# Patient Record
Sex: FEMALE | Race: WHITE | Employment: OTHER | URBAN - METROPOLITAN AREA
[De-identification: names, ages, dates, MRNs, and addresses within clinical notes are randomized per-mention and may not be internally consistent; named-entity substitution may affect disease eponyms.]

---

## 2018-08-24 ENCOUNTER — TRANSFERRED RECORDS (OUTPATIENT)
Dept: MULTI SPECIALTY CLINIC | Facility: CLINIC | Age: 83
End: 2018-08-24

## 2018-08-24 LAB
ALBUMIN SERPL-MCNC: 4.7 G/DL (ref 3.2–4.6)
ALP SERPL-CCNC: 55 IU/L (ref 50–136)
ALT SERPL-CCNC: 16 IU/L (ref 8–45)
ANION GAP SERPL CALCULATED.3IONS-SCNC: 9 MMOL/L (ref 5–18)
AST SERPL-CCNC: 20 IU/L (ref 2–40)
BILIRUB SERPL-MCNC: 0.6 MG/DL (ref 0.2–1.2)
BILIRUBIN DIRECT: 0.3 MG/DL (ref 0.1–0.5)
BUN SERPL-MCNC: 15 MG/DL (ref 8–25)
CALCIUM SERPL-MCNC: 9.9 MG/DL (ref 8.5–10.5)
CHLORIDE SERPLBLD-SCNC: 97 MMOL/L (ref 98–110)
CHOLEST SERPL-MCNC: 179 MG/DL (ref 100–199)
CO2 SERPL-SCNC: 29 MMOL/L (ref 21–31)
CREAT SERPL-MCNC: 0.78 MG/DL (ref 0.57–1.11)
DIFFERENTIAL: NORMAL
ERYTHROCYTE [DISTWIDTH] IN BLOOD BY AUTOMATED COUNT: 13.2 % (ref 11.5–15.5)
GFR SERPL CREATININE-BSD FRML MDRD: >60 ML/MIN/1.73M2
GLUCOSE SERPL-MCNC: 93 MG/DL (ref 65–100)
HCT VFR BLD AUTO: 44.5 % (ref 33–51)
HDLC SERPL-MCNC: 58 MG/DL
HEMOGLOBIN: 14.9 G/DL (ref 12–16)
LDLC SERPL CALC-MCNC: 96 MG/DL
MCH RBC QN AUTO: 31.8 PG (ref 26–34)
MCHC RBC AUTO-ENTMCNC: 33.5 G/DL (ref 32–36)
MCV RBC AUTO: 95 FL (ref 80–100)
NONHDLC SERPL-MCNC: 121 MG/DL
PLATELET # BLD AUTO: 225 THOU/CU MM (ref 140–440)
POTASSIUM SERPL-SCNC: 5.3 MMOL/L (ref 3.5–5)
PROT SERPL-MCNC: 7.4 G/DL (ref 6–8)
RBC # BLD AUTO: 4.69 MIL/CU MM (ref 4–5.2)
SODIUM SERPL-SCNC: 135 MMOL/L (ref 135–145)
TRIGL SERPL-MCNC: 126 MG/DL
TSH SERPL-ACNC: 1.02 UIU/ML (ref 0.35–4.94)
WBC # BLD AUTO: 7 THOU/CU MM (ref 4.5–11)

## 2019-01-01 ENCOUNTER — APPOINTMENT (OUTPATIENT)
Dept: OCCUPATIONAL THERAPY | Facility: CLINIC | Age: 84
DRG: 065 | End: 2019-01-01
Attending: PSYCHIATRY & NEUROLOGY
Payer: COMMERCIAL

## 2019-01-01 ENCOUNTER — APPOINTMENT (OUTPATIENT)
Dept: CT IMAGING | Facility: CLINIC | Age: 84
DRG: 065 | End: 2019-01-01
Attending: PSYCHIATRY & NEUROLOGY
Payer: COMMERCIAL

## 2019-01-01 ENCOUNTER — HOSPITAL ENCOUNTER (EMERGENCY)
Facility: CLINIC | Age: 84
Discharge: HOME | End: 2019-05-26
Attending: EMERGENCY MEDICINE | Admitting: EMERGENCY MEDICINE
Payer: COMMERCIAL

## 2019-01-01 ENCOUNTER — HOSPITAL LABORATORY (OUTPATIENT)
Dept: OTHER | Facility: CLINIC | Age: 84
End: 2019-01-01

## 2019-01-01 ENCOUNTER — NURSING HOME VISIT (OUTPATIENT)
Dept: GERIATRICS | Facility: CLINIC | Age: 84
End: 2019-01-01
Payer: COMMERCIAL

## 2019-01-01 ENCOUNTER — APPOINTMENT (OUTPATIENT)
Dept: PHYSICAL THERAPY | Facility: CLINIC | Age: 84
DRG: 643 | End: 2019-01-01
Payer: COMMERCIAL

## 2019-01-01 ENCOUNTER — APPOINTMENT (OUTPATIENT)
Dept: CT IMAGING | Facility: CLINIC | Age: 84
End: 2019-01-01
Attending: EMERGENCY MEDICINE
Payer: COMMERCIAL

## 2019-01-01 ENCOUNTER — APPOINTMENT (OUTPATIENT)
Dept: CT IMAGING | Facility: CLINIC | Age: 84
DRG: 065 | End: 2019-01-01
Attending: STUDENT IN AN ORGANIZED HEALTH CARE EDUCATION/TRAINING PROGRAM
Payer: COMMERCIAL

## 2019-01-01 ENCOUNTER — DOCUMENTATION ONLY (OUTPATIENT)
Dept: ONCOLOGY | Facility: CLINIC | Age: 84
End: 2019-01-01

## 2019-01-01 ENCOUNTER — APPOINTMENT (OUTPATIENT)
Dept: CT IMAGING | Facility: CLINIC | Age: 84
End: 2019-01-01
Attending: INTERNAL MEDICINE
Payer: COMMERCIAL

## 2019-01-01 ENCOUNTER — APPOINTMENT (OUTPATIENT)
Dept: SPEECH THERAPY | Facility: CLINIC | Age: 84
DRG: 643 | End: 2019-01-01
Payer: COMMERCIAL

## 2019-01-01 ENCOUNTER — APPOINTMENT (OUTPATIENT)
Dept: PHYSICAL THERAPY | Facility: CLINIC | Age: 84
DRG: 065 | End: 2019-01-01
Attending: PSYCHIATRY & NEUROLOGY
Payer: COMMERCIAL

## 2019-01-01 ENCOUNTER — TELEPHONE (OUTPATIENT)
Dept: GERIATRICS | Facility: CLINIC | Age: 84
End: 2019-01-01

## 2019-01-01 ENCOUNTER — DOCUMENTATION ONLY (OUTPATIENT)
Dept: CARE COORDINATION | Facility: CLINIC | Age: 84
End: 2019-01-01

## 2019-01-01 ENCOUNTER — APPOINTMENT (OUTPATIENT)
Dept: PHYSICAL THERAPY | Facility: CLINIC | Age: 84
DRG: 643 | End: 2019-01-01
Attending: PHYSICIAN ASSISTANT
Payer: COMMERCIAL

## 2019-01-01 ENCOUNTER — APPOINTMENT (OUTPATIENT)
Dept: OCCUPATIONAL THERAPY | Facility: CLINIC | Age: 84
DRG: 643 | End: 2019-01-01
Payer: COMMERCIAL

## 2019-01-01 ENCOUNTER — APPOINTMENT (OUTPATIENT)
Dept: SPEECH THERAPY | Facility: CLINIC | Age: 84
DRG: 065 | End: 2019-01-01
Attending: PSYCHIATRY & NEUROLOGY
Payer: COMMERCIAL

## 2019-01-01 ENCOUNTER — HOSPITAL ENCOUNTER (INPATIENT)
Facility: CLINIC | Age: 84
LOS: 8 days | Discharge: NURSING, REHAB OR LONG-TERM CARE | DRG: 065 | End: 2019-05-25
Attending: PSYCHIATRY & NEUROLOGY | Admitting: PSYCHIATRY & NEUROLOGY
Payer: COMMERCIAL

## 2019-01-01 ENCOUNTER — TRANSFERRED RECORDS (OUTPATIENT)
Dept: MULTI SPECIALTY CLINIC | Facility: CLINIC | Age: 84
End: 2019-01-01

## 2019-01-01 ENCOUNTER — INFUSION THERAPY VISIT (OUTPATIENT)
Dept: ONCOLOGY | Facility: CLINIC | Age: 84
DRG: 643 | End: 2019-01-01
Attending: INTERNAL MEDICINE
Payer: COMMERCIAL

## 2019-01-01 ENCOUNTER — HOSPITAL ENCOUNTER (INPATIENT)
Facility: CLINIC | Age: 84
LOS: 7 days | Discharge: NURSING, REHAB OR LONG-TERM CARE | DRG: 643 | End: 2019-06-17
Attending: EMERGENCY MEDICINE | Admitting: INTERNAL MEDICINE
Payer: COMMERCIAL

## 2019-01-01 ENCOUNTER — APPOINTMENT (OUTPATIENT)
Dept: CT IMAGING | Facility: CLINIC | Age: 84
DRG: 643 | End: 2019-01-01
Attending: EMERGENCY MEDICINE
Payer: COMMERCIAL

## 2019-01-01 ENCOUNTER — APPOINTMENT (OUTPATIENT)
Dept: LAB | Facility: CLINIC | Age: 84
DRG: 643 | End: 2019-01-01
Attending: INTERNAL MEDICINE
Payer: COMMERCIAL

## 2019-01-01 ENCOUNTER — TELEPHONE (OUTPATIENT)
Dept: HEMATOLOGY | Facility: CLINIC | Age: 84
End: 2019-01-01

## 2019-01-01 ENCOUNTER — APPOINTMENT (OUTPATIENT)
Dept: SPEECH THERAPY | Facility: CLINIC | Age: 84
DRG: 643 | End: 2019-01-01
Attending: PHYSICIAN ASSISTANT
Payer: COMMERCIAL

## 2019-01-01 ENCOUNTER — RECORDS - HEALTHEAST (OUTPATIENT)
Dept: LAB | Facility: CLINIC | Age: 84
End: 2019-01-01

## 2019-01-01 ENCOUNTER — AMBULATORY - HEALTHEAST (OUTPATIENT)
Dept: OTHER | Facility: CLINIC | Age: 84
End: 2019-01-01

## 2019-01-01 ENCOUNTER — DOCUMENTATION ONLY (OUTPATIENT)
Dept: OTHER | Facility: CLINIC | Age: 84
End: 2019-01-01

## 2019-01-01 ENCOUNTER — ALLIED HEALTH/NURSE VISIT (OUTPATIENT)
Dept: HEMATOLOGY | Facility: CLINIC | Age: 84
End: 2019-01-01
Payer: COMMERCIAL

## 2019-01-01 ENCOUNTER — HOSPITAL ENCOUNTER (EMERGENCY)
Facility: CLINIC | Age: 84
Discharge: OTHER INSTITUTION | End: 2019-05-17
Attending: EMERGENCY MEDICINE | Admitting: EMERGENCY MEDICINE
Payer: COMMERCIAL

## 2019-01-01 ENCOUNTER — HOSPITAL ENCOUNTER (EMERGENCY)
Facility: CLINIC | Age: 84
Discharge: HOME | End: 2019-08-31
Attending: INTERNAL MEDICINE | Admitting: INTERNAL MEDICINE
Payer: COMMERCIAL

## 2019-01-01 ENCOUNTER — OFFICE VISIT (OUTPATIENT)
Dept: HEMATOLOGY | Facility: CLINIC | Age: 84
End: 2019-01-01
Attending: PHYSICIAN ASSISTANT
Payer: COMMERCIAL

## 2019-01-01 ENCOUNTER — INFUSION THERAPY VISIT (OUTPATIENT)
Dept: ONCOLOGY | Facility: CLINIC | Age: 84
End: 2019-01-01
Attending: INTERNAL MEDICINE
Payer: COMMERCIAL

## 2019-01-01 ENCOUNTER — APPOINTMENT (OUTPATIENT)
Dept: OCCUPATIONAL THERAPY | Facility: CLINIC | Age: 84
DRG: 643 | End: 2019-01-01
Attending: PHYSICIAN ASSISTANT
Payer: COMMERCIAL

## 2019-01-01 ENCOUNTER — APPOINTMENT (OUTPATIENT)
Dept: GENERAL RADIOLOGY | Facility: CLINIC | Age: 84
End: 2019-01-01
Attending: INTERNAL MEDICINE
Payer: COMMERCIAL

## 2019-01-01 VITALS
SYSTOLIC BLOOD PRESSURE: 149 MMHG | DIASTOLIC BLOOD PRESSURE: 80 MMHG | HEIGHT: 65 IN | WEIGHT: 92.8 LBS | RESPIRATION RATE: 16 BRPM | HEART RATE: 67 BPM | OXYGEN SATURATION: 93 % | TEMPERATURE: 98.2 F | BODY MASS INDEX: 15.46 KG/M2

## 2019-01-01 VITALS
WEIGHT: 107 LBS | HEART RATE: 48 BPM | TEMPERATURE: 98.2 F | SYSTOLIC BLOOD PRESSURE: 147 MMHG | DIASTOLIC BLOOD PRESSURE: 65 MMHG | BODY MASS INDEX: 20.9 KG/M2

## 2019-01-01 VITALS
BODY MASS INDEX: 15.83 KG/M2 | OXYGEN SATURATION: 97 % | DIASTOLIC BLOOD PRESSURE: 85 MMHG | SYSTOLIC BLOOD PRESSURE: 140 MMHG | TEMPERATURE: 97.7 F | WEIGHT: 95 LBS | RESPIRATION RATE: 12 BRPM | HEART RATE: 79 BPM | HEIGHT: 65 IN

## 2019-01-01 VITALS
SYSTOLIC BLOOD PRESSURE: 156 MMHG | OXYGEN SATURATION: 93 % | TEMPERATURE: 97.9 F | RESPIRATION RATE: 18 BRPM | DIASTOLIC BLOOD PRESSURE: 82 MMHG | HEART RATE: 54 BPM

## 2019-01-01 VITALS
HEART RATE: 64 BPM | BODY MASS INDEX: 15.78 KG/M2 | TEMPERATURE: 97.5 F | HEIGHT: 65 IN | WEIGHT: 94.7 LBS | RESPIRATION RATE: 18 BRPM | SYSTOLIC BLOOD PRESSURE: 146 MMHG | OXYGEN SATURATION: 93 % | DIASTOLIC BLOOD PRESSURE: 76 MMHG

## 2019-01-01 VITALS
HEART RATE: 65 BPM | DIASTOLIC BLOOD PRESSURE: 62 MMHG | WEIGHT: 106.5 LBS | BODY MASS INDEX: 20.8 KG/M2 | RESPIRATION RATE: 14 BRPM | SYSTOLIC BLOOD PRESSURE: 127 MMHG | OXYGEN SATURATION: 91 % | TEMPERATURE: 97.7 F

## 2019-01-01 VITALS
HEART RATE: 69 BPM | WEIGHT: 107.58 LBS | TEMPERATURE: 97.3 F | BODY MASS INDEX: 21.12 KG/M2 | OXYGEN SATURATION: 96 % | RESPIRATION RATE: 16 BRPM | HEIGHT: 60 IN | SYSTOLIC BLOOD PRESSURE: 111 MMHG | DIASTOLIC BLOOD PRESSURE: 67 MMHG

## 2019-01-01 VITALS
SYSTOLIC BLOOD PRESSURE: 140 MMHG | HEART RATE: 83 BPM | DIASTOLIC BLOOD PRESSURE: 80 MMHG | TEMPERATURE: 94.5 F | RESPIRATION RATE: 18 BRPM | OXYGEN SATURATION: 100 %

## 2019-01-01 VITALS
HEART RATE: 76 BPM | TEMPERATURE: 97.8 F | WEIGHT: 91.8 LBS | RESPIRATION RATE: 18 BRPM | SYSTOLIC BLOOD PRESSURE: 149 MMHG | OXYGEN SATURATION: 94 % | DIASTOLIC BLOOD PRESSURE: 79 MMHG | HEIGHT: 65 IN | BODY MASS INDEX: 15.29 KG/M2

## 2019-01-01 VITALS
TEMPERATURE: 97.9 F | HEART RATE: 85 BPM | BODY MASS INDEX: 15.23 KG/M2 | DIASTOLIC BLOOD PRESSURE: 72 MMHG | SYSTOLIC BLOOD PRESSURE: 142 MMHG | WEIGHT: 91.4 LBS | HEIGHT: 65 IN | OXYGEN SATURATION: 93 % | RESPIRATION RATE: 16 BRPM

## 2019-01-01 VITALS
TEMPERATURE: 96.7 F | DIASTOLIC BLOOD PRESSURE: 66 MMHG | WEIGHT: 97.6 LBS | BODY MASS INDEX: 16.66 KG/M2 | OXYGEN SATURATION: 90 % | HEART RATE: 55 BPM | RESPIRATION RATE: 16 BRPM | HEIGHT: 64 IN | SYSTOLIC BLOOD PRESSURE: 152 MMHG

## 2019-01-01 VITALS
SYSTOLIC BLOOD PRESSURE: 146 MMHG | DIASTOLIC BLOOD PRESSURE: 86 MMHG | HEIGHT: 64 IN | RESPIRATION RATE: 18 BRPM | OXYGEN SATURATION: 93 % | TEMPERATURE: 97.6 F | BODY MASS INDEX: 16.53 KG/M2 | HEART RATE: 67 BPM | WEIGHT: 96.8 LBS

## 2019-01-01 VITALS
WEIGHT: 97.1 LBS | BODY MASS INDEX: 19.06 KG/M2 | TEMPERATURE: 97 F | HEART RATE: 72 BPM | DIASTOLIC BLOOD PRESSURE: 88 MMHG | HEIGHT: 60 IN | SYSTOLIC BLOOD PRESSURE: 143 MMHG | RESPIRATION RATE: 20 BRPM | OXYGEN SATURATION: 98 %

## 2019-01-01 DIAGNOSIS — M54.9 BACK PAIN, UNSPECIFIED BACK LOCATION, UNSPECIFIED BACK PAIN LATERALITY, UNSPECIFIED CHRONICITY: ICD-10-CM

## 2019-01-01 DIAGNOSIS — D68.4 ACQUIRED COAGULATION FACTOR DEFICIENCY (H): ICD-10-CM

## 2019-01-01 DIAGNOSIS — R41.89 COGNITIVE IMPAIRMENT: ICD-10-CM

## 2019-01-01 DIAGNOSIS — F41.9 ANXIETY: ICD-10-CM

## 2019-01-01 DIAGNOSIS — I10 HYPERTENSION: ICD-10-CM

## 2019-01-01 DIAGNOSIS — M19.90 OSTEOARTHRITIS: ICD-10-CM

## 2019-01-01 DIAGNOSIS — R42 VERTIGO: ICD-10-CM

## 2019-01-01 DIAGNOSIS — Z86.73 HISTORY OF STROKE: ICD-10-CM

## 2019-01-01 DIAGNOSIS — D66 HEMOPHILIA A (H): Primary | ICD-10-CM

## 2019-01-01 DIAGNOSIS — D68.311 ACQUIRED HEMOPHILIA A (H): ICD-10-CM

## 2019-01-01 DIAGNOSIS — E22.2 SIADH (SYNDROME OF INAPPROPRIATE ADH PRODUCTION) (H): ICD-10-CM

## 2019-01-01 DIAGNOSIS — D68.311 ACQUIRED HEMOPHILIA (H): ICD-10-CM

## 2019-01-01 DIAGNOSIS — E87.1 HYPONATREMIA: ICD-10-CM

## 2019-01-01 DIAGNOSIS — G47.00 INSOMNIA, UNSPECIFIED TYPE: ICD-10-CM

## 2019-01-01 DIAGNOSIS — S00.03XA HEMATOMA OF SCALP, INITIAL ENCOUNTER: ICD-10-CM

## 2019-01-01 DIAGNOSIS — K21.9 GASTROESOPHAGEAL REFLUX DISEASE, ESOPHAGITIS PRESENCE NOT SPECIFIED: ICD-10-CM

## 2019-01-01 DIAGNOSIS — D68.311 ACQUIRED HEMOPHILIA (H): Primary | ICD-10-CM

## 2019-01-01 DIAGNOSIS — I62.9 INTRACRANIAL HEMORRHAGE (H): ICD-10-CM

## 2019-01-01 DIAGNOSIS — R13.10 DYSPHAGIA, UNSPECIFIED TYPE: ICD-10-CM

## 2019-01-01 DIAGNOSIS — R30.0 DYSURIA: ICD-10-CM

## 2019-01-01 DIAGNOSIS — I10 ESSENTIAL HYPERTENSION, BENIGN: ICD-10-CM

## 2019-01-01 DIAGNOSIS — I16.1 HYPERTENSIVE EMERGENCY: ICD-10-CM

## 2019-01-01 DIAGNOSIS — E78.5 HYPERLIPIDEMIA LDL GOAL <100: ICD-10-CM

## 2019-01-01 DIAGNOSIS — E56.9 VITAMIN DEFICIENCY: ICD-10-CM

## 2019-01-01 DIAGNOSIS — R05.3 CHRONIC COUGH: Primary | ICD-10-CM

## 2019-01-01 DIAGNOSIS — Z71.89 ADVANCE CARE PLANNING: ICD-10-CM

## 2019-01-01 DIAGNOSIS — R00.1 BRADYCARDIA: ICD-10-CM

## 2019-01-01 DIAGNOSIS — D68.4 ACQUIRED COAGULATION FACTOR DEFICIENCY (H): Primary | ICD-10-CM

## 2019-01-01 DIAGNOSIS — M19.90 OSTEOARTHRITIS, UNSPECIFIED OSTEOARTHRITIS TYPE, UNSPECIFIED SITE: ICD-10-CM
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
## 2019-01-01 DIAGNOSIS — F32.A DEPRESSION, UNSPECIFIED DEPRESSION TYPE: Primary | ICD-10-CM

## 2019-01-01 DIAGNOSIS — E78.5 HYPERLIPIDEMIA, UNSPECIFIED HYPERLIPIDEMIA TYPE: ICD-10-CM

## 2019-01-01 DIAGNOSIS — J98.8 CONGESTION OF UPPER AIRWAY: ICD-10-CM

## 2019-01-01 DIAGNOSIS — I10 ESSENTIAL HYPERTENSION: ICD-10-CM

## 2019-01-01 DIAGNOSIS — I10 ESSENTIAL HYPERTENSION, BENIGN: Primary | ICD-10-CM

## 2019-01-01 DIAGNOSIS — E22.2 SIADH (SYNDROME OF INAPPROPRIATE ADH PRODUCTION) (H): Primary | ICD-10-CM

## 2019-01-01 DIAGNOSIS — I10 BENIGN ESSENTIAL HYPERTENSION: Primary | ICD-10-CM

## 2019-01-01 DIAGNOSIS — E46 PROTEIN-CALORIE MALNUTRITION, UNSPECIFIED SEVERITY (H): ICD-10-CM

## 2019-01-01 DIAGNOSIS — E78.5 HYPERLIPIDEMIA: ICD-10-CM

## 2019-01-01 DIAGNOSIS — D68.311 ACQUIRED HEMOPHILIA A (H): Primary | ICD-10-CM

## 2019-01-01 DIAGNOSIS — D66 HEMOPHILIA A (H): ICD-10-CM

## 2019-01-01 DIAGNOSIS — M81.0 SENILE OSTEOPOROSIS: ICD-10-CM

## 2019-01-01 LAB
25(OH)D3 SERPL-MCNC: 29.2 NG/ML (ref 30–80)
ABO + RH BLD: NORMAL
ALBUMIN SERPL-MCNC: 2.8 G/DL (ref 3.4–5)
ALBUMIN SERPL-MCNC: 3 G/DL (ref 3.4–5)
ALBUMIN SERPL-MCNC: 3.3 G/DL (ref 3.4–5)
ALBUMIN SERPL-MCNC: 3.3 G/DL (ref 3.4–5)
ALBUMIN SERPL-MCNC: 3.4 G/DL (ref 3.4–5)
ALBUMIN SERPL-MCNC: 3.4 G/DL (ref 3.4–5)
ALBUMIN SERPL-MCNC: 4.1 G/DL (ref 3.4–5)
ALBUMIN UR-MCNC: 10 MG/DL
ALBUMIN UR-MCNC: NEGATIVE MG/DL
ALP SERPL-CCNC: 41 U/L (ref 40–150)
ALP SERPL-CCNC: 57 U/L (ref 40–150)
ALP SERPL-CCNC: 65 U/L (ref 40–150)
ALP SERPL-CCNC: 66 U/L (ref 40–150)
ALP SERPL-CCNC: 78 U/L (ref 40–150)
ALP SERPL-CCNC: 81 U/L (ref 40–150)
ALP SERPL-CCNC: 86 U/L (ref 40–150)
ALT SERPL W P-5'-P-CCNC: 22 U/L (ref 0–50)
ALT SERPL W P-5'-P-CCNC: 22 U/L (ref 0–50)
ALT SERPL W P-5'-P-CCNC: 24 U/L (ref 0–50)
ALT SERPL W P-5'-P-CCNC: 25 U/L (ref 0–50)
ALT SERPL W P-5'-P-CCNC: 37 U/L (ref 0–50)
ALT SERPL W P-5'-P-CCNC: 41 U/L (ref 0–50)
ALT SERPL W P-5'-P-CCNC: 46 U/L (ref 0–50)
AMORPH CRY #/AREA URNS HPF: ABNORMAL /HPF
ANION GAP SERPL CALCULATED.3IONS-SCNC: 11 MMOL/L (ref 3–14)
ANION GAP SERPL CALCULATED.3IONS-SCNC: 4 MMOL/L (ref 3–14)
ANION GAP SERPL CALCULATED.3IONS-SCNC: 5 MMOL/L (ref 3–14)
ANION GAP SERPL CALCULATED.3IONS-SCNC: 6 MMOL/L (ref 3–14)
ANION GAP SERPL CALCULATED.3IONS-SCNC: 7 MMOL/L (ref 3–14)
ANION GAP SERPL CALCULATED.3IONS-SCNC: 8 MMOL/L (ref 3–14)
ANION GAP SERPL CALCULATED.3IONS-SCNC: 8 MMOL/L (ref 5–18)
ANION GAP SERPL CALCULATED.3IONS-SCNC: 8 MMOL/L (ref 5–18)
ANION GAP SERPL CALCULATED.3IONS-SCNC: 9 MMOL/L (ref 3–14)
APPEARANCE UR: ABNORMAL
APPEARANCE UR: CLEAR
APTT PPP: 26 SEC (ref 22–37)
APTT PPP: 28 SEC (ref 22–37)
APTT PPP: 30 SEC (ref 22–37)
APTT PPP: 31 SEC (ref 22–37)
APTT PPP: 32 SEC (ref 22–37)
APTT PPP: 43 SEC (ref 22–37)
APTT PPP: 54 SEC (ref 22–37)
AST SERPL W P-5'-P-CCNC: 12 U/L (ref 0–45)
AST SERPL W P-5'-P-CCNC: 17 U/L (ref 0–45)
AST SERPL W P-5'-P-CCNC: 18 U/L (ref 0–45)
AST SERPL W P-5'-P-CCNC: 18 U/L (ref 0–45)
AST SERPL W P-5'-P-CCNC: 24 U/L (ref 0–45)
AST SERPL W P-5'-P-CCNC: 33 U/L (ref 0–45)
AST SERPL W P-5'-P-CCNC: 38 U/L (ref 0–45)
BACTERIA #/AREA URNS HPF: ABNORMAL /HPF
BACTERIA SPEC CULT: NORMAL
BACTERIA SPEC CULT: NORMAL
BASOPHILS # BLD AUTO: 0 10E9/L (ref 0–0.2)
BASOPHILS # BLD AUTO: 0 THOU/UL (ref 0–0.2)
BASOPHILS NFR BLD AUTO: 0 % (ref 0–2)
BASOPHILS NFR BLD AUTO: 0.1 %
BASOPHILS NFR BLD AUTO: 0.2 %
BASOPHILS NFR BLD AUTO: 0.4 %
BILIRUB DIRECT SERPL-MCNC: 0.1 MG/DL (ref 0–0.2)
BILIRUB DIRECT SERPL-MCNC: <0.1 MG/DL (ref 0–0.2)
BILIRUB SERPL-MCNC: 0.4 MG/DL (ref 0.2–1.3)
BILIRUB SERPL-MCNC: 0.4 MG/DL (ref 0.2–1.3)
BILIRUB SERPL-MCNC: 0.5 MG/DL (ref 0.2–1.3)
BILIRUB SERPL-MCNC: 0.5 MG/DL (ref 0.2–1.3)
BILIRUB SERPL-MCNC: 0.6 MG/DL (ref 0.2–1.3)
BILIRUB SERPL-MCNC: 0.8 MG/DL (ref 0.2–1.3)
BILIRUB SERPL-MCNC: 0.9 MG/DL (ref 0.2–1.3)
BILIRUB UR QL STRIP: NEGATIVE
BLD GP AB SCN SERPL QL: NORMAL
BLD GP AB SCN SERPL QL: NORMAL
BLOOD BANK CMNT PATIENT-IMP: NORMAL
BLOOD BANK CMNT PATIENT-IMP: NORMAL
BUN SERPL-MCNC: 11 MG/DL (ref 7–30)
BUN SERPL-MCNC: 13 MG/DL (ref 7–30)
BUN SERPL-MCNC: 13 MG/DL (ref 7–30)
BUN SERPL-MCNC: 14 MG/DL (ref 7–30)
BUN SERPL-MCNC: 15 MG/DL (ref 7–30)
BUN SERPL-MCNC: 16 MG/DL (ref 7–30)
BUN SERPL-MCNC: 17 MG/DL (ref 7–30)
BUN SERPL-MCNC: 18 MG/DL (ref 7–30)
BUN SERPL-MCNC: 19 MG/DL (ref 7–30)
BUN SERPL-MCNC: 19 MG/DL (ref 7–30)
BUN SERPL-MCNC: 20 MG/DL (ref 8–28)
BUN SERPL-MCNC: 20 MG/DL (ref 8–28)
BUN SERPL-MCNC: 22 MG/DL (ref 7–30)
BUN SERPL-MCNC: 23 MG/DL (ref 7–30)
BUN SERPL-MCNC: 28 MG/DL (ref 7–30)
BUN SERPL-MCNC: 6 MG/DL (ref 7–30)
BUN SERPL-MCNC: 7 MG/DL (ref 7–30)
BUN SERPL-MCNC: 8 MG/DL (ref 7–30)
BUN SERPL-MCNC: 8 MG/DL (ref 7–30)
CALCIUM SERPL-MCNC: 8.3 MG/DL (ref 8.5–10.1)
CALCIUM SERPL-MCNC: 8.4 MG/DL (ref 8.5–10.1)
CALCIUM SERPL-MCNC: 8.5 MG/DL (ref 8.5–10.1)
CALCIUM SERPL-MCNC: 8.5 MG/DL (ref 8.5–10.1)
CALCIUM SERPL-MCNC: 8.6 MG/DL (ref 8.5–10.1)
CALCIUM SERPL-MCNC: 8.6 MG/DL (ref 8.5–10.1)
CALCIUM SERPL-MCNC: 8.7 MG/DL (ref 8.5–10.1)
CALCIUM SERPL-MCNC: 8.8 MG/DL (ref 8.5–10.1)
CALCIUM SERPL-MCNC: 8.9 MG/DL (ref 8.5–10.1)
CALCIUM SERPL-MCNC: 9 MG/DL (ref 8.5–10.1)
CALCIUM SERPL-MCNC: 9.1 MG/DL (ref 8.5–10.1)
CALCIUM SERPL-MCNC: 9.1 MG/DL (ref 8.5–10.1)
CALCIUM SERPL-MCNC: 9.2 MG/DL (ref 8.5–10.1)
CALCIUM SERPL-MCNC: 9.2 MG/DL (ref 8.5–10.5)
CALCIUM SERPL-MCNC: 9.2 MG/DL (ref 8.5–10.5)
CALCIUM SERPL-MCNC: 9.3 MG/DL (ref 8.5–10.1)
CALCIUM SERPL-MCNC: 9.4 MG/DL (ref 8.5–10.1)
CHLORIDE BLD-SCNC: 98 MMOL/L (ref 98–107)
CHLORIDE SERPL-SCNC: 101 MMOL/L (ref 94–109)
CHLORIDE SERPL-SCNC: 101 MMOL/L (ref 94–109)
CHLORIDE SERPL-SCNC: 103 MMOL/L (ref 94–109)
CHLORIDE SERPL-SCNC: 105 MMOL/L (ref 94–109)
CHLORIDE SERPL-SCNC: 105 MMOL/L (ref 94–109)
CHLORIDE SERPL-SCNC: 106 MMOL/L (ref 94–109)
CHLORIDE SERPL-SCNC: 110 MMOL/L (ref 94–109)
CHLORIDE SERPL-SCNC: 90 MMOL/L (ref 94–109)
CHLORIDE SERPL-SCNC: 90 MMOL/L (ref 94–109)
CHLORIDE SERPL-SCNC: 91 MMOL/L (ref 94–109)
CHLORIDE SERPL-SCNC: 92 MMOL/L (ref 94–109)
CHLORIDE SERPL-SCNC: 93 MMOL/L (ref 94–109)
CHLORIDE SERPL-SCNC: 93 MMOL/L (ref 94–109)
CHLORIDE SERPL-SCNC: 94 MMOL/L (ref 94–109)
CHLORIDE SERPL-SCNC: 95 MMOL/L (ref 94–109)
CHLORIDE SERPL-SCNC: 95 MMOL/L (ref 94–109)
CHLORIDE SERPL-SCNC: 96 MMOL/L (ref 94–109)
CHLORIDE SERPL-SCNC: 97 MMOL/L (ref 94–109)
CHLORIDE SERPL-SCNC: 98 MMOL/L (ref 94–109)
CHLORIDE SERPL-SCNC: 98 MMOL/L (ref 94–109)
CHLORIDE SERPLBLD-SCNC: 98 MMOL/L (ref 98–107)
CHOLEST SERPL-MCNC: 127 MG/DL
CO2 SERPL-SCNC: 22 MMOL/L (ref 20–32)
CO2 SERPL-SCNC: 23 MMOL/L (ref 20–32)
CO2 SERPL-SCNC: 24 MMOL/L (ref 20–32)
CO2 SERPL-SCNC: 24 MMOL/L (ref 20–32)
CO2 SERPL-SCNC: 26 MMOL/L (ref 20–32)
CO2 SERPL-SCNC: 27 MMOL/L (ref 20–32)
CO2 SERPL-SCNC: 28 MMOL/L (ref 20–32)
CO2 SERPL-SCNC: 29 MMOL/L (ref 20–32)
CO2 SERPL-SCNC: 29 MMOL/L (ref 22–31)
CO2 SERPL-SCNC: 29 MMOL/L (ref 22–31)
CO2 SERPL-SCNC: 30 MMOL/L (ref 20–32)
CO2 SERPL-SCNC: 31 MMOL/L (ref 20–32)
CO2 SERPL-SCNC: 31 MMOL/L (ref 20–32)
CO2 SERPL-SCNC: 32 MMOL/L (ref 20–32)
COLOR UR AUTO: ABNORMAL
COLOR UR AUTO: ABNORMAL
COLOR UR AUTO: NORMAL
COLOR UR AUTO: YELLOW
CREAT SERPL-MCNC: 0.41 MG/DL (ref 0.52–1.04)
CREAT SERPL-MCNC: 0.42 MG/DL (ref 0.52–1.04)
CREAT SERPL-MCNC: 0.45 MG/DL (ref 0.52–1.04)
CREAT SERPL-MCNC: 0.47 MG/DL (ref 0.52–1.04)
CREAT SERPL-MCNC: 0.48 MG/DL (ref 0.52–1.04)
CREAT SERPL-MCNC: 0.48 MG/DL (ref 0.52–1.04)
CREAT SERPL-MCNC: 0.49 MG/DL (ref 0.52–1.04)
CREAT SERPL-MCNC: 0.5 MG/DL (ref 0.52–1.04)
CREAT SERPL-MCNC: 0.5 MG/DL (ref 0.52–1.04)
CREAT SERPL-MCNC: 0.51 MG/DL (ref 0.52–1.04)
CREAT SERPL-MCNC: 0.51 MG/DL (ref 0.52–1.04)
CREAT SERPL-MCNC: 0.52 MG/DL (ref 0.52–1.04)
CREAT SERPL-MCNC: 0.53 MG/DL (ref 0.52–1.04)
CREAT SERPL-MCNC: 0.54 MG/DL (ref 0.52–1.04)
CREAT SERPL-MCNC: 0.55 MG/DL (ref 0.52–1.04)
CREAT SERPL-MCNC: 0.57 MG/DL (ref 0.52–1.04)
CREAT SERPL-MCNC: 0.58 MG/DL (ref 0.52–1.04)
CREAT SERPL-MCNC: 0.59 MG/DL (ref 0.52–1.04)
CREAT SERPL-MCNC: 0.59 MG/DL (ref 0.52–1.04)
CREAT SERPL-MCNC: 0.61 MG/DL (ref 0.52–1.04)
CREAT SERPL-MCNC: 0.62 MG/DL (ref 0.52–1.04)
CREAT SERPL-MCNC: 0.66 MG/DL (ref 0.52–1.04)
CREAT SERPL-MCNC: 0.69 MG/DL (ref 0.52–1.04)
CREAT SERPL-MCNC: 0.69 MG/DL (ref 0.6–1.1)
CREAT SERPL-MCNC: 0.69 MG/DL (ref 0.6–1.1)
CREAT SERPL-MCNC: 0.71 MG/DL (ref 0.52–1.04)
CREAT SERPL-MCNC: 0.72 MG/DL (ref 0.52–1.04)
CREAT SERPL-MCNC: 0.82 MG/DL (ref 0.52–1.04)
CREAT UR-MCNC: 21 MG/DL
CREAT UR-MCNC: 42 MG/DL
DEPRECATED CALCIDIOL+CALCIFEROL SERPL-MC: 37 UG/L (ref 20–75)
DIFFERENTIAL METHOD BLD: ABNORMAL
DIFFERENTIAL METHOD BLD: NORMAL
DIFFERENTIAL: NORMAL
EOSINOPHIL # BLD AUTO: 0 10E9/L (ref 0–0.7)
EOSINOPHIL # BLD AUTO: 0.1 10E9/L (ref 0–0.7)
EOSINOPHIL # BLD AUTO: 0.1 10E9/L (ref 0–0.7)
EOSINOPHIL # BLD AUTO: 0.1 THOU/UL (ref 0–0.4)
EOSINOPHIL # BLD AUTO: 0.2 10E9/L (ref 0–0.7)
EOSINOPHIL NFR BLD AUTO: 0 %
EOSINOPHIL NFR BLD AUTO: 0 %
EOSINOPHIL NFR BLD AUTO: 0.1 %
EOSINOPHIL NFR BLD AUTO: 0.4 %
EOSINOPHIL NFR BLD AUTO: 0.6 %
EOSINOPHIL NFR BLD AUTO: 1 % (ref 0–6)
EOSINOPHIL NFR BLD AUTO: 2.5 %
ERYTHROCYTE [DISTWIDTH] IN BLOOD BY AUTOMATED COUNT: 13 % (ref 10–15)
ERYTHROCYTE [DISTWIDTH] IN BLOOD BY AUTOMATED COUNT: 13 % (ref 10–15)
ERYTHROCYTE [DISTWIDTH] IN BLOOD BY AUTOMATED COUNT: 13.1 % (ref 10–15)
ERYTHROCYTE [DISTWIDTH] IN BLOOD BY AUTOMATED COUNT: 13.2 % (ref 10–15)
ERYTHROCYTE [DISTWIDTH] IN BLOOD BY AUTOMATED COUNT: 13.2 % (ref 10–15)
ERYTHROCYTE [DISTWIDTH] IN BLOOD BY AUTOMATED COUNT: 13.2 % (ref 11–14.5)
ERYTHROCYTE [DISTWIDTH] IN BLOOD BY AUTOMATED COUNT: 13.2 % (ref 11–14.5)
ERYTHROCYTE [DISTWIDTH] IN BLOOD BY AUTOMATED COUNT: 13.3 % (ref 10–15)
ERYTHROCYTE [DISTWIDTH] IN BLOOD BY AUTOMATED COUNT: 13.3 % (ref 10–15)
ERYTHROCYTE [DISTWIDTH] IN BLOOD BY AUTOMATED COUNT: 13.4 % (ref 10–15)
ERYTHROCYTE [DISTWIDTH] IN BLOOD BY AUTOMATED COUNT: 13.5 % (ref 10–15)
ERYTHROCYTE [DISTWIDTH] IN BLOOD BY AUTOMATED COUNT: 13.6 % (ref 10–15)
ERYTHROCYTE [DISTWIDTH] IN BLOOD BY AUTOMATED COUNT: 13.8 % (ref 10–15)
ERYTHROCYTE [DISTWIDTH] IN BLOOD BY AUTOMATED COUNT: 13.9 % (ref 10–15)
ERYTHROCYTE [DISTWIDTH] IN BLOOD BY AUTOMATED COUNT: 13.9 % (ref 10–15)
ERYTHROCYTE [DISTWIDTH] IN BLOOD BY AUTOMATED COUNT: 14 % (ref 10–15)
ERYTHROCYTE [DISTWIDTH] IN BLOOD BY AUTOMATED COUNT: 14.1 % (ref 10–15)
ERYTHROCYTE [DISTWIDTH] IN BLOOD BY AUTOMATED COUNT: 14.2 % (ref 10–15)
ERYTHROCYTE [DISTWIDTH] IN BLOOD BY AUTOMATED COUNT: 14.7 % (ref 10–15)
FACT VIII ACT/NOR PPP: 11 % (ref 55–200)
FACT VIII ACT/NOR PPP: 232 % (ref 55–200)
FACT VIII ACT/NOR PPP: 76 % (ref 55–200)
FACT VIII ACT/NOR PPP: 76 % (ref 55–200)
FACT VIII ACT/NOR PPP: 94 % (ref 55–200)
FACT VIII INHIB PPP-ACNC: 88.2 BU/ML
FACT VIII INHIB PPP-ACNC: NORMAL BU/ML
GFR SERPL CREATININE-BSD FRML MDRD: 65 ML/MIN/{1.73_M2}
GFR SERPL CREATININE-BSD FRML MDRD: 76 ML/MIN/{1.73_M2}
GFR SERPL CREATININE-BSD FRML MDRD: 76 ML/MIN/{1.73_M2}
GFR SERPL CREATININE-BSD FRML MDRD: 79 ML/MIN/{1.73_M2}
GFR SERPL CREATININE-BSD FRML MDRD: 80 ML/MIN/{1.73_M2}
GFR SERPL CREATININE-BSD FRML MDRD: 81 ML/MIN/{1.73_M2}
GFR SERPL CREATININE-BSD FRML MDRD: 82 ML/MIN/{1.73_M2}
GFR SERPL CREATININE-BSD FRML MDRD: 83 ML/MIN/{1.73_M2}
GFR SERPL CREATININE-BSD FRML MDRD: 84 ML/MIN/{1.73_M2}
GFR SERPL CREATININE-BSD FRML MDRD: 85 ML/MIN/{1.73_M2}
GFR SERPL CREATININE-BSD FRML MDRD: 86 ML/MIN/{1.73_M2}
GFR SERPL CREATININE-BSD FRML MDRD: 86 ML/MIN/{1.73_M2}
GFR SERPL CREATININE-BSD FRML MDRD: 87 ML/MIN/{1.73_M2}
GFR SERPL CREATININE-BSD FRML MDRD: 88 ML/MIN/{1.73_M2}
GFR SERPL CREATININE-BSD FRML MDRD: 89 ML/MIN/{1.73_M2}
GFR SERPL CREATININE-BSD FRML MDRD: >60 ML/MIN/1.73M2
GFR SERPL CREATININE-BSD FRML MDRD: >60 ML/MIN/1.73M2
GFR SERPL CREATININE-BSD FRML MDRD: >90 ML/MIN/{1.73_M2}
GLUCOSE BLD-MCNC: 89 MG/DL (ref 70–125)
GLUCOSE BLDC GLUCOMTR-MCNC: 106 MG/DL (ref 70–99)
GLUCOSE BLDC GLUCOMTR-MCNC: 120 MG/DL (ref 70–99)
GLUCOSE BLDC GLUCOMTR-MCNC: 121 MG/DL (ref 70–99)
GLUCOSE BLDC GLUCOMTR-MCNC: 125 MG/DL (ref 70–99)
GLUCOSE BLDC GLUCOMTR-MCNC: 137 MG/DL (ref 70–99)
GLUCOSE BLDC GLUCOMTR-MCNC: 142 MG/DL (ref 70–99)
GLUCOSE BLDC GLUCOMTR-MCNC: 147 MG/DL (ref 70–99)
GLUCOSE BLDC GLUCOMTR-MCNC: 150 MG/DL (ref 70–99)
GLUCOSE BLDC GLUCOMTR-MCNC: 152 MG/DL (ref 70–99)
GLUCOSE BLDC GLUCOMTR-MCNC: 158 MG/DL (ref 70–99)
GLUCOSE BLDC GLUCOMTR-MCNC: 159 MG/DL (ref 70–99)
GLUCOSE BLDC GLUCOMTR-MCNC: 160 MG/DL (ref 70–99)
GLUCOSE BLDC GLUCOMTR-MCNC: 160 MG/DL (ref 70–99)
GLUCOSE BLDC GLUCOMTR-MCNC: 161 MG/DL (ref 70–99)
GLUCOSE BLDC GLUCOMTR-MCNC: 162 MG/DL (ref 70–99)
GLUCOSE BLDC GLUCOMTR-MCNC: 167 MG/DL (ref 70–99)
GLUCOSE BLDC GLUCOMTR-MCNC: 171 MG/DL (ref 70–99)
GLUCOSE BLDC GLUCOMTR-MCNC: 171 MG/DL (ref 70–99)
GLUCOSE BLDC GLUCOMTR-MCNC: 173 MG/DL (ref 70–99)
GLUCOSE BLDC GLUCOMTR-MCNC: 180 MG/DL (ref 70–99)
GLUCOSE BLDC GLUCOMTR-MCNC: 187 MG/DL (ref 70–99)
GLUCOSE BLDC GLUCOMTR-MCNC: 192 MG/DL (ref 70–99)
GLUCOSE BLDC GLUCOMTR-MCNC: 194 MG/DL (ref 70–99)
GLUCOSE BLDC GLUCOMTR-MCNC: 195 MG/DL (ref 70–99)
GLUCOSE BLDC GLUCOMTR-MCNC: 197 MG/DL (ref 70–99)
GLUCOSE BLDC GLUCOMTR-MCNC: 216 MG/DL (ref 70–99)
GLUCOSE BLDC GLUCOMTR-MCNC: 238 MG/DL (ref 70–99)
GLUCOSE BLDC GLUCOMTR-MCNC: 283 MG/DL (ref 70–99)
GLUCOSE BLDC GLUCOMTR-MCNC: 300 MG/DL (ref 70–99)
GLUCOSE BLDC GLUCOMTR-MCNC: 80 MG/DL (ref 70–99)
GLUCOSE BLDC GLUCOMTR-MCNC: 92 MG/DL (ref 70–99)
GLUCOSE BLDC GLUCOMTR-MCNC: 97 MG/DL (ref 70–99)
GLUCOSE SERPL-MCNC: 100 MG/DL (ref 70–99)
GLUCOSE SERPL-MCNC: 102 MG/DL (ref 70–99)
GLUCOSE SERPL-MCNC: 104 MG/DL (ref 70–99)
GLUCOSE SERPL-MCNC: 106 MG/DL (ref 70–99)
GLUCOSE SERPL-MCNC: 120 MG/DL (ref 70–99)
GLUCOSE SERPL-MCNC: 124 MG/DL (ref 70–99)
GLUCOSE SERPL-MCNC: 125 MG/DL (ref 70–99)
GLUCOSE SERPL-MCNC: 126 MG/DL (ref 70–99)
GLUCOSE SERPL-MCNC: 143 MG/DL (ref 70–99)
GLUCOSE SERPL-MCNC: 152 MG/DL (ref 70–99)
GLUCOSE SERPL-MCNC: 158 MG/DL (ref 70–99)
GLUCOSE SERPL-MCNC: 169 MG/DL (ref 70–99)
GLUCOSE SERPL-MCNC: 171 MG/DL (ref 70–99)
GLUCOSE SERPL-MCNC: 177 MG/DL (ref 70–99)
GLUCOSE SERPL-MCNC: 200 MG/DL (ref 70–99)
GLUCOSE SERPL-MCNC: 66 MG/DL (ref 70–99)
GLUCOSE SERPL-MCNC: 70 MG/DL (ref 70–99)
GLUCOSE SERPL-MCNC: 81 MG/DL (ref 70–99)
GLUCOSE SERPL-MCNC: 84 MG/DL (ref 70–99)
GLUCOSE SERPL-MCNC: 85 MG/DL (ref 70–99)
GLUCOSE SERPL-MCNC: 89 MG/DL (ref 70–125)
GLUCOSE SERPL-MCNC: 91 MG/DL (ref 70–99)
GLUCOSE SERPL-MCNC: 92 MG/DL (ref 70–99)
GLUCOSE SERPL-MCNC: 94 MG/DL (ref 70–99)
GLUCOSE SERPL-MCNC: 95 MG/DL (ref 70–99)
GLUCOSE SERPL-MCNC: 97 MG/DL (ref 70–99)
GLUCOSE UR STRIP-MCNC: 150 MG/DL
GLUCOSE UR STRIP-MCNC: NEGATIVE MG/DL
HBA1C MFR BLD: 6.2 % (ref 0–5.6)
HBV CORE AB SERPL QL IA: NONREACTIVE
HBV SURFACE AG SERPL QL IA: NONREACTIVE
HCT VFR BLD AUTO: 39.3 % (ref 35–47)
HCT VFR BLD AUTO: 39.5 % (ref 35–47)
HCT VFR BLD AUTO: 39.8 % (ref 35–47)
HCT VFR BLD AUTO: 39.8 % (ref 35–47)
HCT VFR BLD AUTO: 40.1 % (ref 35–47)
HCT VFR BLD AUTO: 40.6 % (ref 35–47)
HCT VFR BLD AUTO: 41.3 % (ref 35–47)
HCT VFR BLD AUTO: 41.9 % (ref 35–47)
HCT VFR BLD AUTO: 42.8 % (ref 35–47)
HCT VFR BLD AUTO: 42.8 % (ref 35–47)
HCT VFR BLD AUTO: 42.9 % (ref 35–47)
HCT VFR BLD AUTO: 43.1 % (ref 35–47)
HCT VFR BLD AUTO: 43.1 % (ref 35–47)
HCT VFR BLD AUTO: 43.3 % (ref 35–47)
HCT VFR BLD AUTO: 43.4 % (ref 35–47)
HCT VFR BLD AUTO: 43.5 % (ref 35–47)
HCT VFR BLD AUTO: 44.6 % (ref 35–47)
HCT VFR BLD AUTO: 44.9 % (ref 35–47)
HCT VFR BLD AUTO: 46.1 % (ref 35–47)
HCT VFR BLD AUTO: 47.3 % (ref 35–47)
HCT VFR BLD AUTO: 49.3 % (ref 35–47)
HCT VFR BLD AUTO: 51.3 % (ref 35–47)
HCT VFR BLD AUTO: 52.2 % (ref 35–47)
HDLC SERPL-MCNC: 41 MG/DL
HEMOGLOBIN: 13.5 G/DL (ref 12–16)
HGB BLD-MCNC: 12.9 G/DL (ref 11.7–15.7)
HGB BLD-MCNC: 13.3 G/DL (ref 11.7–15.7)
HGB BLD-MCNC: 13.4 G/DL (ref 11.7–15.7)
HGB BLD-MCNC: 13.5 G/DL (ref 11.7–15.7)
HGB BLD-MCNC: 13.5 G/DL (ref 12–16)
HGB BLD-MCNC: 13.7 G/DL (ref 11.7–15.7)
HGB BLD-MCNC: 13.8 G/DL (ref 11.7–15.7)
HGB BLD-MCNC: 13.9 G/DL (ref 11.7–15.7)
HGB BLD-MCNC: 13.9 G/DL (ref 11.7–15.7)
HGB BLD-MCNC: 14.1 G/DL (ref 11.7–15.7)
HGB BLD-MCNC: 14.2 G/DL (ref 11.7–15.7)
HGB BLD-MCNC: 14.6 G/DL (ref 11.7–15.7)
HGB BLD-MCNC: 14.7 G/DL (ref 11.7–15.7)
HGB BLD-MCNC: 14.8 G/DL (ref 11.7–15.7)
HGB BLD-MCNC: 14.8 G/DL (ref 11.7–15.7)
HGB BLD-MCNC: 14.9 G/DL (ref 11.7–15.7)
HGB BLD-MCNC: 15 G/DL (ref 11.7–15.7)
HGB BLD-MCNC: 15.1 G/DL (ref 11.7–15.7)
HGB BLD-MCNC: 15.5 G/DL (ref 11.7–15.7)
HGB BLD-MCNC: 15.5 G/DL (ref 11.7–15.7)
HGB BLD-MCNC: 16.6 G/DL (ref 11.7–15.7)
HGB BLD-MCNC: 16.8 G/DL (ref 11.7–15.7)
HGB UR QL STRIP: ABNORMAL
HGB UR QL STRIP: ABNORMAL
HGB UR QL STRIP: NEGATIVE
HGB UR QL STRIP: NEGATIVE
IMM GRANULOCYTES # BLD: 0 10E9/L (ref 0–0.4)
IMM GRANULOCYTES # BLD: 0.1 10E9/L (ref 0–0.4)
IMM GRANULOCYTES # BLD: 0.2 10E9/L (ref 0–0.4)
IMM GRANULOCYTES NFR BLD: 0.1 %
IMM GRANULOCYTES NFR BLD: 0.4 %
IMM GRANULOCYTES NFR BLD: 0.4 %
IMM GRANULOCYTES NFR BLD: 0.8 %
IMM GRANULOCYTES NFR BLD: 0.8 %
IMM GRANULOCYTES NFR BLD: 1.5 %
INR PPP: 0.55 (ref 0.86–1.14)
INR PPP: 0.94 (ref 0.86–1.14)
INR PPP: 0.95 (ref 0.86–1.14)
INTERPRETATION ECG - MUSE: NORMAL
KETONES UR STRIP-MCNC: NEGATIVE MG/DL
LA PPP-IMP: ABNORMAL
LDLC SERPL CALC-MCNC: 62 MG/DL
LEUKOCYTE ESTERASE UR QL STRIP: ABNORMAL
LEUKOCYTE ESTERASE UR QL STRIP: ABNORMAL
LEUKOCYTE ESTERASE UR QL STRIP: NEGATIVE
LEUKOCYTE ESTERASE UR QL STRIP: NEGATIVE
LYMPHOCYTES # BLD AUTO: 0.2 10E9/L (ref 0.8–5.3)
LYMPHOCYTES # BLD AUTO: 0.2 10E9/L (ref 0.8–5.3)
LYMPHOCYTES # BLD AUTO: 0.4 10E9/L (ref 0.8–5.3)
LYMPHOCYTES # BLD AUTO: 0.5 10E9/L (ref 0.8–5.3)
LYMPHOCYTES # BLD AUTO: 1 10E9/L (ref 0.8–5.3)
LYMPHOCYTES # BLD AUTO: 1.3 THOU/UL (ref 0.8–4.4)
LYMPHOCYTES # BLD AUTO: 2.6 10E9/L (ref 0.8–5.3)
LYMPHOCYTES NFR BLD AUTO: 1.7 %
LYMPHOCYTES NFR BLD AUTO: 12 % (ref 20–40)
LYMPHOCYTES NFR BLD AUTO: 2.1 %
LYMPHOCYTES NFR BLD AUTO: 3.2 %
LYMPHOCYTES NFR BLD AUTO: 3.4 %
LYMPHOCYTES NFR BLD AUTO: 37.3 %
LYMPHOCYTES NFR BLD AUTO: 9.3 %
Lab: NORMAL
Lab: NORMAL
MAGNESIUM SERPL-MCNC: 1.9 MG/DL (ref 1.6–2.3)
MAGNESIUM SERPL-MCNC: 2 MG/DL (ref 1.6–2.3)
MAGNESIUM SERPL-MCNC: 2.3 MG/DL (ref 1.6–2.3)
MAGNESIUM SERPL-MCNC: 2.4 MG/DL (ref 1.6–2.3)
MCH RBC QN AUTO: 31 PG (ref 26.5–33)
MCH RBC QN AUTO: 31 PG (ref 26.5–33)
MCH RBC QN AUTO: 31.1 PG (ref 26.5–33)
MCH RBC QN AUTO: 31.2 PG (ref 26.5–33)
MCH RBC QN AUTO: 31.3 PG (ref 26.5–33)
MCH RBC QN AUTO: 31.4 PG (ref 26.5–33)
MCH RBC QN AUTO: 31.4 PG (ref 26.5–33)
MCH RBC QN AUTO: 31.5 PG (ref 26.5–33)
MCH RBC QN AUTO: 31.7 PG (ref 27–34)
MCH RBC QN AUTO: 31.7 PG (ref 27–34)
MCH RBC QN AUTO: 31.8 PG (ref 26.5–33)
MCH RBC QN AUTO: 31.9 PG (ref 26.5–33)
MCH RBC QN AUTO: 31.9 PG (ref 26.5–33)
MCH RBC QN AUTO: 32 PG (ref 26.5–33)
MCH RBC QN AUTO: 32.1 PG (ref 26.5–33)
MCH RBC QN AUTO: 32.2 PG (ref 26.5–33)
MCH RBC QN AUTO: 32.4 PG (ref 26.5–33)
MCH RBC QN AUTO: 32.5 PG (ref 26.5–33)
MCH RBC QN AUTO: 32.8 PG (ref 26.5–33)
MCHC RBC AUTO-ENTMCNC: 31.1 G/DL (ref 31.5–36.5)
MCHC RBC AUTO-ENTMCNC: 31.2 G/DL (ref 31.5–36.5)
MCHC RBC AUTO-ENTMCNC: 31.4 G/DL (ref 31.5–36.5)
MCHC RBC AUTO-ENTMCNC: 31.8 G/DL (ref 31.5–36.5)
MCHC RBC AUTO-ENTMCNC: 32.5 G/DL (ref 31.5–36.5)
MCHC RBC AUTO-ENTMCNC: 32.7 G/DL (ref 31.5–36.5)
MCHC RBC AUTO-ENTMCNC: 32.8 G/DL (ref 31.5–36.5)
MCHC RBC AUTO-ENTMCNC: 32.9 G/DL (ref 31.5–36.5)
MCHC RBC AUTO-ENTMCNC: 33 G/DL (ref 31.5–36.5)
MCHC RBC AUTO-ENTMCNC: 33.2 G/DL (ref 31.5–36.5)
MCHC RBC AUTO-ENTMCNC: 33.9 G/DL (ref 32–36)
MCHC RBC AUTO-ENTMCNC: 33.9 G/DL (ref 32–36)
MCHC RBC AUTO-ENTMCNC: 34 G/DL (ref 31.5–36.5)
MCHC RBC AUTO-ENTMCNC: 34.2 G/DL (ref 31.5–36.5)
MCHC RBC AUTO-ENTMCNC: 34.3 G/DL (ref 31.5–36.5)
MCHC RBC AUTO-ENTMCNC: 34.3 G/DL (ref 31.5–36.5)
MCHC RBC AUTO-ENTMCNC: 34.4 G/DL (ref 31.5–36.5)
MCHC RBC AUTO-ENTMCNC: 34.8 G/DL (ref 31.5–36.5)
MCHC RBC AUTO-ENTMCNC: 34.8 G/DL (ref 31.5–36.5)
MCV RBC AUTO: 100 FL (ref 78–100)
MCV RBC AUTO: 93 FL (ref 78–100)
MCV RBC AUTO: 93 FL (ref 80–100)
MCV RBC AUTO: 93 FL (ref 80–100)
MCV RBC AUTO: 94 FL (ref 78–100)
MCV RBC AUTO: 95 FL (ref 78–100)
MCV RBC AUTO: 96 FL (ref 78–100)
MCV RBC AUTO: 98 FL (ref 78–100)
MCV RBC AUTO: 99 FL (ref 78–100)
MCV RBC AUTO: 99 FL (ref 78–100)
MONOCYTES # BLD AUTO: 0.2 10E9/L (ref 0–1.3)
MONOCYTES # BLD AUTO: 0.3 10E9/L (ref 0–1.3)
MONOCYTES # BLD AUTO: 0.3 10E9/L (ref 0–1.3)
MONOCYTES # BLD AUTO: 0.5 10E9/L (ref 0–1.3)
MONOCYTES # BLD AUTO: 0.6 10E9/L (ref 0–1.3)
MONOCYTES # BLD AUTO: 0.8 10E9/L (ref 0–1.3)
MONOCYTES # BLD AUTO: 0.8 THOU/UL (ref 0–0.9)
MONOCYTES NFR BLD AUTO: 1.7 %
MONOCYTES NFR BLD AUTO: 11 %
MONOCYTES NFR BLD AUTO: 2.4 %
MONOCYTES NFR BLD AUTO: 2.5 %
MONOCYTES NFR BLD AUTO: 3.2 %
MONOCYTES NFR BLD AUTO: 5.5 %
MONOCYTES NFR BLD AUTO: 8 % (ref 2–10)
MRSA DNA SPEC QL NAA+PROBE: NEGATIVE
MUCOUS THREADS #/AREA URNS LPF: PRESENT /LPF
NEUTROPHILS # BLD AUTO: 11.1 10E9/L (ref 1.6–8.3)
NEUTROPHILS # BLD AUTO: 11.4 10E9/L (ref 1.6–8.3)
NEUTROPHILS # BLD AUTO: 13.1 10E9/L (ref 1.6–8.3)
NEUTROPHILS # BLD AUTO: 13.2 10E9/L (ref 1.6–8.3)
NEUTROPHILS # BLD AUTO: 3.4 10E9/L (ref 1.6–8.3)
NEUTROPHILS # BLD AUTO: 8 THOU/UL (ref 2–7.7)
NEUTROPHILS # BLD AUTO: 9.2 10E9/L (ref 1.6–8.3)
NEUTROPHILS NFR BLD AUTO: 49 %
NEUTROPHILS NFR BLD AUTO: 79 % (ref 50–70)
NEUTROPHILS NFR BLD AUTO: 82.7 %
NEUTROPHILS NFR BLD AUTO: 92.7 %
NEUTROPHILS NFR BLD AUTO: 93 %
NEUTROPHILS NFR BLD AUTO: 95 %
NEUTROPHILS NFR BLD AUTO: 95.7 %
NITRATE UR QL: NEGATIVE
NITRATE UR QL: POSITIVE
NONHDLC SERPL-MCNC: 86 MG/DL
NRBC # BLD AUTO: 0 10*3/UL
NRBC BLD AUTO-RTO: 0 /100
OSMOLALITY SERPL: 260 MMOL/KG (ref 280–301)
OSMOLALITY SERPL: 262 MMOL/KG (ref 280–301)
OSMOLALITY SERPL: 278 MMOL/KG (ref 280–301)
OSMOLALITY UR: 353 MMOL/KG (ref 100–1200)
OSMOLALITY UR: 363 MMOL/KG (ref 100–1200)
OSMOLALITY UR: 393 MMOL/KG (ref 100–1200)
PH UR STRIP: 5.5 PH (ref 5–7)
PH UR STRIP: 6.5 PH (ref 5–7)
PH UR STRIP: 7 PH (ref 5–7)
PH UR STRIP: 7 PH (ref 5–7)
PHOSPHATE SERPL-MCNC: 2.1 MG/DL (ref 2.5–4.5)
PHOSPHATE SERPL-MCNC: 2.5 MG/DL (ref 2.5–4.5)
PHOSPHATE SERPL-MCNC: 2.6 MG/DL (ref 2.5–4.5)
PHOSPHATE SERPL-MCNC: 2.6 MG/DL (ref 2.5–4.5)
PLATELET # BLD AUTO: 118 10E9/L (ref 150–450)
PLATELET # BLD AUTO: 160 10E9/L (ref 150–450)
PLATELET # BLD AUTO: 175 10E9/L (ref 150–450)
PLATELET # BLD AUTO: 179 10E9/L (ref 150–450)
PLATELET # BLD AUTO: 185 10E9/L (ref 150–450)
PLATELET # BLD AUTO: 186 10E9/L (ref 150–450)
PLATELET # BLD AUTO: 189 10E9/L (ref 150–450)
PLATELET # BLD AUTO: 191 10E9/L (ref 150–450)
PLATELET # BLD AUTO: 194 10E9/L (ref 150–450)
PLATELET # BLD AUTO: 199 10E9/L (ref 150–450)
PLATELET # BLD AUTO: 201 10E9/L (ref 150–450)
PLATELET # BLD AUTO: 206 10E9/L (ref 150–450)
PLATELET # BLD AUTO: 211 THOU/UL (ref 140–440)
PLATELET # BLD AUTO: 211 THOU/UL (ref 140–440)
PLATELET # BLD AUTO: 214 10E9/L (ref 150–450)
PLATELET # BLD AUTO: 218 10E9/L (ref 150–450)
PLATELET # BLD AUTO: 219 10E9/L (ref 150–450)
PLATELET # BLD AUTO: 221 10E9/L (ref 150–450)
PLATELET # BLD AUTO: 222 10E9/L (ref 150–450)
PLATELET # BLD AUTO: 228 10E9/L (ref 150–450)
PLATELET # BLD AUTO: 229 10E9/L (ref 150–450)
PLATELET # BLD AUTO: 229 10E9/L (ref 150–450)
PLATELET # BLD AUTO: 246 10E9/L (ref 150–450)
PLATELET # BLD AUTO: 246 10E9/L (ref 150–450)
PLATELET # BLD AUTO: 337 10E9/L (ref 150–450)
PMV BLD AUTO: 11.1 FL (ref 8.5–12.5)
POTASSIUM BLD-SCNC: 3.9 MMOL/L (ref 3.5–5)
POTASSIUM SERPL-SCNC: 3.4 MMOL/L (ref 3.4–5.3)
POTASSIUM SERPL-SCNC: 3.5 MMOL/L (ref 3.4–5.3)
POTASSIUM SERPL-SCNC: 3.6 MMOL/L (ref 3.4–5.3)
POTASSIUM SERPL-SCNC: 3.6 MMOL/L (ref 3.4–5.3)
POTASSIUM SERPL-SCNC: 3.7 MMOL/L (ref 3.4–5.3)
POTASSIUM SERPL-SCNC: 3.8 MMOL/L (ref 3.4–5.3)
POTASSIUM SERPL-SCNC: 3.9 MMOL/L (ref 3.4–5.3)
POTASSIUM SERPL-SCNC: 3.9 MMOL/L (ref 3.5–5)
POTASSIUM SERPL-SCNC: 4 MMOL/L (ref 3.4–5.3)
POTASSIUM SERPL-SCNC: 4.1 MMOL/L (ref 3.4–5.3)
POTASSIUM SERPL-SCNC: 4.2 MMOL/L (ref 3.4–5.3)
POTASSIUM SERPL-SCNC: 4.3 MMOL/L (ref 3.4–5.3)
POTASSIUM SERPL-SCNC: 4.4 MMOL/L (ref 3.4–5.3)
POTASSIUM SERPL-SCNC: 4.4 MMOL/L (ref 3.4–5.3)
POTASSIUM SERPL-SCNC: 4.9 MMOL/L (ref 3.4–5.3)
PROT SERPL-MCNC: 5.7 G/DL (ref 6.8–8.8)
PROT SERPL-MCNC: 6.2 G/DL (ref 6.8–8.8)
PROT SERPL-MCNC: 6.3 G/DL (ref 6.8–8.8)
PROT SERPL-MCNC: 6.4 G/DL (ref 6.8–8.8)
PROT SERPL-MCNC: 6.9 G/DL (ref 6.8–8.8)
PROT SERPL-MCNC: 7.1 G/DL (ref 6.8–8.8)
PROT SERPL-MCNC: 7.7 G/DL (ref 6.8–8.8)
RBC # BLD AUTO: 4.13 10E12/L (ref 3.8–5.2)
RBC # BLD AUTO: 4.17 10E12/L (ref 3.8–5.2)
RBC # BLD AUTO: 4.26 10E12/L (ref 3.8–5.2)
RBC # BLD AUTO: 4.26 MILL/UL (ref 3.8–5.4)
RBC # BLD AUTO: 4.26 MILL/UL (ref 3.8–5.4)
RBC # BLD AUTO: 4.31 10E12/L (ref 3.8–5.2)
RBC # BLD AUTO: 4.32 10E12/L (ref 3.8–5.2)
RBC # BLD AUTO: 4.37 10E12/L (ref 3.8–5.2)
RBC # BLD AUTO: 4.39 10E12/L (ref 3.8–5.2)
RBC # BLD AUTO: 4.47 10E12/L (ref 3.8–5.2)
RBC # BLD AUTO: 4.49 10E12/L (ref 3.8–5.2)
RBC # BLD AUTO: 4.53 10E12/L (ref 3.8–5.2)
RBC # BLD AUTO: 4.53 10E12/L (ref 3.8–5.2)
RBC # BLD AUTO: 4.56 10E12/L (ref 3.8–5.2)
RBC # BLD AUTO: 4.57 10E12/L (ref 3.8–5.2)
RBC # BLD AUTO: 4.61 10E12/L (ref 3.8–5.2)
RBC # BLD AUTO: 4.64 10E12/L (ref 3.8–5.2)
RBC # BLD AUTO: 4.78 10E12/L (ref 3.8–5.2)
RBC # BLD AUTO: 4.81 10E12/L (ref 3.8–5.2)
RBC # BLD AUTO: 4.85 10E12/L (ref 3.8–5.2)
RBC # BLD AUTO: 4.99 10E12/L (ref 3.8–5.2)
RBC # BLD AUTO: 5.33 10E12/L (ref 3.8–5.2)
RBC # BLD AUTO: 5.35 10E12/L (ref 3.8–5.2)
RBC #/AREA URNS AUTO: 2 /HPF (ref 0–2)
RBC #/AREA URNS AUTO: 2 /HPF (ref 0–2)
RBC #/AREA URNS AUTO: 8 /HPF (ref 0–2)
SODIUM SERPL-SCNC: 121 MMOL/L (ref 133–144)
SODIUM SERPL-SCNC: 121 MMOL/L (ref 133–144)
SODIUM SERPL-SCNC: 124 MMOL/L (ref 133–144)
SODIUM SERPL-SCNC: 124 MMOL/L (ref 133–144)
SODIUM SERPL-SCNC: 125 MMOL/L (ref 133–144)
SODIUM SERPL-SCNC: 126 MMOL/L (ref 133–144)
SODIUM SERPL-SCNC: 127 MMOL/L (ref 133–144)
SODIUM SERPL-SCNC: 128 MMOL/L (ref 133–144)
SODIUM SERPL-SCNC: 129 MMOL/L (ref 133–144)
SODIUM SERPL-SCNC: 130 MMOL/L (ref 133–144)
SODIUM SERPL-SCNC: 131 MMOL/L (ref 133–144)
SODIUM SERPL-SCNC: 132 MMOL/L (ref 133–144)
SODIUM SERPL-SCNC: 132 MMOL/L (ref 133–144)
SODIUM SERPL-SCNC: 133 MMOL/L (ref 133–144)
SODIUM SERPL-SCNC: 133 MMOL/L (ref 133–144)
SODIUM SERPL-SCNC: 134 MMOL/L (ref 133–144)
SODIUM SERPL-SCNC: 135 MMOL/L (ref 133–144)
SODIUM SERPL-SCNC: 135 MMOL/L (ref 136–145)
SODIUM SERPL-SCNC: 135 MMOL/L (ref 136–145)
SODIUM SERPL-SCNC: 137 MMOL/L (ref 133–144)
SODIUM SERPL-SCNC: 138 MMOL/L (ref 133–144)
SODIUM SERPL-SCNC: 138 MMOL/L (ref 133–144)
SODIUM SERPL-SCNC: 139 MMOL/L (ref 133–144)
SODIUM SERPL-SCNC: 141 MMOL/L (ref 133–144)
SODIUM UR-SCNC: 106 MMOL/L
SODIUM UR-SCNC: 19 MMOL/L
SODIUM UR-SCNC: 37 MMOL/L
SODIUM UR-SCNC: 94 MMOL/L
SOURCE: ABNORMAL
SOURCE: NORMAL
SP GR UR STRIP: 1 (ref 1–1.03)
SP GR UR STRIP: 1.01 (ref 1–1.03)
SPECIMEN EXP DATE BLD: NORMAL
SPECIMEN EXP DATE BLD: NORMAL
SPECIMEN SOURCE: NORMAL
SQUAMOUS #/AREA URNS AUTO: 6 /HPF (ref 0–1)
SQUAMOUS #/AREA URNS AUTO: <1 /HPF (ref 0–1)
SQUAMOUS #/AREA URNS AUTO: <1 /HPF (ref 0–1)
TRIGL SERPL-MCNC: 122 MG/DL
TROPONIN I SERPL-MCNC: <0.015 UG/L (ref 0–0.04)
TSH SERPL DL<=0.005 MIU/L-ACNC: 2.21 MU/L (ref 0.4–4)
UROBILINOGEN UR STRIP-MCNC: NORMAL MG/DL (ref 0–2)
WBC # BLD AUTO: 10.3 THOU/UL (ref 4–11)
WBC # BLD AUTO: 11.1 10E9/L (ref 4–11)
WBC # BLD AUTO: 11.5 10E9/L (ref 4–11)
WBC # BLD AUTO: 11.6 10E9/L (ref 4–11)
WBC # BLD AUTO: 11.6 10E9/L (ref 4–11)
WBC # BLD AUTO: 12.2 10E9/L (ref 4–11)
WBC # BLD AUTO: 12.2 10E9/L (ref 4–11)
WBC # BLD AUTO: 12.4 10E9/L (ref 4–11)
WBC # BLD AUTO: 12.4 10E9/L (ref 4–11)
WBC # BLD AUTO: 12.9 10E9/L (ref 4–11)
WBC # BLD AUTO: 13.9 10E9/L (ref 4–11)
WBC # BLD AUTO: 14.2 10E9/L (ref 4–11)
WBC # BLD AUTO: 6.9 10E9/L (ref 4–11)
WBC # BLD AUTO: 6.9 10E9/L (ref 4–11)
WBC # BLD AUTO: 7.5 10E9/L (ref 4–11)
WBC # BLD AUTO: 7.9 10E9/L (ref 4–11)
WBC # BLD AUTO: 8.3 10E9/L (ref 4–11)
WBC # BLD AUTO: 8.4 10E9/L (ref 4–11)
WBC # BLD AUTO: 9.3 10E9/L (ref 4–11)
WBC # BLD AUTO: 9.4 10E9/L (ref 4–11)
WBC # BLD AUTO: 9.6 10E9/L (ref 4–11)
WBC # BLD AUTO: 9.9 10E9/L (ref 4–11)
WBC #/AREA URNS AUTO: 1 /HPF (ref 0–5)
WBC #/AREA URNS AUTO: 24 /HPF (ref 0–5)
WBC #/AREA URNS AUTO: 26 /HPF (ref 0–5)
WBC: 10.3 THOU/UL (ref 4–11)

## 2019-01-01 PROCEDURE — 85610 PROTHROMBIN TIME: CPT | Performed by: INTERNAL MEDICINE

## 2019-01-01 PROCEDURE — 70450 CT HEAD/BRAIN W/O DYE: CPT

## 2019-01-01 PROCEDURE — 25000128 H RX IP 250 OP 636: Performed by: STUDENT IN AN ORGANIZED HEALTH CARE EDUCATION/TRAINING PROGRAM

## 2019-01-01 PROCEDURE — 93010 ELECTROCARDIOGRAM REPORT: CPT | Performed by: INTERNAL MEDICINE

## 2019-01-01 PROCEDURE — 84484 ASSAY OF TROPONIN QUANT: CPT | Performed by: PSYCHIATRY & NEUROLOGY

## 2019-01-01 PROCEDURE — 97165 OT EVAL LOW COMPLEX 30 MIN: CPT | Mod: GO

## 2019-01-01 PROCEDURE — 25000132 ZZH RX MED GY IP 250 OP 250 PS 637: Performed by: PSYCHIATRY & NEUROLOGY

## 2019-01-01 PROCEDURE — 85025 COMPLETE CBC W/AUTO DIFF WBC: CPT | Performed by: EMERGENCY MEDICINE

## 2019-01-01 PROCEDURE — 99223 1ST HOSP IP/OBS HIGH 75: CPT | Mod: AI | Performed by: INTERNAL MEDICINE

## 2019-01-01 PROCEDURE — 12000001 ZZH R&B MED SURG/OB UMMC

## 2019-01-01 PROCEDURE — 99285 EMERGENCY DEPT VISIT HI MDM: CPT | Mod: 25

## 2019-01-01 PROCEDURE — 97530 THERAPEUTIC ACTIVITIES: CPT | Mod: GP

## 2019-01-01 PROCEDURE — 87340 HEPATITIS B SURFACE AG IA: CPT | Performed by: PSYCHIATRY & NEUROLOGY

## 2019-01-01 PROCEDURE — 36415 COLL VENOUS BLD VENIPUNCTURE: CPT | Performed by: STUDENT IN AN ORGANIZED HEALTH CARE EDUCATION/TRAINING PROGRAM

## 2019-01-01 PROCEDURE — 97165 OT EVAL LOW COMPLEX 30 MIN: CPT | Mod: GO | Performed by: OCCUPATIONAL THERAPIST

## 2019-01-01 PROCEDURE — 25000132 ZZH RX MED GY IP 250 OP 250 PS 637: Mod: ZF | Performed by: INTERNAL MEDICINE

## 2019-01-01 PROCEDURE — 97110 THERAPEUTIC EXERCISES: CPT | Mod: GO

## 2019-01-01 PROCEDURE — 25000128 H RX IP 250 OP 636: Performed by: EMERGENCY MEDICINE

## 2019-01-01 PROCEDURE — 25000131 ZZH RX MED GY IP 250 OP 636 PS 637: Performed by: PSYCHIATRY & NEUROLOGY

## 2019-01-01 PROCEDURE — 85730 THROMBOPLASTIN TIME PARTIAL: CPT | Performed by: EMERGENCY MEDICINE

## 2019-01-01 PROCEDURE — 84100 ASSAY OF PHOSPHORUS: CPT | Performed by: PHYSICIAN ASSISTANT

## 2019-01-01 PROCEDURE — 40000269 ZZH STATISTIC NO CHARGE FACILITY FEE

## 2019-01-01 PROCEDURE — 00000167 ZZHCL STATISTIC INR NC: Performed by: INTERNAL MEDICINE

## 2019-01-01 PROCEDURE — 25000132 ZZH RX MED GY IP 250 OP 250 PS 637: Performed by: STUDENT IN AN ORGANIZED HEALTH CARE EDUCATION/TRAINING PROGRAM

## 2019-01-01 PROCEDURE — 99221 1ST HOSP IP/OBS SF/LOW 40: CPT | Mod: GC | Performed by: INTERNAL MEDICINE

## 2019-01-01 PROCEDURE — 80048 BASIC METABOLIC PNL TOTAL CA: CPT | Performed by: STUDENT IN AN ORGANIZED HEALTH CARE EDUCATION/TRAINING PROGRAM

## 2019-01-01 PROCEDURE — 80048 BASIC METABOLIC PNL TOTAL CA: CPT | Performed by: PHYSICIAN ASSISTANT

## 2019-01-01 PROCEDURE — 00000167 ZZHCL STATISTIC INR NC: Performed by: PHYSICIAN ASSISTANT

## 2019-01-01 PROCEDURE — 85240 CLOT FACTOR VIII AHG 1 STAGE: CPT | Performed by: PHYSICIAN ASSISTANT

## 2019-01-01 PROCEDURE — 25000128 H RX IP 250 OP 636: Mod: ZF | Performed by: INTERNAL MEDICINE

## 2019-01-01 PROCEDURE — 97110 THERAPEUTIC EXERCISES: CPT | Mod: GO | Performed by: OCCUPATIONAL THERAPIST

## 2019-01-01 PROCEDURE — 99285 EMERGENCY DEPT VISIT HI MDM: CPT | Mod: 25 | Performed by: EMERGENCY MEDICINE

## 2019-01-01 PROCEDURE — 83930 ASSAY OF BLOOD OSMOLALITY: CPT | Performed by: PHYSICIAN ASSISTANT

## 2019-01-01 PROCEDURE — 99309 SBSQ NF CARE MODERATE MDM 30: CPT | Performed by: NURSE PRACTITIONER

## 2019-01-01 PROCEDURE — 25000132 ZZH RX MED GY IP 250 OP 250 PS 637: Performed by: INTERNAL MEDICINE

## 2019-01-01 PROCEDURE — 25800030 ZZH RX IP 258 OP 636: Performed by: EMERGENCY MEDICINE

## 2019-01-01 PROCEDURE — 25000555 ZZHC RX FACTOR IP 250 OP 636: Performed by: EMERGENCY MEDICINE

## 2019-01-01 PROCEDURE — 99305 1ST NF CARE MODERATE MDM 35: CPT | Performed by: INTERNAL MEDICINE

## 2019-01-01 PROCEDURE — 00000328 ZZHCL STATISTIC PTT NC: Performed by: PHYSICIAN ASSISTANT

## 2019-01-01 PROCEDURE — 93005 ELECTROCARDIOGRAM TRACING: CPT

## 2019-01-01 PROCEDURE — 00000146 ZZHCL STATISTIC GLUCOSE BY METER IP

## 2019-01-01 PROCEDURE — 25000555 ZZHC RX FACTOR IP 250 OP 636: Performed by: PSYCHIATRY & NEUROLOGY

## 2019-01-01 PROCEDURE — 70498 CT ANGIOGRAPHY NECK: CPT

## 2019-01-01 PROCEDURE — 86704 HEP B CORE ANTIBODY TOTAL: CPT | Performed by: PSYCHIATRY & NEUROLOGY

## 2019-01-01 PROCEDURE — 97530 THERAPEUTIC ACTIVITIES: CPT | Mod: GO

## 2019-01-01 PROCEDURE — 72125 CT NECK SPINE W/O DYE: CPT

## 2019-01-01 PROCEDURE — 25000132 ZZH RX MED GY IP 250 OP 250 PS 637: Performed by: PHYSICIAN ASSISTANT

## 2019-01-01 PROCEDURE — 25000131 ZZH RX MED GY IP 250 OP 636 PS 637: Performed by: PHYSICIAN ASSISTANT

## 2019-01-01 PROCEDURE — G0463 HOSPITAL OUTPT CLINIC VISIT: HCPCS

## 2019-01-01 PROCEDURE — 97116 GAIT TRAINING THERAPY: CPT | Mod: GP | Performed by: PHYSICAL THERAPIST

## 2019-01-01 PROCEDURE — C9113 INJ PANTOPRAZOLE SODIUM, VIA: HCPCS | Performed by: PSYCHIATRY & NEUROLOGY

## 2019-01-01 PROCEDURE — 80053 COMPREHEN METABOLIC PANEL: CPT | Performed by: INTERNAL MEDICINE

## 2019-01-01 PROCEDURE — 00000401 ZZHCL STATISTIC THROMBIN TIME NC: Performed by: INTERNAL MEDICINE

## 2019-01-01 PROCEDURE — 87641 MR-STAPH DNA AMP PROBE: CPT | Performed by: PSYCHIATRY & NEUROLOGY

## 2019-01-01 PROCEDURE — 85610 PROTHROMBIN TIME: CPT | Performed by: PSYCHIATRY & NEUROLOGY

## 2019-01-01 PROCEDURE — 40000559 ZZH STATISTIC FAILED PERIPHERAL IV START: Mod: ZF

## 2019-01-01 PROCEDURE — 99231 SBSQ HOSP IP/OBS SF/LOW 25: CPT | Performed by: PEDIATRICS

## 2019-01-01 PROCEDURE — 99232 SBSQ HOSP IP/OBS MODERATE 35: CPT | Mod: GC | Performed by: INTERNAL MEDICINE

## 2019-01-01 PROCEDURE — 85335 FACTOR INHIBITOR TEST: CPT | Performed by: INTERNAL MEDICINE

## 2019-01-01 PROCEDURE — 40000274 ZZH STATISTIC RCP CONSULT EA 30 MIN

## 2019-01-01 PROCEDURE — 25000555 ZZHC RX FACTOR IP 250 OP 636: Performed by: STUDENT IN AN ORGANIZED HEALTH CARE EDUCATION/TRAINING PROGRAM

## 2019-01-01 PROCEDURE — 80053 COMPREHEN METABOLIC PANEL: CPT | Performed by: PHYSICIAN ASSISTANT

## 2019-01-01 PROCEDURE — 36415 COLL VENOUS BLD VENIPUNCTURE: CPT | Performed by: INTERNAL MEDICINE

## 2019-01-01 PROCEDURE — 25000128 H RX IP 250 OP 636: Performed by: PSYCHIATRY & NEUROLOGY

## 2019-01-01 PROCEDURE — 84300 ASSAY OF URINE SODIUM: CPT | Performed by: INTERNAL MEDICINE

## 2019-01-01 PROCEDURE — 80051 ELECTROLYTE PANEL: CPT | Performed by: PSYCHIATRY & NEUROLOGY

## 2019-01-01 PROCEDURE — 99238 HOSP IP/OBS DSCHRG MGMT 30/<: CPT | Performed by: PEDIATRICS

## 2019-01-01 PROCEDURE — 92526 ORAL FUNCTION THERAPY: CPT | Mod: GN

## 2019-01-01 PROCEDURE — 85240 CLOT FACTOR VIII AHG 1 STAGE: CPT | Performed by: INTERNAL MEDICINE

## 2019-01-01 PROCEDURE — 36415 COLL VENOUS BLD VENIPUNCTURE: CPT | Performed by: PSYCHIATRY & NEUROLOGY

## 2019-01-01 PROCEDURE — 85025 COMPLETE CBC W/AUTO DIFF WBC: CPT | Performed by: INTERNAL MEDICINE

## 2019-01-01 PROCEDURE — 97535 SELF CARE MNGMENT TRAINING: CPT | Mod: GO | Performed by: OCCUPATIONAL THERAPIST

## 2019-01-01 PROCEDURE — 85730 THROMBOPLASTIN TIME PARTIAL: CPT | Performed by: PSYCHIATRY & NEUROLOGY

## 2019-01-01 PROCEDURE — 85027 COMPLETE CBC AUTOMATED: CPT | Performed by: PHYSICIAN ASSISTANT

## 2019-01-01 PROCEDURE — 40000275 ZZH STATISTIC RCP TIME EA 10 MIN

## 2019-01-01 PROCEDURE — 36415 COLL VENOUS BLD VENIPUNCTURE: CPT | Performed by: PHYSICIAN ASSISTANT

## 2019-01-01 PROCEDURE — 82570 ASSAY OF URINE CREATININE: CPT | Performed by: PSYCHIATRY & NEUROLOGY

## 2019-01-01 PROCEDURE — 94799 UNLISTED PULMONARY SVC/PX: CPT

## 2019-01-01 PROCEDURE — 97530 THERAPEUTIC ACTIVITIES: CPT | Mod: GP | Performed by: PHYSICAL THERAPIST

## 2019-01-01 PROCEDURE — 85730 THROMBOPLASTIN TIME PARTIAL: CPT | Performed by: INTERNAL MEDICINE

## 2019-01-01 PROCEDURE — 97112 NEUROMUSCULAR REEDUCATION: CPT | Mod: GP | Performed by: PHYSICAL THERAPIST

## 2019-01-01 PROCEDURE — 25000132 ZZH RX MED GY IP 250 OP 250 PS 637: Performed by: PEDIATRICS

## 2019-01-01 PROCEDURE — 25000128 H RX IP 250 OP 636: Performed by: INTERNAL MEDICINE

## 2019-01-01 PROCEDURE — 85335 FACTOR INHIBITOR TEST: CPT | Performed by: STUDENT IN AN ORGANIZED HEALTH CARE EDUCATION/TRAINING PROGRAM

## 2019-01-01 PROCEDURE — 25800030 ZZH RX IP 258 OP 636: Performed by: PSYCHIATRY & NEUROLOGY

## 2019-01-01 PROCEDURE — 86850 RBC ANTIBODY SCREEN: CPT | Performed by: INTERNAL MEDICINE

## 2019-01-01 PROCEDURE — 25000125 ZZHC RX 250: Performed by: EMERGENCY MEDICINE

## 2019-01-01 PROCEDURE — 25800030 ZZH RX IP 258 OP 636: Mod: ZF | Performed by: INTERNAL MEDICINE

## 2019-01-01 PROCEDURE — 86901 BLOOD TYPING SEROLOGIC RH(D): CPT | Performed by: INTERNAL MEDICINE

## 2019-01-01 PROCEDURE — 97530 THERAPEUTIC ACTIVITIES: CPT | Mod: GO | Performed by: OCCUPATIONAL THERAPIST

## 2019-01-01 PROCEDURE — 99232 SBSQ HOSP IP/OBS MODERATE 35: CPT | Performed by: PEDIATRICS

## 2019-01-01 PROCEDURE — 99233 SBSQ HOSP IP/OBS HIGH 50: CPT | Performed by: PEDIATRICS

## 2019-01-01 PROCEDURE — 81001 URINALYSIS AUTO W/SCOPE: CPT | Performed by: EMERGENCY MEDICINE

## 2019-01-01 PROCEDURE — 84295 ASSAY OF SERUM SODIUM: CPT | Performed by: PHYSICIAN ASSISTANT

## 2019-01-01 PROCEDURE — 96374 THER/PROPH/DIAG INJ IV PUSH: CPT | Performed by: INTERNAL MEDICINE

## 2019-01-01 PROCEDURE — 97112 NEUROMUSCULAR REEDUCATION: CPT | Mod: GO | Performed by: OCCUPATIONAL THERAPIST

## 2019-01-01 PROCEDURE — 82570 ASSAY OF URINE CREATININE: CPT | Performed by: STUDENT IN AN ORGANIZED HEALTH CARE EDUCATION/TRAINING PROGRAM

## 2019-01-01 PROCEDURE — 20000004 ZZH R&B ICU UMMC

## 2019-01-01 PROCEDURE — 84295 ASSAY OF SERUM SODIUM: CPT | Performed by: STUDENT IN AN ORGANIZED HEALTH CARE EDUCATION/TRAINING PROGRAM

## 2019-01-01 PROCEDURE — 85027 COMPLETE CBC AUTOMATED: CPT | Performed by: PSYCHIATRY & NEUROLOGY

## 2019-01-01 PROCEDURE — 83735 ASSAY OF MAGNESIUM: CPT | Performed by: PSYCHIATRY & NEUROLOGY

## 2019-01-01 PROCEDURE — 85613 RUSSELL VIPER VENOM DILUTED: CPT | Performed by: STUDENT IN AN ORGANIZED HEALTH CARE EDUCATION/TRAINING PROGRAM

## 2019-01-01 PROCEDURE — 84295 ASSAY OF SERUM SODIUM: CPT | Performed by: INTERNAL MEDICINE

## 2019-01-01 PROCEDURE — 83935 ASSAY OF URINE OSMOLALITY: CPT | Performed by: PHYSICIAN ASSISTANT

## 2019-01-01 PROCEDURE — 00000401 ZZHCL STATISTIC THROMBIN TIME NC: Performed by: PHYSICIAN ASSISTANT

## 2019-01-01 PROCEDURE — 83735 ASSAY OF MAGNESIUM: CPT | Performed by: PHYSICIAN ASSISTANT

## 2019-01-01 PROCEDURE — 99207 ZZC APP CREDIT; MD BILLING SHARED VISIT: CPT | Performed by: PHYSICIAN ASSISTANT

## 2019-01-01 PROCEDURE — 80053 COMPREHEN METABOLIC PANEL: CPT | Performed by: EMERGENCY MEDICINE

## 2019-01-01 PROCEDURE — 40000802 ZZH SITE CHECK

## 2019-01-01 PROCEDURE — 97535 SELF CARE MNGMENT TRAINING: CPT | Mod: GO

## 2019-01-01 PROCEDURE — 85240 CLOT FACTOR VIII AHG 1 STAGE: CPT | Performed by: STUDENT IN AN ORGANIZED HEALTH CARE EDUCATION/TRAINING PROGRAM

## 2019-01-01 PROCEDURE — 87640 STAPH A DNA AMP PROBE: CPT | Performed by: PSYCHIATRY & NEUROLOGY

## 2019-01-01 PROCEDURE — 40000556 ZZH STATISTIC PERIPHERAL IV START W US GUIDANCE

## 2019-01-01 PROCEDURE — 81001 URINALYSIS AUTO W/SCOPE: CPT | Performed by: STUDENT IN AN ORGANIZED HEALTH CARE EDUCATION/TRAINING PROGRAM

## 2019-01-01 PROCEDURE — 80048 BASIC METABOLIC PNL TOTAL CA: CPT | Performed by: PSYCHIATRY & NEUROLOGY

## 2019-01-01 PROCEDURE — 83930 ASSAY OF BLOOD OSMOLALITY: CPT | Performed by: STUDENT IN AN ORGANIZED HEALTH CARE EDUCATION/TRAINING PROGRAM

## 2019-01-01 PROCEDURE — 84100 ASSAY OF PHOSPHORUS: CPT | Performed by: PSYCHIATRY & NEUROLOGY

## 2019-01-01 PROCEDURE — 99285 EMERGENCY DEPT VISIT HI MDM: CPT | Mod: Z6 | Performed by: EMERGENCY MEDICINE

## 2019-01-01 PROCEDURE — 83036 HEMOGLOBIN GLYCOSYLATED A1C: CPT | Performed by: PSYCHIATRY & NEUROLOGY

## 2019-01-01 PROCEDURE — 99285 EMERGENCY DEPT VISIT HI MDM: CPT | Mod: 25 | Performed by: INTERNAL MEDICINE

## 2019-01-01 PROCEDURE — 84300 ASSAY OF URINE SODIUM: CPT | Performed by: PSYCHIATRY & NEUROLOGY

## 2019-01-01 PROCEDURE — 96366 THER/PROPH/DIAG IV INF ADDON: CPT

## 2019-01-01 PROCEDURE — 96365 THER/PROPH/DIAG IV INF INIT: CPT | Mod: 59

## 2019-01-01 PROCEDURE — 84484 ASSAY OF TROPONIN QUANT: CPT | Performed by: EMERGENCY MEDICINE

## 2019-01-01 PROCEDURE — 85610 PROTHROMBIN TIME: CPT | Performed by: EMERGENCY MEDICINE

## 2019-01-01 PROCEDURE — 83930 ASSAY OF BLOOD OSMOLALITY: CPT | Performed by: EMERGENCY MEDICINE

## 2019-01-01 PROCEDURE — 30233V1 TRANSFUSION OF NONAUTOLOGOUS ANTIHEMOPHILIC FACTORS INTO PERIPHERAL VEIN, PERCUTANEOUS APPROACH: ICD-10-PCS | Performed by: PSYCHIATRY & NEUROLOGY

## 2019-01-01 PROCEDURE — 84300 ASSAY OF URINE SODIUM: CPT | Performed by: STUDENT IN AN ORGANIZED HEALTH CARE EDUCATION/TRAINING PROGRAM

## 2019-01-01 PROCEDURE — 83935 ASSAY OF URINE OSMOLALITY: CPT | Performed by: PSYCHIATRY & NEUROLOGY

## 2019-01-01 PROCEDURE — 80076 HEPATIC FUNCTION PANEL: CPT | Performed by: PSYCHIATRY & NEUROLOGY

## 2019-01-01 PROCEDURE — 93005 ELECTROCARDIOGRAM TRACING: CPT | Performed by: INTERNAL MEDICINE

## 2019-01-01 PROCEDURE — 86901 BLOOD TYPING SEROLOGIC RH(D): CPT | Performed by: PSYCHIATRY & NEUROLOGY

## 2019-01-01 PROCEDURE — 86900 BLOOD TYPING SEROLOGIC ABO: CPT | Performed by: INTERNAL MEDICINE

## 2019-01-01 PROCEDURE — 99223 1ST HOSP IP/OBS HIGH 75: CPT | Mod: GC | Performed by: INTERNAL MEDICINE

## 2019-01-01 PROCEDURE — 40000556 ZZH STATISTIC PERIPHERAL IV START W US GUIDANCE: Mod: ZF

## 2019-01-01 PROCEDURE — 92610 EVALUATE SWALLOWING FUNCTION: CPT | Mod: GN

## 2019-01-01 PROCEDURE — 84443 ASSAY THYROID STIM HORMONE: CPT | Performed by: EMERGENCY MEDICINE

## 2019-01-01 PROCEDURE — 99284 EMERGENCY DEPT VISIT MOD MDM: CPT | Mod: Z6 | Performed by: EMERGENCY MEDICINE

## 2019-01-01 PROCEDURE — 85049 AUTOMATED PLATELET COUNT: CPT | Performed by: PHYSICIAN ASSISTANT

## 2019-01-01 PROCEDURE — 99232 SBSQ HOSP IP/OBS MODERATE 35: CPT | Performed by: INTERNAL MEDICINE

## 2019-01-01 PROCEDURE — 84484 ASSAY OF TROPONIN QUANT: CPT | Performed by: INTERNAL MEDICINE

## 2019-01-01 PROCEDURE — 86850 RBC ANTIBODY SCREEN: CPT | Performed by: PSYCHIATRY & NEUROLOGY

## 2019-01-01 PROCEDURE — 96409 CHEMO IV PUSH SNGL DRUG: CPT

## 2019-01-01 PROCEDURE — 96376 TX/PRO/DX INJ SAME DRUG ADON: CPT | Mod: 59

## 2019-01-01 PROCEDURE — 80048 BASIC METABOLIC PNL TOTAL CA: CPT | Performed by: EMERGENCY MEDICINE

## 2019-01-01 PROCEDURE — 97161 PT EVAL LOW COMPLEX 20 MIN: CPT | Mod: GP

## 2019-01-01 PROCEDURE — 40000141 ZZH STATISTIC PERIPHERAL IV START W/O US GUIDANCE

## 2019-01-01 PROCEDURE — 97161 PT EVAL LOW COMPLEX 20 MIN: CPT | Mod: GP | Performed by: STUDENT IN AN ORGANIZED HEALTH CARE EDUCATION/TRAINING PROGRAM

## 2019-01-01 PROCEDURE — 25000125 ZZHC RX 250: Performed by: PSYCHIATRY & NEUROLOGY

## 2019-01-01 PROCEDURE — 36415 COLL VENOUS BLD VENIPUNCTURE: CPT

## 2019-01-01 PROCEDURE — 94640 AIRWAY INHALATION TREATMENT: CPT | Mod: 76

## 2019-01-01 PROCEDURE — 25000125 ZZHC RX 250: Performed by: PHYSICIAN ASSISTANT

## 2019-01-01 PROCEDURE — 97112 NEUROMUSCULAR REEDUCATION: CPT | Mod: GP | Performed by: STUDENT IN AN ORGANIZED HEALTH CARE EDUCATION/TRAINING PROGRAM

## 2019-01-01 PROCEDURE — 71045 X-RAY EXAM CHEST 1 VIEW: CPT

## 2019-01-01 PROCEDURE — 93010 ELECTROCARDIOGRAM REPORT: CPT | Mod: Z6 | Performed by: INTERNAL MEDICINE

## 2019-01-01 PROCEDURE — 84300 ASSAY OF URINE SODIUM: CPT | Performed by: PHYSICIAN ASSISTANT

## 2019-01-01 PROCEDURE — 97112 NEUROMUSCULAR REEDUCATION: CPT | Mod: GP

## 2019-01-01 PROCEDURE — 99205 OFFICE O/P NEW HI 60 MIN: CPT | Mod: GC | Performed by: INTERNAL MEDICINE

## 2019-01-01 PROCEDURE — 85025 COMPLETE CBC W/AUTO DIFF WBC: CPT | Performed by: STUDENT IN AN ORGANIZED HEALTH CARE EDUCATION/TRAINING PROGRAM

## 2019-01-01 PROCEDURE — 94640 AIRWAY INHALATION TREATMENT: CPT

## 2019-01-01 PROCEDURE — 84132 ASSAY OF SERUM POTASSIUM: CPT | Performed by: PSYCHIATRY & NEUROLOGY

## 2019-01-01 PROCEDURE — 83935 ASSAY OF URINE OSMOLALITY: CPT | Performed by: STUDENT IN AN ORGANIZED HEALTH CARE EDUCATION/TRAINING PROGRAM

## 2019-01-01 PROCEDURE — 86900 BLOOD TYPING SEROLOGIC ABO: CPT | Performed by: PSYCHIATRY & NEUROLOGY

## 2019-01-01 RX ORDER — ATENOLOL 50 MG/1
50 TABLET ORAL DAILY
Status: DISCONTINUED | OUTPATIENT
Start: 2019-01-01 | End: 2019-01-01

## 2019-01-01 RX ORDER — MEPERIDINE HYDROCHLORIDE 50 MG/ML
25 INJECTION INTRAMUSCULAR; INTRAVENOUS; SUBCUTANEOUS EVERY 30 MIN PRN
Status: DISCONTINUED | OUTPATIENT
Start: 2019-01-01 | End: 2019-01-01

## 2019-01-01 RX ORDER — METHYLPREDNISOLONE SODIUM SUCCINATE 125 MG/2ML
125 INJECTION, POWDER, LYOPHILIZED, FOR SOLUTION INTRAMUSCULAR; INTRAVENOUS
Status: CANCELLED | OUTPATIENT
Start: 2019-01-01

## 2019-01-01 RX ORDER — ACETAMINOPHEN 325 MG/1
650 TABLET ORAL ONCE
Status: CANCELLED
Start: 2019-01-01

## 2019-01-01 RX ORDER — PROCHLORPERAZINE MALEATE 5 MG
5-10 TABLET ORAL EVERY 6 HOURS PRN
Status: CANCELLED
Start: 2019-01-01

## 2019-01-01 RX ORDER — DIPHENHYDRAMINE HCL 25 MG
50 CAPSULE ORAL ONCE
Status: CANCELLED
Start: 2019-01-01

## 2019-01-01 RX ORDER — ACETAMINOPHEN 325 MG/1
325 TABLET ORAL 3 TIMES DAILY
Status: DISCONTINUED | OUTPATIENT
Start: 2019-01-01 | End: 2019-01-01

## 2019-01-01 RX ORDER — MEPERIDINE HYDROCHLORIDE 25 MG/ML
25 INJECTION INTRAMUSCULAR; INTRAVENOUS; SUBCUTANEOUS EVERY 30 MIN PRN
Status: CANCELLED | OUTPATIENT
Start: 2019-01-01

## 2019-01-01 RX ORDER — SODIUM CHLORIDE 9 MG/ML
INJECTION, SOLUTION INTRAVENOUS CONTINUOUS PRN
Status: DISCONTINUED | OUTPATIENT
Start: 2019-01-01 | End: 2019-01-01

## 2019-01-01 RX ORDER — DIPHENHYDRAMINE HCL 25 MG
25 CAPSULE ORAL ONCE
Status: COMPLETED | OUTPATIENT
Start: 2019-01-01 | End: 2019-01-01

## 2019-01-01 RX ORDER — ATENOLOL 25 MG/1
25 TABLET ORAL DAILY
Status: DISCONTINUED | OUTPATIENT
Start: 2019-01-01 | End: 2019-01-01

## 2019-01-01 RX ORDER — DIPHENHYDRAMINE HYDROCHLORIDE 50 MG/ML
50 INJECTION INTRAMUSCULAR; INTRAVENOUS
Status: CANCELLED
Start: 2019-01-01

## 2019-01-01 RX ORDER — HEPARIN SODIUM,PORCINE 10 UNIT/ML
5 VIAL (ML) INTRAVENOUS
Status: CANCELLED | OUTPATIENT
Start: 2019-01-01

## 2019-01-01 RX ORDER — LISINOPRIL 10 MG/1
10 TABLET ORAL 2 TIMES DAILY
Status: DISCONTINUED | OUTPATIENT
Start: 2019-01-01 | End: 2019-01-01 | Stop reason: HOSPADM

## 2019-01-01 RX ORDER — METHYLPREDNISOLONE SODIUM SUCCINATE 125 MG/2ML
100 INJECTION, POWDER, LYOPHILIZED, FOR SOLUTION INTRAMUSCULAR; INTRAVENOUS ONCE
Status: CANCELLED | OUTPATIENT
Start: 2019-01-01

## 2019-01-01 RX ORDER — BENZONATATE 100 MG/1
100 CAPSULE ORAL 3 TIMES DAILY PRN
Status: DISCONTINUED | OUTPATIENT
Start: 2019-01-01 | End: 2019-01-01 | Stop reason: HOSPADM

## 2019-01-01 RX ORDER — HEPARIN SODIUM (PORCINE) LOCK FLUSH IV SOLN 100 UNIT/ML 100 UNIT/ML
5 SOLUTION INTRAVENOUS
Status: CANCELLED | OUTPATIENT
Start: 2019-01-01

## 2019-01-01 RX ORDER — HYDROMORPHONE HCL/0.9% NACL/PF 0.2MG/0.2
0.2 SYRINGE (ML) INTRAVENOUS ONCE
Status: COMPLETED | OUTPATIENT
Start: 2019-01-01 | End: 2019-01-01

## 2019-01-01 RX ORDER — PROCHLORPERAZINE 25 MG
12.5 SUPPOSITORY, RECTAL RECTAL EVERY 12 HOURS PRN
Status: DISCONTINUED | OUTPATIENT
Start: 2019-01-01 | End: 2019-01-01 | Stop reason: HOSPADM

## 2019-01-01 RX ORDER — LORAZEPAM 2 MG/ML
.5-1 INJECTION INTRAMUSCULAR EVERY 6 HOURS PRN
Status: CANCELLED
Start: 2019-01-01

## 2019-01-01 RX ORDER — DIPHENHYDRAMINE HYDROCHLORIDE 50 MG/ML
50 INJECTION INTRAMUSCULAR; INTRAVENOUS
Status: DISCONTINUED | OUTPATIENT
Start: 2019-01-01 | End: 2019-01-01

## 2019-01-01 RX ORDER — LISINOPRIL 10 MG/1
10 TABLET ORAL 2 TIMES DAILY
Qty: 90 TABLET | DISCHARGE
Start: 2019-01-01 | End: 2019-01-01

## 2019-01-01 RX ORDER — LORAZEPAM 0.5 MG/1
0.5 TABLET ORAL
Qty: 10 TABLET | Refills: 0 | Status: ON HOLD | OUTPATIENT
Start: 2019-01-01 | End: 2019-01-01

## 2019-01-01 RX ORDER — PROCHLORPERAZINE MALEATE 5 MG
5 TABLET ORAL EVERY 6 HOURS PRN
Status: DISCONTINUED | OUTPATIENT
Start: 2019-01-01 | End: 2019-01-01 | Stop reason: HOSPADM

## 2019-01-01 RX ORDER — ATENOLOL 25 MG/1
25 TABLET ORAL ONCE
Status: COMPLETED | OUTPATIENT
Start: 2019-01-01 | End: 2019-01-01

## 2019-01-01 RX ORDER — METHYLPREDNISOLONE SODIUM SUCCINATE 125 MG/2ML
125 INJECTION, POWDER, LYOPHILIZED, FOR SOLUTION INTRAMUSCULAR; INTRAVENOUS
Status: CANCELLED
Start: 2019-01-01

## 2019-01-01 RX ORDER — SIMVASTATIN 20 MG
20 TABLET ORAL AT BEDTIME
Qty: 30 TABLET | Refills: 3 | DISCHARGE
Start: 2019-01-01 | End: 2020-01-01

## 2019-01-01 RX ORDER — DIPHENHYDRAMINE HCL 12.5MG/5ML
1 LIQUID (ML) ORAL ONCE
Status: DISCONTINUED | OUTPATIENT
Start: 2019-01-01 | End: 2019-01-01 | Stop reason: CLARIF

## 2019-01-01 RX ORDER — POTASSIUM CHLORIDE 7.45 MG/ML
10 INJECTION INTRAVENOUS
Status: DISCONTINUED | OUTPATIENT
Start: 2019-01-01 | End: 2019-01-01 | Stop reason: ALTCHOICE

## 2019-01-01 RX ORDER — MECLIZINE HYDROCHLORIDE 25 MG/1
25 TABLET ORAL 4 TIMES DAILY PRN
Qty: 30 TABLET | Refills: 0 | DISCHARGE
Start: 2019-01-01 | End: 2019-01-01

## 2019-01-01 RX ORDER — SODIUM CHLORIDE 9 MG/ML
INJECTION, SOLUTION INTRAVENOUS CONTINUOUS
Status: DISCONTINUED | OUTPATIENT
Start: 2019-01-01 | End: 2019-01-01

## 2019-01-01 RX ORDER — LORAZEPAM 0.5 MG/1
0.5 TABLET ORAL
Qty: 30 TABLET | Refills: 0 | Status: CANCELLED | DISCHARGE
Start: 2019-01-01

## 2019-01-01 RX ORDER — MEPERIDINE HYDROCHLORIDE 25 MG/ML
25 INJECTION INTRAMUSCULAR; INTRAVENOUS; SUBCUTANEOUS EVERY 30 MIN PRN
Status: DISCONTINUED | OUTPATIENT
Start: 2019-01-01 | End: 2019-01-01

## 2019-01-01 RX ORDER — HYDROCODONE BITARTRATE AND ACETAMINOPHEN 5; 325 MG/1; MG/1
1-2 TABLET ORAL EVERY 6 HOURS PRN
Qty: 30 TABLET | Refills: 0 | Status: CANCELLED | DISCHARGE
Start: 2019-01-01

## 2019-01-01 RX ORDER — POTASSIUM CL/LIDO/0.9 % NACL 10MEQ/0.1L
10 INTRAVENOUS SOLUTION, PIGGYBACK (ML) INTRAVENOUS
Status: DISCONTINUED | OUTPATIENT
Start: 2019-01-01 | End: 2019-01-01 | Stop reason: ALTCHOICE

## 2019-01-01 RX ORDER — ONDANSETRON 4 MG/1
4 TABLET, ORALLY DISINTEGRATING ORAL EVERY 6 HOURS PRN
Status: DISCONTINUED | OUTPATIENT
Start: 2019-01-01 | End: 2019-01-01 | Stop reason: HOSPADM

## 2019-01-01 RX ORDER — PREDNISONE 50 MG/1
50 TABLET ORAL DAILY
Status: CANCELLED | DISCHARGE
Start: 2019-01-01

## 2019-01-01 RX ORDER — ATENOLOL 50 MG/1
50 TABLET ORAL DAILY
Status: DISCONTINUED | OUTPATIENT
Start: 2019-01-01 | End: 2019-01-01 | Stop reason: HOSPADM

## 2019-01-01 RX ORDER — HEPARIN SODIUM 5000 [USP'U]/.5ML
5000 INJECTION, SOLUTION INTRAVENOUS; SUBCUTANEOUS EVERY 12 HOURS
Status: DISCONTINUED | OUTPATIENT
Start: 2019-01-01 | End: 2019-01-01

## 2019-01-01 RX ORDER — PANTOPRAZOLE SODIUM 40 MG/1
40 TABLET, DELAYED RELEASE ORAL
Status: DISCONTINUED | OUTPATIENT
Start: 2019-01-01 | End: 2019-01-01 | Stop reason: HOSPADM

## 2019-01-01 RX ORDER — QUETIAPINE FUMARATE 25 MG/1
25 TABLET, FILM COATED ORAL ONCE
Status: COMPLETED | OUTPATIENT
Start: 2019-01-01 | End: 2019-01-01

## 2019-01-01 RX ORDER — POTASSIUM CHLORIDE 29.8 MG/ML
20 INJECTION INTRAVENOUS
Status: DISCONTINUED | OUTPATIENT
Start: 2019-01-01 | End: 2019-01-01 | Stop reason: ALTCHOICE

## 2019-01-01 RX ORDER — IPRATROPIUM BROMIDE AND ALBUTEROL SULFATE 2.5; .5 MG/3ML; MG/3ML
3 SOLUTION RESPIRATORY (INHALATION)
Status: DISCONTINUED | OUTPATIENT
Start: 2019-01-01 | End: 2019-01-01

## 2019-01-01 RX ORDER — HYDROXYZINE HYDROCHLORIDE 10 MG/1
10 TABLET, FILM COATED ORAL 3 TIMES DAILY PRN
DISCHARGE
Start: 2019-01-01 | End: 2019-01-01

## 2019-01-01 RX ORDER — QUETIAPINE FUMARATE 25 MG/1
25 TABLET, FILM COATED ORAL
Status: DISCONTINUED | OUTPATIENT
Start: 2019-01-01 | End: 2019-01-01 | Stop reason: HOSPADM

## 2019-01-01 RX ORDER — ALBUTEROL SULFATE 0.83 MG/ML
2.5 SOLUTION RESPIRATORY (INHALATION)
Status: CANCELLED | OUTPATIENT
Start: 2019-01-01

## 2019-01-01 RX ORDER — POLYETHYLENE GLYCOL 3350 17 G/17G
17 POWDER, FOR SOLUTION ORAL DAILY PRN
Status: DISCONTINUED | OUTPATIENT
Start: 2019-01-01 | End: 2019-01-01 | Stop reason: HOSPADM

## 2019-01-01 RX ORDER — HYDRALAZINE HYDROCHLORIDE 20 MG/ML
10 INJECTION INTRAMUSCULAR; INTRAVENOUS
Status: DISCONTINUED | OUTPATIENT
Start: 2019-01-01 | End: 2019-01-01

## 2019-01-01 RX ORDER — LISINOPRIL 10 MG/1
10 TABLET ORAL DAILY
Status: DISCONTINUED | OUTPATIENT
Start: 2019-01-01 | End: 2019-01-01

## 2019-01-01 RX ORDER — PREDNISONE 50 MG/1
50 TABLET ORAL DAILY
DISCHARGE
Start: 2019-01-01 | End: 2019-01-01

## 2019-01-01 RX ORDER — MECLIZINE HYDROCHLORIDE 25 MG/1
25 TABLET ORAL 4 TIMES DAILY PRN
Qty: 30 TABLET | Refills: 0 | Status: CANCELLED | DISCHARGE
Start: 2019-01-01

## 2019-01-01 RX ORDER — ATENOLOL 50 MG/1
50 TABLET ORAL DAILY
Qty: 90 TABLET | Refills: 0 | DISCHARGE
Start: 2019-01-01 | End: 2019-01-01

## 2019-01-01 RX ORDER — SIMVASTATIN 10 MG
20 TABLET ORAL AT BEDTIME
Status: DISCONTINUED | OUTPATIENT
Start: 2019-01-01 | End: 2019-01-01 | Stop reason: HOSPADM

## 2019-01-01 RX ORDER — ACETAMINOPHEN 325 MG/1
650 TABLET ORAL WEEKLY
Status: CANCELLED | OUTPATIENT
Start: 2019-01-01

## 2019-01-01 RX ORDER — ALBUTEROL SULFATE 90 UG/1
1-2 AEROSOL, METERED RESPIRATORY (INHALATION)
Status: CANCELLED
Start: 2019-01-01

## 2019-01-01 RX ORDER — LORAZEPAM 0.5 MG/1
.5-1 TABLET ORAL EVERY 6 HOURS PRN
Status: CANCELLED
Start: 2019-01-01

## 2019-01-01 RX ORDER — SIMVASTATIN 20 MG
20 TABLET ORAL AT BEDTIME
Qty: 30 TABLET | Refills: 3 | Status: CANCELLED | DISCHARGE
Start: 2019-01-01

## 2019-01-01 RX ORDER — MEPERIDINE HYDROCHLORIDE 25 MG/ML
25 INJECTION INTRAMUSCULAR; INTRAVENOUS; SUBCUTANEOUS EVERY 30 MIN PRN
Status: DISCONTINUED | OUTPATIENT
Start: 2019-01-01 | End: 2019-01-01 | Stop reason: HOSPADM

## 2019-01-01 RX ORDER — SODIUM CHLORIDE 9 MG/ML
1000 INJECTION, SOLUTION INTRAVENOUS CONTINUOUS
Status: DISCONTINUED | OUTPATIENT
Start: 2019-01-01 | End: 2019-01-01 | Stop reason: HOSPADM

## 2019-01-01 RX ORDER — ACETAMINOPHEN 650 MG/1
650 SUPPOSITORY RECTAL EVERY 4 HOURS PRN
Status: DISCONTINUED | OUTPATIENT
Start: 2019-01-01 | End: 2019-01-01 | Stop reason: HOSPADM

## 2019-01-01 RX ORDER — DIPHENHYDRAMINE HYDROCHLORIDE 50 MG/ML
50 INJECTION INTRAMUSCULAR; INTRAVENOUS
Status: DISCONTINUED | OUTPATIENT
Start: 2019-01-01 | End: 2019-01-01 | Stop reason: HOSPADM

## 2019-01-01 RX ORDER — BISACODYL 10 MG
10 SUPPOSITORY, RECTAL RECTAL DAILY PRN
Status: DISCONTINUED | OUTPATIENT
Start: 2019-01-01 | End: 2019-01-01 | Stop reason: HOSPADM

## 2019-01-01 RX ORDER — LORAZEPAM 0.5 MG/1
0.5 TABLET ORAL
Status: COMPLETED | OUTPATIENT
Start: 2019-01-01 | End: 2019-01-01

## 2019-01-01 RX ORDER — NICOTINE POLACRILEX 4 MG
15-30 LOZENGE BUCCAL
Status: DISCONTINUED | OUTPATIENT
Start: 2019-01-01 | End: 2019-01-01 | Stop reason: HOSPADM

## 2019-01-01 RX ORDER — ALBUTEROL SULFATE 90 UG/1
2 AEROSOL, METERED RESPIRATORY (INHALATION)
Status: DISCONTINUED | OUTPATIENT
Start: 2019-01-01 | End: 2019-01-01 | Stop reason: HOSPADM

## 2019-01-01 RX ORDER — SODIUM CHLORIDE 9 MG/ML
INJECTION, SOLUTION INTRAVENOUS CONTINUOUS PRN
Status: DISCONTINUED | OUTPATIENT
Start: 2019-01-01 | End: 2019-01-01 | Stop reason: HOSPADM

## 2019-01-01 RX ORDER — MAGNESIUM SULFATE HEPTAHYDRATE 40 MG/ML
4 INJECTION, SOLUTION INTRAVENOUS EVERY 4 HOURS PRN
Status: DISCONTINUED | OUTPATIENT
Start: 2019-01-01 | End: 2019-01-01 | Stop reason: ALTCHOICE

## 2019-01-01 RX ORDER — LORAZEPAM 0.5 MG/1
0.5 TABLET ORAL
Qty: 30 TABLET | Refills: 0 | Status: SHIPPED | DISCHARGE
Start: 2019-01-01 | End: 2019-01-01

## 2019-01-01 RX ORDER — METHYLPREDNISOLONE SODIUM SUCCINATE 125 MG/2ML
125 INJECTION, POWDER, LYOPHILIZED, FOR SOLUTION INTRAMUSCULAR; INTRAVENOUS ONCE
Status: COMPLETED | OUTPATIENT
Start: 2019-01-01 | End: 2019-01-01

## 2019-01-01 RX ORDER — ONDANSETRON 2 MG/ML
4 INJECTION INTRAMUSCULAR; INTRAVENOUS EVERY 6 HOURS PRN
Status: DISCONTINUED | OUTPATIENT
Start: 2019-01-01 | End: 2019-01-01 | Stop reason: HOSPADM

## 2019-01-01 RX ORDER — EPINEPHRINE 1 MG/ML
0.3 INJECTION, SOLUTION, CONCENTRATE INTRAVENOUS EVERY 5 MIN PRN
Status: CANCELLED | OUTPATIENT
Start: 2019-01-01

## 2019-01-01 RX ORDER — SODIUM CHLORIDE 9 MG/ML
INJECTION, SOLUTION INTRAVENOUS CONTINUOUS PRN
Status: CANCELLED | OUTPATIENT
Start: 2019-01-01

## 2019-01-01 RX ORDER — HYDRALAZINE HYDROCHLORIDE 20 MG/ML
10 INJECTION INTRAMUSCULAR; INTRAVENOUS
Status: DISCONTINUED | OUTPATIENT
Start: 2019-01-01 | End: 2019-01-01 | Stop reason: HOSPADM

## 2019-01-01 RX ORDER — SIMVASTATIN 20 MG
20 TABLET ORAL AT BEDTIME
Status: DISCONTINUED | OUTPATIENT
Start: 2019-01-01 | End: 2019-01-01 | Stop reason: HOSPADM

## 2019-01-01 RX ORDER — DEXTROSE MONOHYDRATE 25 G/50ML
25-50 INJECTION, SOLUTION INTRAVENOUS
Status: DISCONTINUED | OUTPATIENT
Start: 2019-01-01 | End: 2019-01-01 | Stop reason: HOSPADM

## 2019-01-01 RX ORDER — ACETAMINOPHEN 325 MG/1
325 TABLET ORAL 3 TIMES DAILY
COMMUNITY
End: 2020-01-01

## 2019-01-01 RX ORDER — DIPHENHYDRAMINE HYDROCHLORIDE 50 MG/ML
50 INJECTION INTRAMUSCULAR; INTRAVENOUS
Status: CANCELLED | OUTPATIENT
Start: 2019-01-01

## 2019-01-01 RX ORDER — ALBUTEROL SULFATE 90 UG/1
2 AEROSOL, METERED RESPIRATORY (INHALATION)
Status: CANCELLED | OUTPATIENT
Start: 2019-01-01

## 2019-01-01 RX ORDER — LANOLIN ALCOHOL/MO/W.PET/CERES
3 CREAM (GRAM) TOPICAL ONCE
Status: COMPLETED | OUTPATIENT
Start: 2019-01-01 | End: 2019-01-01

## 2019-01-01 RX ORDER — NALOXONE HYDROCHLORIDE 0.4 MG/ML
.1-.4 INJECTION, SOLUTION INTRAMUSCULAR; INTRAVENOUS; SUBCUTANEOUS
Status: DISCONTINUED | OUTPATIENT
Start: 2019-01-01 | End: 2019-01-01 | Stop reason: HOSPADM

## 2019-01-01 RX ORDER — LORAZEPAM 0.5 MG/1
0.5 TABLET ORAL
Status: DISCONTINUED | OUTPATIENT
Start: 2019-01-01 | End: 2019-01-01 | Stop reason: HOSPADM

## 2019-01-01 RX ORDER — HALOPERIDOL 5 MG/ML
0.5 INJECTION INTRAMUSCULAR
Status: COMPLETED | OUTPATIENT
Start: 2019-01-01 | End: 2019-01-01

## 2019-01-01 RX ORDER — ALBUTEROL SULFATE 0.83 MG/ML
2.5 SOLUTION RESPIRATORY (INHALATION)
Status: DISCONTINUED | OUTPATIENT
Start: 2019-01-01 | End: 2019-01-01 | Stop reason: CLARIF

## 2019-01-01 RX ORDER — ACETAMINOPHEN 325 MG/1
325 TABLET ORAL EVERY 6 HOURS PRN
Status: DISCONTINUED | OUTPATIENT
Start: 2019-01-01 | End: 2019-01-01

## 2019-01-01 RX ORDER — ACETAMINOPHEN 325 MG/1
650 TABLET ORAL ONCE
Status: COMPLETED | OUTPATIENT
Start: 2019-01-01 | End: 2019-01-01

## 2019-01-01 RX ORDER — LABETALOL 20 MG/4 ML (5 MG/ML) INTRAVENOUS SYRINGE
10-20
Status: DISCONTINUED | OUTPATIENT
Start: 2019-01-01 | End: 2019-01-01

## 2019-01-01 RX ORDER — POTASSIUM CHLORIDE 750 MG/1
20-40 TABLET, EXTENDED RELEASE ORAL
Status: DISCONTINUED | OUTPATIENT
Start: 2019-01-01 | End: 2019-01-01 | Stop reason: ALTCHOICE

## 2019-01-01 RX ORDER — DIPHENHYDRAMINE HCL 25 MG
50 CAPSULE ORAL WEEKLY
Status: CANCELLED | OUTPATIENT
Start: 2019-01-01 | End: 2019-01-01

## 2019-01-01 RX ORDER — HYDROXYZINE HYDROCHLORIDE 10 MG/1
10 TABLET, FILM COATED ORAL 3 TIMES DAILY PRN
Status: DISCONTINUED | OUTPATIENT
Start: 2019-01-01 | End: 2019-01-01 | Stop reason: HOSPADM

## 2019-01-01 RX ORDER — LABETALOL 20 MG/4 ML (5 MG/ML) INTRAVENOUS SYRINGE
10-20
Status: DISCONTINUED | OUTPATIENT
Start: 2019-01-01 | End: 2019-01-01 | Stop reason: HOSPADM

## 2019-01-01 RX ORDER — METHYLPREDNISOLONE SODIUM SUCCINATE 125 MG/2ML
125 INJECTION, POWDER, LYOPHILIZED, FOR SOLUTION INTRAMUSCULAR; INTRAVENOUS
Status: DISCONTINUED | OUTPATIENT
Start: 2019-01-01 | End: 2019-01-01

## 2019-01-01 RX ORDER — SODIUM CHLORIDE 1 G/1
1 TABLET ORAL
Status: DISCONTINUED | OUTPATIENT
Start: 2019-01-01 | End: 2019-01-01

## 2019-01-01 RX ORDER — HYDROCODONE BITARTRATE AND ACETAMINOPHEN 5; 325 MG/1; MG/1
1-2 TABLET ORAL EVERY 6 HOURS PRN
Qty: 30 TABLET | Refills: 0 | Status: SHIPPED | DISCHARGE
Start: 2019-01-01 | End: 2019-01-01

## 2019-01-01 RX ORDER — MAGNESIUM SULFATE HEPTAHYDRATE 40 MG/ML
4 INJECTION, SOLUTION INTRAVENOUS EVERY 4 HOURS PRN
Status: DISCONTINUED | OUTPATIENT
Start: 2019-01-01 | End: 2019-01-01 | Stop reason: HOSPADM

## 2019-01-01 RX ORDER — METHYLPREDNISOLONE SODIUM SUCCINATE 40 MG/ML
40 INJECTION, POWDER, LYOPHILIZED, FOR SOLUTION INTRAMUSCULAR; INTRAVENOUS DAILY
Status: DISCONTINUED | OUTPATIENT
Start: 2019-01-01 | End: 2019-01-01

## 2019-01-01 RX ORDER — ATENOLOL 50 MG/1
50 TABLET ORAL DAILY
Qty: 90 TABLET | Refills: 0 | Status: CANCELLED | DISCHARGE
Start: 2019-01-01

## 2019-01-01 RX ORDER — DIPHENHYDRAMINE HYDROCHLORIDE 50 MG/ML
1 INJECTION INTRAMUSCULAR; INTRAVENOUS
Status: DISCONTINUED | OUTPATIENT
Start: 2019-01-01 | End: 2019-01-01 | Stop reason: CLARIF

## 2019-01-01 RX ORDER — ALBUTEROL SULFATE 90 UG/1
1-2 AEROSOL, METERED RESPIRATORY (INHALATION)
Status: DISCONTINUED | OUTPATIENT
Start: 2019-01-01 | End: 2019-01-01 | Stop reason: CLARIF

## 2019-01-01 RX ORDER — SODIUM CHLORIDE 9 MG/ML
1000 INJECTION, SOLUTION INTRAVENOUS CONTINUOUS PRN
Status: CANCELLED
Start: 2019-01-01

## 2019-01-01 RX ORDER — METHYLPREDNISOLONE SODIUM SUCCINATE 125 MG/2ML
125 INJECTION, POWDER, LYOPHILIZED, FOR SOLUTION INTRAMUSCULAR; INTRAVENOUS
Status: DISCONTINUED | OUTPATIENT
Start: 2019-01-01 | End: 2019-01-01 | Stop reason: HOSPADM

## 2019-01-01 RX ORDER — ALBUTEROL SULFATE 90 UG/1
2 AEROSOL, METERED RESPIRATORY (INHALATION)
Status: DISCONTINUED | OUTPATIENT
Start: 2019-01-01 | End: 2019-01-01

## 2019-01-01 RX ORDER — DIPHENHYDRAMINE HCL 25 MG
50 CAPSULE ORAL ONCE
Status: COMPLETED | OUTPATIENT
Start: 2019-01-01 | End: 2019-01-01

## 2019-01-01 RX ORDER — POTASSIUM CHLORIDE 1.5 G/1.58G
20-40 POWDER, FOR SOLUTION ORAL
Status: DISCONTINUED | OUTPATIENT
Start: 2019-01-01 | End: 2019-01-01 | Stop reason: ALTCHOICE

## 2019-01-01 RX ORDER — LISINOPRIL 10 MG/1
10 TABLET ORAL DAILY
Qty: 90 TABLET | DISCHARGE
Start: 2019-01-01 | End: 2019-01-01

## 2019-01-01 RX ORDER — ALBUTEROL SULFATE 90 UG/1
1-2 AEROSOL, METERED RESPIRATORY (INHALATION)
Status: CANCELLED | OUTPATIENT
Start: 2019-01-01

## 2019-01-01 RX ORDER — POTASSIUM CHLORIDE 1.5 G/1.58G
20-40 POWDER, FOR SOLUTION ORAL
Status: DISCONTINUED | OUTPATIENT
Start: 2019-01-01 | End: 2019-01-01 | Stop reason: HOSPADM

## 2019-01-01 RX ORDER — IOPAMIDOL 755 MG/ML
70 INJECTION, SOLUTION INTRAVASCULAR ONCE
Status: COMPLETED | OUTPATIENT
Start: 2019-01-01 | End: 2019-01-01

## 2019-01-01 RX ORDER — BENZONATATE 100 MG/1
100 CAPSULE ORAL 3 TIMES DAILY PRN
DISCHARGE
Start: 2019-01-01 | End: 2019-01-01

## 2019-01-01 RX ORDER — POTASSIUM CHLORIDE 750 MG/1
20-40 TABLET, EXTENDED RELEASE ORAL
Status: DISCONTINUED | OUTPATIENT
Start: 2019-01-01 | End: 2019-01-01 | Stop reason: HOSPADM

## 2019-01-01 RX ORDER — AMOXICILLIN 250 MG
1 CAPSULE ORAL 2 TIMES DAILY PRN
Status: DISCONTINUED | OUTPATIENT
Start: 2019-01-01 | End: 2019-01-01 | Stop reason: HOSPADM

## 2019-01-01 RX ORDER — POTASSIUM CHLORIDE 29.8 MG/ML
20 INJECTION INTRAVENOUS
Status: DISCONTINUED | OUTPATIENT
Start: 2019-01-01 | End: 2019-01-01 | Stop reason: HOSPADM

## 2019-01-01 RX ORDER — ALBUTEROL SULFATE 0.83 MG/ML
2.5 SOLUTION RESPIRATORY (INHALATION)
Status: DISCONTINUED | OUTPATIENT
Start: 2019-01-01 | End: 2019-01-01 | Stop reason: HOSPADM

## 2019-01-01 RX ORDER — HYDRALAZINE HYDROCHLORIDE 10 MG/1
10 TABLET, FILM COATED ORAL DAILY PRN
COMMUNITY
Start: 2019-01-01 | End: 2020-01-01

## 2019-01-01 RX ORDER — MEPERIDINE HYDROCHLORIDE 50 MG/ML
25 INJECTION INTRAMUSCULAR; INTRAVENOUS; SUBCUTANEOUS EVERY 30 MIN PRN
Status: CANCELLED | OUTPATIENT
Start: 2019-01-01

## 2019-01-01 RX ORDER — IPRATROPIUM BROMIDE AND ALBUTEROL SULFATE 2.5; .5 MG/3ML; MG/3ML
3 SOLUTION RESPIRATORY (INHALATION) EVERY 4 HOURS PRN
Status: DISCONTINUED | OUTPATIENT
Start: 2019-01-01 | End: 2019-01-01 | Stop reason: HOSPADM

## 2019-01-01 RX ORDER — SODIUM CHLORIDE 1 G/1
1 TABLET ORAL DAILY
Status: COMPLETED | OUTPATIENT
Start: 2019-01-01 | End: 2019-01-01

## 2019-01-01 RX ORDER — ACETAMINOPHEN 325 MG/1
650 TABLET ORAL EVERY 6 HOURS PRN
Status: DISCONTINUED | OUTPATIENT
Start: 2019-01-01 | End: 2019-01-01 | Stop reason: HOSPADM

## 2019-01-01 RX ORDER — SODIUM CHLORIDE 9 MG/ML
INJECTION, SOLUTION INTRAVENOUS CONTINUOUS PRN
Status: DISCONTINUED | OUTPATIENT
Start: 2019-01-01 | End: 2019-01-01 | Stop reason: CLARIF

## 2019-01-01 RX ORDER — LIDOCAINE 40 MG/G
CREAM TOPICAL
Status: DISCONTINUED | OUTPATIENT
Start: 2019-01-01 | End: 2019-01-01 | Stop reason: HOSPADM

## 2019-01-01 RX ORDER — ACETAMINOPHEN 325 MG/1
650 TABLET ORAL EVERY 4 HOURS PRN
Status: DISCONTINUED | OUTPATIENT
Start: 2019-01-01 | End: 2019-01-01 | Stop reason: HOSPADM

## 2019-01-01 RX ORDER — HYDROCODONE BITARTRATE AND ACETAMINOPHEN 5; 325 MG/1; MG/1
1-2 TABLET ORAL EVERY 6 HOURS PRN
Qty: 60 TABLET | Refills: 0 | Status: SHIPPED | OUTPATIENT
Start: 2019-01-01 | End: 2019-01-01

## 2019-01-01 RX ORDER — POTASSIUM CHLORIDE 7.45 MG/ML
10 INJECTION INTRAVENOUS
Status: DISCONTINUED | OUTPATIENT
Start: 2019-01-01 | End: 2019-01-01 | Stop reason: HOSPADM

## 2019-01-01 RX ORDER — PANTOPRAZOLE SODIUM 40 MG/1
40 TABLET, DELAYED RELEASE ORAL
DISCHARGE
Start: 2019-01-01 | End: 2020-05-15

## 2019-01-01 RX ORDER — ALBUTEROL SULFATE 0.83 MG/ML
2.5 SOLUTION RESPIRATORY (INHALATION)
Status: DISCONTINUED | OUTPATIENT
Start: 2019-01-01 | End: 2019-01-01

## 2019-01-01 RX ORDER — POTASSIUM CL/LIDO/0.9 % NACL 10MEQ/0.1L
10 INTRAVENOUS SOLUTION, PIGGYBACK (ML) INTRAVENOUS
Status: DISCONTINUED | OUTPATIENT
Start: 2019-01-01 | End: 2019-01-01 | Stop reason: HOSPADM

## 2019-01-01 RX ORDER — DIPHENHYDRAMINE HYDROCHLORIDE 50 MG/ML
1 INJECTION INTRAMUSCULAR; INTRAVENOUS
Status: CANCELLED | OUTPATIENT
Start: 2019-01-01

## 2019-01-01 RX ADMIN — BENZOCAINE, MENTHOL 1 LOZENGE: 15; 3.6 LOZENGE ORAL at 16:36

## 2019-01-01 RX ADMIN — SIMVASTATIN 20 MG: 20 TABLET, FILM COATED ORAL at 22:25

## 2019-01-01 RX ADMIN — ATENOLOL 50 MG: 50 TABLET ORAL at 08:07

## 2019-01-01 RX ADMIN — INSULIN ASPART 4 UNITS: 100 INJECTION, SOLUTION INTRAVENOUS; SUBCUTANEOUS at 13:30

## 2019-01-01 RX ADMIN — ATENOLOL 50 MG: 50 TABLET ORAL at 07:54

## 2019-01-01 RX ADMIN — IPRATROPIUM BROMIDE AND ALBUTEROL SULFATE 3 ML: .5; 3 SOLUTION RESPIRATORY (INHALATION) at 12:18

## 2019-01-01 RX ADMIN — ACETAMINOPHEN 650 MG: 325 TABLET, FILM COATED ORAL at 21:20

## 2019-01-01 RX ADMIN — COAGULATION FACTOR VIIA (RECOMBINANT) 4000 MCG: KIT at 16:03

## 2019-01-01 RX ADMIN — RITUXIMAB 540 MG: 10 INJECTION, SOLUTION INTRAVENOUS at 15:44

## 2019-01-01 RX ADMIN — HYDRALAZINE HYDROCHLORIDE 10 MG: 20 INJECTION INTRAMUSCULAR; INTRAVENOUS at 01:40

## 2019-01-01 RX ADMIN — METHYLPREDNISOLONE SODIUM SUCCINATE 40 MG: 40 INJECTION, POWDER, LYOPHILIZED, FOR SOLUTION INTRAMUSCULAR; INTRAVENOUS at 15:29

## 2019-01-01 RX ADMIN — HYDROXYZINE HYDROCHLORIDE 10 MG: 10 TABLET ORAL at 17:51

## 2019-01-01 RX ADMIN — INSULIN ASPART 1 UNITS: 100 INJECTION, SOLUTION INTRAVENOUS; SUBCUTANEOUS at 13:56

## 2019-01-01 RX ADMIN — PANTOPRAZOLE SODIUM 40 MG: 40 TABLET, DELAYED RELEASE ORAL at 07:41

## 2019-01-01 RX ADMIN — PREDNISONE 50 MG: 10 TABLET ORAL at 07:50

## 2019-01-01 RX ADMIN — COAGULATION FACTOR VIIA (RECOMBINANT) 4000 MCG: KIT at 21:56

## 2019-01-01 RX ADMIN — LISINOPRIL 10 MG: 10 TABLET ORAL at 21:21

## 2019-01-01 RX ADMIN — ACETAMINOPHEN 650 MG: 325 TABLET ORAL at 14:12

## 2019-01-01 RX ADMIN — BENZOCAINE, MENTHOL 1 LOZENGE: 15; 3.6 LOZENGE ORAL at 15:26

## 2019-01-01 RX ADMIN — INSULIN ASPART 1 UNITS: 100 INJECTION, SOLUTION INTRAVENOUS; SUBCUTANEOUS at 17:35

## 2019-01-01 RX ADMIN — ATENOLOL 50 MG: 50 TABLET ORAL at 08:15

## 2019-01-01 RX ADMIN — INSULIN ASPART 2 UNITS: 100 INJECTION, SOLUTION INTRAVENOUS; SUBCUTANEOUS at 13:40

## 2019-01-01 RX ADMIN — PANTOPRAZOLE SODIUM 40 MG: 40 TABLET, DELAYED RELEASE ORAL at 07:35

## 2019-01-01 RX ADMIN — PANTOPRAZOLE SODIUM 40 MG: 40 TABLET, DELAYED RELEASE ORAL at 09:35

## 2019-01-01 RX ADMIN — ATENOLOL 50 MG: 50 TABLET ORAL at 08:56

## 2019-01-01 RX ADMIN — ATENOLOL 50 MG: 50 TABLET ORAL at 07:33

## 2019-01-01 RX ADMIN — COAGULATION FACTOR VIIA (RECOMBINANT) 4000 MCG: KIT at 09:29

## 2019-01-01 RX ADMIN — LISINOPRIL 10 MG: 10 TABLET ORAL at 21:20

## 2019-01-01 RX ADMIN — NICARDIPINE HYDROCHLORIDE 2.5 MG/HR: 0.2 INJECTION, SOLUTION INTRAVENOUS at 23:01

## 2019-01-01 RX ADMIN — PREDNISONE 50 MG: 5 TABLET ORAL at 07:50

## 2019-01-01 RX ADMIN — METHYLPREDNISOLONE SODIUM SUCCINATE 125 MG: 125 INJECTION, POWDER, FOR SOLUTION INTRAMUSCULAR; INTRAVENOUS at 23:47

## 2019-01-01 RX ADMIN — ATENOLOL 50 MG: 50 TABLET ORAL at 08:00

## 2019-01-01 RX ADMIN — SIMVASTATIN 20 MG: 10 TABLET, FILM COATED ORAL at 23:32

## 2019-01-01 RX ADMIN — PANTOPRAZOLE SODIUM 40 MG: 40 TABLET, DELAYED RELEASE ORAL at 08:15

## 2019-01-01 RX ADMIN — METHYLPREDNISOLONE SODIUM SUCCINATE 40 MG: 40 INJECTION, POWDER, LYOPHILIZED, FOR SOLUTION INTRAMUSCULAR; INTRAVENOUS at 09:34

## 2019-01-01 RX ADMIN — COAGULATION FACTOR VIIA (RECOMBINANT) 4000 MCG: KIT at 21:00

## 2019-01-01 RX ADMIN — ATENOLOL 50 MG: 50 TABLET ORAL at 07:49

## 2019-01-01 RX ADMIN — LABETALOL 20 MG/4 ML (5 MG/ML) INTRAVENOUS SYRINGE 10 MG: at 09:27

## 2019-01-01 RX ADMIN — HYDROXYZINE HYDROCHLORIDE 10 MG: 10 TABLET ORAL at 07:54

## 2019-01-01 RX ADMIN — INSULIN ASPART 3 UNITS: 100 INJECTION, SOLUTION INTRAVENOUS; SUBCUTANEOUS at 12:56

## 2019-01-01 RX ADMIN — COAGULATION FACTOR VIIA (RECOMBINANT) 4000 MCG: KIT at 10:39

## 2019-01-01 RX ADMIN — COAGULATION FACTOR VIIA (RECOMBINANT) 4000 MCG: KIT at 12:59

## 2019-01-01 RX ADMIN — COAGULATION FACTOR VIIA (RECOMBINANT) 4000 MCG: KIT at 10:05

## 2019-01-01 RX ADMIN — INSULIN ASPART 2 UNITS: 100 INJECTION, SOLUTION INTRAVENOUS; SUBCUTANEOUS at 12:56

## 2019-01-01 RX ADMIN — DIPHENHYDRAMINE HYDROCHLORIDE 50 MG: 25 CAPSULE ORAL at 14:12

## 2019-01-01 RX ADMIN — INSULIN ASPART 1 UNITS: 100 INJECTION, SOLUTION INTRAVENOUS; SUBCUTANEOUS at 17:56

## 2019-01-01 RX ADMIN — BENZOCAINE, MENTHOL 1 LOZENGE: 15; 3.6 LOZENGE ORAL at 07:46

## 2019-01-01 RX ADMIN — SODIUM CHLORIDE: 9 INJECTION, SOLUTION INTRAVENOUS at 03:18

## 2019-01-01 RX ADMIN — COAGULATION FACTOR VIIA (RECOMBINANT) 4000 MCG: KIT at 05:25

## 2019-01-01 RX ADMIN — MELATONIN 1000 UNITS: at 07:50

## 2019-01-01 RX ADMIN — COAGULATION FACTOR VIIA (RECOMBINANT) 4000 MCG: KIT at 22:26

## 2019-01-01 RX ADMIN — RITUXIMAB 500 MG: 10 INJECTION, SOLUTION INTRAVENOUS at 14:39

## 2019-01-01 RX ADMIN — SODIUM CHLORIDE TAB 1 GM 1 G: 1 TAB at 07:59

## 2019-01-01 RX ADMIN — IPRATROPIUM BROMIDE AND ALBUTEROL SULFATE 3 ML: .5; 3 SOLUTION RESPIRATORY (INHALATION) at 15:59

## 2019-01-01 RX ADMIN — LORAZEPAM 0.5 MG: 0.5 TABLET ORAL at 17:18

## 2019-01-01 RX ADMIN — PREDNISONE 50 MG: 10 TABLET ORAL at 08:58

## 2019-01-01 RX ADMIN — DIPHENHYDRAMINE HYDROCHLORIDE 25 MG: 25 CAPSULE ORAL at 17:08

## 2019-01-01 RX ADMIN — METHYLPREDNISOLONE SODIUM SUCCINATE 40 MG: 40 INJECTION, POWDER, LYOPHILIZED, FOR SOLUTION INTRAMUSCULAR; INTRAVENOUS at 07:42

## 2019-01-01 RX ADMIN — SIMVASTATIN 20 MG: 10 TABLET, FILM COATED ORAL at 21:21

## 2019-01-01 RX ADMIN — HYDROXYZINE HYDROCHLORIDE 10 MG: 10 TABLET ORAL at 16:23

## 2019-01-01 RX ADMIN — ATENOLOL 50 MG: 50 TABLET ORAL at 10:52

## 2019-01-01 RX ADMIN — PREDNISONE 50 MG: 5 TABLET ORAL at 07:41

## 2019-01-01 RX ADMIN — BISACODYL 10 MG: 10 SUPPOSITORY RECTAL at 16:09

## 2019-01-01 RX ADMIN — ACETAMINOPHEN 650 MG: 325 TABLET, FILM COATED ORAL at 15:39

## 2019-01-01 RX ADMIN — SIMVASTATIN 20 MG: 20 TABLET, FILM COATED ORAL at 21:41

## 2019-01-01 RX ADMIN — ACETAMINOPHEN 325 MG: 325 TABLET, FILM COATED ORAL at 08:56

## 2019-01-01 RX ADMIN — LORAZEPAM 0.5 MG: 0.5 TABLET ORAL at 22:04

## 2019-01-01 RX ADMIN — PREDNISONE 50 MG: 10 TABLET ORAL at 09:40

## 2019-01-01 RX ADMIN — LABETALOL 20 MG/4 ML (5 MG/ML) INTRAVENOUS SYRINGE 10 MG: at 19:34

## 2019-01-01 RX ADMIN — PANTOPRAZOLE SODIUM 40 MG: 40 TABLET, DELAYED RELEASE ORAL at 07:52

## 2019-01-01 RX ADMIN — COAGULATION FACTOR VIIA (RECOMBINANT) 4 MG: KIT at 00:55

## 2019-01-01 RX ADMIN — COAGULATION FACTOR VIIA (RECOMBINANT) 4000 MCG: KIT at 09:35

## 2019-01-01 RX ADMIN — HYDROXYZINE HYDROCHLORIDE 10 MG: 10 TABLET ORAL at 15:39

## 2019-01-01 RX ADMIN — LABETALOL 20 MG/4 ML (5 MG/ML) INTRAVENOUS SYRINGE 10 MG: at 12:07

## 2019-01-01 RX ADMIN — ACETAMINOPHEN 325 MG: 325 TABLET, FILM COATED ORAL at 18:58

## 2019-01-01 RX ADMIN — PREDNISONE 50 MG: 10 TABLET ORAL at 07:35

## 2019-01-01 RX ADMIN — COAGULATION FACTOR VIIA (RECOMBINANT) 4000 MCG: KIT at 23:13

## 2019-01-01 RX ADMIN — SIMVASTATIN 20 MG: 10 TABLET, FILM COATED ORAL at 22:52

## 2019-01-01 RX ADMIN — SIMVASTATIN 20 MG: 10 TABLET, FILM COATED ORAL at 21:20

## 2019-01-01 RX ADMIN — ACETAMINOPHEN 325 MG: 325 TABLET, FILM COATED ORAL at 13:07

## 2019-01-01 RX ADMIN — PREDNISONE 50 MG: 10 TABLET ORAL at 08:07

## 2019-01-01 RX ADMIN — INSULIN ASPART 1 UNITS: 100 INJECTION, SOLUTION INTRAVENOUS; SUBCUTANEOUS at 12:56

## 2019-01-01 RX ADMIN — SODIUM CHLORIDE TAB 1 GM 1 G: 1 TAB at 20:20

## 2019-01-01 RX ADMIN — POTASSIUM CHLORIDE 20 MEQ: 750 TABLET, EXTENDED RELEASE ORAL at 22:30

## 2019-01-01 RX ADMIN — LISINOPRIL 10 MG: 10 TABLET ORAL at 20:59

## 2019-01-01 RX ADMIN — SODIUM CHLORIDE TAB 1 GM 1 G: 1 TAB at 12:56

## 2019-01-01 RX ADMIN — SIMVASTATIN 20 MG: 10 TABLET, FILM COATED ORAL at 19:46

## 2019-01-01 RX ADMIN — LISINOPRIL 10 MG: 10 TABLET ORAL at 07:59

## 2019-01-01 RX ADMIN — PANTOPRAZOLE SODIUM 40 MG: 40 TABLET, DELAYED RELEASE ORAL at 07:49

## 2019-01-01 RX ADMIN — PANTOPRAZOLE SODIUM 40 MG: 40 TABLET, DELAYED RELEASE ORAL at 09:41

## 2019-01-01 RX ADMIN — ACETAMINOPHEN 650 MG: 325 TABLET, FILM COATED ORAL at 10:09

## 2019-01-01 RX ADMIN — MELATONIN 1000 UNITS: at 07:52

## 2019-01-01 RX ADMIN — COAGULATION FACTOR VIIA (RECOMBINANT) 4000 MCG: KIT at 04:50

## 2019-01-01 RX ADMIN — ACETAMINOPHEN 650 MG: 325 SOLUTION ORAL at 16:08

## 2019-01-01 RX ADMIN — MELATONIN 1000 UNITS: at 07:32

## 2019-01-01 RX ADMIN — ACETAMINOPHEN 650 MG: 325 TABLET, FILM COATED ORAL at 20:55

## 2019-01-01 RX ADMIN — ATENOLOL 25 MG: 25 TABLET ORAL at 14:09

## 2019-01-01 RX ADMIN — LABETALOL 20 MG/4 ML (5 MG/ML) INTRAVENOUS SYRINGE 10 MG: at 19:02

## 2019-01-01 RX ADMIN — ACETAMINOPHEN 650 MG: 325 TABLET, FILM COATED ORAL at 17:51

## 2019-01-01 RX ADMIN — PANTOPRAZOLE SODIUM 40 MG: 40 TABLET, DELAYED RELEASE ORAL at 08:56

## 2019-01-01 RX ADMIN — MELATONIN 1000 UNITS: at 08:07

## 2019-01-01 RX ADMIN — ACETAMINOPHEN 650 MG: 325 TABLET, FILM COATED ORAL at 19:51

## 2019-01-01 RX ADMIN — QUETIAPINE FUMARATE 25 MG: 25 TABLET ORAL at 00:40

## 2019-01-01 RX ADMIN — LISINOPRIL 10 MG: 10 TABLET ORAL at 07:50

## 2019-01-01 RX ADMIN — ACETAMINOPHEN 650 MG: 325 TABLET, FILM COATED ORAL at 17:08

## 2019-01-01 RX ADMIN — MELATONIN TAB 3 MG 3 MG: 3 TAB at 21:48

## 2019-01-01 RX ADMIN — PANTOPRAZOLE SODIUM 40 MG: 40 INJECTION, POWDER, LYOPHILIZED, FOR SOLUTION INTRAVENOUS at 09:22

## 2019-01-01 RX ADMIN — PREDNISONE 50 MG: 10 TABLET ORAL at 08:34

## 2019-01-01 RX ADMIN — ACETAMINOPHEN 650 MG: 325 TABLET, FILM COATED ORAL at 13:11

## 2019-01-01 RX ADMIN — POTASSIUM CHLORIDE 20 MEQ: 1.5 POWDER, FOR SOLUTION ORAL at 08:10

## 2019-01-01 RX ADMIN — Medication 1 MG: at 21:10

## 2019-01-01 RX ADMIN — LISINOPRIL 10 MG: 10 TABLET ORAL at 20:22

## 2019-01-01 RX ADMIN — LORAZEPAM 0.5 MG: 0.5 TABLET ORAL at 21:16

## 2019-01-01 RX ADMIN — COAGULATION FACTOR VIIA (RECOMBINANT) 4000 MCG: KIT at 04:04

## 2019-01-01 RX ADMIN — INSULIN ASPART 1 UNITS: 100 INJECTION, SOLUTION INTRAVENOUS; SUBCUTANEOUS at 19:43

## 2019-01-01 RX ADMIN — LISINOPRIL 10 MG: 10 TABLET ORAL at 07:41

## 2019-01-01 RX ADMIN — PANTOPRAZOLE SODIUM 40 MG: 40 TABLET, DELAYED RELEASE ORAL at 08:07

## 2019-01-01 RX ADMIN — HYDRALAZINE HYDROCHLORIDE 10 MG: 20 INJECTION INTRAMUSCULAR; INTRAVENOUS at 21:00

## 2019-01-01 RX ADMIN — BENZOCAINE, MENTHOL 1 LOZENGE: 15; 3.6 LOZENGE ORAL at 18:16

## 2019-01-01 RX ADMIN — COAGULATION FACTOR VIIA (RECOMBINANT) 4000 MCG: KIT at 03:23

## 2019-01-01 RX ADMIN — LABETALOL 20 MG/4 ML (5 MG/ML) INTRAVENOUS SYRINGE 10 MG: at 17:54

## 2019-01-01 RX ADMIN — SIMVASTATIN 20 MG: 20 TABLET, FILM COATED ORAL at 21:59

## 2019-01-01 RX ADMIN — HYDRALAZINE HYDROCHLORIDE 10 MG: 20 INJECTION INTRAMUSCULAR; INTRAVENOUS at 07:56

## 2019-01-01 RX ADMIN — SODIUM CHLORIDE 1000 ML: 9 INJECTION, SOLUTION INTRAVENOUS at 23:05

## 2019-01-01 RX ADMIN — LABETALOL 20 MG/4 ML (5 MG/ML) INTRAVENOUS SYRINGE 20 MG: at 00:49

## 2019-01-01 RX ADMIN — PREDNISONE 50 MG: 5 TABLET ORAL at 07:59

## 2019-01-01 RX ADMIN — LORAZEPAM 0.5 MG: 0.5 TABLET ORAL at 21:53

## 2019-01-01 RX ADMIN — SODIUM CHLORIDE TAB 1 GM 1 G: 1 TAB at 07:41

## 2019-01-01 RX ADMIN — POLYETHYLENE GLYCOL 3350 17 G: 17 POWDER, FOR SOLUTION ORAL at 10:28

## 2019-01-01 RX ADMIN — INSULIN ASPART 1 UNITS: 100 INJECTION, SOLUTION INTRAVENOUS; SUBCUTANEOUS at 19:49

## 2019-01-01 RX ADMIN — LISINOPRIL 10 MG: 10 TABLET ORAL at 08:07

## 2019-01-01 RX ADMIN — HYDRALAZINE HYDROCHLORIDE 10 MG: 20 INJECTION INTRAMUSCULAR; INTRAVENOUS at 22:31

## 2019-01-01 RX ADMIN — BENZONATATE 100 MG: 100 CAPSULE ORAL at 07:45

## 2019-01-01 RX ADMIN — BENZOCAINE, MENTHOL 1 LOZENGE: 15; 3.6 LOZENGE ORAL at 16:13

## 2019-01-01 RX ADMIN — SIMVASTATIN 20 MG: 20 TABLET, FILM COATED ORAL at 21:53

## 2019-01-01 RX ADMIN — INSULIN ASPART 2 UNITS: 100 INJECTION, SOLUTION INTRAVENOUS; SUBCUTANEOUS at 18:12

## 2019-01-01 RX ADMIN — LISINOPRIL 10 MG: 10 TABLET ORAL at 19:45

## 2019-01-01 RX ADMIN — IOPAMIDOL 70 ML: 755 INJECTION, SOLUTION INTRAVENOUS at 22:49

## 2019-01-01 RX ADMIN — ACETAMINOPHEN 650 MG: 325 TABLET, FILM COATED ORAL at 21:00

## 2019-01-01 RX ADMIN — SIMVASTATIN 20 MG: 10 TABLET, FILM COATED ORAL at 21:40

## 2019-01-01 RX ADMIN — COAGULATION FACTOR VIIA (RECOMBINANT) 4000 MCG: KIT at 06:52

## 2019-01-01 RX ADMIN — Medication 12.5 MG: at 09:35

## 2019-01-01 RX ADMIN — LABETALOL 20 MG/4 ML (5 MG/ML) INTRAVENOUS SYRINGE 10 MG: at 15:32

## 2019-01-01 RX ADMIN — SIMVASTATIN 20 MG: 20 TABLET, FILM COATED ORAL at 22:10

## 2019-01-01 RX ADMIN — ACETAMINOPHEN 650 MG: 325 TABLET, FILM COATED ORAL at 14:56

## 2019-01-01 RX ADMIN — SODIUM CHLORIDE: 9 INJECTION, SOLUTION INTRAVENOUS at 05:28

## 2019-01-01 RX ADMIN — LISINOPRIL 10 MG: 10 TABLET ORAL at 22:44

## 2019-01-01 RX ADMIN — SIMVASTATIN 20 MG: 20 TABLET, FILM COATED ORAL at 21:02

## 2019-01-01 RX ADMIN — COAGULATION FACTOR VIIA (RECOMBINANT) 4000 MCG: KIT at 16:38

## 2019-01-01 RX ADMIN — COAGULATION FACTOR VIIA (RECOMBINANT) 4000 MCG: KIT at 01:04

## 2019-01-01 RX ADMIN — LISINOPRIL 10 MG: 10 TABLET ORAL at 12:53

## 2019-01-01 RX ADMIN — ACETAMINOPHEN 650 MG: 325 TABLET, FILM COATED ORAL at 09:41

## 2019-01-01 RX ADMIN — ATENOLOL 50 MG: 50 TABLET ORAL at 09:34

## 2019-01-01 RX ADMIN — COAGULATION FACTOR VIIA (RECOMBINANT) 4000 MCG: KIT at 05:19

## 2019-01-01 RX ADMIN — LISINOPRIL 10 MG: 10 TABLET ORAL at 07:52

## 2019-01-01 RX ADMIN — COAGULATION FACTOR VIIA (RECOMBINANT) 4000 MCG: KIT at 09:57

## 2019-01-01 RX ADMIN — COAGULATION FACTOR VIIA (RECOMBINANT) 4000 MCG: KIT at 02:09

## 2019-01-01 RX ADMIN — POTASSIUM PHOSPHATE, MONOBASIC AND POTASSIUM PHOSPHATE, DIBASIC 15 MMOL: 224; 236 INJECTION, SOLUTION INTRAVENOUS at 06:33

## 2019-01-01 RX ADMIN — ACETAMINOPHEN 650 MG: 325 TABLET, FILM COATED ORAL at 16:13

## 2019-01-01 RX ADMIN — ATENOLOL 75 MG: 25 TABLET ORAL at 09:40

## 2019-01-01 RX ADMIN — SIMVASTATIN 20 MG: 20 TABLET, FILM COATED ORAL at 21:16

## 2019-01-01 RX ADMIN — RITUXIMAB 540 MG: 10 INJECTION, SOLUTION INTRAVENOUS at 17:45

## 2019-01-01 RX ADMIN — ACETAMINOPHEN 650 MG: 325 TABLET, FILM COATED ORAL at 15:00

## 2019-01-01 RX ADMIN — MELATONIN 1000 UNITS: at 08:56

## 2019-01-01 RX ADMIN — ACETAMINOPHEN 650 MG: 325 TABLET, FILM COATED ORAL at 15:19

## 2019-01-01 RX ADMIN — LISINOPRIL 10 MG: 10 TABLET ORAL at 07:30

## 2019-01-01 RX ADMIN — SODIUM CHLORIDE: 9 INJECTION, SOLUTION INTRAVENOUS at 04:50

## 2019-01-01 RX ADMIN — SODIUM CHLORIDE TAB 1 GM 1 G: 1 TAB at 09:34

## 2019-01-01 RX ADMIN — ATENOLOL 50 MG: 50 TABLET ORAL at 07:41

## 2019-01-01 RX ADMIN — DIPHENHYDRAMINE HYDROCHLORIDE 25 MG: 25 CAPSULE ORAL at 15:00

## 2019-01-01 RX ADMIN — COAGULATION FACTOR VIIA (RECOMBINANT) 4000 MCG: KIT at 21:50

## 2019-01-01 RX ADMIN — SIMVASTATIN 20 MG: 20 TABLET, FILM COATED ORAL at 04:07

## 2019-01-01 RX ADMIN — LISINOPRIL 12.5 MG: 2.5 TABLET ORAL at 09:34

## 2019-01-01 RX ADMIN — LISINOPRIL 10 MG: 10 TABLET ORAL at 08:36

## 2019-01-01 RX ADMIN — LORAZEPAM 0.5 MG: 0.5 TABLET ORAL at 18:28

## 2019-01-01 RX ADMIN — COAGULATION FACTOR VIIA (RECOMBINANT) 4000 MCG: KIT at 16:43

## 2019-01-01 RX ADMIN — ACETAMINOPHEN 650 MG: 325 TABLET, FILM COATED ORAL at 08:36

## 2019-01-01 RX ADMIN — COAGULATION FACTOR VIIA (RECOMBINANT) 4000 MCG: KIT at 11:03

## 2019-01-01 RX ADMIN — ATENOLOL 50 MG: 50 TABLET ORAL at 07:50

## 2019-01-01 RX ADMIN — MELATONIN 1000 UNITS: at 09:40

## 2019-01-01 RX ADMIN — SENNOSIDES AND DOCUSATE SODIUM 1 TABLET: 8.6; 5 TABLET ORAL at 10:28

## 2019-01-01 RX ADMIN — LORAZEPAM 0.5 MG: 0.5 TABLET ORAL at 00:38

## 2019-01-01 RX ADMIN — ATENOLOL 50 MG: 50 TABLET ORAL at 08:36

## 2019-01-01 RX ADMIN — COAGULATION FACTOR VIIA (RECOMBINANT) 4000 MCG: KIT at 16:02

## 2019-01-01 RX ADMIN — PANTOPRAZOLE SODIUM 40 MG: 40 TABLET, DELAYED RELEASE ORAL at 09:34

## 2019-01-01 RX ADMIN — Medication 1 MG: at 21:21

## 2019-01-01 RX ADMIN — PANTOPRAZOLE SODIUM 40 MG: 40 TABLET, DELAYED RELEASE ORAL at 08:35

## 2019-01-01 RX ADMIN — ACETAMINOPHEN 650 MG: 325 TABLET, FILM COATED ORAL at 22:44

## 2019-01-01 RX ADMIN — LABETALOL 20 MG/4 ML (5 MG/ML) INTRAVENOUS SYRINGE 10 MG: at 03:18

## 2019-01-01 RX ADMIN — BENZONATATE 100 MG: 100 CAPSULE ORAL at 15:59

## 2019-01-01 RX ADMIN — ACETAMINOPHEN 325 MG: 325 TABLET, FILM COATED ORAL at 06:16

## 2019-01-01 RX ADMIN — COAGULATION FACTOR VIIA (RECOMBINANT) 4000 MCG: KIT at 09:20

## 2019-01-01 RX ADMIN — INSULIN ASPART 1 UNITS: 100 INJECTION, SOLUTION INTRAVENOUS; SUBCUTANEOUS at 13:14

## 2019-01-01 RX ADMIN — LISINOPRIL 10 MG: 10 TABLET ORAL at 09:41

## 2019-01-01 RX ADMIN — ACETAMINOPHEN 650 MG: 325 TABLET, FILM COATED ORAL at 21:49

## 2019-01-01 RX ADMIN — BENZOCAINE, MENTHOL 1 LOZENGE: 15; 3.6 LOZENGE ORAL at 21:24

## 2019-01-01 RX ADMIN — PREDNISONE 50 MG: 5 TABLET ORAL at 08:15

## 2019-01-01 RX ADMIN — COAGULATION FACTOR VIIA (RECOMBINANT) 4000 MCG: KIT at 18:52

## 2019-01-01 RX ADMIN — Medication 0.2 MG: at 10:07

## 2019-01-01 RX ADMIN — MELATONIN 1000 UNITS: at 08:34

## 2019-01-01 RX ADMIN — PREDNISONE 50 MG: 5 TABLET ORAL at 09:34

## 2019-01-01 RX ADMIN — HYDRALAZINE HYDROCHLORIDE 10 MG: 20 INJECTION INTRAMUSCULAR; INTRAVENOUS at 16:43

## 2019-01-01 RX ADMIN — LISINOPRIL 10 MG: 10 TABLET ORAL at 08:55

## 2019-01-01 RX ADMIN — LABETALOL 20 MG/4 ML (5 MG/ML) INTRAVENOUS SYRINGE 10 MG: at 15:40

## 2019-01-01 RX ADMIN — PREDNISONE 50 MG: 5 TABLET ORAL at 09:40

## 2019-01-01 RX ADMIN — LABETALOL 20 MG/4 ML (5 MG/ML) INTRAVENOUS SYRINGE 10 MG: at 11:00

## 2019-01-01 RX ADMIN — QUETIAPINE FUMARATE 25 MG: 25 TABLET ORAL at 23:10

## 2019-01-01 RX ADMIN — ACETAMINOPHEN 650 MG: 325 TABLET, FILM COATED ORAL at 21:16

## 2019-01-01 RX ADMIN — LISINOPRIL 10 MG: 10 TABLET ORAL at 19:51

## 2019-01-01 RX ADMIN — ACETAMINOPHEN 650 MG: 325 TABLET, FILM COATED ORAL at 07:55

## 2019-01-01 RX ADMIN — HALOPERIDOL LACTATE 0.5 MG: 5 INJECTION INTRAMUSCULAR at 17:36

## 2019-01-01 RX ADMIN — INSULIN ASPART 2 UNITS: 100 INJECTION, SOLUTION INTRAVENOUS; SUBCUTANEOUS at 11:37

## 2019-01-01 RX ADMIN — PANTOPRAZOLE SODIUM 40 MG: 40 TABLET, DELAYED RELEASE ORAL at 07:59

## 2019-01-01 RX ADMIN — PANTOPRAZOLE SODIUM 40 MG: 40 TABLET, DELAYED RELEASE ORAL at 07:54

## 2019-01-01 RX ADMIN — LABETALOL 20 MG/4 ML (5 MG/ML) INTRAVENOUS SYRINGE 20 MG: at 22:00

## 2019-01-01 ASSESSMENT — ACTIVITIES OF DAILY LIVING (ADL)
TRANSFERRING: 1-->ASSISTIVE EQUIPMENT
ADLS_ACUITY_SCORE: 25
ADLS_ACUITY_SCORE: 18
ADLS_ACUITY_SCORE: 18
ADLS_ACUITY_SCORE: 27
ADLS_ACUITY_SCORE: 27
ADLS_ACUITY_SCORE: 18
ADLS_ACUITY_SCORE: 32
ADLS_ACUITY_SCORE: 23
ADLS_ACUITY_SCORE: 25
ADLS_ACUITY_SCORE: 21
ADLS_ACUITY_SCORE: 25
ADLS_ACUITY_SCORE: 18
ADLS_ACUITY_SCORE: 27
ADLS_ACUITY_SCORE: 25
IADL_COMMENTS: OT: PT WAS IND
NUMBER_OF_TIMES_PATIENT_HAS_FALLEN_WITHIN_LAST_SIX_MONTHS: 1
ADLS_ACUITY_SCORE: 27
ADLS_ACUITY_SCORE: 29
ADLS_ACUITY_SCORE: 19
ADLS_ACUITY_SCORE: 19
ADLS_ACUITY_SCORE: 27
ADLS_ACUITY_SCORE: 18
ADLS_ACUITY_SCORE: 27
ADLS_ACUITY_SCORE: 23
ADLS_ACUITY_SCORE: 27
SWALLOWING: 2-->DIFFICULTY SWALLOWING LIQUIDS
ADLS_ACUITY_SCORE: 25
ADLS_ACUITY_SCORE: 27
ADLS_ACUITY_SCORE: 27
ADLS_ACUITY_SCORE: 18
ADLS_ACUITY_SCORE: 27
ADLS_ACUITY_SCORE: 25
ADLS_ACUITY_SCORE: 21
ADLS_ACUITY_SCORE: 27
ADLS_ACUITY_SCORE: 32
ADLS_ACUITY_SCORE: 27
ADLS_ACUITY_SCORE: 23
RETIRED_COMMUNICATION: 0-->UNDERSTANDS/COMMUNICATES WITHOUT DIFFICULTY
ADLS_ACUITY_SCORE: 26
PREVIOUS_RESPONSIBILITIES: MEAL PREP
ADLS_ACUITY_SCORE: 32
ADLS_ACUITY_SCORE: 25
ADLS_ACUITY_SCORE: 19
ADLS_ACUITY_SCORE: 23
TOILETING: 0-->INDEPENDENT
ADLS_ACUITY_SCORE: 25
ADLS_ACUITY_SCORE: 21
ADLS_ACUITY_SCORE: 27
BATHING: 1-->ASSISTIVE EQUIPMENT
ADLS_ACUITY_SCORE: 22
ADLS_ACUITY_SCORE: 27
ADLS_ACUITY_SCORE: 28
ADLS_ACUITY_SCORE: 32
ADLS_ACUITY_SCORE: 23
AMBULATION: 1-->ASSISTIVE EQUIPMENT
ADLS_ACUITY_SCORE: 21
ADLS_ACUITY_SCORE: 21
DRESS: 0-->INDEPENDENT
ADLS_ACUITY_SCORE: 18
RETIRED_EATING: 0-->INDEPENDENT
ADLS_ACUITY_SCORE: 27
ADLS_ACUITY_SCORE: 29
ADLS_ACUITY_SCORE: 23
ADLS_ACUITY_SCORE: 16.5
ADLS_ACUITY_SCORE: 23
ADLS_ACUITY_SCORE: 29
ADLS_ACUITY_SCORE: 23
FALL_HISTORY_WITHIN_LAST_SIX_MONTHS: YES
ADLS_ACUITY_SCORE: 21
ADLS_ACUITY_SCORE: 23
ADLS_ACUITY_SCORE: 27
ADLS_ACUITY_SCORE: 28
ADLS_ACUITY_SCORE: 32
ADLS_ACUITY_SCORE: 32
ADLS_ACUITY_SCORE: 26
ADLS_ACUITY_SCORE: 25
ADLS_ACUITY_SCORE: 25
ADLS_ACUITY_SCORE: 23
ADLS_ACUITY_SCORE: 27
ADLS_ACUITY_SCORE: 27
ADLS_ACUITY_SCORE: 25
ADLS_ACUITY_SCORE: 27
ADLS_ACUITY_SCORE: 27
ADLS_ACUITY_SCORE: 24
ADLS_ACUITY_SCORE: 27
ADLS_ACUITY_SCORE: 21
COGNITION: 2 - DIFFICULTY WITH ORGANIZING THOUGHTS
ADLS_ACUITY_SCORE: 23
ADLS_ACUITY_SCORE: 27

## 2019-01-01 ASSESSMENT — ENCOUNTER SYMPTOMS
DIZZINESS: 1
WEAKNESS: 1
FEVER: 0
HEADACHES: 0
WEAKNESS: 0
BRUISES/BLEEDS EASILY: 1
ARTHRALGIAS: 0
DIZZINESS: 0
DIFFICULTY URINATING: 0
EYE REDNESS: 0
DIFFICULTY URINATING: 0
FACIAL ASYMMETRY: 0
VOMITING: 0
HEADACHES: 0
DIZZINESS: 0
NUMBNESS: 1
ABDOMINAL PAIN: 0
COLOR CHANGE: 0
SHORTNESS OF BREATH: 0
LIGHT-HEADEDNESS: 1
ARTHRALGIAS: 1
CONFUSION: 1
NECK PAIN: 0
SEIZURES: 0
NECK STIFFNESS: 0
WEAKNESS: 0
CONFUSION: 0

## 2019-01-01 ASSESSMENT — VISUAL ACUITY
OU: BASELINE
OU: BASELINE;DOUBLE VISION/DIPLOPIA
OU: BASELINE
OU: BASELINE;DOUBLE VISION/DIPLOPIA
OU: DOUBLE VISION/DIPLOPIA;BASELINE
OU: BASELINE
OU: BASELINE;DOUBLE VISION/DIPLOPIA
OU: BASELINE

## 2019-01-01 ASSESSMENT — PAIN DESCRIPTION - DESCRIPTORS
DESCRIPTORS: CONSTANT
DESCRIPTORS: DISCOMFORT;ACHING

## 2019-01-01 ASSESSMENT — MIFFLIN-ST. JEOR
SCORE: 867.71
SCORE: 855.5
SCORE: 801.94
SCORE: 864.08
SCORE: 861.82
SCORE: 871.8
SCORE: 855.47
SCORE: 857.28
SCORE: 870.44
SCORE: 849.5

## 2019-01-01 ASSESSMENT — PAIN SCALES - GENERAL
PAINLEVEL: SEVERE PAIN (7)
PAINLEVEL: SEVERE PAIN (7)
PAINLEVEL: NO PAIN (0)

## 2019-05-17 PROBLEM — I62.9 INTRACRANIAL HEMORRHAGE (H): Status: ACTIVE | Noted: 2019-01-01

## 2019-05-17 PROBLEM — D66 HEMOPHILIA A (H): Status: ACTIVE | Noted: 2019-01-01

## 2019-05-17 PROBLEM — D68.4 ACQUIRED COAGULATION FACTOR DEFICIENCY (H): Status: ACTIVE | Noted: 2019-01-01

## 2019-05-17 NOTE — ED NOTES
"[facility]  ED Nurse Handoff Report    ED Chief complaint: Generalized Weakness      ED Diagnosis:   Final diagnoses:   None       Code Status: Full Code    Allergies:   Allergies   Allergen Reactions     Atorvastatin Calcium      hepatitis     Sulfa Drugs Fatigue       Activity level - Baseline/Home:  Independent    Activity Level - Current:   Total Care     Needed?: No    Isolation: No  Infection: Not Applicable  Bariatric?: No    Vital Signs:   Vitals:    05/16/19 2325 05/16/19 2330 05/16/19 2335 05/16/19 2340   BP: 139/70 138/65 137/68 127/67   Pulse: 78 77 78 76   Resp: 12 15 13 11   Temp:       TempSrc:       SpO2: 93% 95%         Cardiac Rhythm: ,   Cardiac  Cardiac Rhythm: Sinus bradycardia    Pain level:      Is this patient confused?: No   Does this patient have a guardian?  No         If yes, is there guardianship documents in the Epic \"Code/ACP\" activity?  N/A         Guardian Notified?  N/A  Shelley - Suicide Severity Rating Scale Completed?  Yes  If yes, what color did the patient score?  White    Patient Report: Initial Complaint: weak/fall  Focused Assessment: pt states hand numbness earlier in the day around lunch time. Pt states was feeling weak throughout rest of the day. Pt states fell sometime after supper time and was on the floor of her apartment for over 1 hour. Pt is unable to bear weight and leans to the left. Pt will be admitted for head bleed most likely related to the HTN. Pt is on nicardipine.   Tests Performed: CT, labs  Abnormal Results:   Labs Ordered and Resulted from Time of ED Arrival Up to the Time of Departure from the ED   COMPREHENSIVE METABOLIC PANEL - Abnormal; Notable for the following components:       Result Value    Glucose 106 (*)     All other components within normal limits   ROUTINE UA WITH MICROSCOPIC - Abnormal; Notable for the following components:    Blood Urine Trace (*)     Leukocyte Esterase Urine Large (*)     WBC Urine 24 (*)     " All other components within normal limits   CBC WITH PLATELETS DIFFERENTIAL   TROPONIN I   TSH WITH FREE T4 REFLEX   INR   PARTIAL THROMBOPLASTIN TIME   PULSE OXIMETRY NURSING   CARDIAC CONTINUOUS MONITORING   PERIPHERAL IV CATHETER       Treatments provided: nicardipine gtt, steriod    Family Comments: none    OBS brochure/video discussed/provided to patient/family: N/A              Name of person given brochure if not patient: NA              Relationship to patient: NA    ED Medications:   Medications   sodium chloride (PF) 0.9% PF flush 3 mL (has no administration in time range)   sodium chloride (PF) 0.9% PF flush 3 mL ( Intracatheter Canceled Entry 5/16/19 2302)   0.9% sodium chloride BOLUS ( Intravenous Canceled Entry 5/16/19 2302)     Followed by   sodium chloride 0.9% infusion (1,000 mLs Intravenous New Bag 5/16/19 2305)   sodium chloride 0.9 % bag 100mL for CT scan flush use (has no administration in time range)   niCARdipine 40 mg in 200 mL 0.9% NaCl (CARDENE) infusion (7.5 mg/hr Intravenous Rate/Dose Change 5/16/19 2345)   iopamidol (ISOVUE-370) solution 70 mL (70 mLs Intravenous Given 5/16/19 7245)   methylPREDNISolone sodium succinate (solu-MEDROL) injection 125 mg (125 mg Intravenous Given 5/16/19 2347)       Drips infusing?:  Yes    For the majority of the shift this patient was Green.   Interventions performed were none.    Severe Sepsis OR Septic Shock Diagnosis Present: No    To be done/followed up on inpatient unit:  BP control    ED NURSE PHONE NUMBER: *[phone]

## 2019-05-17 NOTE — ED PROVIDER NOTES
History     Chief Complaint:  Generalized Weakness    HPI   Jackeline Smiley is a 86 year old female with a past medical history significant for Hemophilia, Vertigo, Hypertension, Hyperlipidemia, and anxiety who presents with onset of weakness and a fall at home today. The patient reports that she could not walk or function at her place. She reports that she felt like she was going to fall and could not get into her chair and instead fell onto the floor. The patient reports that she thinks she was on the floor for about 1 hour before she elected to use her Life Alert for 1 hour.      Ms. Smiley reports that she took a Lorazepam at 0800 this morning and another one at 1400 and a third one at 1830 this evening. She reports that the Lorazepam makes her feel less anxious. She reports that she has been eating and drinking okay. She denies any vision changes tonight. The patient reports that she took her blood pressure medications this afternoon. The patient notes some shoulder pain following her fall tonight.     Allergies:  Atorvastatin Calcium  Sulfa Drugs     Medications:    Atenolol (tenormin) 50 mg tablet  Calcium carbonate-vitamin D (calcium + d) 600-200 mg-unit per tablet  Cholecalciferol (vitamin D) 1000 unit tablet  Hydrocodone-acetaminophen 5-325 mg per tablet  Lorazepam (Ativan) 0.5 mg tablet  Meclizine (Antivert) 25 mg tablet  Prednisone PO   Simvastatin (Zocor) 20 mg tablet    Past Medical History:    Back ache   Hemophilia (H)   Hyperlipidemia   Hypertension   Menopausal disorder   Osteoarthritis   Pneumonia   Tremor   Vertigo   Osteoporosis screening  Vitamin deficiency  Fatigue  Lyme disease  Urinary incontinence  Hyperlipidemia   Anxiety  Arm pain    Past Surgical History:    cataract IOL, rt/lt;   hysterectomy, pap no longer indicated;   submandibular gland excision.     Family History:    Cancer  CAD  Diabetes  Lipids     Social History:  The patient  reports that she has been smoking cigarettes.  She  has a 46.00 pack-year smoking history. She does not have any smokeless tobacco history on file. She reports that she does not drink alcohol or use drugs. The patient reports that she has a lifealert which she can use in times of an emergency.    Marital Status:   [4]       Review of Systems   Eyes: Negative for visual disturbance.   Musculoskeletal: Positive for arthralgias.   Neurological: Positive for weakness. Negative for dizziness, seizures and headaches.   All other systems reviewed and are negative.      Physical Exam     Vital signs  Patient Vitals for the past 24 hrs:   BP Temp Temp src Pulse Heart Rate Resp SpO2 Weight   05/17/19 0045 123/57 -- -- 70 71 12 -- --   05/17/19 0030 129/58 -- -- 72 73 16 91 % --   05/17/19 0025 -- -- -- -- -- -- -- 48.3 kg (106 lb 8 oz)   05/17/19 0015 125/59 -- -- 72 74 20 94 % --   05/17/19 0000 130/64 -- -- 75 76 12 -- --   05/16/19 2345 125/75 -- -- 75 75 11 -- --   05/16/19 2340 127/67 -- -- 76 77 11 -- --   05/16/19 2335 137/68 -- -- 78 79 13 -- --   05/16/19 2330 138/65 -- -- 77 77 15 95 % --   05/16/19 2325 139/70 -- -- 78 76 12 93 % --   05/16/19 2320 142/71 -- -- -- 73 13 92 % --   05/16/19 2315 154/68 -- -- -- 68 10 91 % --   05/16/19 2310 172/78 -- -- 68 70 8 93 % --   05/16/19 2305 175/87 -- -- 79 69 16 93 % --   05/16/19 2300 (!) 195/100 -- -- 73 68 16 -- --   05/16/19 2230 (!) 206/108 -- -- 67 -- -- 96 % --   05/16/19 2215 (!) 211/93 -- -- 69 -- -- 95 % --   05/16/19 2200 (!) 205/88 -- -- 56 -- -- 93 % --   05/16/19 2145 (!) 209/86 -- -- 57 -- -- 96 % --   05/16/19 2132 196/87 -- -- 62 -- -- -- --   05/16/19 2130 196/87 -- -- 60 -- -- 95 % --   05/16/19 2119 197/90 -- -- -- 56 -- 96 % --   05/16/19 2108 (!) 146/99 97.7  F (36.5  C) Oral 59 59 18 94 % --          Physical Exam  General;: Frail, Cachetic elderly woman who consistently leans tot he left and prefers to keep her eyes closed.     Eye:  Pupils are equal, round, and reactive.  Extraocular  movements intact.    ENT:  No rhinorrhea.  Moist mucus membranes.  Normal tongue and tonsil.    Cardiac:  Regular rate and rhythm.  No murmurs, gallops, or rubs.    Pulmonary:  Clear to auscultation bilaterally.  No wheezes, rales, or rhonchi.    Abdomen:  Positive bowel sounds.  Abdomen is soft and non-distended, without focal tenderness.    Musculoskeletal:  Normal movement of all extremities without evidence for deficit.    Skin:  Warm and dry without rashes.    Neurologic:  CN II - XII intact.  5/5 strength in all extremities.  Normal sensation throughout.  Normal finger to nose.  2+ patellar reflexes. Despite appropriate strength, patient continues to lean to the left. She is unable to stand or ambulate because of this significant leaning preference.      Psychiatric:  Normal affect with appropriate interaction with examiner.     Emergency Department Course   ECG (21:23:16):  Rate 56 bpm. NH interval 163. QRS duration 80. QT/QTc 434/418. P-R-T axes 56 10 62. Sinus bradycardia. Minimal voltage criteria for LVH, may be normal variant. Borderline EKG. Interpreted at 2128 by Trierweiler, Chad A, MD.     Imaging:  Head and Neck CT Angiogram with IV Contrast     Conclusion:   Head CTA  1. Noncontrast head CT demonstrates 13 x 14 x 18 mm acute parenchymal hemorrhage right thamalus. Minimal if any edema. No mass effect. No other hemorrhage.     2. Mild age-related change.    3. Head CTA demonstrates no aneurysm or significant stenosis in the proximal intracranial vessels.     Neck CTA:   1. 50% Narrowing right ICA by NASCET Criteria. No other significant stenosis within the neck vessels.     Results as read by radiology.   I communicated the results of the imaging studies with the patient who expressed understanding of these findings.      Laboratory:  UA: Urine Blood Trace, Leukocyte Esterase Urine Large, WBC 24, otherwise normal     CBC: WNL   CMP: Glucose 106, otherwise normal     Troponin: <0.015  TSH with free t4  reflex 2.21     INR 0.95     PTT 54     Interventions:  2305: Sodium chloride 0.9% infusion 1L  2301: Nicardipine Drip started  0000:  NovoSeven ordered      Emergency Department Course:  Past medical records, nursing notes, and vitals reviewed.  2115: I performed an exam of the patient and obtained history, as documented above.        2230: I consulted with Dr. Ward. He suggested simply getting a CT scan.     2310: I consulted with Hematology, Dr. Belcher who recommended that I speak to a Hematologist at the [facility]. He had no acute recommendations.     2318: I consulted with Dr. Ward of Neurology. He recommended I admit the patient.     2332: I spoke again with Dr. Belcher of Hepatology. He recommended that I give DDAVP if there is concern for acute bleeding.    2341: I consulted with Dr. Duque of Hepatology at the [facility].     0005: I spoke to Dr. Enriuqe regarding the patient.     0030: I spoke with the ICU attending at the [facility].     0034: I spoke with Dr. Ward.     0100: I rechecked the patient and discussed her wishes regarding possible end of life care and decision to be DNR/DNI, though still wanting to go through with transfer to the [facility]    0115: I was able to contact the patient's granddaughter,  Socorro. We discussed her grandmother's condition and I confirmed that she would want to be DNI/DNR.   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  The patient is a poor historian, describing some intermittent tingling and weakness and difficulty with coordination throughout the day starting as early as noon.  She was able to have dinner at around 5:00, but then notes that she fell when she tried to get out of the chair  and spent an unknown period of time on the floor, though it is expected to be approximately 2 to 3 hours.  She denied injuring herself on the way to the floor and eventually hit her life alert button to be brought to the ER.  I was asked to evaluate the patient rapidly by nursing with their concerns that this could represent a stroke.    On my exam, the patient was appropriately alert and interactive.  I put her through a thorough neurologic exam, and interestingly found that her strength and her sensation seem to be intact throughout.  There was no consistent facial droop.  She did not appear to be delirious or acutely confused.  However, she was consistently leaning to the left in bed and when I attempted to ambulate the patient with the help of my technician, the patient was unable to stand or ambulate because of this persistent leaning to the left that she was not exactly aware of.  Considering that she lives alone and is typically able to walk with a walker, this immediately concerned me for a sudden change in her neurologic exam.    I had initially ordered an MRI without contrast of the brain is my chief concern was for a posterior circulation issue.  However, as I observe that her blood pressure was elevated and seem to continue to be on an upward course during her early time in the ER, I did speak with Dr. Ward of our stroke neurology team and he feels that we could start with a CT/CT-angio and if these studies are generally unremarkable that we could move on with MRI at that time.  These changes were made and I spoke with our CT technician to have this performed rapidly.  I was then called by the CT tech, voicing concerns that the patient has an intraparenchymal hemorrhage.    With this, I became immediately alarmed with a high risk situation we were dealing with here.  The patient could not really provide me with any significant history about this acquired hemophilia.  In speaking with her, she denies  having any bleeding issues over the last several years and has been off of prednisone for the last several years.  I spent an extensive time reviewing her past records when she was diagnosed with this condition when she was having atraumatic significant hemorrhaging and ecchymosis to her extremities when the final diagnosis was found.  I do not see that she has had any factor VIII levels checked or coagulation tests done in several years.  Nonetheless, I was not exactly clear how to proceed with this, as it was chiefly paramount to get her bleeding to stop.    I first spoke with Dr. Ward again regarding the CT findings.  We are in agreement that this is in a common location for a hypertensive emergency bleed in the thalamus and recommendation is for strict blood pressure control.  I had started a nicardipine drip before this conversation, and nursing was asked to keep her blood pressure below 140 systolic.  Outside of this, neurology recommended speaking with hematology regarding her coagulation issues.    I then spoke with Dr. Belcher of our local hematology oncology team.  He voiced concerns that this is a very rare and very unusual situation, noting that it is not something that he typically takes care of.  His initial recommendation was that we speak with a hematologist from the [facility] who might be able to provide us with better information.  I put out a page for this hematologist, and in the meantime  kindly called me back on his own accord after doing some research at home.  He discussed giving a dose of DDAVP which can naturally increase factor VIII levels.  He felt this would be very reasonable and highly unlikely to be harmful to the patient.  He confirmed that a dose of Solu-Medrol was also reasonable though it is unlikely to make any significant change in her course in the short-term.    Shortly thereafter, I received a call back from Dr. Duque of hematology from the [facility].  He was  incredibly helpful, going over the patient's situation with me and we went back through her prior factor VIII levels.  With what we were finding, the patient seemed to be in remission and his immediate recommendation was for a check of her PTT.  The advice was that if her PTT was normal that there was nothing more to do from a hemophilia standpoint and we could treat her like a straightforward hypertensive crisis.  If the PTT was elevated however, he recommended starting 3 doses of NovoSeven.  These labs were added on and approximately 20 minutes later, the PTT in fact did return elevated in the low 50s.  With this elevation, I again spoke with Dr. Duque and he confirmed the initiation of the NovoSeven.  In discussion as to the best disposition for the patient, he recommended transfer to the [facility] as these patients can be tricky regarding her coagulation status.    During this time, I had spoken with Dr. Enrique of the hospitalist service here at [facility] for admission, though now finding that the patient has an elevated PTT, he was notified not to admit the patient that they were going to be transferred.  I utilized [facility] call and initially spoke with an intensivist who explained that at the [facility], stroke patients are typically admitted directly to the stroke service.  This then resulted in me again speaking with Dr. Ward who agrees to accept care of the patient in the ICU at the [facility].  We discussed our conversations with the hematology team and the plan to initiate NovoSeven with 2 further doses required in the next several hours.  The patient will undergo a repeat head CT in 6 hours to determine how her bleeding is progressing.    I had performed several reassessments on the patient throughout this activity.  She did not seem to be showing any signs of decompensation.  She was becoming more tired, though I expect this was more likely due to the stress of the situation and the fact  that it is now after midnight.  However, I did want to have a martin conversation with her about her desires of care considering her age and the fact that we do not have a medical directive on file for her.  She voiced a desire for a natural death, noting that she has been living in quite a bit of pain and would not want to undergo any long-term heroic measures such as a ventilator, aggressive resuscitation, etc.  She is in support of continuing medical therapies for her current situation along with transfer to the [facility] for continued therapies there, though she is not sure that she would want to undergo any significant invasive procedures.  I made it very clear to her that I would make the admitting team aware of this and to put in her DNI/DNR status.  With some concern that she may not have complete capacity to make these types of decisions considering her intracranial hemorrhage, I asked to speak with her family member that would be making decisions for her.  She gave me information for her granddaughter, Socorro, who I called and spoke with her at length.  I explained to her grandmother's condition and wanted to verify what her desires would be for care.  Socorro confirmed that she would not want invasive measures and with this, the order was placed for DNI DNR.    On my final assessment, the patient continues to have an intact neurologic exam except for significant leaning to the left.  She has appropriate blood pressures with systolics in the 130s.  NovoSeven is actively infusing.  With this, I see no further interventions that are capable in the emergency department, though I transfer her to the [facility] in guarded condition with concerns for this intracranial hemorrhage and a hemophiliac patient.      Critical Care Time:  120 minutes exclusive of all procedures    Diagnosis:    ICD-10-CM    1. Intracranial hemorrhage (H) I62.9 INR     Partial thromboplastin time   2. Hypertensive emergency I16.1         Disposition:  Transferred to the Stroke team at the [facility].     I, Arcadio Peters, am serving as a scribe at 11:28 PM on 5/16/2019 to document services personally performed by Trierweiler, Chad A, MD based on my observations and the provider's statements to me.     EMERGENCY DEPARTMENT       Trierweiler, Chad A, MD  05/17/19 2701

## 2019-05-17 NOTE — PLAN OF CARE
Discharge Planner SLP   Patient plan for discharge: none stated  Current status: SLP: Clinical swallow eval completed per MD orders. Pt presents with moderate oropharyngeal dysphagia. Pt with L sided facial droop and baseline congested cough noted. Thin liquids via cup resulted in overt s/sx of aspiration marked by coughing. Pt tolerated thin liquids via spoon, nectar thick liquids via cup, and pureed textures with no overt s/sx of aspiration. Pt required prolonged but functional time for AP transit. Recommend pt initiate nectar thick full liquids with supervision. Pt should be fully upright and alert for all PO, take small single sips/bites, slow pacing, and alternate between consistencies. ST to continue to follow to assess diet tolerance and trial advanced textures as appropriate. Anticipate pt will require ongoing ST upon discharge.   Barriers to return to prior living situation: dysphagia, weakness, cognition  Recommendations for discharge: inpatient rehab  Rationale for recommendations: pt would benefit from continued ST to safely return to baseline diet level        Entered by: Muna Luong 05/17/2019 10:39 AM

## 2019-05-17 NOTE — ED NOTES
Bed: ED23  Expected date:   Expected time:   Means of arrival:   Comments:  E2 86F hypertensive/fall

## 2019-05-17 NOTE — PROGRESS NOTES
Admitted/transferred from: [facility]  Reason for admission/transfer: Hemorrhagic stroke  Patient status upon admission/transfer: Stable  Interventions: Continue to monitor  Plan: Frequent neuro exams, monitor for neuro changes   2 RN skin assessment: completed by  SYLVIA Nguyen  Result of skin assessment and interventions/actions: No skin issues identified  Height, weight, drug calc weight: done  Patient belongings: In patient's closet  MDRO education (if applicable): N/A

## 2019-05-17 NOTE — PLAN OF CARE
Discharge Planner PT   Patient plan for discharge: Not addressed   Current status: Patient presents with contraversive pushing behaviors consistent with R thalamic stroke. Patient is well below baseline requiring maximal verbal cues to align to neutral in sitting and currently requires min-mod A x2 for transfers due to pushing.   Barriers to return to prior living situation: Lives alone, safety, swallow concerns  Recommendations for discharge: Patient will require a rehab stay and could potentially benefit from ARU stay due to multiple deficits. Will continue to assess tolerance for ARU vs TCU.   Rationale for recommendations: Pt lives alone, is older but well motivated to participate. 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

## 2019-05-17 NOTE — CONSULTS
[facility], [place]  Hematology/Oncology Consult Note    Patient Name: Jackeline Smiley   MRN: [id number]  YOB: 1933      Date of Service: May 17, 2019  Attending Physician: Dr. Duque      Reason for Consult: management of hemophilia in patient with intracranial hemorrhage       Assessment & Plan   Jackeline Smiley is a 86 year old female with a PMH significant for Hemophilia A, Factor VIII inhibitor, HTN, who was admitted on 5/17/2019 after sudden onset weakness and a fall without head trauma and found to have an acute intracranial hemorrhage on CT.  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  Patient was started on Novoseven (Recombinant Factor VIIa) q2H prior to her transfer from the [facility].  Factor XIII level was found to low at 11, suggesting that her acquired hemophilia has resurfaced and her factor VIII is being consumed by Factor VIII inhibitor. We recommend Novoseven (Recombinant Factor VIIa) but at q4H dosing at this in order to bypass the intrinsic pathway of the coagulation cascade by supplying enough factor VII to increasing coagulation through the extrinsic pathway. In addition, we recommend starting immunosuppression with methylprednisolone and rituxumab in order to suppress production of the factor VIII inhibitor and allow her body to redevelop more factor VIII      Summary of Recommendations:    Transitioned from Novoseven q2H to q4H (orderded for you). If CT head tomorrow AM is shows no increased bleeding, will change dosing to q6H     Started 40mg IV methylprednisolone daily (ordered for you)     Start rituxumab once weekly (will order for you)     Recommend serial CT head and to monitor bleeding    Check factor VIII level, Factor VIII inhibitor titer, and lupus anticoagulant (ordered)    Thank you for involving us in the care of  this patient. We will continue to follow during the hospitalization.    Patient was seen and plan of care developed with Dr. Dunia Agudelo MD  Internal Medicine, PGY-1      Physician Attestation   I, Virgilio Duque, saw this patient with the resident and agree with the resident/fellow's findings and plan of care as documented in the note.      I personally reviewed vital signs, medications, labs and imaging.    Key findings: 87 YO woman with relapsed acquired hemophilia.  Discussed making this complext diagnosis and started management with [facility] ER overnight and with primary team this AM.  Plan is for Novo7 until hemostatic, started steroids and will treat with rituximab with the goal to eradicate the inhibitor    Virgilio Duque MD  Date of Service (when I saw the patient): 5/17/2019    ______________________________________________________________________      History of Present Illness   Jackeline Smiley is a 86 year old female with a PMH significant for Hemophilia A, Factor VIII inhibitor, HTN, who presented to OSH after acute onset generalized weakness and fall.  Patient reports she was brushing her teeth when she acutely felt week and says her legs gave out.  She denies hitting her head and denies a loss of consciousness. She was unable to get up from the floor for an hour and ultimately used her life alert badge. Patient reports she took some lorazepam after the fall for her anxiety.  Patient was brought to an outside ED where she was found be hypertensive with systolic BPs in 200s and had a CT head positive for intracranial hemorrhage. Patient was then started on Novoseven at the OSH and transferred to [facility] for further management. She denies fevers, chills, syncope, loss of consciousness, chest pain, SOB, abdominal pain, hematuria, dysuria, hematochezia, melena, leg swelling.    Review of Systems    The 10 point Review of Systems is negative other than noted in the HPI.    Past  Medical History    Past Medical History:   Diagnosis Date     Back ache      Hemophilia (H)      Hyperlipidemia      Hypertension      Menopausal disorder      Osteoarthritis      Pneumonia 6-11    LEFT MID LUNG     Tremor      Vertigo        Past Surgical History   Past Surgical History:   Procedure Laterality Date     C NONSPECIFIC PROCEDURE      submandibular gland excision     CATARACT IOL, RT/LT      both, Dr Zhou     HYSTERECTOMY, PAP NO LONGER INDICATED         Social History   Social History     Tobacco Use     Smoking status: Current Every Day Smoker     Packs/day: 1.00     Years: 46.00     Pack years: 46.00     Types: Cigarettes     Tobacco comment: 3/4 pack per day   Substance Use Topics     Alcohol use: No     Drug use: No       Family History   Family History   Problem Relation Age of Onset     Cancer Father         leukemia     C.A.D. Brother         CHF     C.A.D. Brother      Diabetes Sister      Diabetes Sister      Diabetes Brother      Diabetes Brother      Diabetes Mother      Lipids Daughter         grand daughter     Lipids Brother         all sibs had it       Prior to Admission Medications   Prior to Admission Medications   Prescriptions Last Dose Informant Patient Reported? Taking?   Calcium Carbonate-Vitamin D (CALCIUM + D) 600-200 MG-UNIT per tablet  Self No No   Sig: Take 1 tablet by mouth 2 times daily.   HYDROcodone-acetaminophen 5-325 MG per tablet   No No   Sig: Take 1-2 tablets by mouth every 6 hours as needed for pain.   LORazepam (ATIVAN) 0.5 MG tablet   No No   Sig: Take 1 tablet by mouth nightly as needed for anxiety.   PREDNISONE PO   Yes No   Sig: Take  by mouth.   atenolol (TENORMIN) 50 MG tablet  Self No No   Sig: Take 1 tablet by mouth daily.   cholecalciferol (VITAMIN D) 1000 UNIT tablet  Self No No   Sig: Take 1 tablet by mouth daily.   meclizine (ANTIVERT) 25 MG tablet  Self No No   Sig: Take 1 tablet by mouth 4 times daily as needed for dizziness.   simvastatin  (ZOCOR) 20 MG tablet  Self No No   Sig: Take 1 tablet by mouth At Bedtime.      Facility-Administered Medications: None       Allergies      Allergies   Allergen Reactions     Atorvastatin Calcium      hepatitis     Sulfa Drugs Fatigue       Physical Exam   Vital Signs: Temp: 97.5  F (36.4  C) Temp src: Axillary BP: 148/70 Pulse: 66 Heart Rate: 68 Resp: 22 SpO2: 97 % O2 Device: Nasal cannula Oxygen Delivery: 2 LPM  Weight: 108 lbs 14.52 oz      GENERAL: Alert, interactive, NAD  HEENT: AT/NC, sclera anicteric, EOMI  RESP: clear to anterior auscultation bilaterally  CARDIAC: regular rate and rhythm  ABDOMEN: Soft, nontender, nondistended. +BS  EXTREMITIES: No LE edema,  SKIN: Warm and dry, no jaundice or rash  NEURO: alert, left sided facial droop, moving all 4 extremities equally    Data     Results for orders placed or performed during the hospital encounter of 05/17/19 (from the past 24 hour(s))   Methicillin Resistant Staph Aureus PCR   Result Value Ref Range    Specimen Description Nares     Methicillin Resist/Sens S. aureus PCR Negative NEG^Negative   Glucose by meter   Result Value Ref Range    Glucose 160 (H) 70 - 99 mg/dL   EKG 12-lead   Result Value Ref Range    Interpretation ECG Click View Image link to view waveform and result    CT Head w/o Contrast    Narrative    CT HEAD W/O CONTRAST 5/17/2019 5:47 AM    History: Stroke, follow up; right thalamic bleed, follow-up from  [facility]    Comparison: CT 5/16/2019.    Technique: Using multidetector thin collimation helical acquisition  technique, axial, coronal and sagittal CT images from the skull base  to the vertex were obtained without intravenous contrast.     Findings:  Acute hemorrhagic focus centered in the right thalamus  measuring 16 x 15 x 21 cm, previously 14 x 14 x 19 cm. No significant  surrounding edema. No significant mass effect. No new intracranial  hemorrhage. Mild diffuse cerebral volume loss. Mild patchy  periventricular hypoattenuation,  consistent with chronic small vessel  ischemic disease. No midline shift. The basal cisterns are patent. No  hydronephrosis.    Right maxillary mucosal retention cysts, the remaining paranasal  sinuses are clear. The mastoid air cells are clear.       Impression    Impression: Minimally increased acute hemorrhagic focus centered in  the right thalamus.     I have personally reviewed the examination and initial interpretation  and I agree with the findings.    JERSEY SEVILLA MD   Basic metabolic panel   Result Value Ref Range    Sodium 138 133 - 144 mmol/L    Potassium 3.7 3.4 - 5.3 mmol/L    Chloride 106 94 - 109 mmol/L    Carbon Dioxide 26 20 - 32 mmol/L    Anion Gap 6 3 - 14 mmol/L    Glucose 171 (H) 70 - 99 mg/dL    Urea Nitrogen 13 7 - 30 mg/dL    Creatinine 0.58 0.52 - 1.04 mg/dL    GFR Estimate 83 >60 mL/min/[1.73_m2]    GFR Estimate If Black >90 >60 mL/min/[1.73_m2]    Calcium 8.6 8.5 - 10.1 mg/dL   INR   Result Value Ref Range    INR 0.55 (L) 0.86 - 1.14   CBC with platelets   Result Value Ref Range    WBC 6.9 4.0 - 11.0 10e9/L    RBC Count 4.99 3.8 - 5.2 10e12/L    Hemoglobin 15.5 11.7 - 15.7 g/dL    Hematocrit 49.3 (H) 35.0 - 47.0 %    MCV 99 78 - 100 fl    MCH 31.1 26.5 - 33.0 pg    MCHC 31.4 (L) 31.5 - 36.5 g/dL    RDW 13.0 10.0 - 15.0 %    Platelet Count 221 150 - 450 10e9/L   Partial thromboplastin time   Result Value Ref Range    PTT 43 (H) 22 - 37 sec   Glucose by meter   Result Value Ref Range    Glucose 161 (H) 70 - 99 mg/dL

## 2019-05-17 NOTE — PLAN OF CARE
D/I: Patient on unit 4A Surgical/Neuro ICU - arrived from [facility] at appx 0215  Neuro- A/Ox2-4. Lethargic. States she is very sleepy. Arouses to repeated stimulation. Refusing to participate in neuro exams at times. PERRL. L facial droop. LUE 3-4/5 strength and unable to touch finger to nose. RUE 5/5, LLE 4/5, RLE 5/5. Afebrile.  CV- Arrived with nicardipine infusing. Able to stop when SBP goal increased to <140. Tele: SR with occasional PACs  Pulm- LS clear/diminished. On 2L O2 via NC 2/2 desatting to 88% while asleep  GI- BS active. No BM this am.  - Incontinent of urine x1  Gtts- NS at 50ml/hr  Skin- No issues identified.  Pain- Denies  IV's - PIVx2  See flow sheets for further interventions and assessments.   A: Stable   P: Continue to monitor pt closely. Notify MD of significant changes

## 2019-05-17 NOTE — LETTER
Transition Communication Hand-off for Care Transitions to Next Level of Care Provider    Name: Jackeline Smiley  : 1933  MRN #: [id number]  Primary Care Provider: [facility]     Primary Clinic: [address]  [facility] [address]     Reason for Hospitalization:  Intracranial hemorrhage  Intracranial hemorrhage (H)  Admit Date/Time: 2019  2:21 AM  Discharge Date: 19  Payor Source: Payor: [facility] / Plan: [facility] / Product Type: HMO /              Reason for Communication Hand-off Referral:     Discharge Plan:    Social Work Services Discharge Note     Patient Name:  Jackeline Smiley     Anticipated Discharge Date:  19     Discharge Disposition:   [facility] ([phone])     Following MD:  Facility Assignment     Pre-Admission Screening (PAS) online form has been completed.  The Level of Care (LOC) is:  Determined  Confirmation Code is:  [id number].  Patient/caregiver informed of referral to [facility] for Pre-Admission Screening for skilled nursing facility (SNF) placement and to expect a phone call post discharge from SNF.     Additional Services/Equipment Arranged:  JI confirmed readiness for discharge with Dr. Pablo.  SW confirmed acceptance for admit and receipt of prior auth from insurance with [facility] Admissions ([place]).       Patient / Family response to discharge plan:  Pt and grand daughter voice understanding of the discharge plan and agreement with the discharge plan.  However, pt also indicating that her grand daughter was planning to pick her up tonight to go out to dinner.       Persons notified of above discharge plan:  Pt, grand daughter ([place]), Dr. Pablo, 6a nursing and SW left a message for [facility] [facility] (Leola Wise [phone])           Staff Discharge Instructions:  Please fax discharge orders and signed hard scripts for any controlled substances (JI will complete this task).  Please  print a packet and send with patient.      CTS Handoff completed:  YES     Medicare Notice of Rights provided to the patient/family:  NO, as per Admissions Facesheet, pt is [employer] for Seniors     ROCHELLE Blake  Social Work, 6A  Phone:  [phone]  Pager:  [phone]  5/24/2019

## 2019-05-17 NOTE — PLAN OF CARE
Neuro: Pt ranging from solmnent to restless. Oriented x2-4, pts mental status waxes and wanes. MD's aware. Arouses to repeated stimulation or pain when sleepy, needs constant re-orientation when restless. Refusing to participate in parts of neuro exams at times. PERRLA 2-3's. L facial droop. L shoulder weakness. LUE 3/5 strength, LLE 4/5. Unable to touch L finger to nose, ataxic. RUE 5/5, RLE 5/5. Denies pain.  CV: Afebrile. NSR w/ occasional PVC's. SBP goal <140. Labetalol and hydralazine PRN, both given. Toward end of shift pt's monitor alarmed A-fib, multiple nouts of 5 beat SVT's and HR up to 144. EKG completed slight ST change.  Pulm: LS clear/diminished. Pt has a very wet cough, confused unable to perform IS. Continue to encourage coughing, weak. On 2L O2 via NC.  GI/: BS active. No BM this am. SLP cleared for nectar thickened liquids but when pt was sitting upright and alert pt coughed and spilled from L mouth, RN keeping pt NPO. Brief in place. Continent and incontinent of urine throughout the day.  Lines/Gtts: R PIV NS at 50ml/hr. L PIV SL.  Skin: No issues identified.  Pain: Denies    Plan: Continue to monitor Neuro's closely. H CT in AM. Probable start of Rituximab. Update NCC with any concerns.

## 2019-05-17 NOTE — ED TRIAGE NOTES
Pt states fell in apartment, pt states was only on floor for 1 hour. Pt states feeling weak. Hx of TIAs.

## 2019-05-17 NOTE — PLAN OF CARE
OT 4AB: pt. asleep/fatigued, unable to tolerated eval at time of initial attempt, Pt. had worked with PT/ST earlier today. Pt. with another caregiver; MD upon 2nd attempt. Will reschedule per POC.

## 2019-05-17 NOTE — PROGRESS NOTES
05/17/19 1026   General Information   Onset Date 05/17/19   Start of Care Date 05/17/19   Referring Physician Erum Ward MD   Patient Profile Review/OT: Additional Occupational Profile Info See Profile for full history and prior level of function   Patient/Family Goals Statement Pt would like to go to [place]   Swallowing Evaluation Bedside swallow evaluation   Behaviorial Observations Alert   Mode of current nutrition NPO   Respiratory Status Room air   Comments Jackeline Smiley is a 86 year old female with history of hemophilia, hypertension, hyperlipidemia, anxiety, who presents as a transfer from outside ED due to known intracerebral hemorrhage. Pt denies any trouble swallowing PTA. Pt repeatedly asking to go to [place] for pancakes and coffee throughout eval. Clinical swallow eval completed per MD orders to further assess oropharyngeal swallow function.    Clinical Swallow Evaluation   Oral Musculature anomalies present  (L sided droop)   Structural Abnormalities none present   Dentition other (see comments)  (sparse dentition)   Secretion Management left corner drooling   Mucosal Quality adequate   Mandibular Strength and Mobility intact   Oral Labial Strength and Mobility impaired retraction;impaired seal   Lingual Strength and Mobility WFL   Velar Elevation intact   Buccal Strength and Mobility impaired   Laryngeal Function Cough;Throat clear;Swallow;Voicing initiated   Oral Musculature Comments Baseline congested cough; L sided droop   Additional Documentation Yes   Clinical Swallow Eval: Thin Liquid Texture Trial   Mode of Presentation, Thin Liquids cup;spoon;fed by clinician   Volume of Liquid or Food Presented 2 oz   Oral Phase of Swallow Premature pharyngeal entry   Pharyngeal Phase of Swallow impaired;coughing/choking   Diagnostic Statement Thin liquids via cup resulted in overt s/sx of aspiration marked by coughing. Pt tolerated thin liquids via spoon with no overt s/sx of aspiration    Clinical Swallow Eval: Nectar Thick Liquid Texture Trial   Mode of Presentation, Nectar cup;fed by clinician   Volume of Nectar Presented 3 oz   Oral Phase, [place] WFL   Pharyngeal Phase, [place] intact   Diagnostic Statement Pt tolerated nectar thick liquids via cup with no overt s/sx of aspiration   Clinical Swallow Eval: Puree Solid Texture Trial   Mode of Presentation, Puree spoon;fed by clinician   Volume of Puree Presented 3 tbsp   Oral Phase, Puree Poor AP movement   Pharyngeal Phase, Puree intact   Diagnostic Statement Pt tolerated pureed textures via spoon with no overt s/sx of aspiration. Pt required prolonged but functional time for AP transit   VFSS Evaluation   VFSS Additional Documentation No   FEES Evaluation   Additional Documentation No   Swallow Compensations   Swallow Compensations Alternate viscosity of consistencies;Pacing;Reduce amounts;Multiple swallow   Results Oral difficulties only;Suspect aspiration   General Therapy Interventions   Planned Therapy Interventions Dysphagia Treatment   Dysphagia treatment Compensatory strategies for swallowing;Instruction of safe swallow strategies;Modified diet education   Swallow Eval: Clinical Impressions   Skilled Criteria for Therapy Intervention Skilled criteria met.  Treatment indicated.   Functional Assessment Scale (FAS) 3   Treatment Diagnosis moderate oropharyngeal dysphagia   Diet texture recommendations Full liquid;Nectar thick liquids   Recommended Feeding/Eating Techniques alternate between small bites and sips of food/liquid;check mouth frequently for oral residue/pocketing;hard swallow w/ each bite or sip;maintain upright posture during/after eating for 30 mins;small sips/bites   Demonstrates Need for Referral to Another Service physical therapy;occupational therapy;dietitian;respiratory therapy   Therapy Frequency 5 times/wk   Predicted Duration of Therapy Intervention (days/wks) 1 week   Anticipated Discharge Disposition inpatient  rehabilitation facility   Risks and Benefits of Treatment have been explained. Yes   Patient, family and/or staff in agreement with Plan of Care Yes   Clinical Impression Comments SLP: Clinical swallow eval completed per MD orders. Pt presents with moderate oropharyngeal dysphagia. Pt with L sided facial droop and baseline congested cough noted. Thin liquids via cup resulted in overt s/sx of aspiration marked by coughing. Pt tolerated thin liquids via spoon, nectar thick liquids via cup, and pureed textures with no overt s/sx of aspiration. Pt required prolonged but functional time for AP transit. Recommend pt initiate nectar thick full liquids with supervision. Pt should be fully upright and alert for all PO, take small single sips/bites, slow pacing, and alternate between consistencies. ST to continue to follow to assess diet tolerance and trial advanced textures as appropriate. Anticipate pt will require ongoing ST upon discharge.    Total Evaluation Time   Total Evaluation Time (Minutes) 9

## 2019-05-17 NOTE — H&P
General acute hospital, [place]      Neurology Stroke Admission    Patient Name: Jcakeline Smiley  : 1933 MRN#: [id number]    STROKE DATA    Stroke Code:  Stroke code not activated.  Time patient seen:  2019 0300  Last known normal (pt's baseline):  2019 time unkown     TPA treatment:  Not given due to intracranial hemorrhage.      Stroke Scales      National Institutes of Health Stroke Scale (at presentation)   NIHSS done at:  time patient seen      Score    Level of consciousness:  (1)   Not alert; arousable w/ minor stim to obey/answer/respond    LOC questions:  (1)   Answers one question correctly    LOC commands:  (0)   Performs both tasks correctly    Best gaze:  (0)   Normal    Visual:  (0)   No visual loss    Facial palsy:  (0)   Normal symmetrical movements    Motor arm (left):  (1)   Drift    Motor arm (right):  (0)   No drift    Motor leg (left):  (0)   No drift    Motor leg (right):  (0)   No drift    Limb ataxia:  (0)   Absent    Sensory:  (0)   Normal- no sensory loss    Best language:  (0)   Normal- no aphasia    Dysarthria:  (0)   Normal    Extinction and inattention:  (1)   Visual, tacile, auditory, spatial, person inattention        NIHSS Total Score:  4        ICH Score (within 6 hrs of admission/before surgery)   Done at: 0300 19    ICH Score Tool Patients Score   Age ? 80 years 1 point 1   GCS score  3-4   5-12   13-15    2 point  1 point  0 point 0   Hematoma volume, cm 3 ? 30 1 point 0   Intraventricular extension 1 point 0   Infratentorial location 1 point 0   Patient s ICH Score (0-6) = 1          Dysphagia Screen  Time of screenin2019 0330  Screening results: Failed screening - strict NPO pending SLP evaluation     ASSESSMENT & PLAN BY PROBLEM     Work-up for Intracerebral Hemorrhage     Acute Hemorrhagic Stroke Plan  - ICH Score at admission: 1  - Admit to Neuro ICU  - Neurochecks  - Systolic BP Goal: < 140  - Euthermia, Euglycemia  - Head  of bed elevated  - Telemetry, EKG  - Imaging: repeat head CT--0500  - Bedside Glucose Monitoring  - A1c, Troponin x 3  - PT/OT/SLP  - PM&R  - Stroke Education   - Activated factor VIIa 90mcg/kg/dose Q2 hours  - Nicardipine gtt for blood pressure goal    During initial physical assessment, the plan of care was discussed and developed with patient and family.  Plan of care includes: repeat CT head, blood pressure control, novoseven administration.    Patient was admitted via transfer from [facility].    The patient will be admitted to the Neuro Critical Care/Stroke team..     Other Medical Problems:    Neuro:  # Anxiety:  PTA on lorazepam. Per home orders only supposed to take 0.5mg once at night PRN for anxiety. Endorsed taking 3 doses today. Possible underlying etiology for her fall.     Resp:  No active issues. Maintaining sats at 93-95% at admission on room air      CV:  # HTN:  Holding PTA atenolol. On nicardipine gtt. SBP goal <140.    # HLD:  PTA issue. Too drowsy to complete swallow at initial intake.  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 Not on any mainteance medications/infusions.   - Novoseven as above for current bleed  - Heme/onc consultation. Appreciate assistance.     Endo:  No active issues.     ID:  No active issues      Fluids/Electrolytes/Nutrition  0.9% sodium chloride @ 50 mL/hr  Avoid hypotonic fluids.    Nutrition: None      Prophylaxis            For VTE Prevention:  - pneumatic compression device    For Acid Suppression:  - GI prophylaxis is not indicated    Code Status  DNR / DNI    MOLINA Grijalvadebi Smiley is a 86 year old female with history of hemophilia, hypertension, hyperlipidemia, anxiety, who presents as a transfer from outside ED due to known intracerebral hemorrhage.  History is taken from the patient and available notes.  The following is known:    -Patient was at home  today and woke up in her regular state of health. At some point in early evening--she cannot state definitively when--she felt that she had onset of generalized weakness.  She felt she could not walk or function properly at home.  She went to sit on a chair but instead fell onto the floor.  Estimates she was on the floor for roughly an hour before she used her life alert badge.    -Patient took at least 3 doses of lorazepam today due to ongoing anxiety. As above, patient's home orders state one dose nightly PRN    -Patient was brought to an outside ED.  Presenting blood pressure was 209/86.  Nicardipine drip was started and her pressures went down to the 120s over 70s.    - Patient at the outside facility was started on recombinant factor VII a 4 mg.  First dose 12:55 AM. Then transferred for further cares.       Pertinent Past Medical/Surgical History  Past Medical History:   Diagnosis Date     Back ache      Hemophilia (H)      Hyperlipidemia      Hypertension      Menopausal disorder      Osteoarthritis      Pneumonia 6-11    LEFT MID LUNG     Tremor      Vertigo        Past Surgical History:   Procedure Laterality Date     C NONSPECIFIC PROCEDURE      submandibular gland excision     CATARACT IOL, RT/LT      both, Dr Zhou     HYSTERECTOMY, PAP NO LONGER INDICATED         Medications:   No medications prior to admission.   .    Allergies:   Allergies   Allergen Reactions     Atorvastatin Calcium      hepatitis     Sulfa Drugs Fatigue   .    Family History:   Family History   Problem Relation Age of Onset     Cancer Father         leukemia     C.A.D. Brother         CHF     C.A.D. Brother      Diabetes Sister      Diabetes Sister      Diabetes Brother      Diabetes Brother      Diabetes Mother      Lipids Daughter         grand daughter     Lipids Brother         all sibs had it   .    Social History:   Social History     Tobacco Use     Smoking status: Current Every Day Smoker     Packs/day: 1.00     Years:  46.00     Pack years: 46.00     Types: Cigarettes     Tobacco comment: 3/4 pack per day   Substance Use Topics     Alcohol use: No   .    Tobacco use: Current smoker, 1 PPD    ROS:  The 10 point Review of Systems is negative other than noted in the HPI or here.     PHYSICAL EXAMINATION  Vital Signs:  B/P: Data Unavailable,  T: Data Unavailable,  P: Data Unavailable,  R: Data Unavailable    General:  patient lying in bed without any acute distress    HEENT:  normocephalic/atraumatic  Cardio:  RRR  Pulmonary:  no respiratory distress  Abdomen:  soft, non-tender  Extremities:  no edema  Skin:  intact, warm/dry     Neurologic  Mental Status: Drowsy but arouses to voice.  Follows basic commands.  Naming intact.  Registration intact.  Does state incorrectly that her age is 67.  Correctly states the day, date, month.  Cranial Nerves:  visual fields intact, PERRL, EOMI with normal smooth pursuit, facial sensation intact and symmetric, facial movements symmetric, hearing not formally tested but intact to conversation, palate elevation symmetric and uvula midline, no dysarthria, shoulder shrug strong bilaterally, tongue protrusion midline  Motor:  no abnormal movements.  Tone is normal.  5 out of 5 strength in the major muscle groups of the right upper and lower extremity.  She does have drift during NIH exam in the left upper and lower extremity.  Formal muscle testing in the left indicates some decreased biceps flexion, intact triceps extension, and decreased  strength.  She does have some difficulties following command during these testing.  4+ out of 5 of the left hip.  5 out of 5 with left foot dorsiflexion and plantarflexion.  Reflexes: 1+ and symmetric in the upper and lower extremity  Sensory:  Intact to light touch and noxious stimuli in the upper and lower extremities.  She does on the initial part of the examination show extinction with double sided stimulation indicating she feels only the right side in the  arms  Coordination: Finger-nose-finger and heel shin intact on the right.  She has difficulties following commands to complete these tasks in the left.  Station/Gait:  unable to test    Labs  Labs and Imaging reviewed and used in developing the plan; pertinent results included.     Lab Results   Component Value Date     (H) 05/16/2019       The patient was discussed with the fellow, Dr. Lori Booth.  The staff is Dr. Erum Ward.    Becky Marquez MD  Pager: [phone]

## 2019-05-18 NOTE — PROGRESS NOTES
[facility]   Hematology Progress Note      Patient: Jackeline Smiley MRN# [id number]   Age: 86 year old YOB: 1933           Assessment and Plans     Jackeline Smiley is a 86 year old female with a PMH significant for Hemophilia A, Factor VIII inhibitor, HTN, who was admitted on 5/17/2019 after sudden onset weakness and a fall without head trauma and found to have an acute intracranial hemorrhage on CT.  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  Patient was started on Novoseven (Recombinant Factor VIIa) q2H prior to her transfer from the OSH.  Factor XIII level was found to low at 11 on 5/17/19, suggesting that her acquired hemophilia has resurfaced and her factor VIII is being consumed by Factor VIII inhibitor (measured 88). We recommend Novoseven (Recombinant Factor VIIa) but at q4H dosing at this in order to bypass the intrinsic pathway of the coagulation cascade by supplying enough factor VII to increasing coagulation through the extrinsic pathway. In addition, we recommend starting immunosuppression with methylprednisolone and rituxumab in order to suppress production of the factor VIII inhibitor and allow her body to redevelop more factor VIII.    Updates:  As today's CT showed stable ICH, will reduce Novoseven to q 6 hrs  Will Continue daily CT head to determine Novoseven replacement frequency (if stable, continue to wean off)   Continue MP 40 mg IV every day; start Rituximab today    Summary of Recommendations:  - Reduce Novoseven to q6 hrs  - Continue daily CT head to determine Novoseven replacement frequency (if stable, continue to wean off)  - Continue 40mg IV methylprednisolone daily   - Start rituxumab once weekly x4 from 5/18/19    Patient was seen, care plans discussed and staffed with Dr. Duque. Please don't hesitate to contact us if you have any  questions.    Se jacinta Reis  Hematology Oncology and Transplantation Fellow  Pager: [phone]      Interval History     Overnight no events.  Patient states that she is hungry and her cousin will bring a pancake for her.  Left-sided weakness persisted but slowly improved.  Denies any bleeding.  Repeatedly she states that she wants to go home.  Repeated CT scan showed stable bleeding. No other immediate concerns or acute issues.     ROS: Negative other than as stated in above interval history.      Current Facility-Administered Medications   Medication     acetaminophen (TYLENOL) tablet 650 mg    Or     acetaminophen (TYLENOL) solution 650 mg    Or     acetaminophen (TYLENOL) Suppository 650 mg     atenolol (TENORMIN) tablet 25 mg     coagulation Factor VIIa Recomb (NOVOSEVEN RT) 4,000 mcg     glucose gel 15-30 g    Or     dextrose 50 % injection 25-50 mL    Or     glucagon injection 1 mg     hydrALAZINE (APRESOLINE) injection 10 mg     insulin aspart (NovoLOG) inj (RAPID ACTING)     insulin aspart (NovoLOG) inj (RAPID ACTING)     labetalol (NORMODYNE/TRANDATE) syringe 10-20 mg     magnesium sulfate 4 g in 100 mL sterile water (premade)     methylPREDNISolone sodium succinate (solu-MEDROL) injection 40 mg     naloxone (NARCAN) injection 0.1-0.4 mg     ondansetron (ZOFRAN-ODT) ODT tab 4 mg    Or     ondansetron (ZOFRAN) injection 4 mg     pantoprazole (PROTONIX) 40 mg IV push injection     potassium chloride (KLOR-CON) Packet 20-40 mEq     potassium chloride 10 mEq in 100 mL intermittent infusion with 10 mg lidocaine     potassium chloride 10 mEq in 100 mL sterile water intermittent infusion (premix)     potassium chloride 20 mEq in 50 mL intermittent infusion     potassium chloride ER (K-DUR/KLOR-CON M) CR tablet 20-40 mEq     prochlorperazine (COMPAZINE) injection 5 mg    Or     prochlorperazine (COMPAZINE) tablet 5 mg    Or     prochlorperazine (COMPAZINE) Suppository 12.5 mg     simvastatin (ZOCOR) tablet 20 mg      sodium chloride 0.9% infusion     sodium phosphate 15 mmol in D5W intermittent infusion     sodium phosphate 20 mmol in D5W intermittent infusion     sodium phosphate 25 mmol in D5W intermittent infusion           Physical Exams     /62   Pulse 73   Temp 97.5  F (36.4  C) (Axillary)   Resp 12   Wt 49.4 kg (108 lb 14.5 oz)   SpO2 99%   BMI 21.27 kg/m    Wt Readings from Last 3 Encounters:   05/17/19 49.4 kg (108 lb 14.5 oz)   05/17/19 48.3 kg (106 lb 8 oz)   12/31/12 49.9 kg (110 lb)     General:  no acute distress.  HEENT: NCAT. anicteric sclera. Oral mucosa pink and moist with no lesions or thrush.  Neck: Neck supple.   Respiratory: Non-labored breathing, poor inspiratory effort, diminished BS at bases bilaterally.  Cardiovascular: Regular rate and rhythm. No murmur or rub.   Abdomen: Normoactive bowel sounds. Abdomen soft, non-distended, and non-tender.  Extremities: grossly normal, non-tender, no edema. Rt side extremity strength 3/5 .  Psych: Alert and awake, appropriated mood         Labs     CBC  ===  WBC (10e9/L)   Date Value   05/18/2019 12.9 (H)     Hemoglobin (g/dL)   Date Value   05/18/2019 13.9     Platelet Count (10e9/L)   Date Value   05/18/2019 228       BMP  ===  Sodium (mmol/L)   Date Value   05/18/2019 139     Potassium (mmol/L)   Date Value   05/18/2019 3.7     Urea Nitrogen (mg/dL)   Date Value   05/18/2019 15     Creatinine (mg/dL)   Date Value   05/18/2019 0.50 (L)     Calcium (mg/dL)   Date Value   05/18/2019 8.8       LFTs  ====  Bilirubin Total (mg/dL)   Date Value   05/16/2019 0.4     Alkaline Phosphatase (U/L)   Date Value   05/16/2019 66     AST (U/L)   Date Value   05/16/2019 18     ALT (U/L)   Date Value   05/16/2019 24               Images     Results for orders placed or performed during the hospital encounter of 05/17/19   CT Head w/o Contrast    Narrative    CT HEAD W/O CONTRAST 5/17/2019 5:47 AM    History: Stroke, follow up; right thalamic bleed, follow[facility]up  from  [facility]    Comparison: CT 5/16/2019.    Technique: Using multidetector thin collimation helical acquisition  technique, axial, coronal and sagittal CT images from the skull base  to the vertex were obtained without intravenous contrast.     Findings:  Acute hemorrhagic focus centered in the right thalamus  measuring 16 x 15 x 21 cm, previously 14 x 14 x 19 cm. No significant  surrounding edema. No significant mass effect. No new intracranial  hemorrhage. Mild diffuse cerebral volume loss. Mild patchy  periventricular hypoattenuation, consistent with chronic small vessel  ischemic disease. No midline shift. The basal cisterns are patent. No  hydronephrosis.    Right maxillary mucosal retention cysts, the remaining paranasal  sinuses are clear. The mastoid air cells are clear.       Impression    Impression: Minimally increased acute hemorrhagic focus centered in  the right thalamus.     I have personally reviewed the examination and initial interpretation  and I agree with the findings.    JERSEY SEVILLA MD   CT Head w/o Contrast    Narrative    CT head without 5/18/2019 6:03 AM    Provided History: Evaluate ICH    Comparison: CT 5/17/2019.    Technique: Using multidetector thin collimation helical acquisition  technique, axial, coronal and sagittal CT images from the skull base  to the vertex were obtained without intravenous contrast.     Findings:  Hemorrhagic focus centered in the right thalamus measures  16 x 15 x 21 cm which is unchanged from the prior exam. There is mild  localized mass effect. No new intracranial hemorrhage.No midline  shift. The ventricles are proportionate to the cerebral sulci. The  gray to white matter differentiation of the cerebral hemispheres is  preserved. The basal cisterns are patent. Mild diffuse cerebral volume  loss. Patchy periventricular hypoattenuation, consistent with chronic  small vessel ischemic disease. No hydrocephalus. Atherosclerotic  calcifications in the  vertebral arteries.    Small right maxillary mucosal retention cyst, the remaining paranasal  sinuses are clear. The mastoid air cells are clear.       Impression    Impression: Unchanged hemorrhagic focus centered in the right  thalamus.    I have personally reviewed the examination and initial interpretation  and I agree with the findings.    TOMASA COHEN MD

## 2019-05-18 NOTE — PROVIDER NOTIFICATION
NCC notified of patients request for PTA ativan to be reordered. MD will not order at this time. Please continue to assess patient anxiety level and contact NCC if med needed.

## 2019-05-18 NOTE — PLAN OF CARE
Neuro: confused in AM. Neuro exam improved at noon, patient a&OX4. L sided weakness in UE/LE. L facial droop noted. Follows commands.     CV: HR NSR. BPs stable. Required labetalol x 2 for bp management. Afebrile.     Resp: LS clear. On room air. Spot checking o2 sats. Weak, loose cough.     : Incontinent/voiding in bedpan/commode.     GI: DDI diet, tolerating well. No BM    Skin: Pre existing pressure ulcer on sacrum. IV Infiltrated on L arm, continue to monitor.     Activity: Tolerated sitting up in chair. Transfer with assist of two and gait belt.     Monitor neuro status, update MD as appropriate

## 2019-05-18 NOTE — PROGRESS NOTES
"   05/18/19 1400   Quick Adds   Type of Visit Initial Occupational Therapy Evaluation   Living Environment   Lives With alone   Living Arrangements apartment  (senior apartment)   Home Accessibility no concerns   Transportation Anticipated   (metro mobility)   Living Environment Comment Pt lives alone in a senior apartment. She reports she gets 1 meal a day but no other services    Self-Care   Usual Activity Tolerance moderate   Current Activity Tolerance fair   Regular Exercise No   Equipment Currently Used at Home shower chair;walker, rolling   Activity/Exercise/Self-Care Comment Patient reports she is independent with dressing and uses a shower chair and handheld showerhead to assist with showering. She reports she takes [employer] to [employer] to buy food but reports she doesn't do a lot of cooking anymore. Patient states \"everything is getting harder\" but she is independent    Functional Level   Ambulation 1-->assistive equipment   Transferring 1-->assistive equipment   Toileting 0-->independent   Bathing 1-->assistive equipment   Dressing 0-->independent   Eating 0-->independent   Communication 0-->understands/communicates without difficulty   Swallowing 2-->difficulty swallowing liquids   Cognition 2 - difficulty with organizing thoughts   Fall history within last six months yes   Number of times patient has fallen within last six months   (Fell two days ago)   Which of the above functional risks had a recent onset or change? ambulation;transferring;toileting;bathing;fall history   Prior Functional Level Comment Patient reports she walks at baseline with a 4ww limited distances    General Information   Onset of Illness/Injury or Date of Surgery - Date 05/17/19   Referring Physician Becky Marquez MD   Patient/Family Goals Statement to drink some coffee   Additional Occupational Profile Info/Pertinent History of Current Problem pe chart \"Jackeline Smiley is a 86 year old female with a PMH significant for " Hemophilia A, Factor VIII inhibitor, HTN, who was admitted on 5/17/2019 after sudden onset weakness and a fall without head trauma and found to have an acute intracranial hemorrhage on CT.  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  Patient was started on Novoseven (Recombinant Factor VIIa) q2H prior to her transfer from the [facility].  Factor XIII level was found to low at 11 on 5/17/19, suggesting that her acquired hemophilia has resurfaced and her factor VIII is being consumed by Factor VIII inhibitor (measured 88). We recommend Novoseven (Recombinant Factor VIIa) but at q4H dosing at this in order to bypass the intrinsic pathway of the coagulation cascade by supplying enough factor VII to increasing coagulation through the extrinsic pathway. In addition, we recommend starting immunosuppression with methylprednisolone and rituxumab in order to suppress production of the factor VIII inhibitor and allow her body to redevelop more factor VIII.'   Precautions/Limitations other (see comments)  (Hemophilia precautions)   Cognitive Status Examination   Orientation person   Cognitive Comment OT: Pt repeatedly stating that she knows people are telling her she is in the hospital but she thinks she is at home   Sensory Examination   Sensory Comments OT: Pt w/ contraversive pushing behaviors consistent with R thalamic stroke   Range of Motion (ROM)   ROM Comment OT: LUE 0-70 degrees, tentatively assessed 2/2 pt's Hemophilia, RUE WFL   Strength   Strength Comments OT: Pt able to  3/5, AAROM for all LUE movement prxomal to the wrist, RUE overall deconditioned, pt able to move WFL for RUE AROM   Hand Strength   Hand Strength Comments OT: L weakened , right  WFL   Muscle Tone Assessment   Muscle Tone Comments OT: Overall deconditioned   Coordination   Coordination Comments OT: Limited by strength  "  Instrumental Activities of Daily Living (IADL)   IADL Comments OT: Pt was ind   Activities of Daily Living Analysis   Impairments Contributing to Impaired Activities of Daily Living balance impaired;cognition impaired;strength decreased;coordination impaired;ROM decreased;sensory feedback impaired   General Therapy Interventions   Planned Therapy Interventions ADL retraining;IADL retraining;balance training;bed mobility training;cognition;strengthening;transfer training;home program guidelines;progressive activity/exercise   Clinical Impression   Criteria for Skilled Therapeutic Interventions Met yes, treatment indicated   OT Diagnosis decreased ind in ADLS/IADLS   Influenced by the following impairments generalized weakness and cognition   Assessment of Occupational Performance 1-3 Performance Deficits   Identified Performance Deficits decreased ind in ADLS/IADLS   Clinical Decision Making (Complexity) Low complexity   Therapy Frequency daily   Predicted Duration of Therapy Intervention (days/wks) 5/29/19   Anticipated Discharge Disposition Acute Rehabilitation Facility   Risks and Benefits of Treatment have been explained. Yes   Patient, Family & other staff in agreement with plan of care Yes   [facility] TM \"6 Clicks\"   2016, Trustees of [facility], under license to [employer].  All rights reserved.   6 Clicks Short Forms Basic Mobility Inpatient Short Form   [facility]  \"6 Clicks\" V.2 Basic Mobility Inpatient Short Form   1. Turning from your back to your side while in a flat bed without using bedrails? 2 - A Lot   2. Moving from lying on your back to sitting on the side of a flat bed without using bedrails? 1 - Total   3. Moving to and from a bed to a chair (including a wheelchair)? 2 - A Lot   4. Standing up from a chair using your arms (e.g., wheelchair, or bedside chair)? 2 - A Lot   5. To walk in hospital room? 1 - Total   6. Climbing 3-5 steps with a railing? 1 - " Total   Basic Mobility Raw Score (Score out of 24.Lower scores equate to lower levels of function) 9   Total Evaluation Time   Total Evaluation Time (Minutes) 6

## 2019-05-18 NOTE — PLAN OF CARE
Discharge Planner SLP   Patient plan for discharge: not stated  Current status: The patient was seen for dysphagia tx this AM while up in the chair. She presents with significant left sided oropharyngeal deficits and is disoriented, requires diet modification for safer swallowing. Recommend diet upgrade to dysphagia diet 1 and nectar thick liquids. Recommend small bites/sips, slow pacing, and that the oral cavity be clear before giving another bite/sip.  Barriers to return to prior living situation: left weakness, confusion, dysphagia with modified diet  Recommendations for discharge: TCU, may be a candidate for ARU pending ability to participate in tx  Rationale for recommendations: below baseline oropharyngeal swallowing skills, requires modified diet       Entered by: Racehl Leal 05/18/2019 10:17 AM

## 2019-05-18 NOTE — PLAN OF CARE
Discharge Planner OT   Patient plan for discharge: Pt open to rehab  Current status: OT educated pt on utilzing visual cues to find midline sitting in chair. OT provided orientation, education on role of OT, education on recommendations for rehab w/in OT scope. VSS throughout.   Barriers to return to prior living situation: medical status  Recommendations for discharge: ARU  Rationale for recommendations: to increase ind in ADLS, pt would tolerate 3hrs/therapy a day       Entered by: Haley Barreto 05/18/2019 3:13 PM

## 2019-05-18 NOTE — PROGRESS NOTES
Neuroscience Intensive Care Progress Note           Assessment and Plan         Current hospital day: Hospital Day: 2    Jackeline Smiley is a 86 year old-year-old lady with a history of hemophilia A who was admitted on 5/17/2019 with a right basal ganglia intraparenchymal hemorrhage.     Neurologic:  #Right basal ganglia intraparenchymal hemorrhage in the setting of acquired hemophilia, ICH 1  - CT head today showed stable hemorrhage   - Hematology following, appreciate recs (details under hematology section)    #Anxiety disorder  - Takes lorazepam 0.5 mg at bedtime PRN, will try to hold while in the hospital      Cardiovascular:   #Hypertension   - On atenolol 50 mg daily at home, restarted half dose here in the hospital due to heart rate in the 60s - will restart full dose today 5/18 since HR is higher  - PRNs available for SBP > 140 and DBP > 90 mmHg    #Hyperlipidemia  - Continue home simvastatin 20 mg daily     Pulmonary:  - No active issues   - Breathing comfortably on room air     Renal:  - No active issues  - Strict I/O monitoring   - Daily BMP    Endocrine:  #Hyperglycemia  - Monitor   - Sliding scale insulin ordered now that she is on steroids    Hematologic:  #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     Nutrition: Full liquid diet. Will advance as tolerated.     Infectious diseases:   No active issues.     PPX:    DVT: SCDs     GI: Pantoprazole while on steroids.   Lines/Tubes: PIVs  Code Status: DNR/DNI    Dispo: Could potentially transfer to the floor today.     Faith Pablo   Neuro ICU Fellow   Pager: [phone]         Subjective        Jackeline Smiley is a 86 year old-year-old lady with a history of hemophilia A who  was admitted on 5/17/2019 with a right basal ganglia intraparenchymal hemorrhage.     24 hour events:  No major events. Remains stable.            24 Hour Vital Signs Summary        /58   Pulse 72   Temp 99.1  F (37.3  C) (Oral)   Resp 26   Wt 49.4 kg (108 lb 14.5 oz)   SpO2 99%   BMI 21.27 kg/m           Ventilator Settings        Resp: 26           Physical Exam        General:  Patient lying in bed without any acute distress    HEENT:  Normocephalic/atraumatic  Cardio:  Regular rate and rhythm   Pulmonary:  No respiratory distress  Extremities:  No edema  Skin:  Warm/dry     Neurologic Examination:  Mental Status: Alert and oriented.   Language: Speaks in full sentences. Follows commands.   Speech: No dysarthria.   Cranial Nerves: Gaze is conjugate. Extraocular movements are intact. Left facial paresis.   Motor: Left upper extremity is overall 2/5 in strength, left lower extremity is 4/5 in strength. Right hemibody strength appears normal.          Intake and Output          Intake/Output Summary (Last 24 hours) at 5/18/2019 0736  Last data filed at 5/18/2019 0600  Gross per 24 hour   Intake 1200 ml   Output 625 ml   Net 575 ml            Labs        CBC  Recent Labs   Lab 05/18/19 0419 05/17/19 0633 05/16/19 2118   WBC 12.9* 6.9 6.9   HGB 13.9 15.5 14.8    221 229       COAGS  Recent Labs   Lab 05/17/19 0633 05/16/19 2118   INR 0.55* 0.95   PTT 43* 54*       BMP  Recent Labs   Lab 05/18/19 0419 05/17/19 1949 05/17/19 0633 05/16/19 2118    138 138 135   POTASSIUM 3.7 3.5 3.7 4.0   CHLORIDE 105 105 106 101   CO2 23 24 26 30   BUN 15 14 13 18   CR 0.50* 0.50* 0.58 0.82   MICK 8.8 8.4* 8.6 8.9       Liver panel:  Recent Labs   Lab Test 05/16/19 2118 12/31/12  1625 06/18/11  1430   PROTTOTAL 7.7 6.1* 8.2   ALBUMIN 4.1 3.8 4.5   BILITOTAL 0.4 0.4 0.3   ALKPHOS 66 66 145   AST 18 21 30   ALT 24 30 35       ABGNo results for input(s): PH, PCO2, PO2, HCO3 in the last 168 hours.    CSFNo  results for input(s): CGLU, CTP in the last 168 hours.    Invalid input(s): CCSF    MICRONo results for input(s): CULT in the last 168 hours.    LIPID Profile:   Cholesterol   Date Value Ref Range Status   06/20/2011 173 0 - 200 mg/dL Final     Comment:     LDL Cholesterol is the primary guide to therapy.   The NCEP recommends further evaluation of: patients with cholesterol greater   than 200 mg/dL if additional risk factors are present, cholesterol greater   than   240 mg/dL, triglycerides greater than 150 mg/dL, or HDL less than 40 mg/dL.   10/21/2010 190 0 - 200 mg/dL Final     Comment:     LDL Cholesterol is the primary guide to therapy.   The NCEP recommends further evaluation of: patients with cholesterol <200   mg/dL   if additional risk factors are present, cholesterol >240 mg/dL, triglycerides   >150 mg/dL, or HDL <40 mg/dL.     HDL Cholesterol   Date Value Ref Range Status   06/20/2011 40 (L) 50 - 110 mg/dL Final   10/21/2010 52 50 - 110 mg/dL Final     LDL Cholesterol Calculated   Date Value Ref Range Status   06/20/2011 102 0 - 129 mg/dL Final     Comment:     LDL Cholesterol is the primary guide to therapy: LDL-cholesterol goal in high   risk patients is <100 mg/dL and in very high risk patients is <70 mg/dL.   10/21/2010 113 0 - 129 mg/dL Final     Comment:     LDL Cholesterol is the primary guide to therapy: LDL-cholesterol goal in high   risk patients is <100 mg/dL and in very high risk patients is <70 mg/dL.     Triglycerides   Date Value Ref Range Status   06/20/2011 155 (H) 0 - 150 mg/dL Final     Comment:     Fasting specimen   10/21/2010 115 0 - 150 mg/dL Final     Comment:     Fasting specimen     Cholesterol/HDL Ratio   Date Value Ref Range Status   06/20/2011 4.3 0.0 - 5.0 Final   10/21/2010 3.6 0.0 - 5.0 Final       A1C: No lab results found.    Troponin I:   Recent Labs   Lab Test 05/18/19  0419 05/17/19  1949 05/17/19  1208 05/16/19  2118 06/18/11  1430   TROPI <0.015 <0.015 <0.015 <0.015  <0.012            Imaging and Tests         No results found for this or any previous visit (from the past 24 hour(s)).         Current Medications           atenolol  25 mg Oral or Feeding Tube Daily     factor VIIa recominant  4,000 mcg Intravenous Q4H     methylPREDNISolone  40 mg Intravenous Daily     simvastatin  20 mg Oral or Feeding Tube At Bedtime       PRN Medications:  acetaminophen **OR** acetaminophen **OR** acetaminophen, hydrALAZINE, labetalol, magnesium sulfate, naloxone, ondansetron **OR** ondansetron, potassium chloride, potassium chloride with lidocaine, potassium chloride, potassium chloride, potassium chloride, prochlorperazine **OR** prochlorperazine **OR** prochlorperazine, sodium phosphate, sodium phosphate, sodium phosphate    Infusions:    sodium chloride 50 mL/hr at 05/18/19 0600       Allergies   Allergen Reactions     Atorvastatin Calcium      hepatitis     Sulfa Drugs Fatigue

## 2019-05-18 NOTE — PLAN OF CARE
Neuro: lethargic vs alert, confuse x2-3, disoriented to time, situation/place, pt waxes and weans with neuro exams, refuses to answers questions most of shift, +2 to +3 perrla, Pt with left sided weakness and moves right side purposefully, follows commands.  CV: NSR with PAC's, SBP <140 goal achieved with labetalol 10mg given X2  , HR 70's to 80's. HR high 120's X3 different occassions lasting <10secs. Asymptomatic and otherwise vitally stable.  Resp: O2 @ 2L with sat >96%, LS clear, diminished at bases. Weak cough, upper airways congestions, IS encouraged, pt refuses most of times.  GI/: incont at times voided X2, no BM, po meds given with apple sauce, no c/o N/V.  Skin: pressure ulcer to sacrum with foam  Plan: Continue to monitor and treat per plan of care. Notify NCC/Neurosurg of changes in pt condition.

## 2019-05-18 NOTE — PROGRESS NOTES
Social Work: Assessment with Discharge Plan    Patient Name:  Jackeline Smiley  :  1933  Age:  86 year old  MRN:  [id number]  Risk/Complexity Score:  Filed Complexity Screen Score: 5  Completed assessment with:  Pt, no family present at this time    Presenting Information   Reason for Referral:  Discharge plan  Date of Intake:  May 18, 2019  Referral Source:  Physician  Decision Maker:  self  Alternate Decision Maker:  Granddaughter  Health Care Directive:  Provided education  Living Situation:  Apartment  Previous Functional Status:  Independent  Patient and family understanding of hospitalization:  It is her understanding that she may have had a stroke.    Cultural/Language/Spiritual Considerations:  None identified at this time  Adjustment to Illness:  Some impaired understanding in her current limitations and probably need for rehab post hospitalization.    Physical Health  Reason for Admission:  No diagnosis found.  Services Needed/Recommended:  TCU vs. ARU    Mental Health/Chemical Dependency  Diagnosis:  Reports lifelong depression and anxiety  Support/Services in Place:  Reports no formal therapy or psychiatric care at this time  Services Needed/Recommended:  Adjustment to illness    Support System  Significant relationship at present time:  .   Pt has one daughter whom she is estranged from.  Family of origin is available for support:  GrandLuz cooper is her main support  Other support available:  [employer] [place]  Gaps in support system:    Patient is caregiver to:  None     Provider Information   Primary Care Physician:  [facility]   [phone]   Clinic:  [address] / [place] [address]      :  [place] is her [facility]     Financial   Income Source:  residential  Financial Concerns:  None identified at this time  Insurance:    Payor/Plan Subscriber Name Rel Member # Group #   [employer] - [employer]*  FRANCOISE VAUGHAN  [id number]       [id number]       Discharge Plan   Patient and family discharge goal:  Pt would like to return home upon discharge  Provided education on discharge plan:  YES  Patient agreeable to discharge plan:  Discharge to rehab with goal of eventual return home most likely the recommendation  A list of Medicare Certified Facilities was provided to the patient and/or family to encourage patient choice. Patient's choices for facility are:  Not at this time.  Family not present and pt wants to go home  Will NH provide Skilled rehabilitation or complex medical:  YES  General information regarding anticipated insurance coverage and possible out of pocket cost was discussed. Patient and patient's family are aware patient may incur the cost of transportation to the facility, pending insurance payment: NO  Barriers to discharge:  Pt want discharge back to home so further conversations needed.    Discharge Recommendations   Anticipated Disposition:  ARU vs. TCU  Transportation Needs:  Medical:  Wheelchair  Name of Transportation Company and Phone:  [employer]    Additional comments   Further assessment needed and coordination with grand daughter to discuss placement options.

## 2019-05-18 NOTE — PLAN OF CARE
Discharge Planner PT   Patient plan for discharge: not discussed   Current status: Pt cont to display pusher's though seems improved, especially with cues for reaching and external vertical visual cues. Supine<>sit with min-mod A for management of trunk and Les. Sits at EOB with SBA. STS x 3 with mod A. Able to take side steps up and down bedside. Time spent working on midline achievement - pt with descent insight.   Barriers to return to prior living situation: medical needs, current mobility, level of A   Recommendations for discharge: ARU vs TCU   Rationale for recommendations: Pt would benefit from ongoing therapy to continue to progress overall mobility and promote progression of stability, strength and initiation of gait and higher level functional mobility as appropriate.        Entered by: Melanie Cope 05/18/2019 3:23 PM

## 2019-05-19 NOTE — PROGRESS NOTES
Pt transferred to 6A from 4A for R basal ganglia IPH.  Pt was settled and VSS on RA. Pt A/Ox4. Pt is on a DD1 diet with nectar liquids. L side weakness. LUE weaker than LLE. No N/T. PERRLA. Voids spontaneously via commode. Cardiac monitor discontinued. Family was notified of transfer.

## 2019-05-19 NOTE — PLAN OF CARE
PT 4A    Discharge Planner PT   Patient plan for discharge: not discussed today  Current status: required mod assist to transfer to EOB and mod -max assist for bed > chair.  Poor midline orientation in sitting/standing with difficulty moving LE in standing. VSS on RA.   Barriers to return to prior living situation: assistance needed for safe mobility.   Recommendations for discharge: TCU  Rationale for recommendations: dependence with functional mobility.  Uncertain timeframe/potential for return to community living.        Entered by: Fuentes Wilson 05/19/2019 1:28 PM

## 2019-05-19 NOTE — PLAN OF CARE
Pt transferred to 6A from  at 14:30. Reason for transfer: pt no longer needing ICU care s/p intracranial hemorrhage. Maintaining sbp <180. On room air saturating mid to high 90s. Pt alert and oriented x 3. No changes in neuro status. Pt up in chair for few hours today, and worked with PT/OT.  Pt tolerating DD1 diet with nectar thick liquids. Report given to 6A nurse. All belongings sent and family contacted of the transfer. See flow sheet for details.

## 2019-05-19 NOTE — PROGRESS NOTES
Noted RN Care Coordinator consult in Epic, placed on admission 5/17/19 as part of Stroke admission order set.  Patient with Hemophilia A hospitalized with new intracranial hemorrhage, stable but remains in Neuro ICU.  SW assessed yesterday, 5/18, and began discharge planning.  Therapies recommended TCU and patient would like to discharge home.    Will defer further discharge planning to weekday RNCC and SW as patient remains on ICU and will not discharge today or tomorrow.  Rehab continues to recommend TCU and describes significant functional impairments.      Lukasz Blanchard, RN, PHN, ACM  RN Care Coordinator   [address] [address]   [email or web address]   Casual Employee - Weekend Coverage only  To contact weekend RNCC, dial [phone] and enter pager number [phone] at prompt.  This pager can not be contacted by text page or outside line.

## 2019-05-19 NOTE — PLAN OF CARE
D: Patient on unit 4A Surgical/Neuro ICU s/p R basal ganglia intraparenchymal hemorrhage. Writer responsible for patient [id number].     I/A:   Neuro- Alert, oriented X4 with intermittent confusion that is easily reoriented. Left facial droop. Left arm 2/5, left leg 3/5, right side 4/5. Up with heavy assist of 2/gait belt. Sat up in chair for 1.5 hours, up to commode X2.   CV- NSR with frequent PACs and rare PVCs. BP goal <140, requiring frequent labetalol.   Pulm- lungs clear, fair cough.   GI- Dysphagia Level 1 pureed, nectar thickened liquids. Fair intake. Passing gas, no BM.   - Voiding adequate amounts into BSC.   Endo- BG Ac+HS.   Lines- PIV R X2   Drips- NS @50/hr   Skin- pre-existing sacral pressure ulcer, intact skin with nonblanchable erythema. Mepilex over it.   Drains - denies   Pain- denies  Psych/social- family visited, supportive of patient.   See flow sheets for further interventions and assessments.     P: Continue to monitor pt closely. Notify MD of significant changes.

## 2019-05-19 NOTE — PLAN OF CARE
Discharge Planner OT   Patient plan for discharge: not stated  Current status: Pt increased ability to sit midline, able to to follow VC and visual cues to correct slight lean to L sitting up edge of chair, Pt max progressing to mod A x1 STS w/ mod A overall LB dressing, VSS throughout.   Barriers to return to prior living situation: medical status  Recommendations for discharge: ARU  Rationale for recommendations: to increase ind in ADLS, pt would be able to tolerate 3hrs/therapy/day       Entered by: Haley Barreto 05/19/2019 11:45 AM

## 2019-05-19 NOTE — PLAN OF CARE
D/I: Patient on unit 4A Surgical/Neuro ICU R basal ganglia IPH  Neuro- Majority of the night pt is A & O x4, but intermediately will forget place and situation. Moves all extremities and follows commands. Able to raise left leg in air, and is 4/5 in strength in LE. UE weak, but improving throughout the night. PERRLA, 2mm. No complaints of numbness or tingling. Seroquel 25mg given for anxiety during the night, pt was able to sleep for a few hours  CV-  Multiply episodes of SVT, HR jumped into the 120s, but did not sustain. MD notified, potassium replaced and protocol changed, 12 lead done. Pt now in NSR w/ PACs and PVCs frequent. HR 70-90s. Afebrile. BP hard to manage d/t patients anxious state at times, labetalol 20mg x 1 given. Hydralazine 10 mg x 2 given.   Pulm-  LS clear, on RA and oxymask PRN  GI-  Nectar thick liquids given,no BM this shift.  -  Voids spontaneously, low urine output.   Gtts-   NS @ 50.   Skin-  Skin intact on sacrum, non-blanchable skin, meplix placed.   Pain-  denies pain just aches all over body d/t arthritis.   IV's/Drains- 2 PIVs   Labs- 20 meq of potassium given. Phos to be replaced this AM.  See flow sheets for further interventions and assessments.   A: Stable   P: Continue to monitor pt closely. Notify MD of significant changes.

## 2019-05-19 NOTE — PROGRESS NOTES
Neuroscience Intensive Care Progress Note           Assessment and Plan         Current hospital day: Hospital Day: 3    Jackeline Smiley is a 86 year old-year-old lady with a history of acquired hemophilia A, HTN, HLD who was admitted on 5/17/2019 with LUE weakness, found to have right basal ganglia intraparenchymal hemorrhage. ICH score 1. Hemorrhage stable. Heme/onc following, assisting with management of acquired hemophilia A.    Changes for today:  - Resumed pta atenolol 25 -> 50 mg daily  - Repeat CT head stable. Repeat CT head 5/20.  - Per heme/onc: continue current regimen as below, changed Methylprednisolone 40 mg daily to prednisone 50 mg  - SBP < 180  - Stop NS  - Resume pta prn ativan for anxiety  - Transfer to 6A floor    Neurologic:  #Right basal ganglia intraparenchymal hemorrhage in the setting of acquired hemophilia, ICH 1, stable  - CT head today showed stable hemorrhage. Repeat CT head 5/20.  - Hematology following, appreciate recs (details under hematology section)    #Anxiety disorder  - Resume pta lorazepam 0.5 mg at bedtime PRN    Cardiovascular:   #Hypertension   - Resumed pta atenolol 25 -> 50 mg daily  - PRNs available for SBP > 180 and DBP > 90 mmHg    #Hyperlipidemia  - Continue home simvastatin 20 mg daily     Pulmonary:  - No active issues   - Breathing comfortably on room air     Renal/FEN:  - No active issues  - Strict I/O monitoring   - Daily BMP  - Stop NS    Endocrine:  #Hyperglycemia, likely 2/2 steroids  - Monitor   - Sliding scale insulin while on steroids    Hematologic:  #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     Nutrition: Per  SLP: Dysphagia 1 diet with nectar thick liquids. Will advance as tolerated.     Infectious diseases:   No active issues.   Leukocytosis likely 2/2 steroids. Continue to monitor WBC & fever.    PPX:    DVT: SCDs     GI: Pantoprazole while on steroids.   Lines/Tubes: PIVs  Code Status: DNR/DNI    Dispo: Transfer to 6A floor. Per OT: ARU. Per PT: ARU vs TCU.    Zen Costa MD  Neurosurgery Resident PGY1         Subjective        24 hour events:  Hemorrhage stable. Given seroquel for agitation. Intermittent confusion. SVT on tele, now sinus. Given prn labetalol, hydralazine for SBP < 140.           24 Hour Vital Signs Summary        /74   Pulse 72   Temp 99  F (37.2  C) (Axillary)   Resp 12   Ht 1.524 m (5')   Wt 48.8 kg (107 lb 9.4 oz)   SpO2 95%   BMI 21.01 kg/m           Ventilator Settings        Resp: 12           Physical Exam        General:  Patient lying in bed without any acute distress    HEENT:  Normocephalic/atraumatic  Cardio:  Regular rate and rhythm   Pulmonary:  No respiratory distress  Extremities:  No edema  Skin:  Warm/dry     Neurologic Examination:  Mental Status: Alert and oriented x3.   Language: Speaks in full sentences. Follows commands. No aphasia.  Speech: No dysarthria.   Cranial Nerves: Gaze is conjugate. Extraocular movements are intact. Left facial paresis.   Motor: Left upper extremity is overall 3/5 in strength, left lower extremity is 4/5 in strength. Right hemibody strength appears normal.   Sensory: sensation intact to light touch         Intake and Output          Intake/Output Summary (Last 24 hours) at 5/19/2019 1058  Last data filed at 5/19/2019 0800  Gross per 24 hour   Intake 1421 ml   Output 775 ml   Net 646 ml            Labs        CBC  Recent Labs   Lab 05/18/19  0419 05/17/19  0633 05/16/19 2118   WBC 12.9* 6.9 6.9   HGB 13.9 15.5 14.8    221 229       COAGS  Recent Labs   Lab 05/17/19  0633 05/16/19 2118   INR 0.55* 0.95   PTT 43* 54*        BMP  Recent Labs   Lab 05/19/19  0424 05/18/19  0419 05/17/19  1949 05/17/19  0633 05/16/19 2118   NA  --  139 138 138 135   POTASSIUM 4.1 3.7 3.5 3.7 4.0   CHLORIDE  --  105 105 106 101   CO2  --  23 24 26 30   BUN  --  15 14 13 18   CR  --  0.50* 0.50* 0.58 0.82   MICK  --  8.8 8.4* 8.6 8.9       Liver panel:  Recent Labs   Lab Test 05/16/19 2118 12/31/12  1625 06/18/11  1430   PROTTOTAL 7.7 6.1* 8.2   ALBUMIN 4.1 3.8 4.5   BILITOTAL 0.4 0.4 0.3   ALKPHOS 66 66 145   AST 18 21 30   ALT 24 30 35       ABGNo results for input(s): PH, PCO2, PO2, HCO3 in the last 168 hours.    CSFNo results for input(s): CGLU, CTP in the last 168 hours.    Invalid input(s): CCSF    MICRONo results for input(s): CULT in the last 168 hours.    LIPID Profile:   Cholesterol   Date Value Ref Range Status   06/20/2011 173 0 - 200 mg/dL Final     Comment:     LDL Cholesterol is the primary guide to therapy.   The NCEP recommends further evaluation of: patients with cholesterol greater   than 200 mg/dL if additional risk factors are present, cholesterol greater   than   240 mg/dL, triglycerides greater than 150 mg/dL, or HDL less than 40 mg/dL.   10/21/2010 190 0 - 200 mg/dL Final     Comment:     LDL Cholesterol is the primary guide to therapy.   The NCEP recommends further evaluation of: patients with cholesterol <200   mg/dL   if additional risk factors are present, cholesterol >240 mg/dL, triglycerides   >150 mg/dL, or HDL <40 mg/dL.     HDL Cholesterol   Date Value Ref Range Status   06/20/2011 40 (L) 50 - 110 mg/dL Final   10/21/2010 52 50 - 110 mg/dL Final     LDL Cholesterol Calculated   Date Value Ref Range Status   06/20/2011 102 0 - 129 mg/dL Final     Comment:     LDL Cholesterol is the primary guide to therapy: LDL-cholesterol goal in high   risk patients is <100 mg/dL and in very high risk patients is <70 mg/dL.   10/21/2010 113 0 - 129 mg/dL Final     Comment:     LDL Cholesterol is the primary guide to therapy:  LDL-cholesterol goal in high   risk patients is <100 mg/dL and in very high risk patients is <70 mg/dL.     Triglycerides   Date Value Ref Range Status   06/20/2011 155 (H) 0 - 150 mg/dL Final     Comment:     Fasting specimen   10/21/2010 115 0 - 150 mg/dL Final     Comment:     Fasting specimen     Cholesterol/HDL Ratio   Date Value Ref Range Status   06/20/2011 4.3 0.0 - 5.0 Final   10/21/2010 3.6 0.0 - 5.0 Final       A1C:   Recent Labs   Lab Test 05/18/19  0419   A1C 6.2*       Troponin I:   Recent Labs   Lab Test 05/18/19  0419 05/17/19  1949 05/17/19  1208 05/16/19  2118 06/18/11  1430   TROPI <0.015 <0.015 <0.015 <0.015 <0.012            Imaging and Tests         No results found for this or any previous visit (from the past 24 hour(s)).         Current Medications           atenolol  50 mg Oral or Feeding Tube Daily     factor VIIa recominant  4,000 mcg Intravenous Q6H     insulin aspart  1-7 Units Subcutaneous TID AC     insulin aspart  1-5 Units Subcutaneous At Bedtime     methylPREDNISolone  40 mg Intravenous Daily     pantoprazole  40 mg Oral QAM AC     simvastatin  20 mg Oral or Feeding Tube At Bedtime       PRN Medications:  acetaminophen **OR** acetaminophen **OR** acetaminophen, albuterol, albuterol, glucose **OR** dextrose **OR** glucagon, diphenhydrAMINE, EPINEPHrine, haloperidol lactate, hydrALAZINE, - MEDICATION INSTRUCTIONS -, labetalol, magnesium sulfate, magnesium sulfate, - MEDICATION INSTRUCTIONS -, meperidine, methylPREDNISolone, naloxone, ondansetron **OR** ondansetron, potassium chloride, potassium chloride with lidocaine, potassium chloride, potassium chloride, potassium chloride, potassium phosphate (KPHOS) in D5W IV, potassium phosphate (KPHOS) in D5W IV, potassium phosphate (KPHOS) in D5W IV, potassium phosphate (KPHOS) in D5W IV, prochlorperazine **OR** prochlorperazine **OR** prochlorperazine, QUEtiapine, sodium chloride    Infusions:    - MEDICATION INSTRUCTIONS -       -  MEDICATION INSTRUCTIONS -       sodium chloride       sodium chloride 50 mL/hr at 05/19/19 9915       Allergies   Allergen Reactions     Atorvastatin Calcium      hepatitis     Sulfa Drugs Fatigue

## 2019-05-19 NOTE — PROGRESS NOTES
Hematology Staff Note  I saw Ms Smiley this AM and gave her the good news that her CT scan is unchanged from yesterday.  She notes no new deficits and is sitting up in a chair today drinking coffee.  Tolerated rituximab yesterday.    Assessment/Recommendations: 87 YO woman with relapsed acquired hemophilia A with spontaneous ICH, now stable on Q6 rFVIIa for hemostasis.    1. Continue Q6 Novo7 for next 24 hours  2. No change to methylprednisolone (ok to change to 50mg Pred when taking PO)  3. Rituximab dose #2 would be Friday 5/24    Virgilio Duque MD   of Medicine, Division of Hematology, Oncology and Transplantation  [facility] [employer]     I spent 25 minutes seeing the patient and coordinating care with neurocritical care today 5/19/2019

## 2019-05-20 NOTE — PLAN OF CARE
Discharge Planner SLP   Patient plan for discharge: Unknown  Current status: Recommend dysphagia diet 2 and thin liquids, no straws, with 1:1 supervision.  Pt to be fully alert and upright for all PO, taking small bites/sips at slow rate.  Pt continues to present with mild oral dysphagia per unilateral weakness.  SLP to follow for diet advancement and PO tolerance.  Barriers to return to prior living situation: Modified diet, cognition?  Recommendations for discharge: TCU  Rationale for recommendations: Below baseline function; pt will benefit from ongoing ST targeting swallowing       Entered by: Maddie Neff 05/20/2019 12:39 PM

## 2019-05-20 NOTE — PLAN OF CARE
Status: On 6a s/p ICH. Hx: Hemophilia A.  Vitals: VSS on room air.   Neuros: A&Ox4, LUE weak. L facial droop.  IV: PIV x2 SL.  Resp/trach: Rales noted in bilateral upper lobes, diminished lower lobes.  Diet: Dd1, nectar thick liquids.  Bowel status: Last BM unknown.  : Voids via bedpan or up with 2 pivot to commode.  Skin: Nonblachable redness to sacrum, covered in mepilex.  Pain: Denied this shift.  Activity: Up with 2 pivot to commode.  Social: Family visited today.  Plan: Continue to monitor and follow POC.

## 2019-05-20 NOTE — PROGRESS NOTES
[facility], [place]   Neurology Daily Note  Jackeline Smiley  [id number]  05/20/2019    Subjective:  No acute events overnight. Slept well. Reports that her left arm still feels weak.     Objective:   /70 (BP Location: Right arm)   Pulse 68   Temp 97.6  F (36.4  C) (Oral)   Resp 16   Ht 1.524 m (5')   Wt 48.8 kg (107 lb 9.4 oz)   SpO2 93%   BMI 21.01 kg/m    General: Awake and alert, not in any acute distress, cooperative  HEENT: Atraumatic, normocephalic  Cardiac: RRR, S1, S2 no S3/S4, no murmurs  Chest: Clear to auscultation bilaterally, no wheezes, rubs or crepitations  Abdomen: Soft, non-tender, non-distended  Extremities: No LE swelling.  Skin: No rash or lesion.   Psych: Normal mood and affect  Neuro:  Mental status: Awake, alert, attentive, oriented to p/p/t. Speech is fluent, comprehension and repetition intact. No dysarthria.  Cranial nerves:  Eyes conjugate, EOMI, face symmetric, facial sensation intact, shoulder shrug strong, hearing intact to conversation.  Motor: Tone normal. 5/5 strength in RUE/RLE. LUE 3/5 with 4+/5 . LLE 4+/5. No atrophy or twitches.   Reflexes: Normoreflexic and symmetric biceps, brachioradialis, patellar, and achilles. No crossed adductors or spread. Toes down-going.  Sensory: Intact to LT in all four extremities.  Coordination: FNF intact on the R, no dysmetria  Gait: Not tested    National Institutes of Health Stroke Scale   Score    Level of consciousness: (0)   Alert, keenly responsive    LOC questions: (0)   Answers both questions correctly    LOC commands: (0)   Performs both tasks correctly    Best gaze: (0)   Normal    Visual: (0)   No visual loss    Facial palsy: (0)   Normal symmetrical movements    Motor arm (left): (2)   Some effort against gravity    Motor arm (right): (0)   No drift    Motor leg (left): (1)   Drift    Motor leg (right): (0)   No drift    Limb ataxia: (0)   Absent    Sensory: (0)   Normal- no sensory loss     Best language: (0)   Normal- no aphasia    Dysarthria: (0)   Normal    Extinction and inattention: (0)   No abnormality        Total Score:  3       Lab Investigations:   Hemoglobin A1C   Date Value Ref Range Status   05/18/2019 6.2 (H) 0 - 5.6 % Final     Comment:     Normal <5.7% Prediabetes 5.7-6.4%  Diabetes 6.5% or higher - adopted from ADA   consensus guidelines.       Lab Results   Component Value Date     06/20/2011       Imaging:  Recent Results (from the past 24 hour(s))   CT Head w/o Contrast    Narrative    CT HEAD W/O CONTRAST 5/20/2019 7:40 AM    Provided History: eval ICH    Comparison: 5/19/2019    Technique: Using multidetector thin collimation helical acquisition  technique, axial, coronal and sagittal CT images from the skull base  to the vertex were obtained without intravenous contrast.     Findings:    Intraparenchymal hemorrhage involving the right thalamus measures 1.4  x 1.3 x 2.0 cm, previously 1.4 x 1.4 x 2.0 cm. No significant mass  effect or midline shift. Punctate hyperattenuating focus in the  inferior left frontal lobe (series 3 image 10), new compared to prior,  and only seen on the axial images. No extra-axial fluid collection.  The ventricles are proportionate to the cerebral sulci.  Periventricular and subcortical white matter hypodensities, unchanged.  Chronic infarction of the left thalamus. No acute loss of gray-white  matter differentiation. The gray to white matter differentiation of  the cerebral hemispheres is preserved. The basal cisterns are patent.    The visualized paranasal sinuses are clear. The mastoid air cells are  clear.       Impression    Impression:   1. No significant change in size of an intraparenchymal hemorrhage  involving the right thalamus since 5/19/2019.   2. New punctate hyperattenuating focus in the inferior left frontal  lobe, may be artifactual, but could represent small focus of  hemorrhage. Recommend attention on follow-up.    I have  personally reviewed the examination and initial interpretation  and I agree with the findings.    JERSEY SEVILLA MD       Assessment and Plan   Jackeline Smiley is a 86 year old year old lady with a history of acquired hemophilia A, HTN, HLD who was admitted on  5/17/2019 with LUE weakness and found to have a right basal ganglia/thalamic IPH. ICH score of 1.     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 Etiology thought likely to be hypertensive bleed in the setting of underlying hemophilia.   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   - Since repeat head CT is stable, will discuss with hematology regarding discontinuing recombinant factor VII infusions   - Continue Rituximab once per week x4 weeks (1st dose 5/18, 2nd dose scheduled for 5/24), pre-treat with benadryl  - Daily CBC  - Coagulation tests and factor levels per hematology, appreciate assistance    #Hypertension  - Resumed on PTA atenolol 50mg daily  - PRNs for SBP >180 DBP >90    #Hyperlipidemia   - Continue simvastatin 20mg daily  - LFTs wnl    #Anxiety disorder  - PTA lorazepam 0.5mg bedtime PRN    FEN: Off IVFs, DD2 diet with thins  PPx: Pantoprazole for GI ppx, SCDs for DVT ppx  Code status: DNR/DNI    Disposition Plan   Expected discharge in 1-2 days to transitional care unit once bed is available.    Patient was seen and discussed with Dr. Stewart.     CECY Wall [facility] [employer], MSc(Res)  Neurology Resident, PGY-2  Pager: [phone]

## 2019-05-20 NOTE — PLAN OF CARE
Discharge Planner PT   Patient plan for discharge: Not discussed  Current status: Pt requires min-mod Ax1 for bed mobility, supine to sit. Requires mod-max Ax1 for scoot to EOB and min Ax1 to sit EOB. At times able to hold midline, but no longer than 2-3s. Pt performed lateral weight shift elbows to target x8, 1 STS and 2 stand pivot transfer with Min Ax2.   Barriers to return to prior living situation: Level of A  Recommendations for discharge: TCU  Rationale for recommendations: Requires further skilled intervention, may not be appropriate for ARU.        Entered by: Geoffrey Bullock 05/20/2019 11:01 AM

## 2019-05-20 NOTE — PLAN OF CARE
Discharge Planner OT   Patient plan for discharge: Home  Current status: Pt requiring moderate assistance for bed mobility and maintaining EOB seated position with cues to maintain upright position due to extensive left lean.  Pt also requiring mod A x2 to maintain standing position due to left lean.  Pt demonstrating lack of insight into deficits and safety, adamantly wanting to return home  Barriers to return to prior living situation: Decreased left side use, lack of safety awareness, cognition  Recommendations for discharge: TCU vs ARU  Rationale for recommendations: Pt would benefit from continued skilled therapy to progress independence with ADL and increase safety.  Likely would not tolerate ARU intensity at this time.       Entered by: Benito Alarcon 05/20/2019 5:08 PM

## 2019-05-20 NOTE — PLAN OF CARE
Status: On 6A s/p ICH. Hx Hemophilia A and Afib   Vitals: VS on RA. HTN Follow hypertension parameters .   Neuros: Confused. A/Ox2-3. Oriented to self and situation. Left side weakness. Weaker in LUE. Left facial droop. LUE 3/5  all other extremities are 4/5. No N/T  IV: PIV 2x SL  Resp/trach: Rales bilateral upper lobes, diminished lower lobes.  Diet: DD1 diet; nectar thick liquids  Bowel status: No BM on shift. No BMs since admission   : Voids via bedpan or commode. May pivot with 2 to the commode  Skin: Non blanchable redness to sacrum. Dressing removed after incontinent of urine. Bruising on right arm and hand d/t lab draws.    Pain: Denied this shift.   Activity: Up with 2 and pivot, GB. Pt confused and needs to be reminded to not get up by herself. Bed alarm on zone 2.   Social: No visitors tonight.  Plan: Continue to monitor and follow POC

## 2019-05-21 NOTE — PLAN OF CARE
Status: ICH,  Vitals: stable  Neuros: This AM was confused and thought she was at home. Insisting to walk to the bathroom-and that she could do it.Did not cooperate fully with neuro exam.This afternoon, was oriented and cooperative and knew that her L side was weak, and that she was here for a stroke.Has double vision-state that she has had for months though.  IV: SL'd  Diet: DD2 with thin liquids  Bowel status: passing flatus  : Voidng spont-states she has to go and we transfer her to commode. Attends are always wet already and she does not go on commode. Scanned for residual urine of 79cc.  Pain: She states only her usual arthritic pain , does not need tylenol for it.  Activity: Up to commode several times, heavy transfer with 2 assist and belt, she leans to left.  Plan: had another head CT today, waiting for results. Will need rehab.

## 2019-05-21 NOTE — PLAN OF CARE
Discharge Planner OT   Patient plan for discharge: Pt wants to go home  Current status: OT facilitated improved core and UB strength via unsupported sit activity.  Pt continues to present with heavy L side lean and posterior pelvic tilt requiring cues to maintain midline.  Pt tolerates ~20 minutes of seated EOB activity - OT facilitated proximal exercises and worked on weightbearing through L UE to promote neuro reeducation.   Barriers to return to prior living situation: AX2 for all transfers, decreased insight into deficits and impact of deficits on safety, L side weakness  Recommendations for discharge: TCU  Rationale for recommendations: To improve safety and IND with ADLs in order to return home       Entered by: Sanford Wing 05/21/2019 3:56 PM

## 2019-05-21 NOTE — PROGRESS NOTES
[facility]   Hematology Progress Note      Patient: Jackeline Smiley MRN# [id number]   Age: 86 year old YOB: 1933           Assessment and Plans   Jackeline Smiley is a 86 year old female with a PMH significant for Hemophilia A, Factor VIII inhibitor, HTN, who was admitted on 5/17/2019 after sudden onset weakness and a fall without head trauma and found to have an acute intracranial hemorrhage on CT.  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  Patient was started on Novoseven (Recombinant Factor VIIa) q2H prior to her transfer from the OSH.  Factor XIII level was found to low at 11 on 5/17/19, suggesting that her acquired hemophilia has resurfaced and her factor VIII is being consumed by Factor VIII inhibitor (measured 88). We recommend Novoseven (Recombinant Factor VIIa) but at q4H dosing at this in order to bypass the intrinsic pathway of the coagulation cascade by supplying enough factor VII to increasing coagulation through the extrinsic pathway. In addition, we recommend starting immunosuppression with methylprednisolone and rituxumab in order to suppress production of the factor VIII inhibitor and allow her body to redevelop more factor VIII.    Recommendations:  - As today's CT showed stable ICH, will reduce Novoseven to q 8 hrs (then consider to stop)  - Continue daily CT head to determine Novoseven replacement frequency (if stable, continue to wean off)   - Continue prednisone 50 mg every day   Received Rituximab 5/18/19 (planned weekly x4)    Patient was seen, care plans discussed and staffed with Dr. Duque. Please don't hesitate to contact us if you have any questions.    Se jacinta Reis  Hematology Oncology and Transplantation Fellow  Pager: [phone]  Physician Attestation   I, Virgilio Duque, saw this patient with the resident and agree with the  resident/fellow's findings and plan of care as documented in the note.      I personally reviewed vital signs, medications, labs and imaging.    Key findings: 87 YO woman with relapsed acquired hemophilia with intracranial hemorrhage.  Carefully downtitrating Novo7 and started rituximab.    Virgilio Duque MD  Date of Service (when I saw the patient): 5/21/2019        Interval History   Overnight no events. Repeated CT scan showed stable bleeding(previous punctate spot in the frontal lobe not detected). She understood possible placement of rehab but anxious to go home sooner. No other immediate concerns or acute issues.     ROS: Negative other than as stated in above interval history.      Current Facility-Administered Medications   Medication     acetaminophen (TYLENOL) tablet 650 mg    Or     acetaminophen (TYLENOL) solution 650 mg    Or     acetaminophen (TYLENOL) Suppository 650 mg     albuterol (PROAIR HFA/PROVENTIL HFA/VENTOLIN HFA) 108 (90 Base) MCG/ACT inhaler 2 puff     albuterol (PROVENTIL) neb solution 2.5 mg     [START ON 5/22/2019] atenolol (TENORMIN) tablet 50 mg     coagulation Factor VIIa Recomb (NOVOSEVEN RT) 4,000 mcg     glucose gel 15-30 g    Or     dextrose 50 % injection 25-50 mL    Or     glucagon injection 1 mg     diphenhydrAMINE (BENADRYL) injection 50 mg     EPINEPHrine (ADRENALIN) kit 0.3 mg     haloperidol lactate (HALDOL) injection 0.5 mg     hydrALAZINE (APRESOLINE) injection 10 mg     If a riTUXimab (RITUXAN) reaction occurs, decrease rate or temporarily discontinue.       insulin aspart (NovoLOG) inj (RAPID ACTING)     insulin aspart (NovoLOG) inj (RAPID ACTING)     labetalol (NORMODYNE/TRANDATE) syringe 10-20 mg     lisinopril (PRINIVIL/ZESTRIL) tablet 10 mg     LORazepam (ATIVAN) tablet 0.5 mg     magnesium sulfate 2 g in NS intermittent infusion (PharMEDium or [facility] Cmpd)     magnesium sulfate 4 g in 100 mL sterile water (premade)     medication instruction     meperidine  (DEMEROL) injection 25 mg     methylPREDNISolone sodium succinate (solu-MEDROL) injection 125 mg     naloxone (NARCAN) injection 0.1-0.4 mg     ondansetron (ZOFRAN-ODT) ODT tab 4 mg    Or     ondansetron (ZOFRAN) injection 4 mg     pantoprazole (PROTONIX) EC tablet 40 mg     polyethylene glycol (MIRALAX/GLYCOLAX) Packet 17 g     potassium chloride (KLOR-CON) Packet 20-40 mEq     potassium chloride 10 mEq in 100 mL intermittent infusion with 10 mg lidocaine     potassium chloride 10 mEq in 100 mL sterile water intermittent infusion (premix)     potassium chloride 20 mEq in 50 mL intermittent infusion     potassium chloride ER (K-DUR/KLOR-CON M) CR tablet 20-40 mEq     potassium phosphate 15 mmol in D5W 250 mL intermittent infusion     potassium phosphate 20 mmol in D5W 250 mL intermittent infusion     potassium phosphate 20 mmol in D5W 500 mL intermittent infusion     potassium phosphate 25 mmol in D5W 500 mL intermittent infusion     predniSONE (DELTASONE) tablet 50 mg     prochlorperazine (COMPAZINE) injection 5 mg    Or     prochlorperazine (COMPAZINE) tablet 5 mg    Or     prochlorperazine (COMPAZINE) Suppository 12.5 mg     QUEtiapine (SEROquel) tablet 25 mg     senna-docusate (SENOKOT-S/PERICOLACE) 8.6-50 MG per tablet 1 tablet     simvastatin (ZOCOR) tablet 20 mg     sodium chloride 0.9% infusion           Physical Exams     /70 (BP Location: Left arm)   Pulse 72   Temp 97.7  F (36.5  C) (Oral)   Resp 16   Ht 1.524 m (5')   Wt 48.8 kg (107 lb 9.4 oz)   SpO2 94%   BMI 21.01 kg/m    Wt Readings from Last 3 Encounters:   05/19/19 48.8 kg (107 lb 9.4 oz)   05/17/19 48.3 kg (106 lb 8 oz)   12/31/12 49.9 kg (110 lb)     General:  no acute distress.  HEENT: NCAT. anicteric sclera. Oral mucosa pink and moist with no lesions or thrush.  Neck: Neck supple.   Respiratory: Non-labored breathing, poor inspiratory effort, diminished BS at bases bilaterally.  Cardiovascular: Regular rate and rhythm. No murmur or  rub.   Abdomen: Normoactive bowel sounds. Abdomen soft, non-distended, and non-tender.  Extremities: grossly normal, non-tender, no edema. Rt side extremity strength 3/5 .  Psych: Alert and awake, appropriated mood         Labs     CBC  ===  WBC (10e9/L)   Date Value   05/21/2019 11.5 (H)     Hemoglobin (g/dL)   Date Value   05/21/2019 16.6 (H)     Platelet Count (10e9/L)   Date Value   05/21/2019 179       BMP  ===  Sodium (mmol/L)   Date Value   05/21/2019 132 (L)     Potassium (mmol/L)   Date Value   05/21/2019 3.4     Urea Nitrogen (mg/dL)   Date Value   05/21/2019 16     Creatinine (mg/dL)   Date Value   05/21/2019 0.51 (L)     Calcium (mg/dL)   Date Value   05/21/2019 8.8       LFTs  ====  Bilirubin Total (mg/dL)   Date Value   05/20/2019 0.4     Alkaline Phosphatase (U/L)   Date Value   05/20/2019 41     AST (U/L)   Date Value   05/20/2019 38     ALT (U/L)   Date Value   05/20/2019 46               Images     Results for orders placed or performed during the hospital encounter of 05/17/19   CT Head w/o Contrast    Narrative    CT HEAD W/O CONTRAST 5/17/2019 5:47 AM    History: Stroke, follow up; right thalamic bleed, follow-up from  [facility]    Comparison: CT 5/16/2019.    Technique: Using multidetector thin collimation helical acquisition  technique, axial, coronal and sagittal CT images from the skull base  to the vertex were obtained without intravenous contrast.     Findings:  Acute hemorrhagic focus centered in the right thalamus  measuring 16 x 15 x 21 cm, previously 14 x 14 x 19 cm. No significant  surrounding edema. No significant mass effect. No new intracranial  hemorrhage. Mild diffuse cerebral volume loss. Mild patchy  periventricular hypoattenuation, consistent with chronic small vessel  ischemic disease. No midline shift. The basal cisterns are patent. No  hydronephrosis.    Right maxillary mucosal retention cysts, the remaining paranasal  sinuses are clear. The mastoid air cells are clear.        Impression    Impression: Minimally increased acute hemorrhagic focus centered in  the right thalamus.     I have personally reviewed the examination and initial interpretation  and I agree with the findings.    JERSEY SEVILLA MD   CT Head w/o Contrast    Narrative    CT head without 5/18/2019 6:03 AM    Provided History: Evaluate ICH    Comparison: CT 5/17/2019.    Technique: Using multidetector thin collimation helical acquisition  technique, axial, coronal and sagittal CT images from the skull base  to the vertex were obtained without intravenous contrast.     Findings:  Hemorrhagic focus centered in the right thalamus measures  16 x 15 x 21 cm which is unchanged from the prior exam. There is mild  localized mass effect. No new intracranial hemorrhage.No midline  shift. The ventricles are proportionate to the cerebral sulci. The  gray to white matter differentiation of the cerebral hemispheres is  preserved. The basal cisterns are patent. Mild diffuse cerebral volume  loss. Patchy periventricular hypoattenuation, consistent with chronic  small vessel ischemic disease. No hydrocephalus. Atherosclerotic  calcifications in the vertebral arteries.    Small right maxillary mucosal retention cyst, the remaining paranasal  sinuses are clear. The mastoid air cells are clear.       Impression    Impression: Unchanged hemorrhagic focus centered in the right  thalamus.    I have personally reviewed the examination and initial interpretation  and I agree with the findings.    TOMASA COHEN MD   CT Head w/o Contrast    Narrative    CT HEAD W/O CONTRAST 5/19/2019 8:31 AM    History: Intracranial hemorrhage, known, follow up    Comparison: 5/18/2019 at 0532 hours.    Technique: Using multidetector thin collimation helical acquisition  technique, axial, coronal and sagittal CT images from the skull base  to the vertex were obtained without intravenous contrast.     Findings:     There is no mass effect, midline shift or  extra-axial fluid  collection.  2 x 1.4 x 1.4 cm right thalamic hemorrhage is similar to  prior. Ventricles are proportionate to the cerebral sulci. Small  chronic appearing infarction of the left thalamus, stable since  9/28/2012.Presumed chronic small vessel ischemic changes in the  periventricular and pontine white matter. Intracranial atherosclerotic  calcifications.    Small right maxillary polyp/mucous retention cyst. Bilateral  pseudophakia.       Impression    IMPRESSION: No change in 2 x 1.4 x 1.4 cm right thalamic hemorrhage.    I have personally reviewed the examination and initial interpretation  and I agree with the findings.    TOMASA COHEN MD   CT Head w/o Contrast    Narrative    CT HEAD W/O CONTRAST 5/20/2019 7:40 AM    Provided History: eval ICH    Comparison: 5/19/2019    Technique: Using multidetector thin collimation helical acquisition  technique, axial, coronal and sagittal CT images from the skull base  to the vertex were obtained without intravenous contrast.     Findings:    Intraparenchymal hemorrhage involving the right thalamus measures 1.4  x 1.3 x 2.0 cm, previously 1.4 x 1.4 x 2.0 cm. No significant mass  effect or midline shift. Punctate hyperattenuating focus in the  inferior left frontal lobe (series 3 image 10), new compared to prior,  and only seen on the axial images. No extra-axial fluid collection.  The ventricles are proportionate to the cerebral sulci.  Periventricular and subcortical white matter hypodensities, unchanged.  Chronic infarction of the left thalamus. No acute loss of gray-white  matter differentiation. The gray to white matter differentiation of  the cerebral hemispheres is preserved. The basal cisterns are patent.    The visualized paranasal sinuses are clear. The mastoid air cells are  clear.       Impression    Impression:   1. No significant change in size of an intraparenchymal hemorrhage  involving the right thalamus since 5/19/2019.   2. New  punctate hyperattenuating focus in the inferior left frontal  lobe, may be artifactual, but could represent small focus of  hemorrhage. Recommend attention on follow-up.    I have personally reviewed the examination and initial interpretation  and I agree with the findings.    JERSEY SEVILLA MD   CT Head w/o Contrast    Narrative    CT HEAD W/O CONTRAST 5/21/2019 11:55 AM    Provided History: follow up on frontal hyperdense focus: hemorrhage or  artifact?    Comparison: CT head 5/20/2019, 5/19/2019, 5/16/2019.    Technique: Using multidetector thin collimation helical acquisition  technique, axial, coronal and sagittal CT images from the skull base  to the vertex were obtained without intravenous contrast.     Findings:    Unchanged intraparenchymal hemorrhage centered in the lateral right  thalamus measuring 1.4 x 1.5 x 2.0 cm. Questionable punctate  hyperdensity on the comparison examination is not evident on this  exam.    The ventricles are proportionate to the cerebral sulci. Moderate  leukoaraiosis, unchanged. The gray to white matter differentiation of  the cerebral hemispheres is preserved. The basal cisterns are patent.  Atherosclerotic calcification of both carotid siphons.    The visualized paranasal sinuses are clear. The mastoid air cells are  clear. Debris in the right external auditory canal, presumed cerumen.       Impression    Impression:   1. Unchanged hemorrhage centered in the lateral right thalamus.  2. No new intracranial hemorrhage.    I have personally reviewed the examination and initial interpretation  and I agree with the findings.    BRENDA QUEZADA MD

## 2019-05-21 NOTE — PLAN OF CARE
Status: S/p ICH, hx of hemophilia A and a-fib   VS: VSS on RA ex HTN w/in parameters  Neuros: A&Ox4 but forgetful and intermittently confused. LUE 3/5 w/weak grasp, AOE 4/5. Per pt LLE <RLE. L droop.   GI: Tolerating Dd2 with thins. No BM this shift. BS+  : Voiding frequently w/urgency. Intermittent incontinence. UA sent last shift.  IV: PIV SL x2, bleeding from IV sites due to coagulation disorder.   Activity: Ax2 w/gb, pivoting to commode. Heavy left lean.  Pain: arthritic pain noted, Tylenol given x1.  Skin: Nonblanchable redness on sacrum  Labs/Tests: PT/SLP/OT worked w/pt.   Social: granddaughter and greatgrandson visited.   Plan of care: patient and granddaughter hopes to discharge tomorrow to TCU.  Shift notes: Patient has been agitated and anxious all shift. Impulsive and determined to leave hospital. Tries to climb over bedrails, seizure pads added to soften. Has been asking staff to bring her home and threatening to leave. Does not understand that she cannot walk without help or get up without assistance. Provider, Ot, Pt, nurse, and charge have explained illness and physical limits. Patient continues to be determined to leave, though physically cannot.

## 2019-05-21 NOTE — PROGRESS NOTES
[facility], [place]   Neurology Daily Note  Jackeline Smiley  [id number]  05/21/2019    Subjective: No acute events overnight. Ms Smiley was having her breakfast this morning. Patient reports that she feels her LUE weakness is slightly improved today. Last evening, patient was agitated/impulsive and wanted to leave the hospital. Calmed down after receiving her PTA PRN ativan for anxiety.     Objective:   /66   Pulse 72   Temp 97.8  F (36.6  C) (Oral)   Resp 16   Ht 1.524 m (5')   Wt 48.8 kg (107 lb 9.4 oz)   SpO2 95%   BMI 21.01 kg/m    General: Awake and alert, not in any acute distress, eating her breakfast with some assistance  HEENT: Atraumatic, normocephalic  Chest: Normal work of breathing in room air  Abdomen: Soft  Extremities: No LE swelling.  Skin: No rash or lesion.     Neuro:  Mental status: Awake, alert, attentive, oriented to p/p/t but unsure of day. Speech is fluent, comprehension intact. No dysarthria.  Cranial nerves:  Eyes conjugate, EOMI, face symmetric with slight loss of nasolabial fold on the left, facial sensation intact, hearing intact to conversation.  Motor: Tone normal. 5/5 strength in RUE/RLE. LUE 3/5. LLE 4+/5. No atrophy or twitches. No lower extremity drift.   Reflexes: Not tested today.  Sensory: Intact to LT in all four extremities.  Coordination: FNF intact on the R, no dysmetria, unable to assess appropriately due to weakness on the L.  Gait: Not tested    National Institutes [employer] Stroke Scale   Score    Level of consciousness: (0)   Alert, keenly responsive    LOC questions: (0)   Answers both questions correctly    LOC commands: (0)   Performs both tasks correctly    Best gaze: (0)   Normal    Visual: (0)   No visual loss    Facial palsy: (0)   Normal symmetrical movements    Motor arm (left): (2)   Some effort against gravity    Motor arm (right): (0)   No drift    Motor leg (left): (0)   No drift    Motor leg (right): (0)   No drift     Limb ataxia: (0)   Absent    Sensory: (0)   Normal- no sensory loss    Best language: (0)   Normal- no aphasia    Dysarthria: (0)   Normal    Extinction and inattention: (0)   No abnormality        Total Score:  0       Lab Investigations:   Hemoglobin A1C   Date Value Ref Range Status   05/18/2019 6.2 (H) 0 - 5.6 % Final     Comment:     Normal <5.7% Prediabetes 5.7-6.4%  Diabetes 6.5% or higher - adopted from ADA   consensus guidelines.       Lab Results   Component Value Date     06/20/2011       Imaging:  Recent Results (from the past 24 hour(s))   CT Head w/o Contrast    Narrative    CT HEAD W/O CONTRAST 5/20/2019 7:40 AM    Provided History: eval ICH    Comparison: 5/19/2019    Technique: Using multidetector thin collimation helical acquisition  technique, axial, coronal and sagittal CT images from the skull base  to the vertex were obtained without intravenous contrast.     Findings:    Intraparenchymal hemorrhage involving the right thalamus measures 1.4  x 1.3 x 2.0 cm, previously 1.4 x 1.4 x 2.0 cm. No significant mass  effect or midline shift. Punctate hyperattenuating focus in the  inferior left frontal lobe (series 3 image 10), new compared to prior,  and only seen on the axial images. No extra-axial fluid collection.  The ventricles are proportionate to the cerebral sulci.  Periventricular and subcortical white matter hypodensities, unchanged.  Chronic infarction of the left thalamus. No acute loss of gray-white  matter differentiation. The gray to white matter differentiation of  the cerebral hemispheres is preserved. The basal cisterns are patent.    The visualized paranasal sinuses are clear. The mastoid air cells are  clear.       Impression    Impression:   1. No significant change in size of an intraparenchymal hemorrhage  involving the right thalamus since 5/19/2019.   2. New punctate hyperattenuating focus in the inferior left frontal  lobe, may be artifactual, but could represent  small focus of  hemorrhage. Recommend attention on follow-up.    I have personally reviewed the examination and initial interpretation  and I agree with the findings.    JERSEY SEVILLA MD       Assessment and Plan   Jackeline Smiley is a 86 year old year old lady with a history of acquired hemophilia A, HTN, HLD who was admitted on  5/17/2019 with LUE weakness and found to have a right basal ganglia/thalamic IPH. ICH score of 1.     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 Etiology thought likely to be hypertensive bleed in the setting of underlying hemophilia.   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   - If repeat head CT today is stable, will discuss with hematology regarding discontinuing recombinant factor VII infusions   - Continue Rituximab once per week x4 weeks (1st dose 5/18, 2nd dose scheduled for 5/24), pre-treat with benadryl  - On predinsone 50 daily - will keep on MDSS and pantroprazole for steroid precautions  - Daily CBC  - Coagulation tests and factor levels per hematology, appreciate assistance    #Hypertension  - Elevated readings overnight, requiring PRNS  - Will start 10mg of lisinopril, will monitor BMPs  - Continue on PTA atenolol 50mg daily  - PRNs for SBP >180 DBP >90    #Hyperlipidemia   - Continue simvastatin 20mg daily  - LFTs wnl    #Anxiety disorder  - PTA lorazepam 0.5mg bedtime PRN    FEN: Off IVFs, DD2 diet with thins  PPx: Pantoprazole for GI ppx, SCDs for DVT ppx  Code status: DNR/DNI    Disposition Plan   Expected discharge in 1-2 days to transitional care unit once bed is available.    Patient was seen and discussed with Dr. Stewart.     [facility], MSc(Res)  Neurology Resident, PGY-2  Pager: [phone]

## 2019-05-21 NOTE — PLAN OF CARE
Discharge Planner SLP   Patient plan for discharge: Did not discuss  Current status: Pt tolerated soft solids, impulsive while eating and drinking and talking frequently with PO in her mouth. Poor natural dentition and prolonged oral clearance, not appropriate for diet advancement. Delayed coughing between trials of thin liquids with straws - question if related to aspiration? Will continue to monitor diet tolerance and consider video swallow if warranted based on site of lesion and clinical assessment.     Continue dysphagia diet 2 and thin liquids, no straws, with 1:1 supervision.  Pt to be fully alert and upright for all PO, taking small bites/sips at slow rate.    Barriers to return to prior living situation: Below baseline, cognition, impulsivity, modified diet   Recommendations for discharge: TCU   Rationale for recommendations: SLP at next level of care for management of dysphagia        Entered by: Eda Rueda 05/21/2019 2:44 PM

## 2019-05-21 NOTE — PLAN OF CARE
Status: Pt pn 6a for ICH with a hx of hemophilia and afib  Vitals: HTN- labetalol and hydralazine given this shift.   Neuros: Refused full assessment. L side 3/5, R side 4/5, L droop, heavy L lean, only oriented to self this shift. Agitated and uncooperative.   IV: PIV SL x2   Resp/trach: LS dim, RA  Diet: DD2 with thin liquids  Bowel status: No BM since admission  : Incontinent.   Skin: Bruising, redness to coccyx   Pain: Denies pain  Activity: HEAVY Ax2 GB pivot to commode d/t extensive left lean. Bed alarm ON; attempts to get out of bed  Plan: TCU vs ARU when ready. Continue to monitor and assess.

## 2019-05-21 NOTE — PROGRESS NOTES
Social Work Services Progress Note    Hospital Day: 5  Date of Initial Social Work Evaluation:  5/18/19  Collaborated with:  Dr. Chen, Patient,     Data:  Rehab placement is recommended upon discharge from acute hospitalization.  JI received a 5/20/19 call from [facility].  Collette explained that [facility] contracts  With [facility] to provide case management services to their clients.  Collette indicated that [facility] ([phone]) is pt's SW Care Coordinator through [employer].  Collette indicated that pt is receiving the following home services:  Home delivered meals through [employer], Homemaking assistance (3 hours every other week) through [employer], [employer] and pt is on [employer].  Per Collette, if rehab placement is indicated, they prefer that pt is placed in one of their preferred facility's for [facility] members as both [facility]ole and [facility] teams come to these homes and it is good for continuity.  Collette faxed SW a list of the preferred facility's.    JI reviewed current OT and Physical Therapy notes.  Pt is not yet ambulating.  Pt is requiring Min - Mod assistance with most activity's.  JI met with pt and reviewed current OT/Physical Therapy and Nursing progress notes which address how much assistance pt is currently needing. JI indicated that because of the level of assist required a rehab stay is indicated and SW explained that this SW would assist pt in securing rehab placement.  Pt inquired as to how long she would be in the rehab facility. JI explained that the average length of stay is 1-2 weeks.  SW explained that some patients are less than a week and some are more than 2 weeks, it depends on how long it takes her body to recover and regain independence.   SW indicated that because pt is more advanced in age, she will likely take a little longer to recover.  Pt voiced agreement that she is not indep and will require a rehab stay.  SW explained to pt that her  insurance has preferred SNF's as at the preferred SNF's, Carole and [facility] teams can follow. Pt requested that SW speak with her grand daughter, Socorro.  Pt states that Socorro works for [employer] on the [employer] and wanted her to go to [facility]. SW explained that  TCU is not on the list of Preferred SNF's from her insurance company. Pt requested that SW discuss facility's with her grand daughter.  SW phoned pt's grand daughter (Socorro) and left a message for her to call.    Intervention:  SW spoke with Dr. Chen who anticipates that pt will be ready for discharge on Friday, 5/24/19.  Per Dr. Chen, pt will require outpt Rituximab injections 1x per week for 4 weeks.  The first dose was given on 5/18/19.    Assessment: Pt is agreeable to short term rehab placement.    Plan:    Anticipated Disposition:  Short term TCU placement    Barriers to d/c plan:  Medical readiness    Follow Up:  SW will follow for discharge planning. SW will await a return call from pt's grand daughter.    ROCHELLE Blake  Social Work, 6A  Phone:  [phone]  Pager:  [phone]  5/21/2019

## 2019-05-22 NOTE — PLAN OF CARE
[id number]  Status: Pt here with ICH on 5/17. Hx of Hemophilia, Factor VIII inhibitor, HTN.   Vitals: VSS, slight HTN but WDL.   Neuros: PATRICIA/LL 3/5. Oriented x4 this shift.   IV: PIV SL   Resp: LS diminished in the bases.   Diet: DD2 with thins. Ate dinner tonight (meatloaf with gravy) with precautions per speech note, small bites, 1:1 supervision, sitting upright. At one point pt began to cough a bit, then seemed fine. Pt proceeded with banana in very small bites and that seemed to go fine. Pt coughed one more time, pt instructed to stop eating, SLP to see pt tomorrow, MD notified.  Bowel status: No BM this shift.   : Voiding spont, pt asks to get to commode but typically has already gone in brief.   Skin: Non-blanchable erythema on buttocks, no change.   Pain: Denies   Activity: Up with heavy assist of 2. Turn/repo Q2hrs while in bed. BA on. HOB up.    Social: No family present this shift.   Plan: Cont to monitor.

## 2019-05-22 NOTE — PROGRESS NOTES
Social Work Services Progress Note    Hospital Day: 6  Date of Initial Social Work Evaluation:  5/18/19  Collaborated with:  Left message for Grand Daughter (Socorro) to call    Data:  SW is following for rehab placement.  Pt would like her grand daughter to assist in selecting facility preferences.    Intervention:  SW phoned pt's grand daughter (Socorro) this am.  SW left a message on her cell phone to call. SW phoned grand daughters home number but was not able to leave a message.    Assessment:  Pt is agreeable to rehab placement.    Plan:    Anticipated Disposition:  Short term rehab placement    Barriers to d/c plan:  Medical readiness    Follow Up:  SW will await a return call from pt's grand daughter. SW will continue to follow for discharge planning.    ROCHELLE Blake  Social Work, 6A  Phone:  [phone]  Pager:  [phone]  5/22/2019

## 2019-05-22 NOTE — PLAN OF CARE
Status: Here for ICH with a hx of hemophilia and afib  Vitals: BP (P) 147/80 (BP Location: Left arm)   Pulse 56   Temp (P) 97.4  F (36.3  C) (Oral)   Resp (P) 16   Ht 1.524 m (5')   Wt 48.8 kg (107 lb 9.4 oz)   SpO2 (P) 93%   BMI 21.01 kg/m    Neuros: L side 3/5, R side 4/5, L droop, heavy L lean, intermittently disoriented to place, and situation. Calmly refused full assessment overnight.   IV: PIV SL   Resp/trach: LS dim, RA  Diet: DD1 with thin liquids- some coughing on eves while eating. 1:1 supervision  Bowel status: No BM since admission  : Incontinent.   Skin: Bruising, redness to coccyx        Pain: Denies pain  Activity: HEAVY Ax2 GB pivot to commode d/t extensive left lean.   Plan: TCU vs ARU when ready. Continue to monitor and assess.

## 2019-05-22 NOTE — PLAN OF CARE
Status: Pt admitted for ICH; hx of hemophilia and afib  Vitals: VSS on RA  Neuros: AO4 with confusion. Pt cannot rationalize why she is still in the hospital with multiple attempts to re-explain. L side 3/5, R side 4/5, L droop, L lean while sitting  IV: PIV SL  Resp/trach: :LS clear bilaterally   Diet: DD1 diet with thin liquids. On 1500 mL fluid restriction. Pt needs supervision to eat. Poor appetite.   Bowel status: Senna, Miralax, and suppository given today with success. Large, formed BM this afternoon.   : Voiding spontaneously with commode. Incontinent in between  Skin: Non-blanchable redness to coccyx, bruising throughout   Pain: Tylenol given for back pain x 1. Haldol given for agitation x 1  Activity: Pivot to commode with assist of 2, Needs help shifting weight in bed   Social: Grand daughter, Socorro, updated from writer and social work today per pt request   Plan: [facility] VALDEZ planned for Friday 5/24 at 0900, granddaughter notified. Continue to monitor and follow POC

## 2019-05-22 NOTE — PROGRESS NOTES
[facility], [place]   Neurology Daily Note  Jackeline Smiley  [id number]  05/22/2019    Subjective: No acute events overnight. Had some episodes of coughing will meals while supervised yesterday. Diet downgraded from DD2 to DD1. Having breakfast this morning. Not reporting any significant     Objective:   BP (P) 147/80 (BP Location: Left arm)   Pulse 56   Temp (P) 97.4  F (36.3  C) (Oral)   Resp (P) 16   Ht 1.524 m (5')   Wt 48.8 kg (107 lb 9.4 oz)   SpO2 (P) 93%   BMI 21.01 kg/m    General: Awake and alert, not in any distress, eating oatmeal and drinking coffee with no swallowing difficulty  HEENT: Atraumatic, normocephalic  Chest: Normal work of breathing in room air  Abdomen: Soft  Extremities: No LE swelling.  Skin: No rash or lesion.     Neuro:  Mental status: Awake, alert, attentive, oriented to self, month and place. Speech is fluent, comprehension intact. No dysarthria.  Cranial nerves:  Eyes conjugate, EOMI, face symmetric with slight loss of nasolabial fold on the left, facial sensation intact, hearing intact to conversation.  Motor: Tone normal. 5/5 strength in RUE/RLE. LUE 3/5 with 4+ . LLE 4+/5. No atrophy or twitches. No lower extremity drift.   Reflexes: Not tested today.  Sensory: Intact to LT in all four extremities.  Coordination: FNF intact on the R, no dysmetria, unable to assess appropriately due to weakness on the L.  Gait: Not tested    Lab Investigations:   Hemoglobin A1C   Date Value Ref Range Status   05/18/2019 6.2 (H) 0 - 5.6 % Final     Comment:     Normal <5.7% Prediabetes 5.7-6.4%  Diabetes 6.5% or higher - adopted from ADA   consensus guidelines.       Lab Results   Component Value Date     06/20/2011     Imaging:  Recent Results (from the past 24 hour(s))   CT Head w/o Contrast    Narrative    CT HEAD W/O CONTRAST 5/20/2019 7:40 AM    Provided History: eval ICH    Comparison: 5/19/2019    Technique: Using multidetector thin collimation  helical acquisition  technique, axial, coronal and sagittal CT images from the skull base  to the vertex were obtained without intravenous contrast.     Findings:    Intraparenchymal hemorrhage involving the right thalamus measures 1.4  x 1.3 x 2.0 cm, previously 1.4 x 1.4 x 2.0 cm. No significant mass  effect or midline shift. Punctate hyperattenuating focus in the  inferior left frontal lobe (series 3 image 10), new compared to prior,  and only seen on the axial images. No extra-axial fluid collection.  The ventricles are proportionate to the cerebral sulci.  Periventricular and subcortical white matter hypodensities, unchanged.  Chronic infarction of the left thalamus. No acute loss of gray-white  matter differentiation. The gray to white matter differentiation of  the cerebral hemispheres is preserved. The basal cisterns are patent.    The visualized paranasal sinuses are clear. The mastoid air cells are  clear.       Impression    Impression:   1. No significant change in size of an intraparenchymal hemorrhage  involving the right thalamus since 5/19/2019.   2. New punctate hyperattenuating focus in the inferior left frontal  lobe, may be artifactual, but could represent small focus of  hemorrhage. Recommend attention on follow-up.    I have personally reviewed the examination and initial interpretation  and I agree with the findings.    JERSEY SEVILLA MD       Assessment and Plan   Jackeline Smiley is a 86 year old year old lady with a history of acquired hemophilia A, HTN, HLD who was admitted on  5/17/2019 with LUE weakness and found to have a right basal ganglia/thalamic IPH. ICH score of 1.     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 Etiology thought likely to be hypertensive bleed in the setting of underlying hemophilia.   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   - Repeat head CT is stable today, change NovoSeven frequency to Q12, per heme recs, if 5/23 head CT stable, will discontinue  - Continue Rituximab once per week x4 weeks (1st dose 5/18, 2nd dose scheduled for 5/24), pre-treat with benadryl  - On predinsone 50 daily - will keep on MDSS and pantroprazole for steroid precautions  - Daily CBC  - Coagulation tests and factor levels per hematology, appreciate assistance    #Hypertension  - Improved readings overnight, did not require PRNs  - Started 10mg of lisinopril on 5/21  - Continue on PTA atenolol 50mg daily  - PRNs for SBP >180 DBP >90    #Hypernatremia  - Slowly down-trending and at 130 today  - Normovalemic therefore could represent SIADH  - Not on any current medications that could be contributing  - Will get serum osmolality, and urine sodium, creatinine and osmolality  - Fluid restriction to 1500cc    #Hyperlipidemia   - Continue simvastatin 20mg daily  - LFTs wnl    #Anxiety disorder  - PTA lorazepam 0.5mg bedtime PRN    FEN: Off IVFs, DD1 diet with thins  PPx: Pantoprazole for GI ppx, SCDs for DVT ppx  Code status: DNR/DNI    Disposition Plan   Expected discharge in 1-2 days to transitional care unit once off NovoSeven and bed is available.    Patient was seen and discussed with Dr. Stewart.     CECY Wall [facility], MSc(Res)  Neurology Resident, PGY-2  Pager: [phone]

## 2019-05-22 NOTE — PROGRESS NOTES
Social Work Services Progress Note    Hospital Day: 6  Date of Initial Social Work Evaluation:  5/18/19  Collaborated with:  Pt's grand daughter (Socorro)    Data:  SW is following for discharge planning.    Intervention:  Pt's grand daughter phoned the nursing station and pt's floor nurse transferred call to this SW. SW learned that grand daughter's contact information (home and cell) listed on the face sheet is incorrect. SW obtained the correct information, call [facility] and had the facesheet updated to reflect correct information. SW spoke with pt's grand daughter in regards to discharge planning.  Pt's grand daughter lives and works in [place] and does not drive.  Pt's grand daughter also does not travel on freeways or highways.  Grand daughter prefers that pt is placed in [place] [place]. SW spoke with Grand Daughter about the [facility] preferred list of facility's for continuity. Grand daughter states that she will support pursuit of facility's outside of the Preferred List so that the accepting facility is easily accessible to her.  Per discussion, Grand Daughter would like referrals to [facility], [facility] and [facility].  SW informed Grand Daughter that pt may be ready for discharge tomorrow.  JI spoke with Admissions at [facility] (Sherri) and pt is declined for admit as projected length of stay exceeds their capacity.  SW faxed referrals to [facility] and [facility].    Assessment:  Pt's grand daughter supports plan to pursue rehab placement    Plan:    Anticipated Disposition:  Short term rehab placement in a community SNF    Barriers to d/c plan:  Medical readiness    Follow Up:  SW will continue to follow.    ROCHELLE Blake  Social Work, 6A  Phone:  [phone]  Pager:  [phone]  5/22/2019

## 2019-05-22 NOTE — PLAN OF CARE
Discharge Planner SLP   Patient plan for discharge: Unknown  Current status: Pt remains appropriate for dysphagia 2 (diced) diet/thin liquids with close supervision. Pt is impulsive and eats/drinks at fast rate. Ensure pt is fully alert and upright for all PO, taking small bites/sips at a slow rate; check oral cavity for pocketing. SLP to follow.  Barriers to return to prior living situation: Cognition, weakness, modified diet  Recommendations for discharge: TCU  Rationale for recommendations: Pt below baseline for swallow function; pt will benefit from ongoing ST targeting dysphagia and possibly cognitive-communication at next level of care       Entered by: Kassie Gallagher 05/22/2019 10:12 AM

## 2019-05-23 NOTE — PROGRESS NOTES
[facility]   Hematology Progress Note      Patient: Jackeline Smiley MRN# [id number]   Age: 86 year old YOB: 1933           Assessment and Plans   Jackeline Smiley is a 86 year old female with a PMH significant for Hemophilia A, Factor VIII inhibitor, HTN, who was admitted on 5/17/2019 after sudden onset weakness and a fall without head trauma and found to have an acute intracranial hemorrhage on CT.  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  Patient was started on Novoseven (Recombinant Factor VIIa) q2H prior to her transfer from the [facility].  Factor XIII level was found to low at 11 on 5/17/19, suggesting that her acquired hemophilia has resurfaced and her factor VIII is being consumed by Factor VIII inhibitor (measured 88). We recommend Novoseven (Recombinant Factor VIIa) but at q4H dosing at this in order to bypass the intrinsic pathway of the coagulation cascade by supplying enough factor VII to increasing coagulation through the extrinsic pathway. In addition, we recommend starting immunosuppression with methylprednisolone and rituxumab in order to suppress production of the factor VIII inhibitor and allow her body to redevelop more factor VIII.    The patient has been monitored clinically and radiographically. Daily CT showed stable hematoma. Novoseven weaned off and discontinued 5/23/19. Prednisone 50 mg every day will be continued. Weekly rituximab was started from 5/18 and 2nd dose will be given 5/23 in anticipation of discharge today.      Recommendations:  - Stop Novoseven  - Continue prednisone 50 mg every day   - Will give 2nd dose Rituximab 5/23/19  - F/U with Dr. Duque in the clinic in a week for next dose of rituximab    Patient was seen, care plans discussed and staffed with Dr. Oswald. Please don't hesitate to contact us if you have any questions.    Se  jacinta Reis  Hematology Oncology and Transplantation Fellow  Pager: [phone]      Interval History   Overnight no events. Repeated CT scan showed stable bleeding. Novoseven was discontinued this AM. She may be discharged to rehab today. Her 2nd dose of rituximab will be given today.    ROS: Negative other than as stated in above interval history.      Current Facility-Administered Medications   Medication     acetaminophen (TYLENOL) tablet 650 mg    Or     acetaminophen (TYLENOL) solution 650 mg    Or     acetaminophen (TYLENOL) Suppository 650 mg     albuterol (PROAIR HFA/PROVENTIL HFA/VENTOLIN HFA) 108 (90 Base) MCG/ACT inhaler 2 puff     albuterol (PROVENTIL) neb solution 2.5 mg     atenolol (TENORMIN) tablet 50 mg     bisacodyl (DULCOLAX) Suppository 10 mg     glucose gel 15-30 g    Or     dextrose 50 % injection 25-50 mL    Or     glucagon injection 1 mg     diphenhydrAMINE (BENADRYL) injection 50 mg     EPINEPHrine (ADRENALIN) kit 0.3 mg     hydrALAZINE (APRESOLINE) injection 10 mg     If a riTUXimab (RITUXAN) reaction occurs, decrease rate or temporarily discontinue.       insulin aspart (NovoLOG) inj (RAPID ACTING)     insulin aspart (NovoLOG) inj (RAPID ACTING)     labetalol (NORMODYNE/TRANDATE) syringe 10-20 mg     lisinopril (PRINIVIL/ZESTRIL) tablet 10 mg     LORazepam (ATIVAN) tablet 0.5 mg     magnesium sulfate 2 g in NS intermittent infusion (PharMEDium or [facility]V Cmpd)     magnesium sulfate 4 g in 100 mL sterile water (premade)     medication instruction     meperidine (DEMEROL) injection 25 mg     meperidine (DEMEROL) injection 25 mg     methylPREDNISolone sodium succinate (solu-MEDROL) injection 125 mg     naloxone (NARCAN) injection 0.1-0.4 mg     ondansetron (ZOFRAN-ODT) ODT tab 4 mg    Or     ondansetron (ZOFRAN) injection 4 mg     pantoprazole (PROTONIX) EC tablet 40 mg     polyethylene glycol (MIRALAX/GLYCOLAX) Packet 17 g     potassium chloride (KLOR-CON) Packet 20-40 mEq     potassium chloride  10 mEq in 100 mL intermittent infusion with 10 mg lidocaine     potassium chloride 10 mEq in 100 mL sterile water intermittent infusion (premix)     potassium chloride 20 mEq in 50 mL intermittent infusion     potassium chloride ER (K-DUR/KLOR-CON M) CR tablet 20-40 mEq     potassium phosphate 15 mmol in D5W 250 mL intermittent infusion     potassium phosphate 20 mmol in D5W 250 mL intermittent infusion     potassium phosphate 20 mmol in D5W 500 mL intermittent infusion     potassium phosphate 25 mmol in D5W 500 mL intermittent infusion     predniSONE (DELTASONE) tablet 50 mg     prochlorperazine (COMPAZINE) injection 5 mg    Or     prochlorperazine (COMPAZINE) tablet 5 mg    Or     prochlorperazine (COMPAZINE) Suppository 12.5 mg     QUEtiapine (SEROquel) tablet 25 mg     riTUXimab (RITUXAN) 540 mg in sodium chloride 0.9 % 540 mL non-oncology use     senna-docusate (SENOKOT-S/PERICOLACE) 8.6-50 MG per tablet 1 tablet     simvastatin (ZOCOR) tablet 20 mg     sodium chloride 0.9% infusion     [START ON 5/24/2019] sodium chloride tablet 1 g           Physical Exams     /63 (BP Location: Right arm)   Pulse 64   Temp 98  F (36.7  C) (Oral)   Resp 16   Ht 1.524 m (5')   Wt 48.8 kg (107 lb 9.4 oz)   SpO2 94%   BMI 21.01 kg/m    Wt Readings from Last 3 Encounters:   05/19/19 48.8 kg (107 lb 9.4 oz)   05/17/19 48.3 kg (106 lb 8 oz)   12/31/12 49.9 kg (110 lb)     General:  no acute distress.  HEENT: NCAT. anicteric sclera. Oral mucosa pink and moist with no lesions or thrush.  Neck: Neck supple.   Respiratory: Non-labored breathing, diminished BS at bases bilaterally.  Cardiovascular: Regular rate and rhythm. No murmur or rub.   Abdomen: Normoactive bowel sounds. Abdomen soft, non-distended, and non-tender.  Extremities: grossly normal, non-tender, no edema. Rt side extremity strength 4/5 .  Psych: Alert and awake, appropriated mood           Labs     CBC  ===  WBC (10e9/L)   Date Value   05/23/2019 9.9      Hemoglobin (g/dL)   Date Value   05/23/2019 15.1     Platelet Count (10e9/L)   Date Value   05/23/2019 199       BMP  ===  Sodium (mmol/L)   Date Value   05/23/2019 133     Potassium (mmol/L)   Date Value   05/23/2019 4.1     Urea Nitrogen (mg/dL)   Date Value   05/23/2019 19     Creatinine (mg/dL)   Date Value   05/23/2019 0.61     Calcium (mg/dL)   Date Value   05/23/2019 9.0       LFTs  ====  Bilirubin Total (mg/dL)   Date Value   05/20/2019 0.4     Alkaline Phosphatase (U/L)   Date Value   05/20/2019 41     AST (U/L)   Date Value   05/20/2019 38     ALT (U/L)   Date Value   05/20/2019 46               Images     Results for orders placed or performed during the hospital encounter of 05/17/19   CT Head w/o Contrast    Narrative    CT HEAD W/O CONTRAST 5/17/2019 5:47 AM    History: Stroke, follow up; right thalamic bleed, follow-up from  [facility]    Comparison: CT 5/16/2019.    Technique: Using multidetector thin collimation helical acquisition  technique, axial, coronal and sagittal CT images from the skull base  to the vertex were obtained without intravenous contrast.     Findings:  Acute hemorrhagic focus centered in the right thalamus  measuring 16 x 15 x 21 cm, previously 14 x 14 x 19 cm. No significant  surrounding edema. No significant mass effect. No new intracranial  hemorrhage. Mild diffuse cerebral volume loss. Mild patchy  periventricular hypoattenuation, consistent with chronic small vessel  ischemic disease. No midline shift. The basal cisterns are patent. No  hydronephrosis.    Right maxillary mucosal retention cysts, the remaining paranasal  sinuses are clear. The mastoid air cells are clear.       Impression    Impression: Minimally increased acute hemorrhagic focus centered in  the right thalamus.     I have personally reviewed the examination and initial interpretation  and I agree with the findings.    JERSEY SEVILLA MD   CT Head w/o Contrast    Narrative    CT head without 5/18/2019 6:03  AM    Provided History: Evaluate ICH    Comparison: CT 5/17/2019.    Technique: Using multidetector thin collimation helical acquisition  technique, axial, coronal and sagittal CT images from the skull base  to the vertex were obtained without intravenous contrast.     Findings:  Hemorrhagic focus centered in the right thalamus measures  16 x 15 x 21 cm which is unchanged from the prior exam. There is mild  localized mass effect. No new intracranial hemorrhage.No midline  shift. The ventricles are proportionate to the cerebral sulci. The  gray to white matter differentiation of the cerebral hemispheres is  preserved. The basal cisterns are patent. Mild diffuse cerebral volume  loss. Patchy periventricular hypoattenuation, consistent with chronic  small vessel ischemic disease. No hydrocephalus. Atherosclerotic  calcifications in the vertebral arteries.    Small right maxillary mucosal retention cyst, the remaining paranasal  sinuses are clear. The mastoid air cells are clear.       Impression    Impression: Unchanged hemorrhagic focus centered in the right  thalamus.    I have personally reviewed the examination and initial interpretation  and I agree with the findings.    TOMASA COHEN MD   CT Head w/o Contrast    Narrative    CT HEAD W/O CONTRAST 5/19/2019 8:31 AM    History: Intracranial hemorrhage, known, follow up    Comparison: 5/18/2019 at 0532 hours.    Technique: Using multidetector thin collimation helical acquisition  technique, axial, coronal and sagittal CT images from the skull base  to the vertex were obtained without intravenous contrast.     Findings:     There is no mass effect, midline shift or extra-axial fluid  collection.  2 x 1.4 x 1.4 cm right thalamic hemorrhage is similar to  prior. Ventricles are proportionate to the cerebral sulci. Small  chronic appearing infarction of the left thalamus, stable since  9/28/2012.Presumed chronic small vessel ischemic changes in the  periventricular  and pontine white matter. Intracranial atherosclerotic  calcifications.    Small right maxillary polyp/mucous retention cyst. Bilateral  pseudophakia.       Impression    IMPRESSION: No change in 2 x 1.4 x 1.4 cm right thalamic hemorrhage.    I have personally reviewed the examination and initial interpretation  and I agree with the findings.    TOMASA COHEN MD   CT Head w/o Contrast    Narrative    CT HEAD W/O CONTRAST 5/20/2019 7:40 AM    Provided History: eval ICH    Comparison: 5/19/2019    Technique: Using multidetector thin collimation helical acquisition  technique, axial, coronal and sagittal CT images from the skull base  to the vertex were obtained without intravenous contrast.     Findings:    Intraparenchymal hemorrhage involving the right thalamus measures 1.4  x 1.3 x 2.0 cm, previously 1.4 x 1.4 x 2.0 cm. No significant mass  effect or midline shift. Punctate hyperattenuating focus in the  inferior left frontal lobe (series 3 image 10), new compared to prior,  and only seen on the axial images. No extra-axial fluid collection.  The ventricles are proportionate to the cerebral sulci.  Periventricular and subcortical white matter hypodensities, unchanged.  Chronic infarction of the left thalamus. No acute loss of gray-white  matter differentiation. The gray to white matter differentiation of  the cerebral hemispheres is preserved. The basal cisterns are patent.    The visualized paranasal sinuses are clear. The mastoid air cells are  clear.       Impression    Impression:   1. No significant change in size of an intraparenchymal hemorrhage  involving the right thalamus since 5/19/2019.   2. New punctate hyperattenuating focus in the inferior left frontal  lobe, may be artifactual, but could represent small focus of  hemorrhage. Recommend attention on follow-up.    I have personally reviewed the examination and initial interpretation  and I agree with the findings.    JERSEY SEVILLA MD   CT Head  w/o Contrast    Narrative    CT HEAD W/O CONTRAST 5/21/2019 11:55 AM    Provided History: follow up on frontal hyperdense focus: hemorrhage or  artifact?    Comparison: CT head 5/20/2019, 5/19/2019, 5/16/2019.    Technique: Using multidetector thin collimation helical acquisition  technique, axial, coronal and sagittal CT images from the skull base  to the vertex were obtained without intravenous contrast.     Findings:    Unchanged intraparenchymal hemorrhage centered in the lateral right  thalamus measuring 1.4 x 1.5 x 2.0 cm. Questionable punctate  hyperdensity on the comparison examination is not evident on this  exam.    The ventricles are proportionate to the cerebral sulci. Moderate  leukoaraiosis, unchanged. The gray to white matter differentiation of  the cerebral hemispheres is preserved. The basal cisterns are patent.  Atherosclerotic calcification of both carotid siphons.    The visualized paranasal sinuses are clear. The mastoid air cells are  clear. Debris in the right external auditory canal, presumed cerumen.       Impression    Impression:   1. Unchanged hemorrhage centered in the lateral right thalamus.  2. No new intracranial hemorrhage.    I have personally reviewed the examination and initial interpretation  and I agree with the findings.    BRENDA QUEZADA MD   CT Head w/o Contrast    Narrative    CT HEAD W/O CONTRAST 5/22/2019 7:33 AM    Provided History: assess stability of ICH    Comparison: Head CT 5/21/2019.    Technique: Using multidetector thin collimation helical acquisition  technique, axial, coronal and sagittal CT images from the skull base  to the vertex were obtained without intravenous contrast.     Findings:    Unchanged intraparenchymal hemorrhage centered in the lateral right  thalamus measuring up to 2 cm. Mild surrounding edema, also unchanged.  Unchanged size and configuration of the ventricles. Mild generalized  proximal volume loss, unchanged. Moderate leukoaraiosis,  unchanged. No  new intracranial hemorrhage. No acute loss of gray-white  differentiation. Atherosclerotic calcification of both carotid  siphons. No midline shift. Increased conspicuity of a presumed old  left thalamic lacunar infarct.    Minimal paranasal sinus mucosal thickening. Mastoid air cells are  clear. Chronic debris in the right external auditory canal, presumed  cerumen.       Impression    Impression:   1. Unchanged hemorrhage centered in the lateral right thalamus.  2. No new intracranial hemorrhage.    BRENDA QUEZADA MD   CT Head w/o Contrast    Narrative    CT HEAD W/O CONTRAST 5/23/2019 6:15 AM    Provided History: Follow-up intraparenchymal hemorrhage    Comparison: CT head 5/22/2019.    Technique: Using multidetector thin collimation helical acquisition  technique, axial, coronal and sagittal CT images from the skull base  to the vertex were obtained without intravenous contrast.     Findings:    Demonstration of an intraparenchymal hemorrhage centered in the right  thalamus measuring 1.6 x 1.5 x 2.0 cm, stable compared to the prior CT  on 5/22/2019. No additional areas of intracranial hemorrhage  identified. There is no mass effect or midline shift.  The ventricles  are proportionate to the cerebral sulci. The gray to white matter  differentiation of the cerebral hemispheres is preserved. The basal  cisterns are patent.     The visualized paranasal sinuses are clear. The mastoid air cells are  clear.       Impression    Impression:  1. Stable hemorrhage centered in the lateral right thalamus.   2. No new intracranial hemorrhage.    I have personally reviewed the examination and initial interpretation  and I agree with the findings.    JERSEY SEVILLA MD

## 2019-05-23 NOTE — PROGRESS NOTES
Social Work Services Progress Note    Hospital Day: 7  Date of Initial Social Work Evaluation:  5/18/19 Collaborated with:  Aleyda TRAYLOR (Leola Wise [phone]), SNF Admissions Coordinators        Data:  SW is following for discharge planning. TCU placement is recommended.      Intervention:   SW received a call from Admissions at [facility] ([place]) indicating that they have an opening, wanted to accept pt for admit but will not accept pt for admit as pt's insurance would not confirm that pt's outpt Rituximab  Infusions will be covered by insurance while pt is in the nursing home (if insurance doesn't cover this expense the nursing home would have to absorb the cost which is not cost effective).  JI spoke with Admissions at [facility] ([place]) and they have assessed and declined pt for admit due to medication costs (3 of the meds are reportedly expensive..  SW is awaiting a clarification from [place] in regards to which 3 meds were of concern.  JI left a message for Aleyda TRAYLOR (Leola Wise [phone]) updating.     Assessment:  Pt and grand daughter support rehab placement recommendation.    Plan:    Anticipated Disposition: TCU placement    Barriers to d/c plan:  Medication costs    Follow Up:  JI will continue to follow.    ROCHELLE Blake  Social Work, 6A  Phone:  [phone]  Pager:  [phone]  5/23/2019

## 2019-05-23 NOTE — PLAN OF CARE
Discharge Planner SLP   Patient plan for discharge: TCU  Current status: Pt remains appropriate for dysphagia 2 (diced) diet/thin liquids with close supervision. Pt to be fully alert and upright for all PO, taking small bites/sips at slow rate. Check oral cavity for pocketing. SLP to follow.  Barriers to return to prior living situation: Cognition, weakness, modified diet  Recommendations for discharge: TCU  Rationale for recommendations: Pt below baseline for swallow function; pt will benefit from ongoing ST targeting dysphagia and possibly cognitive-communication at next level of care       Entered by: Kassie Gallagher 05/23/2019 11:55 AM

## 2019-05-23 NOTE — PLAN OF CARE
Discharge Planner OT   Patient plan for discharge: Strong preference for home  Current status: Min/mod A bed mobility supine > sit. Required intermittent CGA-min A for seated balance EOB to complete ADLs, significant posterior/L sided lean but able to correct with cues. Min A at LUE for bathing task to achieve full ROM for washing, min A UB dressing with cues for one-handed technique, CGA seated grooming/hygiene with cues. Completed SLUMS cognitive assessment, pt scoring 19/30 with normal score being 27+/30 indicating moderate cognitive deficits.  Barriers to return to prior living situation: L sided weakness, fall risk, impaired ADLs, impaired mobility, cognitive deficits  Recommendations for discharge: TCU  Rationale for recommendations: Pt is currently below functional baseline for ADLs, transfers, and functional mobility, would benefit from continued therapy to address above deficits and maximize strength/independence in order to return to PLOF.         Entered by: Margot Marie 05/23/2019 11:26 AM

## 2019-05-23 NOTE — PLAN OF CARE
Status: Patient admitted for intracranial hemorrhage. Hx hemophilia and afib.    Vitals: VSS, on 2 liters of O2 overnight.   Neuros: A&Ox4, forgetful at times. Needs reorientation on why she has to stay in the hospital. Neuros include left side = 3/5, right side = 4/5, left facial droop, and leans left.   IV: PIV SL.   Resp/trach: Nonproductive cough.   Diet: DD1 diet with thin liquids. Needs assistance. On 1500 mL fluid restriction. Strict I/O.   Bowel status: No BM this shift. Had large BM yesterday.  : Incontinent of urine, changed x3 this shift. Up to commode x1.  Skin: Nonblanchable redness to coccyx.   Pain: Denied.  Activity: Up with assist of 2, GB, and pivot.  Social: No visitors this shift.   Plan: Continue to work with therapies. Plan for discharge to TCU when ready. Stroke PLC planned for 5/24 @ 0900. Will continue to monitor.

## 2019-05-23 NOTE — PROGRESS NOTES
Social Work Services Progress Note    Hospital Day: 7  Date of Initial Social Work Evaluation:  5/18/19  Collaborated with:  Dr. Chen, Aleyda WORKMAN CC (Leola Wise [phone])    Data:  JI is following for discharge planning. TCU placement is recommended.      Intervention:  JI spoke with Dr. Chen who confirms readiness for discharge on 5/24/19.  Per Dr. Chen, pt is no longer receiving Clem [facility].  Pt's next Rituximab dose is due on 5/24/19.  JI phoned Admissions at [facility] and [facility] and left messages for representatives to call in regards to whether or not they can accept pt for admit.  JI updated Aleyda WORKMAN CC (Leola Wise [phone]).      Assessment:  Pt and grand daughter support rehab placement recommendation.    Plan:    Anticipated Disposition: Short term rehab placement    Barriers to d/c plan:  Await decisions from [facility] and [facility]    Follow Up:  JI will continue to follow.    ROCHELLE Blake  Social Work, 6A  Phone:  [phone]  Pager:  [phone]  5/23/2019

## 2019-05-23 NOTE — PLAN OF CARE
VSS. Denies pain. Neuros unchanged; O x 4, left droop, left side 3/5 right side 4/5. Leans to the left when sitting at edge of bed or standing. On 1500 fluid restriction. Tolerating DD2 diet with thins, takes pills whole. Voided x 1 via commode, was inc multiple times. Had BM 5/23. Up with assist of 2 to pivot to commode. Worked with PT and OT. Pt called appropriately when needing to go to bathroom. Bed alarm on at all times. Grand- daughter Socorro was updated. Pt will be getting Rituxan at 1630, pre- meds on MAR.

## 2019-05-23 NOTE — PLAN OF CARE
Discharge Planner PT   Patient plan for discharge: Rehab  Current status: Pt min Ax1 for supine to sit log roll. Pt requires mod Ax1 for scoot hips to EOB, cueing and min Ax1 for sit EOB with no lumbar support. Improved in finding and maintaining midline. Improved response to cues. 5 x STS for time, repeat cues for midline posture.   Barriers to return to prior living situation: Level of A, impaired mobility  Recommendations for discharge: TCU  Rationale for recommendations: Pt requires skilled intervention to address deficits, progressing but not enough to warrant ARU.        Entered by: Geoffrey Bullock 05/23/2019 11:27 AM

## 2019-05-23 NOTE — PROGRESS NOTES
[facility]   Hematology Progress Note      Patient: Jackeline Smiley MRN# [id number]   Age: 86 year old YOB: 1933           Assessment and Plans   Jackeline Smiley is a 86 year old female with a PMH significant for Hemophilia A, Factor VIII inhibitor, HTN, who was admitted on 5/17/2019 after sudden onset weakness and a fall without head trauma and found to have an acute intracranial hemorrhage on CT.  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  Patient was started on Novoseven (Recombinant Factor VIIa) q2H prior to her transfer from the OSH.  Factor XIII level was found to low at 11 on 5/17/19, suggesting that her acquired hemophilia has resurfaced and her factor VIII is being consumed by Factor VIII inhibitor (measured 88). We recommend Novoseven (Recombinant Factor VIIa) but at q4H dosing at this in order to bypass the intrinsic pathway of the coagulation cascade by supplying enough factor VII to increasing coagulation through the extrinsic pathway. In addition, we recommend starting immunosuppression with methylprednisolone and rituxumab in order to suppress production of the factor VIII inhibitor and allow her body to redevelop more factor VIII.    Recommendations:  - As today's CT showed stable ICH, will reduce Novoseven to q 12 hrs (then consider to stop tomorrow)  - Continue daily CT head to determine Novoseven replacement frequency as above  - Continue prednisone 50 mg every day   Received Rituximab 5/18/19 (planned weekly x4)    Patient was seen, care plans discussed and staffed with Dr. Oswald. Please don't hesitate to contact us if you have any questions.    Se jacinta Reis  Hematology Oncology and Transplantation Fellow  Pager: [phone]    Physician Attestation   I, Bibi Oswald, saw this patient with the resident and agree with the resident/fellow's findings  and plan of care as documented in the note.      I personally reviewed vital signs, medications, labs and imaging.    Key findings: stable bleed, decrease frequency of dakota 7, continue steroids. Agree with the plan documented by Dr camp.      Bibi Oswald MD  Date of Service (when I saw the patient):5.22.19      Interval History   Overnight no events. Repeated CT scan again showed stable bleeding. She kept saying she would like to go home tonight. Wants to call her grand-daugther to bring her home. Explained she needs to stay in the hospital until IV Novoseven is completed but did not want to stay.      ROS: Negative other than as stated in above interval history.      Current Facility-Administered Medications   Medication     acetaminophen (TYLENOL) tablet 650 mg    Or     acetaminophen (TYLENOL) solution 650 mg    Or     acetaminophen (TYLENOL) Suppository 650 mg     atenolol (TENORMIN) tablet 50 mg     bisacodyl (DULCOLAX) Suppository 10 mg     coagulation Factor VIIa Recomb (NOVOSEVEN RT) 4,000 mcg     glucose gel 15-30 g    Or     dextrose 50 % injection 25-50 mL    Or     glucagon injection 1 mg     hydrALAZINE (APRESOLINE) injection 10 mg     insulin aspart (NovoLOG) inj (RAPID ACTING)     insulin aspart (NovoLOG) inj (RAPID ACTING)     labetalol (NORMODYNE/TRANDATE) syringe 10-20 mg     lisinopril (PRINIVIL/ZESTRIL) tablet 10 mg     LORazepam (ATIVAN) tablet 0.5 mg     magnesium sulfate 2 g in NS intermittent infusion ([facility]ar[employer] or [facility])     magnesium sulfate 4 g in 100 mL sterile water (premade)     meperidine (DEMEROL) injection 25 mg     naloxone (NARCAN) injection 0.1-0.4 mg     ondansetron (ZOFRAN-ODT) ODT tab 4 mg    Or     ondansetron (ZOFRAN) injection 4 mg     pantoprazole (PROTONIX) EC tablet 40 mg     polyethylene glycol (MIRALAX/GLYCOLAX) Packet 17 g     potassium chloride (KLOR-CON) Packet 20-40 mEq     potassium chloride 10 mEq in 100 mL intermittent infusion with 10 mg lidocaine      potassium chloride 10 mEq in 100 mL sterile water intermittent infusion (premix)     potassium chloride 20 mEq in 50 mL intermittent infusion     potassium chloride ER (K-DUR/KLOR-CON M) CR tablet 20-40 mEq     potassium phosphate 15 mmol in D5W 250 mL intermittent infusion     potassium phosphate 20 mmol in D5W 250 mL intermittent infusion     potassium phosphate 20 mmol in D5W 500 mL intermittent infusion     potassium phosphate 25 mmol in D5W 500 mL intermittent infusion     predniSONE (DELTASONE) tablet 50 mg     prochlorperazine (COMPAZINE) injection 5 mg    Or     prochlorperazine (COMPAZINE) tablet 5 mg    Or     prochlorperazine (COMPAZINE) Suppository 12.5 mg     QUEtiapine (SEROquel) tablet 25 mg     senna-docusate (SENOKOT-S/PERICOLACE) 8.6-50 MG per tablet 1 tablet     simvastatin (ZOCOR) tablet 20 mg     sodium chloride tablet 1 g           Physical Exams     /71 (BP Location: Left arm)   Pulse 57   Temp 98.1  F (36.7  C)   Resp 18   Ht 1.524 m (5')   Wt 48.8 kg (107 lb 9.4 oz)   SpO2 94%   BMI 21.01 kg/m    Wt Readings from Last 3 Encounters:   05/19/19 48.8 kg (107 lb 9.4 oz)   05/17/19 48.3 kg (106 lb 8 oz)   12/31/12 49.9 kg (110 lb)     She did not want physical exams.  General:  no acute distress.  HEENT: NCAT. anicteric sclera.   Neck: Neck supple.   Respiratory: Non-labored breathing  Psych: Alert and awake, appropriated mood         Labs     CBC  ===  WBC (10e9/L)   Date Value   05/22/2019 9.6     Hemoglobin (g/dL)   Date Value   05/22/2019 14.8     Platelet Count (10e9/L)   Date Value   05/22/2019 185       BMP  ===  Sodium (mmol/L)   Date Value   05/22/2019 128 (L)     Potassium (mmol/L)   Date Value   05/22/2019 4.1     Urea Nitrogen (mg/dL)   Date Value   05/22/2019 22     Creatinine (mg/dL)   Date Value   05/22/2019 0.69     Calcium (mg/dL)   Date Value   05/22/2019 8.7       LFTs  ====  Bilirubin Total (mg/dL)   Date Value   05/20/2019 0.4     Alkaline Phosphatase (U/L)    Date Value   05/20/2019 41     AST (U/L)   Date Value   05/20/2019 38     ALT (U/L)   Date Value   05/20/2019 46               Images     Results for orders placed or performed during the hospital encounter of 05/17/19   CT Head w/o Contrast    Narrative    CT HEAD W/O CONTRAST 5/17/2019 5:47 AM    History: Stroke, follow up; right thalamic bleed, follow-up from  [facility]    Comparison: CT 5/16/2019.    Technique: Using multidetector thin collimation helical acquisition  technique, axial, coronal and sagittal CT images from the skull base  to the vertex were obtained without intravenous contrast.     Findings:  Acute hemorrhagic focus centered in the right thalamus  measuring 16 x 15 x 21 cm, previously 14 x 14 x 19 cm. No significant  surrounding edema. No significant mass effect. No new intracranial  hemorrhage. Mild diffuse cerebral volume loss. Mild patchy  periventricular hypoattenuation, consistent with chronic small vessel  ischemic disease. No midline shift. The basal cisterns are patent. No  hydronephrosis.    Right maxillary mucosal retention cysts, the remaining paranasal  sinuses are clear. The mastoid air cells are clear.       Impression    Impression: Minimally increased acute hemorrhagic focus centered in  the right thalamus.     I have personally reviewed the examination and initial interpretation  and I agree with the findings.    JERSEY SEVILLA MD   CT Head w/o Contrast    Narrative    CT head without 5/18/2019 6:03 AM    Provided History: Evaluate ICH    Comparison: CT 5/17/2019.    Technique: Using multidetector thin collimation helical acquisition  technique, axial, coronal and sagittal CT images from the skull base  to the vertex were obtained without intravenous contrast.     Findings:  Hemorrhagic focus centered in the right thalamus measures  16 x 15 x 21 cm which is unchanged from the prior exam. There is mild  localized mass effect. No new intracranial hemorrhage.No midline  shift. The  ventricles are proportionate to the cerebral sulci. The  gray to white matter differentiation of the cerebral hemispheres is  preserved. The basal cisterns are patent. Mild diffuse cerebral volume  loss. Patchy periventricular hypoattenuation, consistent with chronic  small vessel ischemic disease. No hydrocephalus. Atherosclerotic  calcifications in the vertebral arteries.    Small right maxillary mucosal retention cyst, the remaining paranasal  sinuses are clear. The mastoid air cells are clear.       Impression    Impression: Unchanged hemorrhagic focus centered in the right  thalamus.    I have personally reviewed the examination and initial interpretation  and I agree with the findings.    TOMASA COHEN MD   CT Head w/o Contrast    Narrative    CT HEAD W/O CONTRAST 5/19/2019 8:31 AM    History: Intracranial hemorrhage, known, follow up    Comparison: 5/18/2019 at 0532 hours.    Technique: Using multidetector thin collimation helical acquisition  technique, axial, coronal and sagittal CT images from the skull base  to the vertex were obtained without intravenous contrast.     Findings:     There is no mass effect, midline shift or extra-axial fluid  collection.  2 x 1.4 x 1.4 cm right thalamic hemorrhage is similar to  prior. Ventricles are proportionate to the cerebral sulci. Small  chronic appearing infarction of the left thalamus, stable since  9/28/2012.Presumed chronic small vessel ischemic changes in the  periventricular and pontine white matter. Intracranial atherosclerotic  calcifications.    Small right maxillary polyp/mucous retention cyst. Bilateral  pseudophakia.       Impression    IMPRESSION: No change in 2 x 1.4 x 1.4 cm right thalamic hemorrhage.    I have personally reviewed the examination and initial interpretation  and I agree with the findings.    TOMASA COHEN MD   CT Head w/o Contrast    Narrative    CT HEAD W/O CONTRAST 5/20/2019 7:40 AM    Provided History: juanal  ICH    Comparison: 5/19/2019    Technique: Using multidetector thin collimation helical acquisition  technique, axial, coronal and sagittal CT images from the skull base  to the vertex were obtained without intravenous contrast.     Findings:    Intraparenchymal hemorrhage involving the right thalamus measures 1.4  x 1.3 x 2.0 cm, previously 1.4 x 1.4 x 2.0 cm. No significant mass  effect or midline shift. Punctate hyperattenuating focus in the  inferior left frontal lobe (series 3 image 10), new compared to prior,  and only seen on the axial images. No extra-axial fluid collection.  The ventricles are proportionate to the cerebral sulci.  Periventricular and subcortical white matter hypodensities, unchanged.  Chronic infarction of the left thalamus. No acute loss of gray-white  matter differentiation. The gray to white matter differentiation of  the cerebral hemispheres is preserved. The basal cisterns are patent.    The visualized paranasal sinuses are clear. The mastoid air cells are  clear.       Impression    Impression:   1. No significant change in size of an intraparenchymal hemorrhage  involving the right thalamus since 5/19/2019.   2. New punctate hyperattenuating focus in the inferior left frontal  lobe, may be artifactual, but could represent small focus of  hemorrhage. Recommend attention on follow-up.    I have personally reviewed the examination and initial interpretation  and I agree with the findings.    JERSEY SEVILLA MD   CT Head w/o Contrast    Narrative    CT HEAD W/O CONTRAST 5/21/2019 11:55 AM    Provided History: follow up on frontal hyperdense focus: hemorrhage or  artifact?    Comparison: CT head 5/20/2019, 5/19/2019, 5/16/2019.    Technique: Using multidetector thin collimation helical acquisition  technique, axial, coronal and sagittal CT images from the skull base  to the vertex were obtained without intravenous contrast.     Findings:    Unchanged intraparenchymal hemorrhage centered in  the lateral right  thalamus measuring 1.4 x 1.5 x 2.0 cm. Questionable punctate  hyperdensity on the comparison examination is not evident on this  exam.    The ventricles are proportionate to the cerebral sulci. Moderate  leukoaraiosis, unchanged. The gray to white matter differentiation of  the cerebral hemispheres is preserved. The basal cisterns are patent.  Atherosclerotic calcification of both carotid siphons.    The visualized paranasal sinuses are clear. The mastoid air cells are  clear. Debris in the right external auditory canal, presumed cerumen.       Impression    Impression:   1. Unchanged hemorrhage centered in the lateral right thalamus.  2. No new intracranial hemorrhage.    I have personally reviewed the examination and initial interpretation  and I agree with the findings.    BRENDA QUEZADA MD   CT Head w/o Contrast    Narrative    CT HEAD W/O CONTRAST 5/22/2019 7:33 AM    Provided History: assess stability of ICH    Comparison: Head CT 5/21/2019.    Technique: Using multidetector thin collimation helical acquisition  technique, axial, coronal and sagittal CT images from the skull base  to the vertex were obtained without intravenous contrast.     Findings:    Unchanged intraparenchymal hemorrhage centered in the lateral right  thalamus measuring up to 2 cm. Mild surrounding edema, also unchanged.  Unchanged size and configuration of the ventricles. Mild generalized  proximal volume loss, unchanged. Moderate leukoaraiosis, unchanged. No  new intracranial hemorrhage. No acute loss of gray-white  differentiation. Atherosclerotic calcification of both carotid  siphons. No midline shift. Increased conspicuity of a presumed old  left thalamic lacunar infarct.    Minimal paranasal sinus mucosal thickening. Mastoid air cells are  clear. Chronic debris in the right external auditory canal, presumed  cerumen.       Impression    Impression:   1. Unchanged hemorrhage centered in the lateral right  thalamus.  2. No new intracranial hemorrhage.    BRENDA QUEZADA MD

## 2019-05-23 NOTE — PROGRESS NOTES
[facility], [place]   Neurology Daily Note  Jackeline Smiley  [id number]  05/23/2019    Subjective: No acute events overnight. Received x1 of 0.5mg haldol last night.     Objective:   /84   Pulse 64   Temp 98.3  F (36.8  C) (Oral)   Resp 16   Ht 1.524 m (5')   Wt 48.8 kg (107 lb 9.4 oz)   SpO2 94%   BMI 21.01 kg/m    General: Awake and alert, not in any distress  HEENT: Atraumatic, normocephalic  Chest: Normal work of breathing in room air  Abdomen: Soft  Extremities: No LE swelling.  Skin: No rash or lesion.     Neuro:  Mental status: Awake, alert, attentive, oriented to self, month and place. Speech is fluent, comprehension intact. No dysarthria.  Cranial nerves:  Eyes conjugate, EOMI, face symmetric with loss of nasolabial fold on the left, facial sensation intact, hearing intact to conversation, tongue midline.   Motor: Tone normal. 5/5 strength in RUE/RLE. LUE 3/5 with 4+ . LLE 4+/5. No atrophy or twitches. No lower extremity drift.   Reflexes: Not tested today.  Sensory: Intact to LT in all four extremities.  Coordination: FNF intact on the R, no dysmetria, unable to assess appropriately due to weakness on the L.  Gait: Not tested    Lab Investigations:   Hemoglobin A1C   Date Value Ref Range Status   05/18/2019 6.2 (H) 0 - 5.6 % Final     Comment:     Normal <5.7% Prediabetes 5.7-6.4%  Diabetes 6.5% or higher - adopted from ADA   consensus guidelines.       Lab Results   Component Value Date     06/20/2011     Imaging:  Recent Results (from the past 24 hour(s))   CT Head w/o Contrast    Narrative    CT HEAD W/O CONTRAST 5/20/2019 7:40 AM    Provided History: eval ICH    Comparison: 5/19/2019    Technique: Using multidetector thin collimation helical acquisition  technique, axial, coronal and sagittal CT images from the skull base  to the vertex were obtained without intravenous contrast.     Findings:    Intraparenchymal hemorrhage involving the right  thalamus measures 1.4  x 1.3 x 2.0 cm, previously 1.4 x 1.4 x 2.0 cm. No significant mass  effect or midline shift. Punctate hyperattenuating focus in the  inferior left frontal lobe (series 3 image 10), new compared to prior,  and only seen on the axial images. No extra-axial fluid collection.  The ventricles are proportionate to the cerebral sulci.  Periventricular and subcortical white matter hypodensities, unchanged.  Chronic infarction of the left thalamus. No acute loss of gray-white  matter differentiation. The gray to white matter differentiation of  the cerebral hemispheres is preserved. The basal cisterns are patent.    The visualized paranasal sinuses are clear. The mastoid air cells are  clear.       Impression    Impression:   1. No significant change in size of an intraparenchymal hemorrhage  involving the right thalamus since 5/19/2019.   2. New punctate hyperattenuating focus in the inferior left frontal  lobe, may be artifactual, but could represent small focus of  hemorrhage. Recommend attention on follow-up.    I have personally reviewed the examination and initial interpretation  and I agree with the findings.    JERSEY SEVILLA MD       Assessment and Plan   Jackeline VALDEMAR Baljit is a 86 year old year old lady with a history of acquired hemophilia A, HTN, HLD who was admitted on  5/17/2019 with LUE weakness and found to have a right basal ganglia/thalamic IPH. ICH score of 1.     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 Etiology thought likely to be hypertensive bleed in the setting of underlying hemophilia.   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   - Repeat head CT looks stable today, therefore will discontinue NovoSeven based on hematology recs  - Dose of Rituximab today,  once per week for a x4 weeks (1st dose 5/18, 2nd dose today 5/23), pre-treat with benadryl  - On predinsone 50 daily - will keep on MDSS and pantroprazole for steroid precautions  - Daily CBC  - Coagulation tests and factor levels per hematology, appreciate assistance    #Hypertension  - Did not require PRNs overnight  - Started 10mg of lisinopril on 5/21  - Continue on PTA atenolol 50mg daily  - PRNs for SBP >180 DBP >90    #Hypernatremia, improved  - 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 Expected discharge in 1-2 days to transitional care unit once bed is available.    Patient was seen and discussed with Dr. Stewart.     CECY Wall [facility] [employer], MSc(Res)  Neurology Resident, PGY-2  Pager: [phone]

## 2019-05-24 NOTE — PLAN OF CARE
VSS. Denies pain. Neuros unchanged; O x 4, left droop, left side 3/5 right side 4/5. Leans to the left when sitting at edge of bed or standing. On 1500 fluid restriction. Tolerating DD2 diet with thins, takes pills whole. Voided x 2 via commode, was inc multiple times. Had BM 5/24. Up with assist of 2 to pivot to commode. Worked with speech. Bed alarm on at all times. Pt is being discharged this evening at 1745, SW updated pt and Grand-Daughter Socorro.

## 2019-05-24 NOTE — DISCHARGE SUMMARY
[facility] Discharge Summary and Instructions    Jackeline Smiley MRN# [id number]   Age: 86 year old YOB: 1933     Date of Admission:  5/17/2019  Date of Discharge::  5/25/2019  Admitting Physician:  Eurm Ward MD  Discharge Physician:  Taye Deng MD          Admission Diagnoses:   Intracranial hemorrhage          Discharge Diagnosis:   Intracranial hemorrhage           Brief History of Illness:     Ms. Smiley has a previous history of Hemophilia A (secondary to inhibition), who presented with an acute deep white matter intracranial hemorrhage, with corresponding left sided weakness. She was given Factor VII emergently, blood pressure was controlled with nicardipine and then she was transferred to [facility].          Hospital Course:     On admission the patient was treated with strict blood pressure control <140 mmHg systolics and intermittent factor VII infusions under hematology's recommendations. Serial head CTs revealed a stable bleed. Factor VIII levels were low and inhibitor levels were elevated, therefore she was started on immunosuppressive treatment with steroids and rituximab. The tentative plan is to wean the steroids within the next couple of weeks and continue rituximab. Specific steroid weaning plan and long term immunosuppressive plan to be made at a follow-up visit with hematology. She was weaned off the nicardipine drip and blood pressure was controlled with atenolol and lisinopril.     During the hospitalization the patient became hyponatremic and studies were consistent with SIADH. The patient was fluid restricted and the hyponatremia resolved.     RECOMMENDATIONS:   - Continue prednisone 50 mg every day   - Next rituximab doses at 5/30/19 and 6/27/19  - Follow up with Dr. Duque in hematology clinic in a week for next dose of rituximab   - Continue fluid restriction with 1500 mL fluid daily  - Monitor BMPs at least twice in the next week to ensure sodium  stability, can liberalize fluid restriction if stable          Examination      General: Awake and alert, oriented   HEENT: Atraumatic, normocephalic  Chest: Normal work of breathing on room air  Extremities: No LE swelling.  Skin: No rash or lesion.     Neuro:  Mental status: Awake, alert, attentive, oriented to self, month and place. Speech is fluent, comprehension intact. No dysarthria.  Cranial nerves:  Eyes conjugate, EOMI, face symmetric with loss of nasolabial fold on the left, facial sensation intact, hearing intact to conversation.  Motor: 5/5 strength in RUE/RLE. LUE 3/5 with 4+ . LLE 4+/5. No atrophy or twitches. No lower extremity drift.  Coordniation: FNF intact on the right  Sensation: Intact to light touch in all four extremities  Gait: Deferred         Discharge Medications:     Current Discharge Medication List      START taking these medications    Details   lisinopril (PRINIVIL/ZESTRIL) 10 MG tablet Take 1 tablet (10 mg) by mouth 2 times daily  Qty: 90 tablet    Associated Diagnoses: Benign essential hypertension      pantoprazole (PROTONIX) 40 MG EC tablet Take 1 tablet (40 mg) by mouth every morning (before breakfast)    Associated Diagnoses: Hemophilia A (H)         CONTINUE these medications which have CHANGED    Details   atenolol (TENORMIN) 50 MG tablet Take 1 tablet (50 mg) by mouth daily  Qty: 90 tablet, Refills: 0    Associated Diagnoses: Essential hypertension      calcium carbonate-vitamin D (CALCIUM + D) 600-200 MG-UNIT TABS Take 1 tablet by mouth 2 times daily  Qty: 180 tablet, Refills: 3    Associated Diagnoses: Osteoarthritis, unspecified osteoarthritis type, unspecified site      LORazepam (ATIVAN) 0.5 MG tablet Take 1 tablet (0.5 mg) by mouth nightly as needed for anxiety  Qty: 30 tablet, Refills: 0    Associated Diagnoses: Anxiety      meclizine (ANTIVERT) 25 MG tablet Take 1 tablet (25 mg) by mouth 4 times daily as needed for dizziness  Qty: 30 tablet, Refills: 0     Associated Diagnoses: Vertigo      predniSONE (DELTASONE) 50 MG tablet Take 1 tablet (50 mg) by mouth daily    Comments: Continue until follow up with hematology clinic.  Associated Diagnoses: Hemophilia A (H)      simvastatin (ZOCOR) 20 MG tablet Take 1 tablet (20 mg) by mouth At Bedtime  Qty: 30 tablet, Refills: 3    Associated Diagnoses: Hyperlipidemia LDL goal <100      vitamin D3 (CHOLECALCIFEROL) 1000 units (25 mcg) tablet Take 1 tablet (1,000 Units) by mouth daily  Qty: 60 tablet, Refills: 6    Associated Diagnoses: Vitamin deficiency         STOP taking these medications       HYDROcodone-acetaminophen 5-325 MG per tablet Comments:   Reason for Stopping:                  Discharge Disposition:   Discharged to transitional care unit         Pamela Chen  Neurology Resident, PGY2  Pager: [phone]

## 2019-05-24 NOTE — PLAN OF CARE
Status: ICH; hx of hemophilia and afib  Vitals: /83 (BP Location: Left arm)   Pulse 69   Temp 97.4  F (36.3  C) (Axillary)   Resp 18   Ht 1.524 m (5')   Wt 48.8 kg (107 lb 9.4 oz)   SpO2 97%   BMI 21.01 kg/m    Neuros: Confused and intermittently disoriented to time and situation overnight. L facial droop, L lean, L side 3/5, R side 4/5  IV: PIV SL  Resp/trach: Frequent loose non-productive cough when awake, LS dim  Diet: DD2 thin liquids   Bowel status: Incontinent smear BM overnight  : Incontinent of urine  Skin: Bruised but intact  Pain: Denies  Activity: Heavy Ax2/GB pivot to commode. Bed alarm on.   Plan: D/C to TCU pending placement. Continue to monitor and assess.

## 2019-05-24 NOTE — PROGRESS NOTES
Care Coordinator Progress Note    Admission Date/Time:  5/17/2019  Attending MD:  Dr Erum Ward    Data  Informed by Dr Chen pt is refusing to discharge to TCU tonight. [facility] Transport stretcher is here but pt refusing. Granddaughter, Socorro is here. Transport not able to wait any longer, had to leave.     Coordination of Care & Referrals  Introduced myself to pt and granddaughter, Socorro. Pt lying in bed, awake, insisting she is going home. Socorro not able to provide 24 hour care. Informed pt it isn't safe to discharge home. Pt does not have insight into her needs or safety. Not able to have a rational conversation with pt. Pt saying she can go home and sleep.   Discussed with Dr Chen, pt not able to make own decisions. Will try small dose of Ativan; pt's nurse updated. Pt may be more confused later in the day. I suggested staff call SW in the morning to reschedule transport to [facility].     I called [facility] and informed them pt would not be coming tonight. Gave them the phone number for 6A. Will have SW follow-up with them tomorrow.     Assessment  Pt scheduled to discharge to TCU today. Pt refusing to leave when transport arrived. Not able to make her own decisions per MD. Pt not safe to discharge home, does not have 24 hour care at home.     Plan  Try Ativan or Seroquel tonight to ease confusion/agitation.     MD/Nursing to contact SW tomorrow AM regarding discharge to [facility]. Would suggest pt transport earlier in the day.        Uyen Emerson, RN Care Coordinator  Unit 6A, [facility]

## 2019-05-24 NOTE — PLAN OF CARE
ST session cancelled. Attempted to see pt for dysphagia tx, however pt refused PO despite encouragement. Per chart review, pt to discharge to TCU today. Recommend ST follow up to assess diet tolerance and advanced trials as appropriate.

## 2019-05-24 NOTE — PLAN OF CARE
Alert and oriented x4, forgetful.  Pleasant and cooperative all shift. Neuros unchanged.  Turned and repositioned every 2 hours..  Fed herself dinner after set-up. Received rituxan tonight and tolerated well.  Awaiting TCU placement.

## 2019-05-24 NOTE — DISCHARGE INSTRUCTIONS
Follow-up with Dr Duque, Hematology.   [facility] at the [facility] will contact you with the clinic appointment and instructions on the 2 remaining Rituximab infusions.   [facility]  Clinic Phone:  [phone]  Allison Lopez, RN Care Coordinator

## 2019-05-24 NOTE — PROGRESS NOTES
Social Work Services Progress Note    Hospital Day: 8  Date of Initial Social Work Evaluation:  5/17/19  Collaborated with:  SNF Admissions Coordinator's, pt's grand daughter (Socorro) and Aleyda TRAYLOR (Leola Wise [phone]), patient        Data:  JI is following for discharge planning. TCU placement is recommended.  Pt ready for discharge on 5/24/19.    Intervention:  JI received a return call from Admissions at [facility] (Collin).  Per Collin, pt was declined for admit due to the expense of Clem 7 ([facility]in voices understanding that pt is no longer receiving Clem 7 and will not be discharged on this, but he states that they do not trust that pt will not have this prescribed again during her stay.  Collin also indicates that pt declined because of the cost of Rituximab and concerns that pt's insurance company will not cover the cost of pt's outpt infusion.  JI phoned  Aleyda [facility] (Leola Billie [phone]) and updated in regards to discharge planning.  JI asked Leola if she could secure a guarantee from insurance that they will cover the cost of pt's outpt Rituximab infusions.  Leola plans to defer this question to her Supervisor and will get back to this SW.  JI phoned pt's grand daughter (Socorro) on her cell phoned as pt has deferred discharge planning decisions to Socorro).  The person who answered the phone confirmed that they were pt's grand daughter Socorro.  JI provided an update regarding the above and discussed referrals to [facility].  At this point in the conversation, the person who identified as being Socorro indicated that they were not Socorro and rather, Socorro's son, who indicated that Socorro had told him to take her calls.  JI advised the in the future, that when asked if he is Socorro that he not say that he is.  Socorro's son then instructed JI to phone Socorro at work. JI phoned Socorro and also educated that her son, should not misrepresent who he is.   Socorro voiced understanding.  SW updated Socorro.  Per discussion and per Socorro's stated preferences, SW made add'l referrals to [facility] and [facility].  SW updated pt.    Assessment:  Pt states that she is anxious to discharge.  Pt confirms that she wants Socorro to choose the SNF.    Plan:    Anticipated Disposition:  Rehab placement in a community  SNF    Barriers to d/c plan:  Medication expenses    Follow Up:  SW will continue to follow.    ROCHELLE Blake  Social Work, 6A  Phone:  [phone]  Pager:  [phone]  5/24/2019

## 2019-05-25 NOTE — PLAN OF CARE
Status: ICH; hx of hemophilia and afib  Vitals:  /81 (BP Location: Right arm)   Pulse 69   Temp 97.2  F (36.2  C) (Oral)   Resp 16   Ht 1.524 m (5')   Wt 48.8 kg (107 lb 9.4 oz)   SpO2 98%   BMI 21.01 kg/m    Neuros: Confused and intermittently disoriented to time and situation overnight. Refusing assessments and insisting on going home; frustrated, uncooperative, and restless. Seroquel given- pt slept the rest of the night. L facial droop, L lean, L side 3/5, R side 4/5  IV: PIV removed  Resp/trach: LS dim, RA  Diet: DD2 thin liquids  Bowel status: Incontinent, no BM overnight   : Incontinent of urine  Skin: Bruised  Pain: Denies  Activity: Heavy Ax2/GB pivot to commode. Bed alarm on ZONE 2 at all times  Plan: Pt was supposed to d/c yesterday to TCU but refused as transport arrived. Care coordinator/SW to follow up this morning. Continue to monitor and assess.

## 2019-05-25 NOTE — PROGRESS NOTES
[facility], [place]   Neurology Daily Note  Jackeline Smiley  [id number]  05/24/2019    Subjective: No acute events overnight.    Objective:   /83 (BP Location: Right arm)   Pulse 69   Temp 98.2  F (36.8  C) (Oral)   Resp 16   Ht 1.524 m (5')   Wt 48.8 kg (107 lb 9.4 oz)   SpO2 99%   BMI 21.01 kg/m    General: Awake and alert, not in any distress  HEENT: Atraumatic, normocephalic  Chest: Normal work of breathing in room air  Abdomen: Soft  Extremities: No LE swelling.  Skin: No rash or lesion.     Neuro:  Mental status: Awake, alert, attentive, oriented to self and place. Speech is fluent, comprehension intact. No dysarthria.  Cranial nerves:  Eyes conjugate, EOMI, face symmetric with loss of nasolabial fold on the left, facial sensation intact, hearing intact to conversation, tongue midline.   Motor: Tone normal. 5/5 strength in RUE/RLE. LUE 3/5 with 4+ . LLE 4+/5. No atrophy or twitches. No lower extremity drift.   Reflexes: Not tested today.  Sensory: Intact to LT in all four extremities.  Coordination: Not tested today.  Gait: Not tested    Lab Investigations:   Hemoglobin A1C   Date Value Ref Range Status   05/18/2019 6.2 (H) 0 - 5.6 % Final     Comment:     Normal <5.7% Prediabetes 5.7-6.4%  Diabetes 6.5% or higher - adopted from ADA   consensus guidelines.       Lab Results   Component Value Date     06/20/2011     Imaging:  Recent Results (from the past 24 hour(s))   CT Head w/o Contrast    Narrative    CT HEAD W/O CONTRAST 5/20/2019 7:40 AM    Provided History: eval ICH    Comparison: 5/19/2019    Technique: Using multidetector thin collimation helical acquisition  technique, axial, coronal and sagittal CT images from the skull base  to the vertex were obtained without intravenous contrast.     Findings:    Intraparenchymal hemorrhage involving the right thalamus measures 1.4  x 1.3 x 2.0 cm, previously 1.4 x 1.4 x 2.0 cm. No significant mass  effect or  midline shift. Punctate hyperattenuating focus in the  inferior left frontal lobe (series 3 image 10), new compared to prior,  and only seen on the axial images. No extra-axial fluid collection.  The ventricles are proportionate to the cerebral sulci.  Periventricular and subcortical white matter hypodensities, unchanged.  Chronic infarction of the left thalamus. No acute loss of gray-white  matter differentiation. The gray to white matter differentiation of  the cerebral hemispheres is preserved. The basal cisterns are patent.    The visualized paranasal sinuses are clear. The mastoid air cells are  clear.       Impression    Impression:   1. No significant change in size of an intraparenchymal hemorrhage  involving the right thalamus since 5/19/2019.   2. New punctate hyperattenuating focus in the inferior left frontal  lobe, may be artifactual, but could represent small focus of  hemorrhage. Recommend attention on follow-up.    I have personally reviewed the examination and initial interpretation  and I agree with the findings.    JERSEY SEVILLA MD       Assessment and Plan   Jackeline Smiley is a 86 year old year old lady with a history of acquired hemophilia A, HTN, HLD who was admitted on  5/17/2019 with LUE weakness and found to have a right basal ganglia/thalamic IPH. ICH score of 1.     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 Etiology thought likely to be hypertensive bleed in the setting of underlying hemophilia.   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   - Received NovoSeven based on hematology recs  - Doses of Rituximab (1st dose 5/18, 2nd dose today 5/23), pre-treat with benadryl, plan for Rituximab with OP  Infusions on 5/30 and 6/7  - On predinsone 50 daily - will keep on MDSS and pantroprazole for steroid precautions  - Daily CBC  - Coagulation tests and factor levels per  hematology, appreciate assistance    #Hypertension  - Did not require PRNs overnight  - Started 10mg of lisinopril on 5/21  - Continue on PTA atenolol 50mg daily  - PRNs for SBP >180 DBP >90    #Hyperlipidemia   - Continue simvastatin 20mg daily  - LFTs wnl    #Anxiety disorder  - PTA lorazepam 0.5mg bedtime PRN  - Seroquel 25mg PRN for agitation    FEN: Off IVFs, DD2 diet with thins  PPx: Pantoprazole for GI ppx, SCDs for DVT ppx  Code status: DNR/DNI    Disposition Plan   Medically ready for discharge. Expected discharge in 1 days to transitional care unit.    Patient was seen and discussed with Dr. Deng.     CECY Wall [facility], MSc(Res)  Neurology Resident, PGY-2  Pager: [phone]

## 2019-05-25 NOTE — PROGRESS NOTES
VSS. Denies pain. Neuros unchanged; O x 4, left droop, left side 3/5 right side 4/5. Leans to the left when sitting at edge of bed or standing. Pt slept in between cares this afternoon, able to make needs known. On 1500 fluid restriction. Tolerating DD2 diet with thins, takes pills whole. Inc x 2. Had BM 5/24. Up with assist of 2 to pivot to commode. Bed alarm on at all times. Pt is being discharged this evening at 1600. Grand-Daughter Socorro aware and will be meeting pt at rehab.     Attempted to call [facility] ([phone]) to give report at 1440 but no answer at RN desk

## 2019-05-25 NOTE — PROGRESS NOTES
Social Work Services Progress Note    Hospital Day: 9  Date of Initial Social Work Evaluation:  5/17/2019  Collaborated with:  Patricia Socorro, [facility] RN Supervisor    Data:  Pt was scheduled to leave for TCU yesterday before suddenly refusing placement. Medical team indicated patient lacks insight into need for therapy. Pt medically ready for discharge.     Intervention:  SW spoke with [facility] who were still able to accept patient for admission today.  SW also spoke with granddaughter Socorro as patient is not cognitively aware of her lack of safety in returning home.  Socorro indicated not being able to provide the necessary supervision patient would need to return home and expressed awareness and agreement with plan for TCU placement.  [facility] stretcher ride was set up for 4:00pm.  [facility] aware of new ride time.      [facility]:   Phone: [phone]  Fax: [phone]    Assessment:  Patient to discharge to [facility] at 4pm    Plan:    Anticipated Disposition:  Facility:  [facility]         Soo Yeon Han, MSW, [facility]  Weekend Pager: [phone]

## 2019-05-25 NOTE — PLAN OF CARE
Status: Pt here w/ ICH. Pt adamantly refused to leave for TCU this afternoon with her granddaughter supporting her choices. MD and care coordinator aware. Pt now staying the night inpatient.   Vitals: Hypertensive but within parameters otherwise VSS.   Neuros: Pt refused 2000 neuro exam.  L facial droop, L lean, L side 3/5, R side 4/5. Agitated and anxious throughout shift, Ativan PRN administered with minor relief.   IV: PIV taken out as pt thought to have been leaving at 1745. Pt asked if writer could place order for another IV and pt refused. Charge nurse informed.    Resp/trach: LS diminished. On RA.   Diet: DD2 thin liquids. Refused to eat this shift.   Bowel status: Incontinent of urine x4, pivoted to commode x1 this shift. Attempted to bladder scan but pt refused.   : Incontinent of urine  Skin: Bruised but intact.   Pain: Denies.   Lab/Tests: Pt refusing BS checks this evening so insulin not administered.    Activity: Heavy Ax2/GB pivot to commode. Bed alarm on AT ALL TIMES. Pt agitated and anxious throughout shift, calling for her granddaughter ?Socorro?.   Plan: Pt medically stable and adequate for discharge. Plan to discharge tomorrow morning after care coordinator and SW discuss and can place transport to TCU. Continue to monitor and assess.

## 2019-05-25 NOTE — PLAN OF CARE
Speech Language Therapy Discharge Summary    Reason for therapy discharge:    Discharged to transitional care facility.    Progress towards therapy goal(s). See goals on Care Plan in [facility] electronic health record for goal details.  Goals partially met.  Barriers to achieving goals:   discharge from facility.    Therapy recommendation(s):    Continued therapy is recommended.  Rationale/Recommendations:  Below baseline swallow function; recommend dysphagia 2 (diced) diet/thin liquids. Pt may also benefit from cognitive-communication evaluation.

## 2019-05-25 NOTE — PLAN OF CARE
Pt sent to TCU via stretcher with [facility] transport. Pt stable and adequate for discharge. Pt's belongings were sent with her.

## 2019-05-26 NOTE — ED TRIAGE NOTES
fall at rehab at three am.   head injury was found on ground an hour or so later.  history of  hemophilia    136/76  92 on RA  53 bpm     hemorraghic stroke May 17th of this year

## 2019-05-26 NOTE — ED AVS SNAPSHOT
[facility], [place], Emergency Department  [address] [place] [id number]  Phone:  [phone]                                    Jackeline Smiley   MRN: [id number]    Department:  [facility], Emergency Department   Date of Visit:  5/26/2019           After Visit Summary Signature Page    I have received my discharge instructions, and my questions have been answered. I have discussed any challenges I see with this plan with the nurse or doctor.    ..........................................................................................................................................  Patient/Patient Representative Signature      ..........................................................................................................................................  Patient Representative Print Name and Relationship to Patient    ..................................................               ................................................  Date                                   Time    ..........................................................................................................................................  Reviewed by Signature/Title    ...................................................              ..............................................  Date                                               Time          22EPIC Rev 08/18

## 2019-05-26 NOTE — TELEPHONE ENCOUNTER
Called by nursing staff at [facility] TCU about patient who was discharged from [facility] today, sent with order for lorazepam but no script.       Rx sent to Lacey Mulligan MD

## 2019-05-26 NOTE — TELEPHONE ENCOUNTER
Staff called to report to family about fall and x ray ordered.  After some time family called back and want to take her to the [facility] ED themselves to be checked out.    OK with this to occur.  Family to transport    Electronically signed by Sigrid Tripp RN, CNP

## 2019-05-26 NOTE — PLAN OF CARE
Physical Therapy Discharge Summary    Reason for therapy discharge:    Discharged to transitional care facility.    Progress towards therapy goal(s). See goals on Care Plan in [facility] electronic health record for goal details.  Goals partially met.  Barriers to achieving goals:   discharge from facility.    Therapy recommendation(s):    Continued therapy is recommended.  Rationale/Recommendations:  TCU.

## 2019-05-26 NOTE — TELEPHONE ENCOUNTER
Resident took a fall last night around 3am.  Did hit head but complaining of neck pain now.  Hx of stroke and confusion    Orders:  Cervical neck x-ray today to make sure no acute fracture    Electronically signed by Sigrid Tripp RN, CNP

## 2019-05-26 NOTE — PROGRESS NOTES
Social Work Services Progress Note    Hospital Day: 1  Date of Initial Social Work Evaluation:  Not completed   Collaborated with:  ED staff, pt, and pt granddaughter Socorro, chart review    Data:  Pt is an 85 yo female being seen in the ED after a fall at her TCU.  Pt was discharged from [facility] yesterday, 5/25/19 at 4pm to [facility] and had a fall at 3am when she was trying to go to the bathroom.  Family has concerns about pt safety at this TCU and does not want her to return there.    Intervention:  JI Jacome and Juliette spoke with pt and granddaughter who wanted to know what other options might be available for TCU placement as they will not return to [facility].      Assessment:  Pt is currently being assessed in the ED and if she is admitted JI can assist in finding another placement.  Pt is not safe to return home at this time.    Plan:    Anticipated Disposition:  Facility:  TCU    Barriers to d/c plan:  Cost of medications    Follow Up:  JI available to assist with discharge planning. Please note that JI Blake has worked extensively to find placement on this pt in the past week.  There may be some confusion about the medications so will be good to clarify moving forward.     Naa Enrique, JENNIFER  Weekend     0800 - 1600 Saturday and Sunday    4A, 4C, 4E, 5A, and 5B  - [phone]    6A, 6B, 6C, and 6D - [phone]    5C, 7A, 7B, 7C, and 7D - [phone]    1600 - 0000 Saturday and Sunday    [phone]

## 2019-05-28 NOTE — ED PROVIDER NOTES
History     Chief Complaint   Patient presents with     Fall     HPI  Jackeline Smiley is a 86 year old female with a history of acquired hemophilia and recent ICH who presents after a fall.  Jackeline is residing at a rehab facility.  She used her call light to go to the bathroom but states no one answered so she tried to get out of bed.  She fell to the floor landing on her bottom.  She believes she might have hit her head.  She has some pain to the back of her neck.  She has some residual weakness on the left from her recent ICH which is unchanged.  She had no loss of consciousness.  She is unsure how long she was on the floor this morning.  She does not feel safe to return.     PAST MEDICAL HISTORY:   Past Medical History:   Diagnosis Date     Back ache      Hemophilia (H)      Hemophilia A (H) 2019     Hyperlipidemia      Hypertension      Menopausal disorder      Osteoarthritis      Pneumonia 6-11    LEFT MID LUNG     Tremor      Vertigo        PAST SURGICAL HISTORY:   Past Surgical History:   Procedure Laterality Date     C NONSPECIFIC PROCEDURE      submandibular gland excision     CATARACT IOL, RT/LT      both, Dr Zhou     HYSTERECTOMY, PAP NO LONGER INDICATED         FAMILY HISTORY:   Family History   Problem Relation Age of Onset     Diabetes Mother      Cancer Father         leukemia     C.A.D. Brother         CHF     C.A.D. Brother      Diabetes Sister      Diabetes Sister      Diabetes Brother      Diabetes Brother      Lipids Daughter         grand daughter     Lipids Brother         all sibs had it       SOCIAL HISTORY:   Social History     Tobacco Use     Smoking status: Former Smoker     Packs/day: 1.00     Years: 46.00     Pack years: 46.00     Types: Cigarettes     Last attempt to quit: 2019     Years since quittin.0     Tobacco comment: 3/4 pack per day   Substance Use Topics     Alcohol use: No       Discharge Medication List as of 2019  4:24 PM      CONTINUE these  medications which have NOT CHANGED    Details   atenolol (TENORMIN) 50 MG tablet Take 1 tablet (50 mg) by mouth daily, Disp-90 tablet, R-0, Transitional      calcium carbonate-vitamin D (CALCIUM + D) 600-200 MG-UNIT TABS Take 1 tablet by mouth 2 times daily, Disp-180 tablet, R-3, Transitional      lisinopril (PRINIVIL/ZESTRIL) 10 MG tablet Take 1 tablet (10 mg) by mouth 2 times daily, Disp-90 tablet, Transitional      LORazepam (ATIVAN) 0.5 MG tablet Take 1 tablet (0.5 mg) by mouth nightly as needed for anxiety, Disp-10 tablet, R-0, E-Prescribe      meclizine (ANTIVERT) 25 MG tablet Take 1 tablet (25 mg) by mouth 4 times daily as needed for dizziness, Disp-30 tablet, R-0, Transitional      pantoprazole (PROTONIX) 40 MG EC tablet Take 1 tablet (40 mg) by mouth every morning (before breakfast), Transitional      predniSONE (DELTASONE) 50 MG tablet Take 1 tablet (50 mg) by mouth daily, TransitionalContinue until follow up with hematology clinic.      simvastatin (ZOCOR) 20 MG tablet Take 1 tablet (20 mg) by mouth At Bedtime, Disp-30 tablet, R-3, Transitional      vitamin D3 (CHOLECALCIFEROL) 1000 units (25 mcg) tablet Take 1 tablet (1,000 Units) by mouth daily, Disp-60 tablet, R-6, Transitional                Allergies   Allergen Reactions     Atorvastatin Calcium      hepatitis     Sulfa Drugs Fatigue         I have reviewed the Medications, Allergies, Past Medical and Surgical History, and Social History in the Epic system.    Review of Systems   All other systems reviewed and are negative.      Physical Exam   BP: 136/76  Pulse: 83  Heart Rate: 70  Temp: 94.5  F (34.7  C)  Resp: 16  SpO2: 92 %      Physical Exam   Constitutional: She is oriented to person, place, and time. No distress.   HENT:   Head: Normocephalic and atraumatic.   Mouth/Throat: Oropharynx is clear and moist.   Eyes: Pupils are equal, round, and reactive to light. Conjunctivae are normal.   Neck: Normal range of motion. Neck supple.    Cardiovascular: Normal rate and intact distal pulses.   Pulmonary/Chest: Effort normal. No respiratory distress. She has no wheezes. She has no rales.   Abdominal: Soft. There is no tenderness. There is no rebound and no guarding.   Musculoskeletal: Normal range of motion.   Neurological: She is alert and oriented to person, place, and time.   Skin: Skin is warm and dry. She is not diaphoretic.   Psychiatric: She has a normal mood and affect. Her behavior is normal. Thought content normal.   Nursing note and vitals reviewed.      ED Course        Procedures             Critical Care time:  none             Labs Ordered and Resulted from Time of ED Arrival Up to the Time of Departure from the ED   CBC WITH PLATELETS DIFFERENTIAL - Abnormal; Notable for the following components:       Result Value    WBC 12.2 (*)     RBC Count 5.35 (*)     Hemoglobin 16.8 (*)     Hematocrit 51.3 (*)     Absolute Neutrophil 11.4 (*)     Absolute Lymphocytes 0.4 (*)     All other components within normal limits   BASIC METABOLIC PANEL - Abnormal; Notable for the following components:    Sodium 131 (*)     Chloride 92 (*)     Glucose 158 (*)     All other components within normal limits   PARTIAL THROMBOPLASTIN TIME            Assessments & Plan (with Medical Decision Making)   87 yo F with 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  She fell from bed trying to go to the bathroom, striking the back of her head.  A head CT showed no change in her prior ICH, with no acute pathology.  Her PTT was normal.  I discussed the case with hematology who stated she did not requrire factor, or further monitoring based on her normal PTT level.  She should continue Retuximab and prednisone and will follow up with hematology as scheduled.  Jackeline elected to return home with family and will investigate other rehab facilities with her SW.    I have reviewed the nursing notes.    I have  reviewed the findings, diagnosis, plan and need for follow up with the patient.       Medication List      There are no discharge medications for this visit.         Final diagnoses:   Fall, initial encounter       5/26/2019   [facility], [place], EMERGENCY DEPARTMENT     Carlos Rodriguez MD  05/28/19 1159       Carlos Rordiguez MD  05/28/19 1156

## 2019-05-28 NOTE — PLAN OF CARE
Occupational Therapy Discharge Summary    Reason for therapy discharge:    Discharged to transitional care facility.    Progress towards therapy goal(s). See goals on Care Plan in [facility] electronic health record for goal details.  Goals partially met.  Barriers to achieving goals:   discharge from facility.    Therapy recommendation(s):    Continued therapy is recommended.  Rationale/Recommendations:  For greater ADL/IADL I.

## 2019-05-30 NOTE — NURSING NOTE
Vitals done, labs drawn by venipuncture.  See doc flow sheets for details.  Cielo Hart, SHELIA May 30, 2019

## 2019-05-30 NOTE — PROGRESS NOTES
Infusion Nursing Note:  Jackeline Smiley presents today for Rituxan.    Patient seen by provider today: No   present during visit today: Not Applicable.    Note: Pt arrives in wheelchair today with attendant from transitional care. Pt states she does not remember receiving rituxan in the hospital. She had a stroke about a week ago and had fallen. She is unsure how it all happened. She feels her mind is now blurry. She is oriented x 3 on arrival. Her heart rate was 47 in the lab and 48 when rechecked in infusion. Pt states she feels woozy but that feeling has been going on for weeks. She has back pain 7/10 today due to her chronic arthritis. Also reports urinary frequency with no pain or burning.     TORB: Ro Reardon/Leola Trujillo RN  -get EKG today  EKG completed and Ro Reardon aware of results. Per Ro ok to go ahead with Rituxan today. Pt to follow up with her PCP regarding bradycardia.    Rituxan ran at  100 ml/hr for 30 minutes  200ml/hr for 30 minutes  300ml/hr for 30 minutes  400ml/hr for remainder    Intravenous Access:  Peripheral IV placed.    Treatment Conditions:  Lab Results   Component Value Date    HGB 14.9 05/30/2019     Lab Results   Component Value Date    WBC 14.2 05/30/2019      Lab Results   Component Value Date    ANEU 13.1 05/30/2019     Lab Results   Component Value Date     05/30/2019      Lab Results   Component Value Date     05/30/2019                   Lab Results   Component Value Date    POTASSIUM 4.3 05/30/2019           Lab Results   Component Value Date    MAG 2.4 05/19/2019            Lab Results   Component Value Date    CR 0.72 05/30/2019                   Lab Results   Component Value Date    MICK 9.0 05/30/2019                Lab Results   Component Value Date    BILITOTAL 0.8 05/30/2019           Lab Results   Component Value Date    ALBUMIN 3.3 05/30/2019                    Lab Results   Component Value Date    ALT 41 05/30/2019            Lab Results   Component Value Date    AST 24 05/30/2019       Post Infusion Assessment:  Patient tolerated infusion without incident.  Blood return noted pre and post infusion.  Site patent and intact, free from redness, edema or discomfort.  No evidence of extravasations.  Access discontinued per protocol.       Discharge Plan:   Patient declined prescription refills.  Discharge instructions reviewed with: Patient.  Patient and/or family verbalized understanding of discharge instructions and all questions answered.  Copy of AVS reviewed with patient and/or family.  Patient will return 6/10/19 for next appointment.  Patient discharged in stable condition accompanied by: attendant.  Departure Mode: Wheelchair.    Leola Trujillo RN

## 2019-05-30 NOTE — PATIENT INSTRUCTIONS
Contact Numbers:     [facility] Main Line: [phone]  [facility] Triage: [phone]    Call triage to speak with triage if you are experiencing chills and/or temperature greater than or equal to 100.5, uncontrolled nausea/vomiting, diarrhea, constipation, dizziness, shortness of breath, chest pain, bleeding, unexplained bruising, or any new/concerning symptoms, questions/concerns.     If you are having any concerning symptoms or wish to speak to a provider before your next infusion visit, please call your care coordinator or triage to notify them so we can adequately serve you.     If you need a refill on a narcotic prescription, please call triage or your care coordinator before your infusion appointment.       May 2019      [facility]rish[facility] Monday Tuesday Wednesday Thursday Friday Saturday                  1     2     3     4       5     6     7     8     9     10     11       12     13     14     15     16    Admission   9:07 PM   Trierweiler, Chad A, MD    Emergency Department   (Discharge: 5/17/2019)    CT HEAD WO  10:35 PM   (15 min.)   [facility]    CTA  HEAD NECK W  10:40 PM   (30 min.)   [facility] CT 17    Admission   2:21 AM   Froylan Stewart MD   [address] [facility]   (Discharge: 5/25/2019)    CT HEAD WO   5:35 AM   (15 min.)   UUCT4   [facility], CT    IP EVALUATION   6:00 AM   (60 min.)   Muna Luong, SLP   [facility] [place], Speech Therapy    IP EVALUATION   6:00 AM   (60 min.)   Erica Ku, PT   [facility] [place], Physical Therapy 18    CT HEAD WO   5:20 AM   (15 min.)   UUCT1   [facility], CT    IP EVALUATION   6:00 AM   (60 min.)   Haley Barreto, ARIANNA   [facility] [place], Occupational Therapy    IP TREATMENT   6:00 AM   (30 min.)   Rachel Leal, SLP   [facility], Speech Therapy    IP TREATMENT   6:00 AM   (30 min.)   Melanie Cope, PT   [facility] [place], Physical Therapy   19    IP TREATMENT   6:00 AM   (30 min.)   Fuentes Wilson, PT   [facility],  [place], Physical Therapy    IP TREATMENT   6:00 AM   (30 min.)   Haley Barreto, OT   [facility], Occupational Therapy    CT HEAD WO   7:40 AM   (15 min.)   UUCT1   [facility], [place], CT 20    IP TREATMENT   6:00 AM   (30 min.)   Maddie Neff, SLP   [facility], Speech Therapy    IP TREATMENT   6:00 AM   (30 min.)   Benito Alarcon, OT   [facility], Occupational Therapy    CT HEAD WO   7:05 AM   (15 min.)   UUCT4   [facility], CT    IP TREATMENT  10:30 AM   (30 min.)   Blaine Mckeon, PT   [facility], [place], Physical Therapy 21    IP TREATMENT   6:00 AM   (30 min.)   Sanford Wing, OTR   [facility], Occupational Therapy    IP TREATMENT   6:00 AM   (30 min.)   Eda Rueda, SLP   [facility], [place], Speech Therapy    CT HEAD WO  10:30 AM   (15 min.)   UUCT1   [facility], [place], CT 22    IP TREATMENT   6:00 AM   (30 min.)   Kassie Gallagher, SLP   [facility], Speech Therapy    CT HEAD WO   6:45 AM   (15 min.)   UUCT4   [facility], [place], CT 23    CT HEAD WO   5:45 AM   (15 min.)   [address], CT    IP TREATMENT   6:00 AM   (30 min.)   Margot Marie, OT   [facility], Occupational Therapy    IP TREATMENT   6:00 AM   (30 min.)   Blaine Mckeon, PT   [facility], Physical Therapy    IP TREATMENT   6:00 AM   (30 min.)   Kassie Gallagher, SLP   [facility], [place], Speech Therapy 24    UU STROKE/TIA   9:00 AM   (60 min.)   Ladan Suarez, RN   [facility], [facility] 25       26    Admission   1:21 PM   Carlos Rodriguez MD   [facility], Emergency Department   (Discharge: 5/26/2019)    CT HEAD WO   2:50 PM   (15 min.)   [address] [place] [address], CT    CT CERVICAL SPINE WO   2:55 PM   (15 min.)   [facility] [place] 27     28     29     30    [facility] LAB DRAW  11:30 AM   (15 min.)   [facility] LAB DRAW   [facility] Lab Draw    [facility] ONC INFUSION 360  12:00 PM   (360 min.)    ONCOLOGY INFUSION   [facility] 31                       June 2019 Sunday Monday Tuesday Wednesday Thursday Friday Saturday                                 1       2     3     4     5     6     7     8       9     10    [facility] LAB DRAW  11:30 AM   (15 min.)    [facility] LAB DRAW   [facility] Lab Draw    [facility] ONC INFUSION 360  12:00 PM   (360 min.)    ONCOLOGY INFUSION   [facility] 11     12     13    RETURN BLEEDING DISORDER   9:30 AM   (30 min.)   Virgilio Duque MD   [facility] 14     15       16     17     18     19     20     21     22       23     24     25     26     27     28     29       30                                                   Lab Results:  Recent Results (from the past 12 hour(s))   CBC with platelets differential    Collection Time: 05/30/19 12:40 PM   Result Value Ref Range    WBC 14.2 (H) 4.0 - 11.0 10e9/L    RBC Count 4.64 3.8 - 5.2 10e12/L    Hemoglobin 14.9 11.7 - 15.7 g/dL    Hematocrit 43.4 35.0 - 47.0 %    MCV 94 78 - 100 fl    MCH 32.1 26.5 - 33.0 pg    MCHC 34.3 31.5 - 36.5 g/dL    RDW 13.1 10.0 - 15.0 %    Platelet Count 206 150 - 450 10e9/L    Diff Method Automated Method     % Neutrophils 92.7 %    % Lymphocytes 3.2 %    % Monocytes 3.2 %    % Eosinophils 0.4 %    % Basophils 0.1 %    % Immature Granulocytes 0.4 %    Nucleated RBCs 0 0 /100    Absolute Neutrophil 13.1 (H) 1.6 - 8.3 10e9/L    Absolute Lymphocytes 0.5 (L) 0.8 - 5.3 10e9/L    Absolute Monocytes 0.5 0.0 - 1.3 10e9/L    Absolute Eosinophils 0.1 0.0 - 0.7 10e9/L    Absolute Basophils 0.0 0.0 - 0.2 10e9/L    Abs Immature Granulocytes 0.1 0 - 0.4 10e9/L    Absolute Nucleated RBC 0.0    Comprehensive metabolic panel    Collection Time: 05/30/19 12:40 PM   Result Value Ref Range    Sodium 132 (L) 133 - 144 mmol/L    Potassium 4.3 3.4 - 5.3 mmol/L    Chloride 97 94 - 109 mmol/L    Carbon Dioxide 28 20 - 32 mmol/L    Anion Gap 7 3 - 14 mmol/L    Glucose 125 (H) 70 - 99 mg/dL    Urea Nitrogen 28 7 -  30 mg/dL    Creatinine 0.72 0.52 - 1.04 mg/dL    GFR Estimate 76 >60 mL/min/[1.73_m2]    GFR Estimate If Black 88 >60 mL/min/[1.73_m2]    Calcium 9.0 8.5 - 10.1 mg/dL    Bilirubin Total 0.8 0.2 - 1.3 mg/dL    Albumin 3.3 (L) 3.4 - 5.0 g/dL    Protein Total 6.3 (L) 6.8 - 8.8 g/dL    Alkaline Phosphatase 57 40 - 150 U/L    ALT 41 0 - 50 U/L    AST 24 0 - 45 U/L   EKG 12-lead complete w/read - Clinics    Collection Time: 05/30/19  1:56 PM   Result Value Ref Range    Interpretation ECG Click View Image link to view waveform and result

## 2019-06-10 PROBLEM — E87.1 HYPO-OSMOLAR HYPONATREMIA: Status: ACTIVE | Noted: 2019-01-01

## 2019-06-10 NOTE — LETTER
Transition Communication Hand-off for Care Transitions to Next Level of Care Provider    Name: Jackeline ALDRICH Baljit  : 1933  MRN #: [id number]  Primary Care Provider: [facility]     Primary Clinic: [address]  [facility] [address]     Reason for Hospitalization:  Hyponatremia [E87.1]  Admit Date/Time: 6/10/2019  2:20 PM  Discharge Date: 19  Payor Source: Payor: UCARE / Plan: [employer] FOR [employer] / Product Type: HMO /     Readmission Assessment Measure (SACHA) Risk Score/category: Elevated    Reason for Communication Hand-off Referral: Multiple providers/specialties    Discharge Plan:  TCU:  [facility]   [address] [place] [address]   [phone]  Fax [phone]         Concern for non-adherence with plan of care:   Y/N No  Discharge Needs Assessment:  Needs      Most Recent Value   Equipment Currently Used at Home  shower chair, wheelchair, manual [4WW]          Already enrolled in Tele-monitoring program and name of program:  Unknown  Follow-up specialty is recommended: Yes    Follow-up plan:    Future Appointments   Date Time Provider Department Center   2019  1:20 PM Tg Ny MD [facility]       Any outstanding tests or procedures:        Referrals     Future Labs/Procedures    Nutrition Services Adult IP Consult     Comments:    Reason:  Fluid restriction, ensure compliance    Occupational Therapy Adult Consult     Comments:    Evaluate and treat as clinically indicated.    Reason:  Recent CVA    Physical Therapy Adult Consult     Comments:    Evaluate and treat as clinically indicated.    Reason:  Recent CVA    Speech Language Path Adult Consult     Comments:    Evaluate and treat as clinically indicated.    Reason:  Chronic dysphagia s/p stroke            Tammy Galvez MSW, LGSW  5th Floor   Pager [phone]  Phone [phone]      AVS/Discharge Summary is the source of truth;  this is a helpful guide for improved communication of patient story

## 2019-06-10 NOTE — ED TRIAGE NOTES
Patient presents via EMS from [facility] for c/o AMS and abnormal lab. Per EMS, patient sent to ED for confusion and hyponatremia (Na 115). BG per . Hx of CVA with left-sided weakness reported to EMS. Patient alert and oriented to self, place, and month/year.

## 2019-06-10 NOTE — ED NOTES
Bed: ED22  Expected date: 6/10/19  Expected time: 2:17 PM  Means of arrival: Ambulance  Comments:  [facility]---hypertensive, confusion, female, 86 yrs old. Triaged as yellow.

## 2019-06-10 NOTE — H&P
[facility], [place]    History and Physical - Hospitalist Service, Gold Night       Date of Admission:  6/10/2019    Assessment & Plan Jackeline Smiley is a 86 year old female with history of HTN, HLD, anxiety, acquired hemophilia A, and recent admission 5/17/19 to 5/25/19 for acute ICH who presents with altered mental status and was subsequently found to be hyponatremic (Na 121).     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 Responded to 1500 mL fluid restriction, which was continued on discharge. Presents now with symptomatic hyponatremia and Na 121. Unclear if fluid restriction continued at NH. Urine studies pending, but suspect SIADH in setting of hemorrhagic CVA. Cannot r/o component of volume depletion due to poor PO intake. TSH 2.21 on 5/16.     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 Admitted 5/17 with R thalamic hemorrhagic CVA after presenting with L sided weakness. Residual left hemiparesis persists. No new focal neurologic deficits. Repeat Head CT today shows evolving, slightly reduced size R basal ganglia hemorrhage. No other signs of bleeding.    Hematology consulted    Continue prednisone 50mg daily    HTN:  Stable. Continue lisinopril 10mg BID and Atenolol 50mg daily    HLD:  Continue PTA statin.         Diet: Dysphagia Diet Level 2 Mech Altered Honey Thickened Liquids (pre-thickened or use instant food thickener)  Fluid restriction 1000 ML FLUID    DVT Prophylaxis: Pneumatic Compression Devices  Granados Catheter: not present  Code Status: DNR/DNI    Disposition Plan   Expected discharge: 2 - 3 days, recommended to prior living  arrangement once work up and treatment completed.  Entered: Bradley Brandon PA-C 06/10/2019, 6:55 PM     The patient's care was discussed with the Attending Physician, Dr. Buck.    Bradley Brandon PA-C  [facility], [place]  Pager: [phone]  Please see sticky note for cross cover information  ______________________________________________________________________    Chief Complaint   Confusion    History is obtained from the patient    History of Present Illness Jackeline Smiley is a 86 year old female with history of HTN, HLD, hemophilia A, and residual L hemiparesis following R hemorrhagic CVA (admitted 5/17/19) who presents today from hematology clinic with confusion and hyponatremia. Nursing home staff report several days of confusion. The patient was seen in the hematology clinic today for her Rituximab infusion where she was noted to have a sodium of 121. She was subsequently sent to the ED for further evaluation. She has a repeat head CT which showed stable, if not improving R intracranial hemorrhage. Repeat sodium improved to 125. The patient seems to be more oriented following. The patient noted herself to be disoriented and reported blurred vision for the past few days. She denies any weakness, fatigue, nausea, vomiting, abdominal pain or diarrhea. She states that she feels dehydrated. She was previously on a fluid restriction, but cannot recall if this was still in place. She otherwise denies any complaints. No headache or new focal neurologic deficits. She reports stable L sided weakness following ICH.     Review of Systems    The 10 point Review of Systems is negative other than noted in the HPI or here.     Past Medical History    I have reviewed this patient's medical history and updated it with pertinent information if needed.   Past Medical History:   Diagnosis Date     Back ache      Hemophilia (H)      Hemophilia A (H) 5/17/2019     Hyperlipidemia      Hypertension       Menopausal disorder      Osteoarthritis      Pneumonia 6-11    LEFT MID LUNG     Tremor      Vertigo        Past Surgical History   I have reviewed this patient's surgical history and updated it with pertinent information if needed.  Past Surgical History:   Procedure Laterality Date     C NONSPECIFIC PROCEDURE      submandibular gland excision     CATARACT IOL, RT/LT      both, Dr Zhou     HYSTERECTOMY, PAP NO LONGER INDICATED         Social History   I have reviewed this patient's social history and updated it with pertinent information if needed.  Social History     Tobacco Use     Smoking status: Former Smoker     Packs/day: 1.00     Years: 46.00     Pack years: 46.00     Types: Cigarettes     Last attempt to quit: 2019     Years since quittin.0     Tobacco comment: 3/4 pack per day   Substance Use Topics     Alcohol use: No     Drug use: No       Family History   I have reviewed this patient's family history and updated it with pertinent information if needed.   Family History   Problem Relation Age of Onset     Diabetes Mother      Cancer Father         leukemia     C.A.D. Brother         CHF     C.A.D. Brother      Diabetes Sister      Diabetes Sister      Diabetes Brother      Diabetes Brother      Lipids Daughter         grand daughter     Lipids Brother         all sibs had it       Prior to Admission Medications   Prior to Admission Medications   Prescriptions Last Dose Informant Patient Reported? Taking?   LORazepam (ATIVAN) 0.5 MG tablet   No No   Sig: Take 1 tablet (0.5 mg) by mouth nightly as needed for anxiety   acetaminophen (TYLENOL) 325 MG tablet   Yes No   Sig: Take 325 mg by mouth 3 times daily   atenolol (TENORMIN) 50 MG tablet   No No   Sig: Take 1 tablet (50 mg) by mouth daily   calcium carbonate-vitamin D (CALCIUM + D) 600-200 MG-UNIT TABS   No No   Sig: Take 1 tablet by mouth 2 times daily   lisinopril (PRINIVIL/ZESTRIL) 10 MG tablet   No No   Sig: Take 1 tablet (10 mg) by  mouth 2 times daily   meclizine (ANTIVERT) 25 MG tablet   No No   Sig: Take 1 tablet (25 mg) by mouth 4 times daily as needed for dizziness   pantoprazole (PROTONIX) 40 MG EC tablet   No No   Sig: Take 1 tablet (40 mg) by mouth every morning (before breakfast)   predniSONE (DELTASONE) 50 MG tablet   No No   Sig: Take 1 tablet (50 mg) by mouth daily   simvastatin (ZOCOR) 20 MG tablet   No No   Sig: Take 1 tablet (20 mg) by mouth At Bedtime   vitamin D3 (CHOLECALCIFEROL) 1000 units (25 mcg) tablet   No No   Sig: Take 1 tablet (1,000 Units) by mouth daily      Facility-Administered Medications: None     Allergies   Allergies   Allergen Reactions     Atorvastatin Calcium      hepatitis     Calcium Channel Blockers      Sulfa Drugs Fatigue       Physical Exam   Vital Signs: Temp: 98.2  F (36.8  C) Temp src: Oral BP: 155/73 Pulse: 61 Heart Rate: 64 Resp: 18 SpO2: 94 % O2 Device: None (Room air)    Weight: 0 lbs 0 oz    GENERAL:  Awake. Alert. Oriented x 2. Leaning to the left. NAD.   HEENT:  No scleral icterus. Mucous membranes moist.   CV:  RRR. S1, S2. No murmurs appreciated.   RESPIRATORY:  Lungs CTAB with no wheezing, rales, rhonchi.   GI:  Abdomen soft, non-distended. Active bowel sounds. No tenderness.    NEUROLOGICAL:  Left sided weakness noted, however patient had difficulty participating in exam. CN II-XII intact grossly. Moves all extremities.    EXTREMITIES:  No peripheral edema. No calf tenderness. Intact bilateral pedal pulses.   SKIN:  No jaundice, rashes, wounds or lesions.      Data   Data reviewed today: I reviewed all medications, new labs and imaging results over the last 24 hours. I personally reviewed the head CT image(s) showing Stable if not improved hemorrhage in R basal ganglia.    ROUTINE IP LABS (Last four results)  Recent Labs   Lab 06/10/19  1440 06/10/19  1300 06/10/19  1154   * 121* 121*   POTASSIUM 4.2 4.4 4.4   CHLORIDE 90* 90* 91*   CO2 28 26 22   ANIONGAP 7 5 8   * 143* 177*    BUN 14 14 15   CR 0.52 0.48* 0.42*   MICK 8.5 8.7 8.3*   PROTTOTAL  --  6.9 6.2*   ALBUMIN  --  3.3* 3.0*   BILITOTAL  --  0.5 0.5   ALKPHOS  --  86 81   AST  --  18 17   ALT  --  25 22     Recent Labs   Lab 06/10/19  1154   WBC 11.6*   RBC 4.32   HGB 13.8   HCT 40.1   MCV 93   MCH 31.9   MCHC 34.4   RDW 13.1        No lab results found in last 7 days.     Glucose Values Latest Ref Rng & Units 5/25/2019 5/25/2019 5/26/2019 5/30/2019 6/10/2019 6/10/2019 6/10/2019   Bedside Glucose (mg/dl )  - -- -- -- -- -- -- --   GLUCOSE 70 - 99 mg/dL 80 238(H) 158(H) 125(H) 177(H) 143(H) 152(H)   Some recent data might be hidden        All labs personally reviewed in [facility].  See A&P for additional results.     Unresulted Labs Ordered in the Past 30 Days of this Admission     No orders found from 4/11/2019 to 6/11/2019.

## 2019-06-10 NOTE — PROGRESS NOTES
Infusion Nursing Note:  Jackeline Smiley presents today for Cycle 2 Rituximab (NOT GIVEN, Patient sent to ED).    Patient seen by provider today: No   present during visit today: Not Applicable.    Note: Patient arrives today from TCU.  Patient arrives with an aide from TCU.  Patient up with assist of 2 to infusion chair. Patient disoriented to place, but answers correctly to person and time.  The aide who came with patient states that the patient has been more confused the past couple of days.  Patient states she feels she has been disoriented at times.   While in infusion, patient putting on her call light and repeatedly asking the same questions. Patient asking why she is here and asking to go home.  Patient stating she has been here since 4am waiting.  Patient states she just wants to go home and lay down, why does she have to wait so long.  Writer reminded patient several times why she was in infusion.  Told her we were waiting on her lab results and a call back from the doctor.  Reassured patient she would be told when there was a plan in place.    Patient also  hypertensive in the lab.  Recheck in infusion 162/81.  Dr. Duque notified of patient's VS, labs and symptoms today.    Intravenous Access:  Peripheral IV placed in lab.    When patient arrived in infusion, IV had infiltrated.  Vascular access attempted to place a second IV without success.    Treatment Conditions:  Lab Results   Component Value Date    HGB 13.8 06/10/2019     Lab Results   Component Value Date    WBC 11.6 06/10/2019      Lab Results   Component Value Date    ANEU 11.1 06/10/2019     Lab Results   Component Value Date     06/10/2019      Lab Results   Component Value Date     06/10/2019                   Lab Results   Component Value Date    POTASSIUM 4.4 06/10/2019           Lab Results   Component Value Date    MAG 2.4 05/19/2019            Lab Results   Component Value Date    CR 0.48 06/10/2019                    Lab Results   Component Value Date    MICK 8.7 06/10/2019                Lab Results   Component Value Date    BILITOTAL 0.5 06/10/2019           Lab Results   Component Value Date    ALBUMIN 3.3 06/10/2019                    Lab Results   Component Value Date    ALT 25 06/10/2019           Lab Results   Component Value Date    AST 18 06/10/2019       Dr. Duque and Ro Reardon PA-C notified of patient's sodium level and confusion today.      6/10/19 1300 TORB:  Ro Reardon PA-C/Shahbaz Mason RN  - Please redraw CMP to verify sodium level.    Writer asked to have patient evaluated by a provider.  Per Ro, her schedule is full today.    Vascular access redrew CMP.    Re-check also showed a level of 121.  Dr. Duque notified of patient's sodium recheck.    6/10/19 1350 TORB:  Dr. Virgilio Duque MD/Shahbaz Mason RN  - Please send patient to the ED for repeat head CT to r/o head bleed and for low sodium.    911 called for EMS transport to the ED for further evaluation.  Report called and given to ED nurse.    Discharge Plan:   Patient sent to the [place] ED via [facility].  Departure Mode: Cart.  Face to Face time: 15 minutes.    SHAHBAZ MASON, YVETTE

## 2019-06-10 NOTE — NURSING NOTE
Chief Complaint   Patient presents with     Labs Only     venipuncture, vitals checked       PIV placed for labs and infusions  Kandace Olson RN on 6/10/2019 at 11:54 AM

## 2019-06-10 NOTE — ED NOTES
[facility], [place]   ED Nurse to Floor Handoff     Jackeline Smiley is a 86 year old female who speaks English and lives with others,  in a nursing home  They arrived in the ED by ambulance from clinic    ED Chief Complaint: Altered Mental Status and Abnormal Labs    ED Dx;   Final diagnoses:   Hyponatremia         Needed?: No    Allergies:   Allergies   Allergen Reactions     Atorvastatin Calcium      hepatitis     Calcium Channel Blockers      Sulfa Drugs Fatigue   .  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     Hyperlipidemia      Hypertension      Menopausal disorder      Osteoarthritis      Pneumonia 6-11    LEFT MID LUNG     Tremor      Vertigo       Baseline Mental status: mild dementia, recent stroke weakness some left sided weakness  Current Mental Status changes: increase confussion    Infection present or suspected this encounter: no  Sepsis suspected: No  Isolation type: No active isolations     Activity level - Baseline/Home:  Stand with Assist with wheelchair  Activity Level - Current:   Stand with Assist with wheelchair    Bariatric equipment needed?: No    In the ED these meds were given: Medications - No data to display    Drips running?  No    Home pump  No    Current LDAs  Peripheral IV 06/10/19 Left Lower forearm (Active)   Site Assessment WDL 6/10/2019  2:45 PM   Line Status Saline locked 6/10/2019  2:45 PM   Number of days: 0       Pressure Injury 05/17/19 Posterior Sacrum Stage 1 (Active)   Number of days: 24       Labs results:   Labs Ordered and Resulted from Time of ED Arrival Up to the Time of Departure from the ED   BASIC METABOLIC PANEL - Abnormal; Notable for the following components:       Result Value    Sodium 125 (*)     Chloride 90 (*)     Glucose 152 (*)     All other components within normal limits   OSMOLALITY - Abnormal; Notable for the following  "components:    Osmolality 262 (*)     All other components within normal limits   ROUTINE UA WITH MICROSCOPIC REFLEX TO CULTURE   SODIUM RANDOM URINE   OSMOLALITY URINE       Imaging Studies:   Recent Results (from the past 24 hour(s))   Head CT w/o contrast    Narrative    CT HEAD W/O CONTRAST 6/10/2019 3:54 PM    History: Encephalopathy  ICD-10:  Comparison: 5/26/2019.    Technique: Using multidetector thin collimation helical acquisition  technique, axial, coronal and sagittal CT images from the skull base  to the vertex were obtained without intravenous contrast.     Findings:    On the noncontrast images of the brain, there is decreased size of  what was an acute parenchymal hematoma within the right putamen,  previously about 1.8 x 1.5 cm now 1.4 cm in each dimension. The  ventricles are not enlarged. No new intracranial hemorrhage  identified. Hypoattenuation scattered throughout the basal ganglia and  thalami likely related to severe underlying vasculopathy, also  involving the periventricular white matter to a degree slightly  disproportionate to patient's age. The basal cisterns are patent.  The visualized paranasal sinuses are clear. 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       Impression    Impression:   1. Evolving and slightly reduced size of right basal ganglia  parenchymal hemorrhage, now subacute age.  2. Scattered severe underlying vasculopathy related findings of the  periventricular white matter and basal ganglia and thalami and  brainstem, grossly unchanged. Given these underlying diffuse white  matter and basal ganglia abnormalities, MRI is more sensitive to  evaluate for acute pathology.    YUKO ERVIN MD       Recent vital signs:   /85   Pulse 65   Temp 98.2  F (36.8  C) (Oral)   Resp 12   Ht 1.626 m (5' 4\")   SpO2 94%   BMI 18.37 kg/m      Kina Coma Scale Score: 14 (06/10/19 1522)       Cardiac Rhythm: Normal Sinus  Pt needs tele? See epic " orders  Skin/wound Issues: None    Code Status: DNR / DNI    Pain control: good    Nausea control: good    Abnormal labs/tests/findings requiring intervention: see epic, Na 125, low osmolality    Family present during ED course? No   Family Comments/Social Situation comments: fluid restriction 1000, dysphagia 2 diet, pureed solids texture/ regular consistency, crush medications and mix with food, PT/ OT consult    Tasks needing completion: clean urine sample    Muna Brewer RN  [facility] -- [facility] ED  [phone] [place] [facility]  [phone] [facility]

## 2019-06-10 NOTE — ED PROVIDER NOTES
[place] EMERGENCY DEPARTMENT ([facility])  Brittanie 10, 2019    History     Chief Complaint   Patient presents with     Altered Mental Status     Abnormal Labs     HPI  Jackeline Smiley is a 86 year old female with history ntoable for hemophilia A, recent intracranial hemorrhage, HTN, HLD, and vertigo who presents to the ED from the infusion center for altered mental status and abnormal labs.  Patient states she currently feels lightheaded and dizzy, and confused.  She also complains of her baseline arthritic back pain.  She denies any recent falls since her last ED visit and denies vomiting, new weakness, visual changes, or changes in bladder habits.    Per chart review, patient was seen at infusion clinic for her hemophilia today.  She did have laboratories also were notable for sodium of 121.  They sent the patient to the ED for repeat head CT to rule out head bleed and for low sodium.    PAST MEDICAL HISTORY  Past Medical History:   Diagnosis Date     Back ache      Hemophilia (H)      Hemophilia A (H) 5/17/2019     Hyperlipidemia      Hypertension      Menopausal disorder      Osteoarthritis      Pneumonia 6-11    LEFT MID LUNG     Tremor      Vertigo      PAST SURGICAL HISTORY  Past Surgical History:   Procedure Laterality Date     C NONSPECIFIC PROCEDURE      submandibular gland excision     CATARACT IOL, RT/LT      both, Dr Zhou     HYSTERECTOMY, PAP NO LONGER INDICATED       FAMILY HISTORY  Family History   Problem Relation Age of Onset     Diabetes Mother      Cancer Father         leukemia     C.A.D. Brother         CHF     C.A.D. Brother      Diabetes Sister      Diabetes Sister      Diabetes Brother      Diabetes Brother      Lipids Daughter         grand daughter     Lipids Brother         all sibs had it     SOCIAL HISTORY  Social History     Tobacco Use     Smoking status: Former Smoker     Packs/day: 1.00     Years: 46.00     Pack years: 46.00     Types: Cigarettes     Last attempt to  "quit: 2019     Years since quittin.0     Tobacco comment: 3/4 pack per day   Substance Use Topics     Alcohol use: No     MEDICATIONS  No current facility-administered medications for this encounter.      Current Outpatient Medications   Medication     acetaminophen (TYLENOL) 325 MG tablet     atenolol (TENORMIN) 50 MG tablet     calcium carbonate-vitamin D (CALCIUM + D) 600-200 MG-UNIT TABS     lisinopril (PRINIVIL/ZESTRIL) 10 MG tablet     LORazepam (ATIVAN) 0.5 MG tablet     meclizine (ANTIVERT) 25 MG tablet     pantoprazole (PROTONIX) 40 MG EC tablet     predniSONE (DELTASONE) 50 MG tablet     simvastatin (ZOCOR) 20 MG tablet     vitamin D3 (CHOLECALCIFEROL) 1000 units (25 mcg) tablet     ALLERGIES  Allergies   Allergen Reactions     Atorvastatin Calcium      hepatitis     Calcium Channel Blockers      Sulfa Drugs Fatigue       I have reviewed the Medications, Allergies, Past Medical and Surgical History, and Social History in the Epic system.    Review of Systems   Eyes: Negative for visual disturbance.   Gastrointestinal: Negative for vomiting.   Genitourinary: Negative for difficulty urinating.   Neurological: Positive for dizziness and light-headedness. Negative for weakness.   Psychiatric/Behavioral: Positive for confusion.   All other systems reviewed and are negative.      Physical Exam   BP: 174/87  Pulse: 60  Temp: 98.2  F (36.8  C)  Resp: 16  Height: 162.6 cm (5' 4\")  SpO2: 94 %      Physical Exam   Constitutional: She is oriented to person, place, and time. No distress.   HENT:   Head: Normocephalic and atraumatic.   Eyes: Pupils are equal, round, and reactive to light. Conjunctivae are normal.   Neck: Normal range of motion. Neck supple.   Cardiovascular: Normal rate and intact distal pulses.   Pulmonary/Chest: Effort normal. No respiratory distress. She has no wheezes. She has no rales.   Abdominal: Soft. She exhibits no distension. There is no tenderness. There is no rebound and no " guarding.   Musculoskeletal: Normal range of motion.   Neurological: She is alert and oriented to person, place, and time. She displays normal reflexes. She exhibits normal muscle tone.   Skin: Skin is warm and dry. She is not diaphoretic.   Psychiatric: She has a normal mood and affect. Her behavior is normal. Thought content normal.   Nursing note and vitals reviewed.      ED Course        Procedures   3:21 PM  The patient was seen and examined by Dr. Rodriguez in Room 22.                Critical Care time:  none             Labs Ordered and Resulted from Time of ED Arrival Up to the Time of Departure from the ED   BASIC METABOLIC PANEL - Abnormal; Notable for the following components:       Result Value    Sodium 125 (*)     Chloride 90 (*)     Glucose 152 (*)     All other components within normal limits   OSMOLALITY            Assessments & Plan (with Medical Decision Making)   This is an 87 yo F with a history of acquired heomophilia with a recent right thalamic CVA on May 16th, 2019, who presents with confusion and was found to be hyponatremic, 126.  A repeat head CT showed no acute pathology.  There was no evidence of infection.  I suspect Jackeline is having confusion related to her hyponatremia.  Further laboratory studies are pending, but the patient appears euvolemic, and I suspect this is related to SIADH.  Will admit to the medicine service for further evaluation.         I have reviewed the nursing notes.    I have reviewed the findings, diagnosis, plan and need for follow up with the patient.       Medication List      There are no discharge medications for this visit.         Final diagnoses:   None     Koffi RODRIGUEZ, am serving as a trained medical scribe to document services personally performed by Carlos Rodriguez MD, based on the provider's statements to me.      ICarlos MD, was physically present and have reviewed and verified the accuracy of this note documented by Koffi Abdul.      6/10/2019   [facility], [place], EMERGENCY DEPARTMENT     Carlos Rodriguez MD  06/13/19 [id number]

## 2019-06-10 NOTE — LETTER
Health Information Management Services               Recipient:  [facility]        Sender:  LUISA Correa, [facility]  5th Floor   Phone [phone]          Date: June 17, 2019  Patient Name:  Jackeline Smiley  Routing Message:    Discharge orders        The documents accompanying this notice contain confidential information belonging to the sender.  This information is intended only for the use of the individual or entity named above.  The authorized recipient of this information is prohibited from disclosing this information to any other party and is required to destroy the information after its stated need has been fulfilled, unless otherwise required by state law.      If you are not the intended recipient, you are hereby notified that any disclosure, copy, distribution or action taken in reliance on the contents of these documents is strictly prohibited.  If you have received this document in error, please return it by fax to [phone] with a note on the cover sheet explaining why you are returning it (e.g. not your patient, not your provider, etc.).  If you need further assistance, please call [facility] Centralized Transcription at [phone].  Documents may also be returned by mail to [employer], , [address], [place], [place] [address].

## 2019-06-10 NOTE — PROGRESS NOTES
Rapid Response Epic Documentation     Situation:RRT not called. Pt being sent to the ER for follow up of recent head bleed due to AMS and low sodium levels. Due to AMS Giancarlo recent head bleed, 911 was called over [facility]. Pt's BP was trending downward vs upwards.    Plan:[place] by [facility]. Staff to call ER for report.    Location:[facility]    Disposition:      Transfer to Emergency Room Via: 911    Protocol Used:     AMS

## 2019-06-11 NOTE — PROGRESS NOTES
06/11/19 1200   General Information   Onset Date 06/10/19   Start of Care Date 06/11/19   Referring Physician Rosalie Davila PA-C   Patient Profile Review/OT: Additional Occupational Profile Info See Profile for full history and prior level of function   Patient/Family Goals Statement the patient wants to go back to her apartment   Swallowing Evaluation Bedside swallow evaluation   Behaviorial Observations WFL (within functional limits);Alert;Confused   Mode of current nutrition Oral diet   Type of oral diet Dysphagia diet level 2;Nectar - thick liquid  (MD put patient on modified diet pending SLP eval)   Respiratory Status Room air   Comments Jackeline Smiley is a 86 year old female with history of acquired hemophilia A, HTN, HLD, anxiety and recent admission 5/17/19 to 5/25/19 for acute ICH who presents with altered mental status and was subsequently found to be hyponatremic to 121 . SLP consulted for bedside swallow eval. Per chart review was last discharged from the hospital on dysphagia diet 2/thin liquids. She reports that she has been eating a regular diet/thin liquids at home without difficulty.   Clinical Swallow Evaluation   Oral Musculature anomalies present  (left facial droop s/p previous CVA)   Structural Abnormalities none present   Dentition   (own dentition, missing some teeth)   Secretion Management   (WFL)   Mucosal Quality adequate   Mandibular Strength and Mobility intact   Oral Labial Strength and Mobility impaired retraction;impaired pursing;impaired seal;impaired coordination  (Left sided weakness)   Lingual Strength and Mobility impaired protrusion;impaired anterior elevation;impaired left lateral movement;impaired right lateral movement;impaired coordination  (Left sided weakness)   Velar Elevation intact   Buccal Strength and Mobility impaired   Laryngeal Function Cough;Throat clear;Swallow;Voicing initiated;Dry swallow palpated   Oral Musculature Comments Left sided weakness    Clinical Swallow Eval: Thin Liquid Texture Trial   Mode of Presentation, Thin Liquids cup;straw;self-fed   Volume of Liquid or Food Presented 6 oz thin H20   Oral Phase of Swallow WFL   Pharyngeal Phase of Swallow intact  (x1 wet voice quality with throat clear)   Diagnostic Statement WFL with thin liquids   Oral Residue   (none)   Clinical Swallow Eval: Puree Solid Texture Trial   Mode of Presentation, Puree self-fed   Volume of Puree Presented 2 cammie crackers   Oral Phase, Puree Poor AP movement;Residue in oral cavity  (extended mastication)   Pharyngeal Phase, Puree intact   Diagnostic Statement slow oral phase, left oral residue cleared given extra time. No direct signs/symptoms of aspiration.   Oral Residue, Puree left anterior lateral sulci  (left residue independently cleared given extra time)   Esophageal Phase of Swallow   Patient reports or presents with symptoms of esophageal dysphagia No   General Therapy Interventions   Planned Therapy Interventions Dysphagia Treatment   Dysphagia treatment Modified diet education;Instruction of safe swallow strategies   Swallow Eval: Clinical Impressions   Skilled Criteria for Therapy Intervention Skilled criteria met.  Treatment indicated.   Functional Assessment Scale (FAS) 5   Treatment Diagnosis mild oropharyngeal dysphagia   Diet texture recommendations Dysphagia diet level 3;Thin liquids   Recommended Feeding/Eating Techniques alternate between small bites and sips of food/liquid;maintain upright posture during/after eating for 30 mins;small sips/bites   Demonstrates Need for Referral to Another Service occupational therapy;physical therapy   Therapy Frequency 5 times/wk   Predicted Duration of Therapy Intervention (days/wks) 1 week   Anticipated Discharge Disposition   (defer to OT and PT)   Risks and Benefits of Treatment have been explained. Yes   Patient, family and/or staff in agreement with Plan of Care Yes   Clinical Impression Comments Clinical  dysphagia examination completed per MD order. The patient has baseline left facial weakness per previous CVA. Today she fed herself somewhat impulsively with moderate oral residue on the left side which she did eventually clear, though it took additional time and effort. X1 throat clear with thin liquid, but no overt overt signs/symptoms of aspiration present. Recommend dysphagia diet 3 and thin liquids. The patient should sit upright for PO and alternate bites/sips to assistance in oral clearance.    Total Evaluation Time   Total Evaluation Time (Minutes) 17

## 2019-06-11 NOTE — PHARMACY-ADMISSION MEDICATION HISTORY
Admission medication history interview status for the 6/10/2019 admission is complete. See Epic admission navigator for allergy information, pharmacy, prior to admission medications and immunization status.     Medication history interview sources:  discharge med list from [facility], facility med list from [facility]    Changes made to PTA medication list (reason)  Added: none  Deleted: none  Changed: updated last dose admin times    Additional medication history information (including reliability of information, actions taken by pharmacist):   -pt was to continue prednisone 50 mg daily until f/u in Hemophilia clinic, for which she currently has an appointment scheduled for 6/13 w/ Dr Virgilio Duque      Prior to Admission medications    Medication Sig Last Dose Taking? Auth Provider   acetaminophen (TYLENOL) 325 MG tablet Take 325 mg by mouth 3 times daily 6/10/2019 at AM Yes Reported, Patient   atenolol (TENORMIN) 50 MG tablet Take 1 tablet (50 mg) by mouth daily 6/10/2019 at AM Yes Pamela Chen MD   calcium carbonate-vitamin D (CALCIUM + D) 600-200 MG-UNIT TABS Take 1 tablet by mouth 2 times daily 6/10/2019 at AM Yes Pamela Chen MD   lisinopril (PRINIVIL/ZESTRIL) 10 MG tablet Take 1 tablet (10 mg) by mouth 2 times daily 6/10/2019 at AM Yes Pamela Chen MD   LORazepam (ATIVAN) 0.5 MG tablet Take 1 tablet (0.5 mg) by mouth nightly as needed for anxiety Past Week at Unknown time Yes Leatha Mulligan MD   meclizine (ANTIVERT) 25 MG tablet Take 1 tablet (25 mg) by mouth 4 times daily as needed for dizziness Past Week at Unknown time Yes Pamela Chen MD   pantoprazole (PROTONIX) 40 MG EC tablet Take 1 tablet (40 mg) by mouth every morning (before breakfast) 6/10/2019 at AM Yes Pamela Chen MD   predniSONE (DELTASONE) 50 MG tablet Take 1 tablet (50 mg) by mouth daily 6/10/2019 at AM Yes Pamela Chen MD   vitamin D3 (CHOLECALCIFEROL) 1000 units (25 mcg) tablet Take 1 tablet (1,000 Units)  by mouth daily 6/10/2019 at AM Yes Pamela Chen MD   simvastatin (ZOCOR) 20 MG tablet Take 1 tablet (20 mg) by mouth At Bedtime 6/9/2019 at PM  Pamela Chen MD         Medication history completed by:   Geronimo Edge, AlbertoD, BCPS

## 2019-06-11 NOTE — PROGRESS NOTES
[facility], [facility] Progress Note - Hospitalist Service, Gold 4       Date of Admission:  6/10/2019  Assessment & Plan    Jackeline Smiley is a 86 year old female with history of acquired hemophilia A, HTN, HLD, anxiety and recent admission 5/17/19 to 5/25/19 for acute ICH who presents with altered mental status and was subsequently found to be hyponatremic to 121      Symptomatic euvolemic hypo-osmolar hyponatremia: Mild hyponatremia noted during 5/2019 admission (Na 130s) c/w SIADH. Responded to 1500 mL fluid restriction, which was continued on discharge. TSH normal 5/2019. Admitted 6/10 with symptomatic hyponatremia to 121. Unclear if fluid restriction continued at NH. Etiology likely SIADH 2/2 recent hemorrhagic CVA vs hypovolemia due to poor oral intake. Na 126 (127). A&O x2 6/11  - Urine Na and osm, serum osm ordered  - Continue fluid restriction 1000 ml    - Trend sodium q4h   - LVM with patient's granddaughter for update on current status and BL capacity   ** Addendum: Na 124 but per d/w Dr. Isabel there were several drinks in her room. No urine studies obtained today. 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 Residual left hemiparesis. No new focal neurologic deficits. Head CT 6/10 notes evolving, slightly reduced size R basal ganglia hemorrhage. No e/o acute bleeding  - Hematology consult  - Continue PTA prednisone 50 mg daily    Chronic dysphagia: PTA on DD2 diet with unclear liquid thickening. SLP consulted and suggest DD3 with thin liquids at this time        Stable Medical Issues:  HTN: Stable.Continue lisinopril and Atenolol with hold parameters  HLD:  Continue statin  GERD: Continue PPI      Diet: Fluid restriction 1000 ML FLUID  Dysphagia Diet Level 3 Advanced Thin  Liquids (water, ice chips, juice, milk gelatin, ice cream, etc)    DVT Prophylaxis: Pneumatic Compression Devices  Granados Catheter: not present  Code Status: DNR/DNI      Disposition Plan   Expected discharge: 2-5 days, recommended to transitional care unit once medically stable, Na improving, workup complete.  Entered: Rosalie Davila PA-C 06/11/2019, 11:44 AM       The patient's care was discussed with the patient, nursing, SW, CC, SLP, and attending physician, Dr. Maame Davila PA-C  Hospitalist Service, [address]  Pager: [phone]  Please see sticky note for cross cover information  ______________________________________________________________________    Interval History   Reports she is going to make herself some peanut butter toast and instant coffee this morning. When asked where she is, reports she is at a place like a hospital but not the hospital. Does not want to have help to get up and walk. Denies confusion. No dyspnea or chest pain.     Data reviewed today: I reviewed all medications, new labs and imaging results over the last 24 hours    Physical Exam   Vital Signs: Temp: 98  F (36.7  C) Temp src: Oral BP: 165/65 Pulse: 64 Heart Rate: 63 Resp: 18 SpO2: 94 % O2 Device: None (Room air)    Weight: 97 lbs 9.6 oz  General Appearance: Alert, oriented to person and time. Disoriented to location  Respiratory: CTAB without wheezing or rales  Cardiovascular: RRR, S1, S2. No murmur noted  GI: Abdomen soft, non-tender with active bowel sounds. No guarding or rebound  Skin: No rash or jaundice   Other: No peripheral edema, distal pulses palpable     Data   Recent Labs   Lab 06/11/19  1002 06/11/19  0609 06/11/19  0157 06/10/19  1440 06/10/19  1300 06/10/19  1154   WBC  --  7.9  --   --   --  11.6*   HGB  --  13.7  --   --   --  13.8   MCV  --  94  --   --   --  93   PLT  --  201  --   --   --  218   * 127* 127* 125* 121* 121*   POTASSIUM   --  3.6  --  4.2 4.4 4.4   CHLORIDE  --  92*  --  90* 90* 91*   CO2  --  28  --  28 26 22   BUN  --  11  --  14 14 15   CR  --  0.53  --  0.52 0.48* 0.42*   ANIONGAP  --  7  --  7 5 8   MICK  --  8.4*  --  8.5 8.7 8.3*   GLC  --  81  --  152* 143* 177*   ALBUMIN  --   --   --   --  3.3* 3.0*   PROTTOTAL  --   --   --   --  6.9 6.2*   BILITOTAL  --   --   --   --  0.5 0.5   ALKPHOS  --   --   --   --  86 81   ALT  --   --   --   --  25 22   AST  --   --   --   --  18 17     Recent Results (from the past 24 hour(s))   Head CT w/o contrast    Narrative    CT HEAD W/O CONTRAST 6/10/2019 3:54 PM    History: Encephalopathy  ICD-10:  Comparison: 5/26/2019.    Technique: Using multidetector thin collimation helical acquisition  technique, axial, coronal and sagittal CT images from the skull base  to the vertex were obtained without intravenous contrast.     Findings:    On the noncontrast images of the brain, there is decreased size of  what was an acute parenchymal hematoma within the right putamen,  previously about 1.8 x 1.5 cm now 1.4 cm in each dimension. The  ventricles are not enlarged. No new intracranial hemorrhage  identified. Hypoattenuation scattered throughout the basal ganglia and  thalami likely related to severe underlying vasculopathy, also  involving the periventricular white matter to a degree slightly  disproportionate to patient's age. The basal cisterns are patent.  The visualized paranasal sinuses are clear. 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       Impression    Impression:   1. Evolving and slightly reduced size of right basal ganglia  parenchymal hemorrhage, now subacute age.  2. Scattered severe underlying vasculopathy related findings of the  periventricular white matter and basal ganglia and thalami and  brainstem, grossly unchanged. Given these underlying diffuse white  matter and basal ganglia abnormalities, MRI is more sensitive to  evaluate for acute  pathology.    YUKO ERVIN MD

## 2019-06-11 NOTE — PLAN OF CARE
[id number]: Afebrile. VSS on RA. Disoriented to place and situation. Patient often thinks she is still at her nursing home. Re-oriented frequently. Bed alarm for safety. Patient has been calling appropriately. Here for AMS secondary to hyponatremia. Sodium checks q4hrs. DD3 diet with thin liquids and 1LFR. Fair appetite; patient needs help ordering meals. Able to pivot with very heavy A2. Incontinent of urine x4. No BM today. Skin on bottom CDI. Patient off-loaded on pillows when in bed. C/O pain in back. Scheduled tylenol given and heat pad applied. LPIV SL. PT/OT consult placed for tomorrow. SW consult placed since patient + granddaughter stated she does not want to go back to same nursing home after discharge. Urine sample collected and sent to lab this afternoon. Hematology consulting for hx of hemophilia A. Will continue to monitor and follow POC.

## 2019-06-11 NOTE — CONSULTS
Hematology  Consult Note   Date of Service: 06/11/2019    Patient: Jackeline Smiley  MRN: [id number]  Admission Date: 6/10/2019  Hospital Day # 1    Reason for Consult: Patient with hemophilia A admitted with hyponatremia.      History of Present Illness:    Jackeline Smiley is a 86 year old woman with history significant for acquired hemophilia A with factor VIII inhibitor, recently admitted for a spontaneous intracranial hemorrhage on 5/17/2019.  She was treated with recombinant factor VIIa (NovoSeven) at an OSH prior to transfer here, where Factor VIII level was found to be low at 11, suggesting the presence of a factor VIII inhibitor (adjusted titer measured at 88).  She was continued on NovoSeven, as well as steroids and rituximab (started 5/18/2019, second dose on 5/23/2019).  She was discharged to a TCU.  The third dose of rituximab was given on 5/30/2018.  She presented to the infusion clinic for the fourth and final dose on 10/2019, but was disoriented to place.  Per nursing a stated that she had been more confused over the past couple of days.  On the infusion center, she was using her call light repeatedly and asking the same questions.  She was asking why she was there and wanted to go home.  Labs were notable for sodium 121.  She was sent to the ED for repeat head CT to rule out hemorrhage, and admission for hyponatremia.    Upon arrival in the ED, she was noted to be afebrile with HR 60 and /87.  Head CT on 6/10/2019 demonstrated evolving and slightly reduced size of the right basal ganglia parenchymal hemorrhage, which now appeared subacute.  She was admitted to the medicine service for work-up and treatment of the hyponatremia.  She was put on a fluid restriction, and sodium improved to 127 this morning.  The hematology service has been consulted for additional recommendations regarding the recent diagnosis of relapsed acquired hemophilia A with factor VIII inhibitor.  The patient is still  slightly confused today, and unable to provide much meaningful history.  She otherwise reports feeling fairly well.  She has some bruising, but denies any significant bleeding complications.  She is fixated on where she will go on discharge, and she does not want to go back to the same TCU.    Past Medical History:  Past Medical History:   Diagnosis Date     Back ache      Hemophilia (H)      Hemophilia A (H) 2019     Hyperlipidemia      Hypertension      Menopausal disorder      Osteoarthritis      Pneumonia 6-11    LEFT MID LUNG     Tremor      Vertigo        Past Surgical History:  Past Surgical History:   Procedure Laterality Date     C NONSPECIFIC PROCEDURE      submandibular gland excision     CATARACT IOL, RT/LT      both, Dr Zhou     HYSTERECTOMY, PAP NO LONGER INDICATED         Social History:  Social History     Socioeconomic History     Marital status:      Spouse name: None     Number of children: None     Years of education: None     Highest education level: None   Occupational History     None   Social Needs     Financial resource strain: None     Food insecurity:     Worry: None     Inability: None     Transportation needs:     Medical: None     Non-medical: None   Tobacco Use     Smoking status: Former Smoker     Packs/day: 1.00     Years: 46.00     Pack years: 46.00     Types: Cigarettes     Last attempt to quit: 2019     Years since quittin.0     Tobacco comment: 3/4 pack per day   Substance and Sexual Activity     Alcohol use: No     Drug use: No     Sexual activity: Not Currently     Partners: Male   Lifestyle     Physical activity:     Days per week: None     Minutes per session: None     Stress: None   Relationships     Social connections:     Talks on phone: None     Gets together: None     Attends Spiritism service: None     Active member of club or organization: None     Attends meetings of clubs or organizations: None     Relationship status: None     Intimate  partner violence:     Fear of current or ex partner: None     Emotionally abused: None     Physically abused: None     Forced sexual activity: None   Other Topics Concern     Parent/sibling w/ CABG, MI or angioplasty before 65F 55M? Not Asked      Service Not Asked     Blood Transfusions Not Asked     Caffeine Concern Not Asked     Occupational Exposure Not Asked     Hobby Hazards Not Asked     Sleep Concern Not Asked     Stress Concern Not Asked     Weight Concern Not Asked     Special Diet Not Asked     Back Care Not Asked     Exercise Yes     Comment: walks in summer     Bike Helmet Not Asked     Seat Belt Not Asked     Self-Exams Not Asked   Social History Narrative    , lives alone    retired        Family History  Family History   Problem Relation Age of Onset     Diabetes Mother      Cancer Father         leukemia     C.A.D. Brother         CHF     C.A.D. Brother      Diabetes Sister      Diabetes Sister      Diabetes Brother      Diabetes Brother      Lipids Daughter         grand daughter     Lipids Brother         all sibs had it       Outpatient Medications:    No current facility-administered medications on file prior to encounter.   Current Outpatient Medications on File Prior to Encounter:  acetaminophen (TYLENOL) 325 MG tablet Take 325 mg by mouth 3 times daily   atenolol (TENORMIN) 50 MG tablet Take 1 tablet (50 mg) by mouth daily   calcium carbonate-vitamin D (CALCIUM + D) 600-200 MG-UNIT TABS Take 1 tablet by mouth 2 times daily   lisinopril (PRINIVIL/ZESTRIL) 10 MG tablet Take 1 tablet (10 mg) by mouth 2 times daily   LORazepam (ATIVAN) 0.5 MG tablet Take 1 tablet (0.5 mg) by mouth nightly as needed for anxiety   meclizine (ANTIVERT) 25 MG tablet Take 1 tablet (25 mg) by mouth 4 times daily as needed for dizziness   pantoprazole (PROTONIX) 40 MG EC tablet Take 1 tablet (40 mg) by mouth every morning (before breakfast)   predniSONE (DELTASONE) 50 MG tablet Take 1 tablet (50 mg) by  "mouth daily   vitamin D3 (CHOLECALCIFEROL) 1000 units (25 mcg) tablet Take 1 tablet (1,000 Units) by mouth daily   simvastatin (ZOCOR) 20 MG tablet Take 1 tablet (20 mg) by mouth At Bedtime        Physical Exam:    Blood pressure 135/62, pulse 64, temperature 98.3  F (36.8  C), temperature source Oral, resp. rate 16, height 1.626 m (5' 4\"), weight 44.3 kg (97 lb 9.6 oz), SpO2 94 %.  General: elderly frail woman, alert and cooperative, lying in bed, no acute distress  HEENT: sclera anicteric, EOMI, MMM  Neck: supple, normal ROM  CV: RRR, normal S1/S2, no m/r/g  Resp: CTAB, no wheezing/crackles, normal respiratory effort on ambient air  GI: soft, non-tender, non-distended, bowel sounds present and normoactive  MSK: warm and well-perfused, no edema  Skin: no rashes on limited exam, no jaundice, a few scattered small bruises at various stages of healing  Neuro: alert and oriented to person and place, moves all extremities equally, no focal deficits    Labs & Studies: I personally reviewed the following studies:  ROUTINE LABS (Last four results):  CMP  Recent Labs   Lab 06/11/19  1412 06/11/19  1002 06/11/19  0609 06/11/19  0157 06/10/19  1440 06/10/19  1300 06/10/19  1154   * 126* 127* 127* 125* 121* 121*   POTASSIUM  --   --  3.6  --  4.2 4.4 4.4   CHLORIDE  --   --  92*  --  90* 90* 91*   CO2  --   --  28  --  28 26 22   ANIONGAP  --   --  7  --  7 5 8   GLC  --   --  81  --  152* 143* 177*   BUN  --   --  11  --  14 14 15   CR  --   --  0.53  --  0.52 0.48* 0.42*   GFRESTIMATED  --   --  86  --  86 88 >90   GFRESTBLACK  --   --  >90  --  >90 >90 >90   MICK  --   --  8.4*  --  8.5 8.7 8.3*   MAG  --   --  1.9  --   --   --   --    PHOS  --   --  2.6  --   --   --   --    PROTTOTAL  --   --   --   --   --  6.9 6.2*   ALBUMIN  --   --   --   --   --  3.3* 3.0*   BILITOTAL  --   --   --   --   --  0.5 0.5   ALKPHOS  --   --   --   --   --  86 81   AST  --   --   --   --   --  18 17   ALT  --   --   --   --   --  25 " 22     CBC  Recent Labs   Lab 06/11/19  0609 06/10/19  1154   WBC 7.9 11.6*   RBC 4.39 4.32   HGB 13.7 13.8   HCT 41.3 40.1   MCV 94 93   MCH 31.2 31.9   MCHC 33.2 34.4   RDW 13.2 13.1    218     Component      Latest Ref Rng & Units 5/17/2019   Harrington Assay 2      0 BU/mL 88.2 (H)   Factor 8 Assay      55 - 200 % 11 (L)       Assessment & Plan:   Jackeline Smiley is a 86 year old woman with history significant for acquired hemophilia A with factor VIII inhibitor, recently admitted for a spontaneous intracranial hemorrhage on 5/17/2019.  During her recent admission she was treated with steroids and started on rituximab, which she has so far received 3 of 4 planned doses (the last of which was attempted on the day of this hospital admission).  She is now admitted to the hospital for confusion, found to be hyponatremic.  The hematology service has been consulted for additional guidance regarding the recent diagnosis of relapsed acquired hemophilia A with factor VIII inhibitor.    Summary of recommendations:    Continue prednisone 50 mg daily.    Will need to follow-up with Dr. Duque regarding the missed fourth dose of rituximab.      She has an appointment on 6/13/2019, which may need to be adjusted depending on the length of this hospitalization.    As there is no evidence to suggest recurrent bleed, no further interventions are needed at this time.    We will check a factor VIII assay with tomorrow's AM labs.  This is just informative to get an idea of new baseline factor levels, and results should not delay hospital discharge.       Patient was seen and plan of care was discussed with attending physician Dr. Shi.    Susanna Hernández MD/PhD  Heme/Onc Fellow  Pager [phone]    Attending Note:  I have reviewed the patient chart, and interviewed and examined the patient.  I agree with the assessment and plan.  Nicole Shi MD  Hematology

## 2019-06-11 NOTE — PLAN OF CARE
Patient admitted from ED for hyponatremia and AMS. Sodium level at infusion center 6/10 was 121, latest lab draw improved to 127 (ordered Na lab draw Q4). Oriented to self and could state that it was June 2019, but did not know she was in the hospital and thought she had a stroke. Patient could not recall her current living situation and amount of care she receives at this facility. Recent admission due to CVA, left sided weakness noted; unsure of patient's ambulatory status. Hypertensive upon arrival, PTA medications restarted. Incontinent of urine, however, patient did request to use the commode rather than a bedpan. Repositioned throughout the night. Scheduled Tylenol for arthritic back pain. Need to clarify honey vs nectar thickened liquids.

## 2019-06-11 NOTE — PLAN OF CARE
Discharge Planner SLP   Patient plan for discharge: patient wants to go home to her apartment  Current status: Clinical dysphagia examination completed per MD order. The patient has baseline left facial weakness per previous CVA. Today she fed herself somewhat impulsively with moderate oral residue on the left side which she did eventually clear, though it took additional time and effort. X1 throat clear with thin liquid, but no overt overt signs/symptoms of aspiration present. Recommend dysphagia diet 3 and thin liquids. The patient should sit upright for PO and alternate bites/sips to assistance in oral clearance.   Barriers to return to prior living situation: medical status  Recommendations for discharge: defer to OT & PT  Rationale for recommendations: benefits from SLP tx for diet modification and safe swallowing education, but will likely reach SLP goals before hospital discharge       Entered by: Rachel Leal 06/11/2019 11:53 AM

## 2019-06-11 NOTE — PROGRESS NOTES
Social Work: Assessment with Discharge Plan    Patient Name:  Jackeline Smiley  :  1933  Age:  86 year old  MRN:  [id number]  Risk/Complexity Score:  Filed Complexity Screen Score: 6  Completed assessment with:  Pt's granddaughter Socorro ( [phone], alternate phone [phone])    Presenting Information   Reason for Referral:  Discharge plan  Date of Intake:  2019  Referral Source:  Pt's granddaughter requested phone call  Decision Maker:  Pt currently confused; pt's granddaughter Socorro states she is pt's healthcare agent  Alternate Decision Maker:  Per report, pt's granddaughter Socorro  Health Care Directive:  None on file- requested Socorro bring copy of [facility]  Living Situation:  Currently in TCU at [facility]; previously in apartment living independently  Previous Functional Status:  Independent prior to TCU stay  Patient and family understanding of hospitalization:  Restorative  Cultural/Language/Spiritual Considerations:  None identified  Adjustment to Illness:  Appropriate    Physical Health  Reason for Admission:    1. Hyponatremia      Services Needed/Recommended:  TCU    Mental Health/Chemical Dependency  Diagnosis:  Per medical record, anxiety  Support/Services in Place:  Pt's granddaughter concerned as she states [facility] started pt on Lexapro which she feels is not appropriate for pt. Encouraged her to discuss this with primary team.   Services Needed/Recommended:  None identified    Support System  Significant relationship at present time:  None identified  Family of origin is available for support:  Pt's granddaughter supportive  Other support available:  Not discussed  Gaps in support system:  None identified  Patient is caregiver to:  None     Provider Information   Primary Care Physician:  Carole Quiñonez [facility]   [phone]   Clinic:  [address] / [place] [address]      :  Unknown    Financial   Income Source:  Not  "discussed  Financial Concerns:  Not identified  Insurance:    Payor/Plan Subscriber Name Rel Member # Group #   [employer] - [employer]* FRANCOISE VAUGHAN  [id number]       [id number]       Discharge Plan   Patient and family discharge goal:  TCU; pt's granddaughter does not want pt to return to [facility].  Provided education on discharge plan:  YES  Patient agreeable to discharge plan:  YES  A list of Medicare Certified Facilities was provided to the patient and/or family to encourage patient choice. Patient's choices for facility are:  [facility]  Will NH provide Skilled rehabilitation or complex medical:  YES  General information regarding anticipated insurance coverage and possible out of pocket cost was discussed. Patient and patient's family are aware patient may incur the cost of transportation to the facility, pending insurance payment: YES  Barriers to discharge:  Medical stability    Discharge Recommendations   Anticipated Disposition:  Facility:  TCU  Transportation Needs:  Likely medical transportation  Name of Transportation Company and Phone:  [employer] ( [phone])    Additional comments   Pt is an 86-year-old female who presented with altered mental status and found to be hyponatremic. Received request from pt's granddaughter Socorro for call. Spoke with Socorro. She states pt has been staying at [facility] (since 5/24/19) and she does not want pt to return there. She expresses concerns that pt has been falling out of bed and that facility doesn't have air conditioning. She is also concerned that facility prescribed pt Lexapro which she feels is not an appropriate medication for pt d/t her sodium levels and hemophilia. Encouraged Socorro to discuss the Lexapro with the primary team.    Socorro requests referral to [facility] and states pt was \"supposed\" to discharge there last admission but they had concerns about paying for rituximab. Per chart review, several TCUs declined " pt last admission d/t cost of rituximab and other medications. Socorro also has questions regarding ARU vs TCU- explained the difference and that therapy staff will recommend whichever level of care is appropriate. Socorro states that prior to her TCU stay pt was living alone in an apartment and was completely independent. Will make referrals pending PT/OT recommendations.    LUISA Correa, [facility]  5th Floor   Pager [phone]  Phone [phone]

## 2019-06-11 NOTE — PROGRESS NOTES
"Unable to complete patient's admission profile due to confusion, does not consistently answer questions and frequently answers \"I don't know\".   "

## 2019-06-12 NOTE — PLAN OF CARE
Discharge Planner PT   Patient plan for discharge: return to TCU  Current status: PT eval complete and tx initiated. Pt AxO3 though known how reliable historian, from TCU. Completes bed mobility modAx2, stands to FWW minAx2 but needs modA to maintain standing especially with fatigue. She is able to take a few steps ~5' with FWW and maxA, noted heavy L lean with all upright mobility and needing max cues for neutral posture with all activity. She engages in seated LE exercises needing modA at trunk for stability.  Barriers to return to prior living situation: medical needs, weakness, reduced activity tolerance, impaired balance, postural deficits  Recommendations for discharge: ARU vs TCU, pending ongoing therapy tolerance.  Rationale for recommendations: Pt from TCU following intracranial hemorrhage end of May 2019 and well below baseline level of function (Angi with 4WW). She would benefit from ongoing therapy to address the above deficits in order to progress towards PLOF and promote IND mobility. She is very motivated, but not yet known if would be able to tolerate 3 hours therapy daily, will update daily. She may now be more appropriate for ARU vs TCU as she appears more motivated, but still demos significant stroke deficits.       Entered by: Anne Quintana 06/12/2019 12:33 PM

## 2019-06-12 NOTE — PLAN OF CARE
"Time 6003-4451    Reason for admission: Altered mental status with hyponatremia  Vitals: Stable on room air   Activity: Worked with PT this morning. Would benefit from a lift room to be able to get to commode. Unable to easily pivot with A2.   Pain: Reporting chronic back pain. Tylenol PRN and heating pad.   Neuro: Oriented x 3-4 but very forgetful and has repeated requests. Asks the same questions often.     Cardiac: No acute issues   Respiratory: Pt reported a \"tickle in her throat\" and had a loose sounding cough this morning. RT did acapella teaching and nursing did incentive spirometry teaching. No coughing noted in evening.  GI/: Incontinent of urine. No BM.   Diet: DD3 with thin liquids. 1000mL fluid restriction which patient does not seem to understand - frequently asking for liquids. Nursing staff frequently giving instruction to patient about restriction during awake hours.   Lines: PIV saline locked.   Skin/Wounds: Bruised but otherwise seems intact. Refusing repositioning q 2 hours. Bed bath done this morning.   Labs/imaging: Na 128 to 125 to 124. Q6 hour checks.         New changes this shift:  Granddaughter updated via telephone. Pt moved to lift room for mobility.     Plan: Trend Na levels. SW to find new placement for patient when considered medically stable as family and patient do not want patient to return to previous TCU.     Continue to monitor and follow POC      "

## 2019-06-12 NOTE — PLAN OF CARE
Discharge Planner SLP   Patient plan for discharge: Unknown  Current status: Pt seen for dysphagia management; pt remains appropriate for dysphagia 3 (chopped) diet/thin liquids. Pt to be fully alert and upright for all PO, taking small bites/sips at slow rate. Check oral cavity for pocketing. SLP to follow.  Barriers to return to prior living situation: Medical condition  Recommendations for discharge: Defer to PT/OT  Rationale for recommendations: Swallow function not impacting discharge disposition; suspect softer diet/thin liquids is pt's new baseline since prior CVA       Entered by: Kassie Gallagher 06/12/2019 9:52 AM

## 2019-06-12 NOTE — PLAN OF CARE
Discharge Planner OT   Patient plan for discharge: Rehab.  Current status: Patient completed OT evaluation and treatment session this afternoon following encouragement. Memory deficits evident and benefited from verbal reminders frequently. Moderate assistance required for edge of bed sitting with strong lateral lean towards left, tactile cueing helpful with shifting weight to right. Patient fatigued readily.  Barriers to return to prior living situation: Cognition, strength, endurance, balance.  Recommendations for discharge: TCU.  Rationale for recommendations: To progress functional independence.       Entered by: Meredith Damico 06/12/2019 4:11 PM

## 2019-06-12 NOTE — PROGRESS NOTES
"   06/12/19 1042   Quick Adds   Type of Visit Initial PT Evaluation   Living Environment   Lives With alone   Living Arrangements apartment  (senior living)   Home Accessibility no concerns   Living Environment Comment Pt lives alone in senior apartment.   Self-Care   Usual Activity Tolerance moderate   Current Activity Tolerance fair   Equipment Currently Used at Home shower chair;other (see comments)  (4WW)   Activity/Exercise/Self-Care Comment pt currently at TCU recieving therapy   Functional Level Prior   Ambulation 1-->assistive equipment   Transferring 1-->assistive equipment   Toileting 0-->independent   Bathing 1-->assistive equipment   Communication 0-->understands/communicates without difficulty   Swallowing 2-->difficulty swallowing liquids   Cognition 0 - no cognition issues reported   Fall history within last six months yes   Number of times patient has fallen within last six months 2   Which of the above functional risks had a recent onset or change? ambulation;transferring   Prior Functional Level Comment At baseline, pt Angi with 4WW and ADLs. Currently from TCU getting assist with all cares and mobility   General Information   Onset of Illness/Injury or Date of Surgery - Date 06/10/19   Referring Physician Rosalie Davila PA-C   Patient/Family Goals Statement \"get walking\"   Pertinent History of Current Problem (include personal factors and/or comorbidities that impact the POC)  Jackeline Smiley is a 86 year old female with history of acquired hemophilia A, HTN, HLD, anxiety and recent admission 5/17/19 to 5/25/19 for acute ICH who presents with altered mental status and was subsequently found to be hyponatremic to 121    Precautions/Limitations fall precautions   Weight-Bearing Status - LUE full weight-bearing   Weight-Bearing Status - RUE full weight-bearing   Weight-Bearing Status - LLE full weight-bearing   Weight-Bearing Status - RLE full weight-bearing   General Info Comments " activity: up with assist   Cognitive Status Examination   Orientation orientation to person, place and time   Level of Consciousness alert   Follows Commands and Answers Questions 100% of the time   Personal Safety and Judgment impaired;at risk behaviors demonstrated   Memory intact   Pain Assessment   Patient Currently in Pain Yes, see Vital Sign flowsheet   Integumentary/Edema   Integumentary/Edema no deficits were identifed   Posture    Posture Scoliosis;Forward head position;Protracted shoulders   Range of Motion (ROM)   ROM Comment B LEs WFL per functional mobility   Strength   Strength Comments B LEs grossly 4/5   Bed Mobility   Bed Mobility Comments modAx2, cues for technique and scooting (tx)   Transfer Skills   Transfer Comments stands to FWW minAx2, cues for safe hand placement (tx)   Gait   Gait Comments ambulates x5' with FWW, initially modA with L lean and poor walker management. Needs maxA with fatigue and cues for posture and LE activation as knees begin buckling with fatigue (tx)   Balance   Balance Comments needs modA in unsupported siting with fatigue   Sensory Examination   Sensory Perception no deficits were identified   Clinical Impression   Criteria for Skilled Therapeutic Intervention yes, treatment indicated   PT Diagnosis impaired functional mobility   Influenced by the following impairments weakness, reduced activity tolerance, impaired balance, impulsivity, poor midline orientation   Functional limitations due to impairments bed mobility, transfers, gait, falls risk   Clinical Presentation Stable/Uncomplicated   Clinical Presentation Rationale PMH, clinician impression   Clinical Decision Making (Complexity) Low complexity   Therapy Frequency` 5 times/week   Predicted Duration of Therapy Intervention (days/wks) 3-5 days   Anticipated Discharge Disposition Acute Rehabilitation Facility;Transitional Care Facility   Risk & Benefits of therapy have been explained Yes   Patient, Family & other  staff in agreement with plan of care Yes   Clinical Impression Comments Eval complete, tx initiated. Pt well below baseline prior to stroke admission (May 2019) who went to TCU and now below baseline from TCU gains where pt reports ambulating with assist with FWW. Very eager, ARU vs TCU as pt does not seem to have made great gains in TCU and is eager for therapy. Unknown if will tolerate intensity of ARU.   Total Evaluation Time   Total Evaluation Time (Minutes) 5

## 2019-06-12 NOTE — PROGRESS NOTES
"   06/12/19 1543   Quick Adds   Type of Visit Initial Occupational Therapy Evaluation   Living Environment   Lives With alone   Living Arrangements apartment  (senior living)   Home Accessibility no concerns   Transportation Anticipated   ([employer])   Self-Care   Usual Activity Tolerance moderate   Current Activity Tolerance fair   Regular Exercise No   Equipment Currently Used at Home shower chair;wheelchair, manual  (4WW)   Activity/Exercise/Self-Care Comment Patient was currently at TCU prior to readmission.   Functional Level   Ambulation 1-->assistive equipment   Transferring 1-->assistive equipment   Toileting 1-->assistive equipment   Bathing 3-->assistive equipment and person   Dressing 2-->assistive person   Eating 0-->independent   Communication 0-->understands/communicates without difficulty   Cognition 1 - attention or memory deficits   Fall history within last six months yes   Number of times patient has fallen within last six months 2   Which of the above functional risks had a recent onset or change? ambulation;transferring;fall history;bathing;toileting;dressing;cognition       Present no   General Information   Onset of Illness/Injury or Date of Surgery - Date 06/11/19   Referring Physician Rosalie Davila PA-C   Patient/Family Goals Statement Did not report.   Additional Occupational Profile Info/Pertinent History of Current Problem \"Jackeline Smiley is a 86 year old female with history of acquired hemophilia A, HTN, HLD, anxiety and recent admission 5/17/19 to 5/25/19 for acute ICH who presents with altered mental status and was subsequently found to be hyponatremic to 121\"   Precautions/Limitations fall precautions   Cognitive Status Examination   Orientation person;place   Memory impaired   Cognitive Comment Confusion evident.   Visual Perception   Visual Perception Wears glasses  (For reading only.)   Sensory Examination   Sensory Quick Adds   (Reported " numbness in left upper extremity.)   Pain Assessment   Patient Currently in Pain Yes, see Vital Sign flowsheet   Range of Motion (ROM)   ROM Quick Adds   (Impaired through left upper extremity.)   Strength   Manual Muscle Testing Quick Adds   (Impaired through left upper extremity.)   Hand Strength   Hand Strength Comments WFL   Coordination   Coordination Comments Impaired in left hand.   Transfer Skill: Sit to Stand   Level of Richardson: Sit/Stand maximum assist (25% patients effort)   Physical Assist/Nonphysical Assist: Sit/Stand 1 person assist;verbal cues   Instrumental Activities of Daily Living (IADL)   Previous Responsibilities meal prep   Activities of Daily Living Analysis   Impairments Contributing to Impaired Activities of Daily Living balance impaired;cognition impaired;strength decreased   General Therapy Interventions   Planned Therapy Interventions ADL retraining;IADL retraining;cognition;strengthening;transfer training   Clinical Impression   Criteria for Skilled Therapeutic Interventions Met yes, treatment indicated   OT Diagnosis Patient presented to OT with decreased independence in ADL tasks.   Influenced by the following impairments Strength, endurance, balance, cognition   Assessment of Occupational Performance 5 or more Performance Deficits   Identified Performance Deficits Dressing, bathing, toileting, ambulating, transferring   Clinical Decision Making (Complexity) Low complexity   Therapy Frequency 5 times/wk   Predicted Duration of Therapy Intervention (days/wks) 1 week   Anticipated Discharge Disposition Transitional Care Facility   Risks and Benefits of Treatment have been explained. Yes   Patient, Family & other staff in agreement with plan of care Yes   Total Evaluation Time   Total Evaluation Time (Minutes) 5

## 2019-06-12 NOTE — PROGRESS NOTES
Social Work Services Progress Note    Hospital Day: 3  Date of Initial Social Work Evaluation:  6/11/19  Collaborated with:  Primary team Gold 4, patient    Data:  Pt is an 86-year-old female admitted with altered mental status and found to be hyponatremic. TCU is recommended at discharge.    Intervention:  Pt admitted from [facility] TCU but does not want to return there. Pt's granddaughter Socorro requests referral to  TCU. Met with pt. Pt also expresses desire to d/c to  TCU and states SW can work with her granddaughter on placement.     Paged  TCU liaison with referral.     Assessment:  Pursuing TCU placement    Plan:    Anticipated Disposition:  Facility:  TCU    Barriers to d/c plan:  Medical stability    Follow Up:  SW to follow for discharge planning    LUISA Correa, LGSW  5th Floor   Pager [phone]  Phone [phone]

## 2019-06-12 NOTE — PROGRESS NOTES
[facility], [facility] Progress Note - Hospitalist Service, Gold 4       Date of Admission:  6/10/2019  Assessment & Plan    Jackeline Smiley is a 86 year old female with history of acquired hemophilia A, HTN, HLD, anxiety and recent admission 5/17/19 to 5/25/19 for acute ICH who presents with altered mental status and was subsequently found to be hyponatremic to 121      Symptomatic euvolemic hypo-osmolar hyponatremia: Mild hyponatremia noted during 5/2019 admission (Na 130s) c/w SIADH. Responded to 1500 mL fluid restriction, which was continued on discharge. TSH normal 5/2019. Admitted 6/10 with symptomatic hyponatremia to 121. Unclear if fluid restriction continued at NH. 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 Na 125 (128). A&O x3  - Continue fluid restriction 1000 ml    - Trend sodium q6h  - PT/OT, family would like to consider ARU    Acquired hemophilia A, recent hemorrhagic CVA: Hemophilia 2/2 Factor VIII inhibitor antibody. 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 Residual left hemiparesis. No new focal neurologic deficits. Head CT 6/10 notes evolving, slightly reduced size R basal ganglia hemorrhage. Factor VII 94 6/11. No e/o acute bleeding  - Hematology following, needs OP follow up as she will miss 6/13/19 appointment   - Continue PTA prednisone 50 mg daily    Chronic dysphagia: PTA on DD2 diet with unclear liquid thickening. SLP consulted and suggest DD3 with thin liquids at this time     Inaccurate med list: Per granddaughter, patient recently started Leaxpro and Trazodone at outside TCU. Not in med rec per our system. Will suggest not continuing either of these medications on discharge     Upper airway congestion: New congestion without crackles 6/12. Breathing comfortably room air. WBC normal. No fever. Treat with IS and flutter valve. Contact medicine with acute  changes     Stable Medical Issues:  HTN: Stable.Continue lisinopril and Atenolol with hold parameters  HLD:  Continue statin  GERD: Continue PPI      Diet: Fluid restriction 1000 ML FLUID  Dysphagia Diet Level 3 Advanced Thin Liquids (water, ice chips, juice, milk gelatin, ice cream, etc)  Room Service    DVT Prophylaxis: Pneumatic Compression Devices  Granados Catheter: not present  Code Status: DNR/DNI      Disposition Plan   Expected discharge: 2-5 days, recommended to transitional care unit once medically stable, Na improving, workup complete.  Entered: Rosalie Davila PA-C 06/12/2019, 11:16 AM       The patient's care was discussed with the patient, nursing, SW, CC, family, and attending physician, Dr. Maame Davila PA-C  Hospitalist Service, [address]  Pager: [phone]  Please see sticky note for cross cover information  ______________________________________________________________________    Interval History    Feeling better today. Denies confusion. Feels hungry. Bria fever or chills. No urinary symptoms. No chest pain or dyspnea. Patient's granddaughter, Socorro, updated via phone    Data reviewed today: I reviewed all medications, new labs and imaging results over the last 24 hours    Physical Exam   Vital Signs: Temp: 98.3  F (36.8  C) Temp src: Oral BP: 168/82   Heart Rate: 58 Resp: 16 SpO2: 94 % O2 Device: None (Room air)    Weight: 97 lbs 9.6 oz  General Appearance: Alert and oriented x3, pleasant   Respiratory: Bilateral upper congestion breathing comfortably on room air. No crackles noted  Cardiovascular: RRR, S1, S2. No murmur noted  GI: Abdomen soft, non-tender with active bowel sounds. No guarding or rebound  Skin: No rash or jaundice   Other: No peripheral edema, distal pulses palpable     Data   Recent Labs   Lab 06/12/19  1013 06/12/19  0543 06/12/19  0139  06/11/19  0609  06/10/19  1440 06/10/19  1300 06/10/19  1154   WBC   --  7.5  --   --  7.9  --   --   --  11.6*   HGB  --  14.1  --   --  13.7  --   --   --  13.8   MCV  --  96  --   --  94  --   --   --  93   PLT  --  222  --   --  201  --   --   --  218   * 128* 128*   < > 127*   < > 125* 121* 121*   POTASSIUM  --  3.8  --   --  3.6  --  4.2 4.4 4.4   CHLORIDE  --  91*  --   --  92*  --  90* 90* 91*   CO2  --  30  --   --  28  --  28 26 22   BUN  --  16  --   --  11  --  14 14 15   CR  --  0.59  --   --  0.53  --  0.52 0.48* 0.42*   ANIONGAP  --  7  --   --  7  --  7 5 8   MICK  --  9.2  --   --  8.4*  --  8.5 8.7 8.3*   GLC  --  92  --   --  81  --  152* 143* 177*   ALBUMIN  --   --   --   --   --   --   --  3.3* 3.0*   PROTTOTAL  --   --   --   --   --   --   --  6.9 6.2*   BILITOTAL  --   --   --   --   --   --   --  0.5 0.5   ALKPHOS  --   --   --   --   --   --   --  86 81   ALT  --   --   --   --   --   --   --  25 22   AST  --   --   --   --   --   --   --  18 17    < > = values in this interval not displayed.     No results found for this or any previous visit (from the past 24 hour(s)).

## 2019-06-12 NOTE — PROGRESS NOTES
BRIEF HEMATOLOGY NOTE  06/12/2019    We did not see the patient today, but reviewed notes and recent labs. Hemoglobin is stable, there is no sign of recurrent bleeding, and Factor 8 assay was 94%. This suggests good control of the relapsed acquired Hemophilia after 3 of 4 planned doses of rituximab. She will need to follow-up with Dr. Duque as an outpatient to discuss timing or need for the 4th dose of rituximab. She has an appointment scheduled for tomorrow, which will need to be rescheduled due to hospitalization.    The hematology consult service will sign-off. Please page with any questions or concerns.    Susanna Hernández MD/PhD  Heme/Onc Fellow

## 2019-06-12 NOTE — PLAN OF CARE
Pt admitted for altered mental status r/t hyponatremia. Alert, disoriented to situation and intermittently place. Pleasantly confused.Neuro's intact except baseline left sided weakness. VSS on RA. C/o back pain, heat applied and repositioned, some relief. No c/o nausea. Tolerates DD3 diet well. Continued 1L FR. Incontinent of bladder. No BM this shift. Mobility is impaired w/ l sided weakness, does not pivot well to commode w/ assist of 2. Skin intact. Continued to trend Na q 4 hrs, 128 overnight. Pt slept overnight. Able to make needs known at times, bed alarm and frequent rounding in place. Will continue with POC and Na trends.

## 2019-06-13 NOTE — PLAN OF CARE
Discharge Planner OT   Patient plan for discharge: rehab   Current status: Pt scored 17/30 on SLUMs assessment indicating dementia-like cognitive impairment. Pt with deficits in attention and STM memory. Pt demonstrating relatively similar cognitive function than prior admission. Pt progressing in functional status.   Barriers to return to prior living situation: medical status, lives alone, level of assist needed for ADLs/mobility   Recommendations for discharge: TCU   Rationale for recommendations: Recommend continued OT intervention to maximize independence with ADLs/IADLs. Pt limited by cognitive impairment, weakness, balance deficits, and decreased activity tolerance for ADLs.        Entered by: Ladonna Patterson 06/13/2019 2:34 PM

## 2019-06-13 NOTE — PLAN OF CARE
5A: Discharge Planner PT   Patient plan for discharge: return to TCU or go home (pt confused, inconsistent)   Current status: pt completes bed mobility with min A, min A x 2 for STS transfer with FWW. Pt ambulates 50ft x 3 with FWW and mod A x 1, wheelchair follow for seated rest breaks. Pt needs cues for step length and to reduce L sided lean. Vitals stable on room air during session.   Barriers to return to prior living situation: medical status, confusion, assist for mobility   Recommendations for discharge: TCU  Rationale for recommendations: pt currently with below baseline cognition and lives alone, admitted from TCU. Pt currently needing heavy assist for all mobility, would benefit from continued therapy to maximize independence and overall function.        Entered by: Melonie Bartholomew 06/13/2019 11:52 AM

## 2019-06-13 NOTE — PROGRESS NOTES
[facility], [place] Progress Note - Hospitalist Service, Gold 4       Date of Admission:  6/10/2019  Assessment & Plan    Jackeline Smiley is a 86 year old female with history of acquired hemophilia A, HTN, HLD, anxiety and recent admission 5/17/19 to 5/25/19 for acute ICH who presents with altered mental status and was subsequently found to be hyponatremic to 121      Symptomatic euvolemic hypo-osmolar hyponatremia: Mild hyponatremia during 5/2019 admission (130s) c/w SIADH. Responded to 1500 mL fluid restriction, which was continued on discharge.  Unclear if fluid restriction continued at NH. TSH normal 5/2019. Admitted 6/10 with symptomatic hyponatremia to 121. 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 Na 129 (128). A&O x3  - Continue fluid restriction 1000 ml, no caffeine   - Trend sodium q8h  - PT/OT, family would like to consider ARU    Acquired hemophilia A, recent hemorrhagic CVA: Hemophilia 2/2 Factor VIII inhibitor antibody. 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 Residual left hemiparesis. No new focal neurologic deficits. Head CT 6/10 notes evolving, slightly reduced size R basal ganglia hemorrhage. Factor VII 94 6/11. No e/o acute bleeding  - Hematology signed off 6/12, needs OP follow up as she will missed 6/13/19 appointment   - Continue PTA prednisone 50 mg daily    Chronic dysphagia: PTA on DD2 diet with unclear liquid thickening. SLP consulted and suggest DD3 with thin liquids at this time     Upper airway congestion: New congestion without crackles 6/12 and new wheezing 6/13. Breathing comfortably room air. WBC normal. No fever.   - Continue IS and flutter valve  - Duonebs  - Contact medicine with acute changes       Stable Medical Issues:  Inaccurate med list: Per granddaughter, patient recently started Leaxpro and Trazodone at outside TCU. Not in med rec per our  system. Will suggest not continuing either of these medications on discharge   HTN: Stable.Continue lisinopril and Atenolol with hold parameters  HLD:  Continue statin  GERD: Continue PPI      Diet: Fluid restriction 1000 ML FLUID  Room Service  Dysphagia Diet Level 3 Advanced Thin Liquids (water, ice chips, juice, milk gelatin, ice cream, etc)    DVT Prophylaxis: Pneumatic Compression Devices  Granados Catheter: not present  Code Status: DNR/DNI      Disposition Plan   Expected discharge: 1-2 days, recommended to transitional care unit vs ARU once medically stable, Na improving, workup complete.  Entered: Rosalie Davila PA-C 06/13/2019, 10:08 AM       The patient's care was discussed with the patient, nursing, SW, CC, family, and attending physician, Dr. Maame Davila PA-C  Hospitalist Service, [address]  Pager: [phone]  Please see sticky note for cross cover information  ______________________________________________________________________    Interval History    Slept well. Would like to shower or bathe today. Denies confusion. Would like coffee. Slightly frustrated with fluid restriction. No chest pain or dyspnea. Congestion/throat tickle with cough continues. Per patient, this has been present intermittently for several years. Patient's granddaughter, Socorro, updated via phone    Data reviewed today: I reviewed all medications, new labs and imaging results over the last 24 hours    Physical Exam   Vital Signs: Temp: 96.8  F (36  C) Temp src: Oral BP: 139/52 Pulse: 54 Heart Rate: 54 Resp: 16 SpO2: 96 % O2 Device: None (Room air)    Weight: 97 lbs 9.6 oz  General Appearance: Alert and oriented x3, pleasant   Respiratory: Bilateral upper congestion breathing comfortably on room air. No crackles noted. Expiratory wheezes on the left  Cardiovascular: RRR, S1, S2. No murmur noted  GI: Abdomen soft, non-tender with active bowel sounds. No  guarding or rebound  Skin: No rash or jaundice   Other: No peripheral edema, distal pulses palpable     Data   Recent Labs   Lab 06/13/19  0624 06/12/19  2344 06/12/19  1752  06/12/19  0543  06/11/19  0609  06/10/19  1300 06/10/19  1154   WBC  --   --   --   --  7.5  --  7.9  --   --  11.6*   HGB  --   --   --   --  14.1  --  13.7  --   --  13.8   MCV  --   --   --   --  96  --  94  --   --  93     --   --   --  222  --  201  --   --  218   * 128* 125*   < > 128*   < > 127*   < > 121* 121*   POTASSIUM 3.8  --   --   --  3.8  --  3.6   < > 4.4 4.4   CHLORIDE 93*  --   --   --  91*  --  92*   < > 90* 91*   CO2 29  --   --   --  30  --  28   < > 26 22   BUN 13  --   --   --  16  --  11   < > 14 15   CR 0.54  --   --   --  0.59  --  0.53   < > 0.48* 0.42*   ANIONGAP 7  --   --   --  7  --  7   < > 5 8   MICK 8.9  --   --   --  9.2  --  8.4*   < > 8.7 8.3*   GLC 85  --   --   --  92  --  81   < > 143* 177*   ALBUMIN  --   --   --   --   --   --   --   --  3.3* 3.0*   PROTTOTAL  --   --   --   --   --   --   --   --  6.9 6.2*   BILITOTAL  --   --   --   --   --   --   --   --  0.5 0.5   ALKPHOS  --   --   --   --   --   --   --   --  86 81   ALT  --   --   --   --   --   --   --   --  25 22   AST  --   --   --   --   --   --   --   --  18 17    < > = values in this interval not displayed.     No results found for this or any previous visit (from the past 24 hour(s)).

## 2019-06-13 NOTE — PLAN OF CARE
[id number]: Afebrile. VSS on RA. A/Ox3. Disoriented to situation. Often asks repeated questions about plan of care. Calls appropriately and makes needs known. Here for AMS secondary to hyponatremia. Sodium 127; checking q8hrs. DD3 diet with thin liquids and 1LFR. Fair appetite; patient needs help ordering meals. Incontinent of urine in brief. Had 1 BM in commode. Skin on bottom with blanchable redness. Cleansed + barrier cream applied. Off-loaded on pillows when in bed. C/O pain in back. Scheduled tylenol given and heat pad applied. LPIV SL. Atarax given x1 for anxiety with good relief. SW working on placement for patient to go to TCU after discharge. Continue to monitor and follow POC.    Rocio Beck RN on 6/13/2019 at 6:40 PM

## 2019-06-13 NOTE — PLAN OF CARE
Time: 3458-7136     Reason for admission: Hyponatremia   Vitals: VSS on RA  Activity: Mechanical lift, assistx1 for repositioning q2h.   Pain: C/o generalized pain, partially relieved with PRN tylenol  Neuro: Lethargic. Oriented x3, intermittently disoriented to time. Forgetful with repeated requests.   Cardiac: WDL  Respiratory: LS coarse. No cough.   GI/: Incontinent of urine. No BM this shift.   Diet:  DD3 diet. No caffeine. 1L FR.   Lines: PIV saline locked   Skin/Wounds: Bruised but intact   Labs: Midnight Na check 128- next check 0600 result pending      Plan: Continue to trend Na. SW working on TCU placement, possibly [facility].      Continue to monitor and follow POC

## 2019-06-13 NOTE — PROGRESS NOTES
"Social Work Services Progress Note     Hospital Day: 4  Date of Initial Social Work Evaluation:  6/11/19  Collaborated with:  Primary team Gold 4,  rehab     Data:  Pt is an 86-year-old female admitted with altered mental status and found to be hyponatremic. TCU is recommended at discharge.     Intervention:  Per  rehab liaison Essence, pt declined from both [facility] and [facility]U d/t \"unclear trajectory\" as patient lives alone and they anticipate long rehab stay.    Called cell phone listed for pt's granddaughter Socorro (ph [phone]) and a man answered the phone and said Socorro is unavailable and asked to take a message. Received call from Socorro requesting call on her work phone ([phone]). Updated Socorro on denial from [facility] and requesting additional facilities for referral. Socorro does not have any places identified right now but will call back later.      Assessment:  Pursuing TCU placement     Plan:    Anticipated Disposition:  Facility:  TCU    Barriers to d/c plan:  Medical stability    Follow Up:  SW to follow for discharge planning     LUISA Correa, [facility]  5th Floor   Pager [phone]  Phone [phone]    Addendum 1:01pm: Faxed referral to [facility] (ph [phone]) per pt family request.    Addendum 3:19pm: Received call from [facility] stating that director of nursing reviewed referral and they do not feel they can meet pt's needs. Updated pt's granddaughter Socorro. Advised Socorro to use Medicare.gov listings as a resource for finding facilities in desired geographic areas. Socorro to look at this and choose additional referrals.     Addendum 3:43pm: Socorro requesting referral to [facility]. Faxed referral.       "

## 2019-06-14 NOTE — PLAN OF CARE
Discharge Planner OT   Patient plan for discharge: difficult to assess due to AMS; pt appears agreeable to TCU and working with grand daughter on finding placement   Current status: Pt perseverating on discharge home/rehab; pt appears to lack insight to level of assist needed. Pt forgetful and needing min VCs for sequencing tasks. Pt with L sided neglect evident with ADL activity. Pt mod-max A x2 for stand pivot transfer with FWW. Pt completed seated g/h tasks with min-mod A.   Barriers to return to prior living situation: medical status, cognitive impairment, level of assist needed for ADLs/mobility, lives alone   Recommendations for discharge: TCU   Rationale for recommendations: recommend continued rehab to maximize function due impairments in cognition, L sided weakness/neglect, balance impairment, deconditioning.        Entered by: Ladonna Patterson 06/14/2019 10:08 AM

## 2019-06-14 NOTE — PLAN OF CARE
Time: 1222-7365     Reason for admission: Hyponatremia   Vitals: VSS on RA  Activity: Mechanical lift, assistx1 for repositioning q2h.   Pain: C/o generalized pain, partially relieved with PRN tylenol  Neuro: Lethargic. Oriented x3, intermittently disoriented to time and situation. Forgetful with repeated requests.   Cardiac: WDL  Respiratory: LS coarse. Infrequent cough.   GI/: Incontinent of urine- purewick in place. No BM this shift.   Diet:  DD3 diet. No caffeine. 1L FR.   Lines: PIV saline locked   Skin/Wounds: Bruised but intact   Labs: Na 127- next check 0600 result pending      Plan: Continue to trend Na. SW working on TCU placement, possibly [facility].      Continue to monitor and follow POC

## 2019-06-14 NOTE — PLAN OF CARE
Discharge Planner SLP   Patient plan for discharge: Unknown  Current status: Recommend continuation of dysphagia diet 3 and thin liquids.  Pt to be fully alert and upright for all PO, taking small bites/sips at slow rate.  Pt would benefit from alternating bites and sips, and checking oral cavity for buccal pocketing.  SLP to follow for diet advancement and PO tolerance.  Barriers to return to prior living situation: Medical status  Recommendations for discharge: TCU  Rationale for recommendations: Suspect pt is at new baseline status for diet.  Pt would benefit from formal cognitive-linguistic assessment at the next level of care.       Entered by: Maddie Neff 06/14/2019 1:50 PM

## 2019-06-14 NOTE — PROGRESS NOTES
"   Reason for admission: Hyponatremia   Vitals:BP: 151/67, Temp: 98.8, RR: 16, Wt: 44.3kg, SPO2: 94, BMI: 16.75   Activity: Lift with assist x2 to chair, repositioning [id number]  Pain: Generalized pain, partially relieved with PRN tylenol  Neuro: Lethargic. Oriented x3, intermittently disoriented to time. Forgetful with repeated requests.   Cardiac: WDL  Respiratory: LS coarse. No cough.   GI/: Incontinent of urine. No BM this shift.   Diet:  DD3 diet. No caffeine. 1L FR. Intake 100% for breakfast lunch   Lines: PIV saline locked   Skin/Wounds: Bruised but intact   Labs: Na131-   Plan: Continue to trend Na. SW working on TCU placement, Continue to monitor and follow POC    SOAP:  S: Patient stated that she had \" a tickle in her throat.\"  O: Congested sounding cough, but no material coughed up.Provided Aerobika to relieve the cough. Coached patient through the use of Aerobika, but unable to follow instructions. Lung sounds coarse.  A: Difficulty following instructions. Continues to sound congested.  P: Try some alternative coughing strategies, huffing, incentive spirometer.           "

## 2019-06-14 NOTE — PROGRESS NOTES
"Social Work Services Progress Note     Hospital Day: 5  Date of Initial Social Work Evaluation:  6/11/19  Collaborated with:  Primary team Gold 4, TCU facilities, pt's granddaughter Socorro (ph [phone])     Data:  Pt is an 86-year-old female admitted with altered mental status and found to be hyponatremic. TCU is recommended at discharge. Anticipate pt will be medically stable for discharge tomorrow, 6/15.      Intervention:  Following up on TCU referrals. Spoke with pt's granddaughter Socorro and requested several other options for referrals.     Referral status:  1) [facility]- declined d/t \"unclear trajectory\"  2) [facility] (ph [phone])- declined, cannot meet need  3) [facility] (ph [phone])- left VM 6/14     Assessment:  Pursuing TCU placement     Plan:    Anticipated Disposition:  Facility:  TCU    Barriers to d/c plan:  Medical stability    Follow Up:  SW to follow for discharge planning     LUISA Correa, SW  5th Floor   Pager [phone]  Phone [phone]    Addendum 2:55pm: Received call from pt's granddaughter requesting referral to [facility] [place] (ph [phone]). Faxed referral.        "

## 2019-06-14 NOTE — PLAN OF CARE
Time: 1306-6835  Reason for admission: Hyponatremia   Vitals: VSS on RA  Activity: Mechanical lift, assistx1 for repositioning q2h.   Pain: C/o generalized back pain PRN tylenol given x1 with relief   Neuro: Forgetful. Oriented x2-3, intermittently disoriented to time and situation. repeated requests.   Cardiac: WDL  Respiratory: LS clear/dimin, Infrequent cough. PRN neb offered from RT but pt said it didn't help last time.    GI/: Incontinent of urine- purewick in place (changed 1100). No BM this shift.   Diet:  DD3 diet. No caffeine. 1L FR.   Lines: PIV SL  Skin/Wounds: Blanchable redness on bottom, scattered bruising noted   Labs: Na 131, 131      New: SLP cont to follow.  cough drops ordered for pt for throat discomfort.   Plan: Continue to trend Na q12. SW following for TCU placement. Possible discharge 6/15 pending bed avail.  Will continue to monitor.

## 2019-06-14 NOTE — PLAN OF CARE
5A: Discharge Planner PT   Patient plan for discharge: return to TCU or go home (pt confused, inconsistent)  Current status: pt completed bed mobility with mod A, STS with min-mod A and FWW. Pt ambulates 20ft x 4 with FWW and mod A + wheelchair follow. Pt fatigued with all gait needing seated rest breaks between bouts. Pt with L sided lean during gait, poor awareness of surroundings.  Barriers to return to prior living situation: medical status, cognition, assist for mobility   Recommendations for discharge: TCU  Rationale for recommendations: pt with below baseline cognition, lives alone, currently far below baseline mobility. Pt needing continued therapy to maximize functional independence.       Entered by: Melonie Bartholomew 06/14/2019 3:25 PM

## 2019-06-14 NOTE — PROGRESS NOTES
[facility] Progress Note - Hospitalist Service, Gold 4       Date of Admission:  6/10/2019  Assessment & Plan    Jackeline Smiley is a 86 year old female with history of acquired hemophilia A, HTN, HLD, anxiety and recent admission 5/17/19 to 5/25/19 for acute ICH who presents with altered mental status and was subsequently found to be hyponatremic to 121      Symptomatic euvolemic hypo-osmolar hyponatremia: Mild hyponatremia during 5/2019 admission (130s) c/w SIADH. Responded to 1500 mL fluid restriction, which was continued on discharge.  Unclear if fluid restriction continued at NH. TSH normal 5/2019. Admitted 6/10 with symptomatic hyponatremia to 121. 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 Na 131 (129). A&O x3  - Continue fluid restriction 1000 ml, no caffeine   - Trend sodium q12h  - PT/OT    Acquired hemophilia A, recent hemorrhagic CVA: Hemophilia 2/2 Factor VIII inhibitor antibody. 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 Residual left hemiparesis. No new focal neurologic deficits. Head CT 6/10 notes evolving, slightly reduced size R basal ganglia hemorrhage. Factor VII 94 6/11. No e/o acute bleeding  - Hematology signed off 6/12, needs OP follow up as she missed 6/13/19 appointment   - Continue PTA prednisone 50 mg daily    Chronic dysphagia: PTA on DD2 diet with unclear liquid thickening. SLP consulted and suggest DD3 with thin liquids at this time     Stable Medical Issues:  Upper airway congestion: Intermittently an issue for several years. No hypoxia, productive cough, fevers, or crackles. Continue IS and flutter valve. Duonbes prn for wheezing. Contact medicine with acute changes  Inaccurate med list: Per granddaughter, patient recently started Leaxpro and Trazodone at outside TCU. Not in med rec per our system. Will suggest not continuing either of these  medications on discharge   HTN: Stable.Continue lisinopril and Atenolol with hold parameters  HLD:  Continue statin  GERD: Continue PPI      Diet: Fluid restriction 1000 ML FLUID  Room Service  Dysphagia Diet Level 3 Advanced Thin Liquids (water, ice chips, juice, milk gelatin, ice cream, etc)    DVT Prophylaxis: Pneumatic Compression Devices  Granados Catheter: not present  Code Status: DNR/DNI      Disposition Plan   Expected discharge: tomorrow, recommended to transitional care unit vs ARU once Na remains stable, TCU bed available   Entered: Rosalie Davila PA-C 06/14/2019, 9:01 AM       The patient's care was discussed with the patient, nursing, SW, CC, family, and attending physician, Dr. Maame Davila PA-C  Hospitalist Service, [address]  Pager: [phone]  Please see sticky note for cross cover information  ______________________________________________________________________    Interval History    Doing well. Sleeping through the night. Wants to go right home from here over TCU. Repeats question about discharge a few times. Per d/w Socorro, patient's granddaughter, this is new BL since stroke happened this spring. Tolerating. Cough unchanged. Denies confusion, chest pain, dyspnea     Data reviewed today: I reviewed all medications, new labs and imaging results over the last 24 hours    Physical Exam   Vital Signs: Temp: 97.5  F (36.4  C) Temp src: Oral BP: 164/61   Heart Rate: 63 Resp: 16 SpO2: 94 % O2 Device: None (Room air)    Weight: 97 lbs 9.6 oz  General Appearance: Alert and oriented x3, pleasant   Respiratory: Bilateral upper congestion breathing comfortably on room air. No crackles or wheezing noted  Cardiovascular: RRR, S1, S2. No murmur noted  GI: Abdomen soft, non-tender with active bowel sounds. No guarding or rebound  Skin: No rash or jaundice   Other: No peripheral edema, distal pulses palpable     Data   Recent Labs   Lab  06/14/19  0556 06/13/19  2141 06/13/19  1340 06/13/19  0624  06/12/19  0543  06/11/19  0609  06/10/19  1300 06/10/19  1154   WBC  --   --   --   --   --  7.5  --  7.9  --   --  11.6*   HGB  --   --   --   --   --  14.1  --  13.7  --   --  13.8   MCV  --   --   --   --   --  96  --  94  --   --  93   PLT  --   --   --  214  --  222  --  201  --   --  218   * 129* 127* 129*   < > 128*   < > 127*   < > 121* 121*   POTASSIUM  --   --   --  3.8  --  3.8  --  3.6   < > 4.4 4.4   CHLORIDE  --   --   --  93*  --  91*  --  92*   < > 90* 91*   CO2  --   --   --  29  --  30  --  28   < > 26 22   BUN  --   --   --  13  --  16  --  11   < > 14 15   CR  --   --   --  0.54  --  0.59  --  0.53   < > 0.48* 0.42*   ANIONGAP  --   --   --  7  --  7  --  7   < > 5 8   MICK  --   --   --  8.9  --  9.2  --  8.4*   < > 8.7 8.3*   GLC  --   --   --  85  --  92  --  81   < > 143* 177*   ALBUMIN  --   --   --   --   --   --   --   --   --  3.3* 3.0*   PROTTOTAL  --   --   --   --   --   --   --   --   --  6.9 6.2*   BILITOTAL  --   --   --   --   --   --   --   --   --  0.5 0.5   ALKPHOS  --   --   --   --   --   --   --   --   --  86 81   ALT  --   --   --   --   --   --   --   --   --  25 22   AST  --   --   --   --   --   --   --   --   --  18 17    < > = values in this interval not displayed.     No results found for this or any previous visit (from the past 24 hour(s)).

## 2019-06-15 NOTE — PLAN OF CARE
VSS on RA. Pt disoriented to time- bed alarm on. Pt slept most of night. Good urine output with purewick catheter, changed on shift. PIV SL. Pt on DD3 thin liquid diet. 1 L FR. No BM on shift. Na 125 last night- MD aware. Recheck this AM. Will cont to monitor and follow POC.

## 2019-06-15 NOTE — PROGRESS NOTES
[facility] Progress Note - Hospitalist Service, Gold 4       Date of Admission:  6/10/2019  Assessment & Plan    Jackeline Smiley is a 86 year old female with history of acquired hemophilia A, HTN, HLD, anxiety and recent admission 5/17/19 to 5/25/19 for acute ICH who presents with altered mental status and was subsequently found to be hyponatremic to 121      Symptomatic euvolemic hypo-osmolar hyponatremia: Mild hyponatremia during 5/2019 admission (130s) c/w SIADH. Responded to 1500 mL fluid restriction, which was continued on discharge.  Unclear if fluid restriction continued at NH. TSH normal 5/2019. Admitted 6/10 with symptomatic hyponatremia to 121. Urine studies c/w SIADH likely 2/2 recent hemorrhagic CVA. Na 128 (131 --> 135). A&O x3  - Continue fluid restriction 1000 ml, no caffeine   - Trend sodium daily   - PT/OT    Acquired hemophilia A, recent hemorrhagic CVA: Hemophilia 2/2 Factor VIII inhibitor antibody. 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 Residual left hemiparesis. No new focal neurologic deficits. Head CT 6/10 notes evolving, slightly reduced size R basal ganglia hemorrhage. Factor VII 94 6/11. No e/o acute bleeding  - Hematology signed off 6/12, needs OP follow up as she missed 6/13/19 appointment   - Continue PTA prednisone 50 mg daily    Chronic dysphagia: PTA on DD2 diet with unclear liquid thickening. SLP consulted and suggest DD3 with thin liquids at this time     Stable Medical Issues:  Upper airway congestion: Intermittently an issue for several years. No hypoxia, productive cough, fevers, or crackles. Continue IS and flutter valve. Duonbes prn for wheezing. Contact medicine with acute changes  Inaccurate med list: Per granddaughter, patient recently started Leaxpro and Trazodone at outside TCU. Not in med rec per our system. Will suggest not continuing either of these medications on  discharge   HTN: Stable.Continue lisinopril and Atenolol with hold parameters  HLD:  Continue statin  GERD: Continue PPI      Diet: Fluid restriction 1000 ML FLUID  Room Service  Dysphagia Diet Level 3 Advanced Thin Liquids (water, ice chips, juice, milk gelatin, ice cream, etc)    DVT Prophylaxis: Pneumatic Compression Devices  Granados Catheter: not present  Code Status: DNR/DNI      Disposition Plan   Expected discharge: tomorrow, recommended to transitional care unit once TCU bed available   Entered: Rosalie Davila PA-C 06/15/2019, 2:03 PM       The patient's care was discussed with the patient, nursing, SW, family, and attending physician, Dr. Maame Davila PA-C  Hospitalist Service, [address], [place]  Pager: [phone]  Please see sticky note for cross cover information  ______________________________________________________________________    Interval History    No acute events. Tolerating diet and fluid restriction. Cough unchanged. No dyspnea, fever. Denies confusion. Wants to discharge from the hospital today and frustrated by lack of placement. Patient's granddaughter updated with plan     Data reviewed today: I reviewed all medications, new labs and imaging results over the last 24 hours    Physical Exam   Vital Signs: Temp: 96.8  F (36  C) Temp src: Oral BP: 160/54   Heart Rate: 63 Resp: 16 SpO2: 96 % O2 Device: None (Room air)    Weight: 97 lbs 9.6 oz  General Appearance: Alert and oriented x3, appears frustrated   Respiratory: Bilateral upper congestion breathing comfortably on room air. Expiratory wheezes. No crackles   Cardiovascular: RRR, S1, S2. No murmur noted  GI: Abdomen soft, non-tender with active bowel sounds. No guarding or rebound  Skin: No rash or jaundice   Other: No peripheral edema, distal pulses palpable     Data   Recent Labs   Lab 06/15/19  0640 06/14/19 2013 06/14/19  0853  06/13/19  0624  06/12/19  0543  06/11/19  0609   06/10/19  1300 06/10/19  1154   WBC 8.3  --   --   --   --   --  7.5  --  7.9  --   --  11.6*   HGB 13.3  --   --   --   --   --  14.1  --  13.7  --   --  13.8   MCV 95  --   --   --   --   --  96  --  94  --   --  93     --   --   --  214  --  222  --  201  --   --  218   * 125* 131*   < > 129*   < > 128*   < > 127*   < > 121* 121*   POTASSIUM 4.2  --   --   --  3.8  --  3.8  --  3.6   < > 4.4 4.4   CHLORIDE 92*  --   --   --  93*  --  91*  --  92*   < > 90* 91*   CO2 27  --   --   --  29  --  30  --  28   < > 26 22   BUN 17  --   --   --  13  --  16  --  11   < > 14 15   CR 0.54  --   --   --  0.54  --  0.59  --  0.53   < > 0.48* 0.42*   ANIONGAP 8  --   --   --  7  --  7  --  7   < > 5 8   MICK 8.8  --   --   --  8.9  --  9.2  --  8.4*   < > 8.7 8.3*   GLC 81  --   --   --  85  --  92  --  81   < > 143* 177*   ALBUMIN  --   --   --   --   --   --   --   --   --   --  3.3* 3.0*   PROTTOTAL  --   --   --   --   --   --   --   --   --   --  6.9 6.2*   BILITOTAL  --   --   --   --   --   --   --   --   --   --  0.5 0.5   ALKPHOS  --   --   --   --   --   --   --   --   --   --  86 81   ALT  --   --   --   --   --   --   --   --   --   --  25 22   AST  --   --   --   --   --   --   --   --   --   --  18 17    < > = values in this interval not displayed.     No results found for this or any previous visit (from the past 24 hour(s)).

## 2019-06-15 NOTE — PROVIDER NOTIFICATION
Most recent sodium level 125. Provider paged [phone]) to update regarding new result. Awaiting call back/respose.

## 2019-06-15 NOTE — PLAN OF CARE
Alert, confused to time. Forgetful. PureWick changed at 1700 and 2245. Good urine output. No BM this evening. Good appetite. Repositioned q 2hrs. Heels elevated. PIV saline locked. Neuros unchanged. C/o back pain. Tylenol administered with effect. Also c/o loose, frequent cough. New order for tessalon. Awaiting TCU placement.

## 2019-06-15 NOTE — PROGRESS NOTES
Social Work Services Progress Note    Hospital Day: 6  Date of Initial Social Work Evaluation:  6/11/19  Collaborated with:  [facility] and [facility] TCU's    Data:  SW following for discharge planning. Per note from yesterday, referrals made to  TCU (declined), [place] (declined), [facility] (pending). Handoff note from weekday SW indicated that pt's granddaughter Socorro was going to come up with more choices, but an addendum to the note states that Socorro called back yesterday and provided [facility] as ad additional option. Referral sent there yesterday.     Intervention:  SW spoke with Tammie at [facility] ([phone]) - pt has been tentatively clinically accepted and they may have a spot for her tomorrow. Admissions RN will need to review referral tomorrow for final decusions. Sunday SW to follow up tomorrow.     SW left message for admissions staff at [facility] ([phone]) - per staff there today, no weekend admissions.     SW will continue to follow for discharge planning.     Plan:    Anticipated Disposition:  Facility:  TBD    Barriers to d/c plan:  None anticipated     Follow Up:  SW will continue to follow for discharge placement     Ernestine MORAN LICSW  6/15/2019    Text paging available through [employer] on [employer] - search SOCIAL WORK    ON CALL PAGER   0800 - 1600   [phone]    ON CALL COVERAGE AFTER 1600  [phone]

## 2019-06-15 NOTE — PROGRESS NOTES
"Reason for admission: Hyponatremia   Vitals:BP: 148/60, Temp: 97.8, RR: 16, Wt: 44.3kg, SPO2: 95 BMI: 16.75   Activity: Lift with assist x3 to chair, repositioning [id number]  Pain: Generalized pain, partially relieved with PRN tylenol  Neuro: Oriented x3, intermittently disoriented to time.Forgetful with repeated requests.   Cardiac: WDL  Respiratory: LS Clear. occasional cough.   GI/: Incontinent of urine. 1 BM this shift.   Diet:  DD3 diet. No caffeine. 1L FR. Intake 100% for breakfast lunch   Lines: PIV saline locked   Skin/Wounds: Bruised but intact   Labs: Na128-   Plan: Continue to trend Na. SW working on TCU placement, Continue to monitor and follow POC     SOAP:  S: Patient stated that she had \" back pain.\"  O: patient pain scale 6/10  A: patient is frustrated, because her situation not getting batter   P:  Try alternative pain relieve, such as  hot/cold pack.        "

## 2019-06-16 NOTE — DISCHARGE SUMMARY
[facility] [place]  Hospitalist Discharge Summary       Date of Admission:  6/10/2019  Date of Discharge:  6/17/2019  1:45 PM  Discharging Provider: Bradley Brandon PA-C   Discharge Team: Hospitalist Service, [facility] [place]    Discharge Diagnoses   Euvolemic hypo-osmolar hyponatremia d/t SIADH  Acquired hemophilia A  Recent hemorrhagic CVA  Chronic dysphagia  Chronic upper airway congestion  HTN  HLD  GERD    Follow-ups Needed After Discharge   - Follow up with TCU provider within 48 hours of admission. Suggest weekly BMP and Na checks 1-3 times per week  - Follow up with hematology on discharge for ongoing management of hemophilia A    Unresulted Labs Ordered in the Past 30 Days of this Admission     No orders found from 4/11/2019 to 6/11/2019.          Discharge Disposition   Discharged to rehabilitation facility  Condition at discharge: Stable    Hospital Course   Jackeline Smiley is a 86 year old female with history of acquired hemophilia A, HTN, HLD, anxiety and recent admission 5/17/19 to 5/25/19 for acute ICH who was admitted with symptomatic hyponatremia (Na 121) after presenting with altered mental status. The following problems were addressed this hospitalization:     Euvolemic hypo-osmolar hyponatremia with SIADH: Mild hyponatremia noted during 5/2019 admission (130s) with urine Na 102 c/w SIADH. TSH normal. Responded to 1500 mL fluid restriction, which was continued on discharge. Unclear how long fluid restriction was continued at NH. Admitted 6/10 with symptomatic hyponatremia to 121. Urine studies again c/w SIADH, which I suspect is related to recent hemorrhagic CVA. Also noted to have been started on serotonin specific reuptake inhibitor at NH, which could be contributing factor. This was not continued. Initially treated with 1500 mL fluid restriction but further restricted to 1000 mL due to lack of response. Na improved and remained stable at 128-131 prior to discharge. Patient  clinically improved, but anticipate this will be an ongoing issue until able to further recover from CVA.  - Please continue caffeine-free diet with 1000 fluid restriction at TCU.   - Ok to liberalize to 3854-0816 ml/24 hours once Na is stable for several days.   - Would not suggest increasing above 1500 ml/24 hours at this time  - Trend Na 2-3 times per week at TCU with weekly BMP    Metabolic encephalopathy:  Secondary to hyponatremia. No prior history of dementia. No other organic cause of AMS apparent. TSH normal. No evidence of infection. Electrolytes otherwise normal. Mentation improved with treatment of hyponatremia.     Acquired hemophilia A, recent hemorrhagic CVA: Hemophilia 2/2 Factor VIII inhibitor antibody. 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 Residual left hemiparesis noted. No new focal neurologic deficits. Head CT 6/10 notes evolving, slightly reduced size R basal ganglia hemorrhage. Factor VII 94 6/11. No e/o acute bleeding  - Follow up with hematology outpatient   - Continue prednisone 50 mg daily on discharge     Chronic dysphagia: PTA on DD2 diet with unclear liquid thickening. SLP consulted this admission  - Continue suggest DD3 with thin liquids on discharge     Stable Medical Issues:  Upper airway congestion: Intermittently an issue for several years. No hypoxia, productive cough, fevers, or crackles. Nebs not helpful per patient. Suggest continuing pulmonary toilet on discharge    HTN: Stable.Continue lisinopril and Atenolol on discharge   HLD:  Continue statin on discharge  GERD: Continue PPI on discharge       Consultations This Hospital Stay   Hematology, PT, OT, social work     Code Status   DNR/DNI    Time Spent on this Encounter   IBradley, personally saw the patient today and spent greater than 30 minutes discharging this patient.       [id number]  [facility],  [place]  ______________________________________________________________________    Physical Exam   Vital Signs: Temp: 96.7  F (35.9  C) Temp src: Oral BP: 152/66   Heart Rate: 64 Resp: 16 SpO2: 90 % O2 Device: None (Room air)    Weight: 97 lbs 9.6 oz  General Appearance:  Awake. Alert. Oriented to person, place, day of week, month, year. Appears comfortable.   Respiratory: CTAB.   Cardiovascular: RRR. S1, S2. No murmur appreciated.   GI: Soft, ND, NT, active BS.   Skin: No rash.   Other:  Mild left hemiparesis, otherwise neuro exam nonfocal.         Primary Care Physician   [facility]ole Flores [facility]    Discharge Orders      General info for SNF    Length of Stay Estimate: Short Term Care: Estimated # of Days <30  Condition at Discharge: Stable  Level of care:skilled   Rehabilitation Potential: Fair  Admission H&P remains valid and up-to-date: Yes  Recent Chemotherapy: N/A  Use Nursing Home Standing Orders: Yes     Mantoux instructions    Give two-step Mantoux (PPD) Per Facility Policy Yes     Reason for your hospital stay    You were admitted with confusion which was found to be due to low sodium numbers. Your sodium improved and your mentation returned to baseline. You will discharge to TCU for ongoing rehabilitation care     Intake and output    Every shift     Daily weights    Call Provider for weight gain of more than 2 pounds per day or 5 pounds per week.     Activity - Up with nursing assistance     Follow Up and recommended labs and tests    Follow up with detention physician.  The following labs/tests are recommended: CBC. Weekly BMP. Na checks 1-3 times per week.    Follow up with hematology after discharge for ongoing care of hemophilia A     DNR/DNI     Nutrition Services Adult IP Consult    Reason:  Fluid restriction, ensure compliance     Occupational Therapy Adult Consult    Evaluate and treat as clinically indicated.    Reason:  Recent CVA     Speech Language Path Adult Consult    Evaluate  and treat as clinically indicated.    Reason:  Chronic dysphagia s/p stroke     Physical Therapy Adult Consult    Evaluate and treat as clinically indicated.    Reason:  Recent CVA     Fall precautions     Advance Diet as Tolerated    Follow this diet upon discharge:      Fluid restriction 1000 ML FLUID      No caffeine       Dysphagia Diet Level 3 Advanced Thin Liquids (water, ice chips, juice, milk gelatin, ice cream, etc)       Significant Results and Procedures   Most Recent 3 CBC's:  Recent Labs   Lab Test 06/16/19  0726 06/15/19  0640 06/13/19  0624 06/12/19  0543 06/11/19  0609   WBC  --  8.3  --  7.5 7.9   HGB  --  13.3  --  14.1 13.7   MCV  --  95  --  96 94    229 214 222 201     Most Recent 3 BMP's:  Recent Labs   Lab Test 06/17/19  1029 06/16/19  0726 06/15/19  0640  06/13/19  0624   * 130* 128*   < > 129*   POTASSIUM 3.9  --  4.2  --  3.8   CHLORIDE 93*  --  92*  --  93*   CO2 29  --  27  --  29   BUN 22  --  17  --  13   CR 0.59  --  0.54  --  0.54   ANIONGAP 7  --  8  --  7   MICK 8.6  --  8.8  --  8.9   *  --  81  --  85    < > = values in this interval not displayed.     Most Recent 2 LFT's:  Recent Labs   Lab Test 06/10/19  1300 06/10/19  1154   AST 18 17   ALT 25 22   ALKPHOS 86 81   BILITOTAL 0.5 0.5       Discharge Medications   Current Discharge Medication List      START taking these medications    Details   benzonatate (TESSALON) 100 MG capsule Take 1 capsule (100 mg) by mouth 3 times daily as needed for cough    Associated Diagnoses: Chronic cough      hydrOXYzine (ATARAX) 10 MG tablet Take 1 tablet (10 mg) by mouth 3 times daily as needed for anxiety    Associated Diagnoses: Anxiety         CONTINUE these medications which have NOT CHANGED    Details   acetaminophen (TYLENOL) 325 MG tablet Take 325 mg by mouth 3 times daily      atenolol (TENORMIN) 50 MG tablet Take 1 tablet (50 mg) by mouth daily  Qty: 90 tablet, Refills: 0    Associated Diagnoses: Essential hypertension       calcium carbonate-vitamin D (CALCIUM + D) 600-200 MG-UNIT TABS Take 1 tablet by mouth 2 times daily  Qty: 180 tablet, Refills: 3    Associated Diagnoses: Osteoarthritis, unspecified osteoarthritis type, unspecified site      lisinopril (PRINIVIL/ZESTRIL) 10 MG tablet Take 1 tablet (10 mg) by mouth 2 times daily  Qty: 90 tablet    Associated Diagnoses: Benign essential hypertension      meclizine (ANTIVERT) 25 MG tablet Take 1 tablet (25 mg) by mouth 4 times daily as needed for dizziness  Qty: 30 tablet, Refills: 0    Associated Diagnoses: Vertigo      pantoprazole (PROTONIX) 40 MG EC tablet Take 1 tablet (40 mg) by mouth every morning (before breakfast)    Associated Diagnoses: Hemophilia A (H)      predniSONE (DELTASONE) 50 MG tablet Take 1 tablet (50 mg) by mouth daily    Comments: Continue until follow up with hematology clinic.  Associated Diagnoses: Hemophilia A (H)      vitamin D3 (CHOLECALCIFEROL) 1000 units (25 mcg) tablet Take 1 tablet (1,000 Units) by mouth daily  Qty: 60 tablet, Refills: 6    Associated Diagnoses: Vitamin deficiency      simvastatin (ZOCOR) 20 MG tablet Take 1 tablet (20 mg) by mouth At Bedtime  Qty: 30 tablet, Refills: 3    Associated Diagnoses: Hyperlipidemia LDL goal <100         STOP taking these medications       LORazepam (ATIVAN) 0.5 MG tablet Comments:   Reason for Stopping:             Allergies   Allergies   Allergen Reactions     Atorvastatin Calcium      hepatitis     Calcium Channel Blockers      Sulfa Drugs Fatigue

## 2019-06-16 NOTE — PLAN OF CARE
A/O x 3. Forgetful with STM loss. VSS. Back pain managed with PRN tylenol. Atarax given x 1 for anxiety. Patient is upset and frustrated about not discharged today. Still waiting for TCU placement. On FR 1L. Incontinent of urine x 4.Turn/reposition Q 2 hrs. Refused to set up on  the chair for meals. Appropriate with call light. Tolerate diet well. Continue to monitor and update primary team with any changes.

## 2019-06-16 NOTE — PROGRESS NOTES
"Social Work Services Progress Note    Hospital Day: 7  Date of Initial Social Work Evaluation:  6/11/19  Collaborated with:  TCU's, PA, family    Data:  SW following for discharge planning. Referrals have been made to  TCU, [facility] and [facility] and facilities declined to take patient due to safety concerns and \"unclear trajectory\".   Referrals also made to [facility], but no weekend admission staff, so unsure if they can accept her.     Intervention:  SW spoke with granddaughter Socorro today to update her. Discussed other TCU options. She is agreeable to a referral to [facility] and will also look again at the list to come up with more options. SW sent referral to [facility] via [employer] today. Awaiting response.   Updated PA at 7409.     Plan:    Anticipated Disposition:  Facility:  TCU TBD    Barriers to d/c plan:  Facility Acceptance    Follow Up:  SW continues to follow for discharge planning.     "

## 2019-06-16 NOTE — PROGRESS NOTES
"Reason for admission: Hyponatremia   Vitals:BP: 141/59, Temp: 98.2, RR: 16, Wt: 44.3kg, SPO2: 93 BMI: 16.75   Activity: Lift with assist x2 to chair, repositioning [id number]  Pain: Generalized pain, partially relieved with PRN tylenol  Neuro: Oriented x3, intermittently disoriented to time.Forgetful with repeated requests.   Cardiac: WDL  Respiratory: LS Clear. occasional cough.   GI/: Incontinent of urine.No BM this shift.   Diet:  DD3 diet. No caffeine. 1L FR. Intake 100% for breakfast lunch   Lines: PIV saline locked   Skin/Wounds: Bruised but intact   Labs: Na130-   Plan: Continue to trend Na. SW working on TCU placement, Continue to monitor and follow POC     SOAP:  S: Patient stated that she  \"tried of laying on the bed all day\"   O: The patient appeared agitated and anxious.   A: patient feels that her situation is not improving, instead it is going backward. So she id sick of being in the hospital and want to get out of here.    P:  Try distraction methods to take her mind off of what is bothering her.       "

## 2019-06-16 NOTE — PLAN OF CARE
"Time 4374-6736     Reason for admission: Hyponatremia  Vitals: VSS on RA    /72 (BP Location: Left arm)   Pulse 54   Temp 98  F (36.7  C) (Oral)   Resp 16   Ht 1.626 m (5' 4\")   Wt 44.3 kg (97 lb 9.6 oz)   SpO2 96%   BMI 16.75 kg/m      Activity: A2, GB, walker  Pain: C/o lower back pain  Neuro: A&Ox4  Cardiac: WDL  Respiratory: WDL on RA  GI/: Incontinent urine; purwick in place. 1 continent BM.  Diet: DD3, thin liquids.  Lines: PIV  Labs: Na 128  New this shift: Up to chair for dinner. Up to commode. Rested most of shift. Alarm on in bed.    Plan: discharge pending TCU placment      Continue to monitor and follow POC  "

## 2019-06-16 NOTE — PLAN OF CARE
VSS on RA. Disoriented to time- bed alarm on for safety. Pt slept most of night. No BM on shift. Purewick catheter in place- good output. Tolerating DD3 thin liquid diet. Pt on 1L FR. Na is 128- MD aware and plan is to continue fluid restriction.  Waiting on TCU placement- possible discharge today. Will cont to monitor and follow POC.

## 2019-06-16 NOTE — PROGRESS NOTES
[facility], [facility] Progress Note - Hospitalist Service, Gold 4       Date of Admission:  6/10/2019  Assessment & Plan    Jackeline Smiley is a 86 year old female with history of acquired hemophilia A, HTN, HLD, anxiety and recent admission 5/17/19 to 5/25/19 for acute ICH who presents with altered mental status and was subsequently found to be hyponatremic to 121      Symptomatic euvolemic hypo-osmolar hyponatremia: Mild hyponatremia during 5/2019 admission (130s) c/w SIADH. Responded to 1500 mL fluid restriction, which was continued on discharge.  Unclear if fluid restriction continued at NH. TSH normal 5/2019. Admitted 6/10 with symptomatic hyponatremia to 121. Urine studies c/w SIADH likely 2/2 recent hemorrhagic CVA. Na 130 (128). 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 Residual left hemiparesis. No new focal neurologic deficits. Head CT 6/10 notes evolving, slightly reduced size R basal ganglia hemorrhage. Factor VII 94 6/11. No e/o acute bleeding  - Hematology signed off 6/12, needs OP follow up as she missed 6/13/19 appointment   - Continue PTA prednisone 50 mg daily    Chronic dysphagia: PTA on DD2 diet with unclear liquid thickening. SLP consulted and suggest DD3 with thin liquids at this time     Stable Medical Issues:  Upper airway congestion: Intermittently an issue for several years. No hypoxia, productive cough, fevers, or crackles. Continue IS and flutter valve. Duonbes prn for wheezing. Contact medicine with acute changes  Inaccurate med list: Per granddaughter, patient recently started Leaxpro and Trazodone at outside TCU. Not in med rec per our system. Will suggest not continuing either of these medications on discharge   HTN:  Stable.Continue lisinopril and Atenolol with hold parameters  HLD:  Continue statin  GERD: Continue PPI      Diet: Fluid restriction 1000 ML FLUID  Room Service  Dysphagia Diet Level 3 Advanced Thin Liquids (water, ice chips, juice, milk gelatin, ice cream, etc)    DVT Prophylaxis: Pneumatic Compression Devices  Granados Catheter: not present  Code Status: DNR/DNI      Disposition Plan   Expected discharge: 1-3 days, recommended to transitional care unit once TCU bed available   Entered: Rosalie Davila PA-C 06/16/2019, 10:48 AM       The patient's care was discussed with the patient, nursing, SW, family, and attending physician, Dr. Maame Davila PA-C  Hospitalist Service, [address]  Pager: [phone]  Please see sticky note for cross cover information  ______________________________________________________________________    Interval History    Ongoing frustration. Feels as if she cannot get physically better at the hospital. Does not feel sick enough to be here. Denies fever or chills. Tolerating fluid restriction. Denies confusion     Data reviewed today: I reviewed all medications, new labs and imaging results over the last 24 hours    Physical Exam   Vital Signs: Temp: 97  F (36.1  C) Temp src: Oral BP: 174/64 Pulse: 55 Heart Rate: 61 Resp: 16 SpO2: 96 % O2 Device: None (Room air)    Weight: 97 lbs 9.6 oz  General Appearance: Alert and oriented x3, appears frustrated   Respiratory: Bilateral upper congestion breathing comfortably on room air. Expiratory wheezes. No crackles   Cardiovascular: RRR, S1, S2. No murmur noted  GI: Abdomen soft, non-tender with active bowel sounds. No guarding or rebound  Skin: No rash or jaundice   Other: No peripheral edema, distal pulses palpable     Data   Recent Labs   Lab 06/16/19  0726 06/15/19  0640 06/14/19 2013 06/13/19  0624  06/12/19  0543  06/11/19  0609  06/10/19  1300 06/10/19  1154   WBC  --  8.3   --   --   --   --  7.5  --  7.9  --   --  11.6*   HGB  --  13.3  --   --   --   --  14.1  --  13.7  --   --  13.8   MCV  --  95  --   --   --   --  96  --  94  --   --  93    229  --   --  214  --  222  --  201  --   --  218   * 128* 125*   < > 129*   < > 128*   < > 127*   < > 121* 121*   POTASSIUM  --  4.2  --   --  3.8  --  3.8  --  3.6   < > 4.4 4.4   CHLORIDE  --  92*  --   --  93*  --  91*  --  92*   < > 90* 91*   CO2  --  27  --   --  29  --  30  --  28   < > 26 22   BUN  --  17  --   --  13  --  16  --  11   < > 14 15   CR  --  0.54  --   --  0.54  --  0.59  --  0.53   < > 0.48* 0.42*   ANIONGAP  --  8  --   --  7  --  7  --  7   < > 5 8   MICK  --  8.8  --   --  8.9  --  9.2  --  8.4*   < > 8.7 8.3*   GLC  --  81  --   --  85  --  92  --  81   < > 143* 177*   ALBUMIN  --   --   --   --   --   --   --   --   --   --  3.3* 3.0*   PROTTOTAL  --   --   --   --   --   --   --   --   --   --  6.9 6.2*   BILITOTAL  --   --   --   --   --   --   --   --   --   --  0.5 0.5   ALKPHOS  --   --   --   --   --   --   --   --   --   --  86 81   ALT  --   --   --   --   --   --   --   --   --   --  25 22   AST  --   --   --   --   --   --   --   --   --   --  18 17    < > = values in this interval not displayed.

## 2019-06-17 NOTE — PLAN OF CARE
Occupational Therapy Discharge Summary    Reason for therapy discharge:    Discharged to transitional care facility.    Progress towards therapy goal(s). See goals on Care Plan in [facility] electronic health record for goal details.  Goals partially met.  Barriers to achieving goals:   discharge from facility.    Therapy recommendation(s):    Continued therapy is recommended.  Rationale/Recommendations:  recommend continued rehab to maximize function due impairments in cognition, L sided weakness/neglect, balance impairment, deconditioning. .

## 2019-06-17 NOTE — PROGRESS NOTES
Social Work Services Discharge Note      Patient Name:  Jackeline Smiley     Anticipated Discharge Date:  6/17/19    Discharge Disposition:   TCU:  [facility]   [address] [place] [address]   [phone]  Fax [phone]    Following MD:  Facility assignment     Pre-Admission Screening (PAS) online form has been completed.  The Level of Care (LOC) is:  Determined  Confirmation Code is:  [id number]  Patient/caregiver informed of referral to [facility] Line for Pre-Admission Screening for skilled nursing facility (SNF) placement and to expect a phone call post discharge from SNF.     Additional Services/Equipment Arranged:  [facility] (ph [phone]) wheelchair van arranged for 1:45pm.      Patient / Family response to discharge plan: Pt in agreement with plan. Pt alert and oriented. Pt was hoping to discharge directly home but is agreeable with TCU placement. Pt's granddaughter Socorro in agreement with plan.     Persons notified of above discharge plan:  Pt, YVETTE Castrejon, primary team Margot Payne- admissions at [facility] (ph [phone]), pt's granddaughter Socorro (ph [phone]). Offered to call pt's daughter Sigrid who is listed on pt's facesheet, but pt declines, stating that they are estranged.    Staff Discharge Instructions:  RN to call report to [phone].  SW to fax discharge orders and signed hard scripts for any controlled substances.  Please print a packet and send with patient.     CTS Handoff completed:  YES    LUISA Correa, LGSW  5th Floor   Pager [phone]  Phone [phone]

## 2019-06-17 NOTE — PLAN OF CARE
VSS on RA. Pt disoriented to time. Bed alarm on for safety. Pt admitted for hyponatremia. Current Na is 130. Last BM on 6/15. Voiding using purewick catheter for urinary incontinence, good output. Tolerating DD3 Thin liquid diet. Pt on 1L FR. PIV SL. Waiting on TCU placement. Will cont to monitor and follow POC.

## 2019-06-17 NOTE — PLAN OF CARE
Discharge Planner PT   Patient plan for discharge: Wants to go home today  Current status: Multiple sit<>stands and standing activity with FWW and min-max A. High falls risk. Assisted back to bed with bed alarm on  Barriers to return to prior living situation: Deconditioning, left sided weakness/neglect, falls risk  Recommendations for discharge: TCU  Rationale for recommendations: Current level of function       Entered by: Alphonso Arechiga 06/17/2019 12:47 PM

## 2019-06-17 NOTE — PLAN OF CARE
Patient is discharging to TCU. PIV removed by NST. Pt did not want to get dressed - leaving in hospital gown. A2 with gaitbelt and walker. RN report given to facility admitting RN. Packet printed by NST. WC ride at 1345. Pt sent with 3 bags of belongings and in her wheelchair from home.

## 2019-06-17 NOTE — PLAN OF CARE
"Assumed cares at 9596-7449. A/o x 3. Forgetful at times. VSS on RA. C/o low back pain- APAP avail again around 0000. Turned Q2H. No BM during shift. Purewick replaced at 2115. L PIV SL. Melatonin given at HS. Lozenge given x 1 for \"Scratchy throat\". 1L Fluid restriction (1095ml). DD3 diet with thin liquids. No Caffeine. Strict I & O. Neuros intact. Trending Na Q48H. Pt was upset at beginning of shift regarding having to stay IP for another night- reassured pt on safety concerns returning home- Pt stated that she understood, but would like to \"Get out of here\".     P: Cont with POC. Monitor labs. Plan for discharge to TCU pending bed availability. Call light within reach. Bed alarm on for pt safety. Will continue to monitor.     "

## 2019-06-18 NOTE — PLAN OF CARE
Speech Language Therapy Discharge Summary    Reason for therapy discharge:    Discharged to transitional care facility.    Progress towards therapy goal(s). See goals on Care Plan in [facility] electronic health record for goal details.  Goals partially met.  Barriers to achieving goals:   discharge from facility.    Therapy recommendation(s):    Continued therapy is recommended.  Rationale/Recommendations:  Dysphagia diet 3, po tolerance and advancement.

## 2019-08-31 NOTE — ED AVS SNAPSHOT
[facility], [place], Emergency Department  [address] [place] [id number]  Phone:  [phone]                                    Jackeline Smiley   MRN: [id number]    Department:  [facility], Emergency Department   Date of Visit:  8/31/2019           After Visit Summary Signature Page    I have received my discharge instructions, and my questions have been answered. I have discussed any challenges I see with this plan with the nurse or doctor.    ..........................................................................................................................................  Patient/Patient Representative Signature      ..........................................................................................................................................  Patient Representative Print Name and Relationship to Patient    ..................................................               ................................................  Date                                   Time    ..........................................................................................................................................  Reviewed by Signature/Title    ...................................................              ..............................................  Date                                               Time          22EPIC Rev 08/18

## 2019-08-31 NOTE — PROGRESS NOTES
"Social Work: Assessment with Discharge Plan    Patient Name:  Jackeline Smiley  :  1933  Age:  86 year old  MRN:  [id number]  Risk/Complexity Score:     Completed assessment with: ED MD, ED RN chart, patient    Presenting Information   Reason for Referral:  Discharge plan  Date of Intake:  2019  Referral Source:  Chart Review  Decision Maker:  patient  Alternate Decision Maker: Per patient she completed one \"a long time ago\" naming her granddaughter Socorro Huerta as primary decision maker.  Notes she is hoping to change it to have her daughter Sigrid Huerta jointly with her granddaughter Socorro Huerta  Health Care Directive:  Will bring in copy  Living Situation:  [facility]  Previous Functional Status:  Assistance with Other:  Skilled nursing/24-hour care at this time  Patient and family understanding of hospitalization:  fall  Cultural/Language/Spiritual Considerations:  Quaker per demographics  Adjustment to Illness:  Appears somewhat saddened re her ED visit, noting \"I was supposed to move home (senior apartment) yesterday.\"    Physical Health  Reason for Admission:    1. Fall, initial encounter    2. Hyponatremia, SIADH Dx'ed earlier    3. Hematoma of scalp, initial encounter      Services Needed/Recommended:  TCU    Mental Health/Chemical Dependency  Diagnosis:  None reported by patient today.  Per chart review, history of anxiety  Support/Services in Place:  none  Services Needed/Recommended:  none    Support System  Significant relationship at present time:  TCU staff  Family of origin is available for support:  yes  Other support available:  none  Gaps in support system:  none  Patient is caregiver to:  None     Provider Information   Primary Care Physician:  Carole [facility]onez [facility]   [phone]   Clinic:  [address] / [place] [address]      :  [facility] , name unknown    Financial   Income Source:  Not " discussed.  Financial Concerns:  None reported.  Insurance:    Payor/Plan Subscriber Name Rel Member # Group #   UCARE - UCARE FOR SEN* JACKELINE VAUGHAN [facility] [id number]       [id number]       Discharge Plan   Patient and family discharge goal: Would like to go home to her apartment but seems to understand she will need to return TCU  Provided education on discharge plan:  NO  Patient agreeable to discharge plan:  Not discussed.  A list of Medicare Certified Facilities was provided to the patient and/or family to encourage patient choice. Patient's choices for facility are:  From [facility].   Will NH provide Skilled rehabilitation or complex medical:  YES  General information regarding anticipated insurance coverage and possible out of pocket cost was discussed. Patient and patient's family are aware patient may incur the cost of transportation to the facility, pending insurance payment: NO  Barriers to discharge:  TBD - Pending patient's progress and further recommendation.    Discharge Recommendations   Anticipated Disposition:  Facility:  [facility]  Transportation Needs:  Medical:  Wheelchair  Name of Transportation Company and Phone:  [employer] (Ph: [phone])    Additional comments   SW consulted via chart review given patient's age (86) and from TCU.  ED SW met with patient, Jackeline, for assessment and discussion regarding her current TCU.  Jackeline appears alert and oriented, is able to answer questions and provide some details. Later in her ED visit, noted she appeared to be confused, mildly anxious and was intermittently yelling out for her and daughter and grand-daughter.     Jackeline is an 86-year-old  female who is currently at [facility].  Previously, she had been relatively independent at [facility].  Jackeline's primary contact is her granddaughter Socorro, during last hospitalization she reported discord with her daughter  Sigrid.  However, today she reports she is in contact with both her granddaughter and her daughter.  She reports that the plan had been for her to discharge back to her senior living yesterday but it was delayed for some reason.  Inquired regarding level of care available at her senior living, she is under the impression she would be having some extra services.    Plan: TBD - Pending patient's progress and further recommendation.  Anticipate she will return to [facility] once medically stable. Anticipate will need [facility] continued services post fall, then will work on long term plan to get back to her senior apartment.     [address] [address] [place] [address]  Main: [phone], Admissions: [phone], Fax: [phone]      Family contacts:  Granddaughter Socorro Huerta [phone]  Daughter Sigrid Huerta [phone]    Cate Rivers, HARVINDER, [facility]  Social Work Services, Emergency Dept [facility]  Pager: [phone] Mon-Sat 9 am - 9 pm, on-call/after hours pager [phone]    This note was created in part by the use of Dragon voice recognition dictation system. Inadvertent grammatical errors and typographical errors may still exist.

## 2019-08-31 NOTE — DISCHARGE INSTRUCTIONS
Please continue fluid restriction 1500 ml a day for hyponatremia/SIADH and make an appointment to follow up with Your Primary Care Provider in 3-7 days even if entirely better.

## 2019-08-31 NOTE — ED PROVIDER NOTES
[place] EMERGENCY DEPARTMENT ([facility])  8/31/19   History     Chief Complaint   Patient presents with     Fall     The history is provided by the patient, the EMS personnel, a caregiver and medical records.     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  Per care facility patient fell out of bed while attempting to tie her shoes.  Patient does endorse hitting her forehead but denies loss of consciousness. She does endorse pain at the site of impact but no other diffuse headache.  Patient does endorse a history of hemophilia and stroke but denies taking anticoagulants.  It was per the patient the patient in a c-collar.  Here in the ED, she denies any neurological deficits, fever, cough, shortness of breath or chest pain. She denies neck pain. She does endorse pain and paresthesias of her right digits which is new since this fall.  She states that she is right-hand dominant.  Patient states that she has been living in a rehabilitation facility and was supposed to be discharged to her apartment tomorrow prior to this fall.    I have reviewed the Medications, Allergies, Past Medical and Surgical History, and Social History in the [employer] system.    Past Medical History:   Diagnosis Date     Back ache      Hemophilia (H)      Hemophilia A (H) 5/17/2019     Hyperlipidemia      Hypertension      Menopausal disorder      Osteoarthritis      Pneumonia 6-11    LEFT MID LUNG     Tremor      Vertigo        Past Surgical History:   Procedure Laterality Date     C NONSPECIFIC PROCEDURE      submandibular gland excision     CATARACT IOL, RT/LT      both, Dr hZou     HYSTERECTOMY, PAP NO LONGER INDICATED         Family History   Problem Relation Age of Onset     Diabetes Mother      Cancer Father         leukemia     C.A.D.  Brother         [facility]     C.A.ADALID. Brother      Diabetes Sister      Diabetes Sister      Diabetes Brother      Diabetes Brother      Lipids Daughter         grand daughter     Lipids Brother         all sibs had it       Social History     Tobacco Use     Smoking status: Former Smoker     Packs/day: 1.00     Years: 46.00     Pack years: 46.00     Types: Cigarettes     Last attempt to quit: 2019     Years since quittin.2     Smokeless tobacco: Never Used     Tobacco comment: 3/4 pack per day   Substance Use Topics     Alcohol use: No       No current facility-administered medications for this encounter.      Current Outpatient Medications   Medication     acetaminophen (TYLENOL) 325 MG tablet     atenolol (TENORMIN) 50 MG tablet     benzonatate (TESSALON) 100 MG capsule     calcium carbonate-vitamin D (CALCIUM + D) 600-200 MG-UNIT TABS     hydrOXYzine (ATARAX) 10 MG tablet     lisinopril (PRINIVIL/ZESTRIL) 10 MG tablet     meclizine (ANTIVERT) 25 MG tablet     pantoprazole (PROTONIX) 40 MG EC tablet     predniSONE (DELTASONE) 50 MG tablet     simvastatin (ZOCOR) 20 MG tablet     vitamin D3 (CHOLECALCIFEROL) 1000 units (25 mcg) tablet        Allergies   Allergen Reactions     Atorvastatin Calcium      hepatitis     Calcium Channel Blockers      Sulfa Drugs Fatigue        Review of Systems   Constitutional: Negative for fever.   HENT: Negative for congestion.    Eyes: Negative for redness.   Respiratory: Negative for shortness of breath.    Cardiovascular: Negative for chest pain.   Gastrointestinal: Negative for abdominal pain.   Genitourinary: Negative for difficulty urinating.   Musculoskeletal: Negative for arthralgias, neck pain and neck stiffness.        Positive for R hand pain   Skin: Negative for color change.   Neurological: Positive for numbness (R hand paresthesias). Negative for dizziness, facial asymmetry, weakness and headaches.   Hematological: Bruises/bleeds easily.   Psychiatric/Behavioral:  Negative for confusion.   All other systems reviewed and are negative.      Physical Exam   BP: (!) 169/97  Pulse: 69  Heart Rate: 65  Temp: 97  F (36.1  C)  Resp: 12  Height: 152.4 cm (5')  Weight: 44 kg (97 lb 1.6 oz)  SpO2: 90 %      Physical Exam   Constitutional: She is oriented to person, place, and time. No distress.   HENT:   Head: Head is with contusion. Head is without laceration, without right periorbital erythema and without left periorbital erythema.       Mouth/Throat: Oropharynx is clear and moist. No oropharyngeal exudate.   Eyes: Pupils are equal, round, and reactive to light. No scleral icterus.   Cardiovascular: Normal rate, regular rhythm, normal heart sounds and intact distal pulses. Exam reveals no gallop and no friction rub.   No murmur heard.  Pulmonary/Chest: Effort normal and breath sounds normal. No stridor. No respiratory distress. She has no wheezes. She has no rales. She exhibits no tenderness.   Abdominal: Soft. Bowel sounds are normal. She exhibits no distension and no mass. There is no tenderness. There is no rebound and no guarding. No hernia.   Musculoskeletal: She exhibits no edema or tenderness.        Cervical back: She exhibits no tenderness.        Thoracic back: She exhibits no tenderness.        Lumbar back: She exhibits no tenderness.   Neurological: She is alert and oriented to person, place, and time. No cranial nerve deficit.   Skin: Skin is warm. No rash noted. She is not diaphoretic.       ED Course   9:20 AM  The patient was seen and examined by Rose Richards MD in Room ED23.       Procedures             EKG Interpretation:      Interpreted by Rose Richards MD  Time reviewed: 9:40AM  Symptoms at time of EKG: digital paresthesias  Rhythm: normal sinus   Rate: Normal and 65 bpm  Axis: Voltage criteria for LV hypertrophy  Ectopy: none  Conduction: normal  ST Segments/ T Waves: No ST-T wave changes  Q Waves: none  Comparison to prior: No significant changes from  5/30/19    Clinical Impression: No significant changes              Results for orders placed or performed during the hospital encounter of 08/31/19 (from the past 24 hour(s))   EKG 12-lead, tracing only     Status: None    Collection Time: 08/31/19  9:33 AM   Result Value Ref Range    Interpretation ECG Click View Image link to view waveform and result    ABO/Rh type and screen     Status: None    Collection Time: 08/31/19 10:01 AM   Result Value Ref Range    ABO O     RH(D) Neg     Antibody Screen Neg     Test Valid Only At          [facility]    Specimen Expires 09/03/2019    CBC with platelets differential     Status: Abnormal    Collection Time: 08/31/19 10:02 AM   Result Value Ref Range    WBC 11.1 (H) 4.0 - 11.0 10e9/L    RBC Count 4.85 3.8 - 5.2 10e12/L    Hemoglobin 15.5 11.7 - 15.7 g/dL    Hematocrit 47.3 (H) 35.0 - 47.0 %    MCV 98 78 - 100 fl    MCH 32.0 26.5 - 33.0 pg    MCHC 32.8 31.5 - 36.5 g/dL    RDW 14.2 10.0 - 15.0 %    Platelet Count 189 150 - 450 10e9/L    Diff Method Automated Method     % Neutrophils 82.7 %    % Lymphocytes 9.3 %    % Monocytes 5.5 %    % Eosinophils 0.6 %    % Basophils 0.4 %    % Immature Granulocytes 1.5 %    Nucleated RBCs 0 0 /100    Absolute Neutrophil 9.2 (H) 1.6 - 8.3 10e9/L    Absolute Lymphocytes 1.0 0.8 - 5.3 10e9/L    Absolute Monocytes 0.6 0.0 - 1.3 10e9/L    Absolute Eosinophils 0.1 0.0 - 0.7 10e9/L    Absolute Basophils 0.0 0.0 - 0.2 10e9/L    Abs Immature Granulocytes 0.2 0 - 0.4 10e9/L    Absolute Nucleated RBC 0.0    INR     Status: None    Collection Time: 08/31/19 10:02 AM   Result Value Ref Range    INR 0.94 0.86 - 1.14   Partial thromboplastin time     Status: None    Collection Time: 08/31/19 10:02 AM   Result Value Ref Range    PTT 28 22 - 37 sec   Comprehensive metabolic panel     Status: Abnormal    Collection Time: 08/31/19 10:02 AM   Result Value Ref Range    Sodium 127 (L) 133 - 144 mmol/L    Potassium  4.9 3.4 - 5.3 mmol/L    Chloride 94 94 - 109 mmol/L    Carbon Dioxide 29 20 - 32 mmol/L    Anion Gap 5 3 - 14 mmol/L    Glucose 84 70 - 99 mg/dL    Urea Nitrogen 15 7 - 30 mg/dL    Creatinine 0.54 0.52 - 1.04 mg/dL    GFR Estimate 85 >60 mL/min/[1.73_m2]    GFR Estimate If Black >90 >60 mL/min/[1.73_m2]    Calcium 9.0 8.5 - 10.1 mg/dL    Bilirubin Total 0.9 0.2 - 1.3 mg/dL    Albumin 3.4 3.4 - 5.0 g/dL    Protein Total 7.1 6.8 - 8.8 g/dL    Alkaline Phosphatase 78 40 - 150 U/L    ALT 37 0 - 50 U/L    AST 33 0 - 45 U/L   Troponin I     Status: None    Collection Time: 08/31/19 10:02 AM   Result Value Ref Range    Troponin I ES <0.015 0.000 - 0.045 ug/L   CT Head w/o Contrast     Status: None    Collection Time: 08/31/19 10:56 AM    Narrative    CT HEAD W/O CONTRAST 8/31/2019 10:56 AM    Provided History: Head trauma, minor, GCS>=13, low clinical risk,  initial exam  ICD-10: Head trauma    Comparison: CT dated 6/10/2019.    Technique: Using multidetector thin collimation helical acquisition  technique, axial, coronal and sagittal CT images from the skull base  to the vertex were obtained without intravenous contrast.     Findings:      Small left frontal scalp hematoma.    No intracranial hemorrhage, mass effect, or midline shift. The  ventricles are proportionate to the cerebral sulci. There is moderate  cerebral and cerebellar volume loss. The gray to white matter  differentiation of the cerebral hemispheres is preserved. The basal  cisterns are patent.. Scattered hypodensities in the periventricular  white matter and basal ganglia, which is nonspecific, but likely  secondary to chronic small vessel ischemic disease.    The visualized paranasal sinuses are clear. The mastoid air cells are  clear. No fractures.       Impression    Impression:   1. Small left frontal scalp hematoma.   2. No acute intracranial pathology.  3. Moderate leukoaraiosis and moderate cerebral and cerebellar  atrophy.    I have personally  reviewed the examination and initial interpretation  and I agree with the findings.    LOBO JENKINS MD   Cervical spine CT w/o contrast     Status: None    Collection Time: 08/31/19 10:56 AM    Narrative    CT CERVICAL SPINE W/O CONTRAST 8/31/2019 10:56 AM.    Provided History: C-spine fx, traumatic; fall    Comparison: Cervical spine CT dated 5/26/2019    Technique: Using multidetector thin collimation helical acquisition  technique, axial, coronal and sagittal CT images through the cervical  spine were obtained without intravenous contrast.     Findings:  Reversal of the normal cervical lordosis centered at C5. No acute  fracture or subluxation. No prevertebral edema. There is no disc  height narrowing at any level. Severe degenerative changes of the  cervical spine. Mild anterolisthesis at C3-C4, unchanged since  5/26/2019. Moderate disc space narrowing at C4-C5. Marked disc space  narrowing at C5-C6 and C6-C7. Severe multilevel facet hypertrophy. The  findings on a level by level basis are as follows:    C2-3: No spinal canal or neural foraminal stenosis.    C3-4: Bilateral facet hypertrophy. Mild bilateral neural foraminal  stenosis. No significant spinal canal narrowing.    C4-5:  Mild bilateral neural foraminal stenosis. No significant spinal  canal narrowing..    C5-6:  Severe right and moderate left neural foraminal stenosis. Mild  spinal canal stenosis.    C6-7:  Mild bilateral neural foraminal narrowing. No significant  spinal canal narrowing..    C7-T1:  Mild bilateral neural foraminal narrowing. No significant  small canal narrowing.     No acute abnormality of the paraspinous soft tissues.      Impression    Impression:     1. No acute fractures or subluxation.  2. Mild spinal canal stenosis at C5 and C6.  3. Mild anterolisthesis at C3-C4, unchanged compared to 5/26/2019.  4. Severe degenerative changes of the cervical spine most prominent at  C5-C6 and C6-C7.  5. Mild spinal canal stenosis at C5-C6.  Moderate to severe neural  foraminal stenosis at C5-C6 and C6-C7.    I have personally reviewed the examination and initial interpretation  and I agree with the findings.    LOBO JENKINS MD   XR Chest 1 View     Status: None    Collection Time: 08/31/19 11:01 AM    Narrative    Chest one view portable supine    HISTORY: Fall    COMPARISON STUDY: 7/18/2011    FINDINGS: Cardiac silhouette is nonenlarged. Convex right thoracic  spinal curve. Study limited by rotation.      Impression    IMPRESSION: No obvious fracture identified.    SENTHIL HOWARD MD           Labs Ordered and Resulted from Time of ED Arrival Up to the Time of Departure from the ED   CBC WITH PLATELETS DIFFERENTIAL - Abnormal; Notable for the following components:       Result Value    WBC 11.1 (*)     Hematocrit 47.3 (*)     Absolute Neutrophil 9.2 (*)     All other components within normal limits   COMPREHENSIVE METABOLIC PANEL - Abnormal; Notable for the following components:    Sodium 127 (*)     All other components within normal limits   INR   PARTIAL THROMBOPLASTIN TIME   TROPONIN I   SODIUM RANDOM URINE   CARDIAC CONTINUOUS MONITORING   STRAIGHT CATH FOR URINE   ABO/RH TYPE AND SCREEN            Assessments & Plan (with Medical Decision Making)  Fall with scalp hematoma, CT head and c spine neg, no AC but acquired Hemophilia A noted with ICH in 4/2019, PTT normal-no need for factor on last visit, ambulate with walker ok as baseline, transfer back to her NH.  Hyponatremia-SIADH accroding to work up recently with Urine Na very high-responded to fluid restriction, currently no symptom and 127, continue fluid restriction, follow up with her PMD.       I have reviewed the nursing notes.    I have reviewed the findings, diagnosis, plan and need for follow up with the patient.    Discharge Medication List as of 8/31/2019  3:34 PM          Final diagnoses:   Fall, initial encounter   Hyponatremia, SIADH Dx'ed earlier   Hematoma of scalp, initial  encounter     I, Kahlil Marinelli, am serving as a trained medical scribe to document services personally performed by Rose Kendrick MD, based on the provider's statements to me.   I, Rose Kendrick MD, was physically present and have reviewed and verified the accuracy of this note documented by Kahlil Marinelli.     8/31/2019   [facility], [place], EMERGENCY DEPARTMENT     Rose Kendrick MD  08/31/19 [id number]

## 2019-08-31 NOTE — ED TRIAGE NOTES
Triage Assessment & Note:    BP (!) 169/97   Pulse 69   Temp 97  F (36.1  C) (Axillary)   Resp 12   Ht 1.524 m (5')   Wt 44 kg (97 lb 1.6 oz)   SpO2 90%   BMI 18.96 kg/m        Patient presents with: Pt BIBA from care facility after falling out of bed while tying shoes. Pt reports hitting head. EMS placed c-collar on pt. No reports of fever, cough, SOB, or CP.     Home Treatments/Remedies: Home medications    Febrile / Afebrile: afebrile    Duration of C/o: <2 hrs    Muna Peraza RN  August 31, 2019

## 2019-10-03 NOTE — PROGRESS NOTES
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  Achieved remission with steroids.      She was admitted 5/17/2019 after sudden onset weakness and fall; workup revealed acute intracranial hemorrhage.  This was thought due to factor VIII inhibitor, as her aPTT was elevated on admission.  She was transferred to [facility] after presenting to a different facility.  Factor VIII level was checked and was 11%.  She was treated for the acute hemorrhage with Novoseven; inhibitor suppression regimen of methylprednisolone and weekly rituximab (4 planned doses) was started.    She was discharged on 5/25/2019 with plan to taper steroids.  However, she was readmitted for lightheadedness/dizziness and confusion on 6/10/2019 prior to being able to follow up in Hematology clinic to start the taper.  At the time of this admission she had received 3 of 4 planned rituximab doses and was still on 50 mg prednisone daily.      She discharged on 6/16/2019 on the same 50 mg prednisone dose, again with plans to follow up in Hematology clinic.  However, this somehow did not happen in a timely fashion.    Currently at [facility] and has a physician through [employer] [employer]. This doctor has been tapering the prednisone and Ms. Smiley is now on 15 mg daily.    Interval history:  She reports that she is feeling much better since her stroke and has regained some of her L-sided function.  She is still not able to stand.  She has also noticed that she has \"fuzzy vision\" in only the right eye and this really started at the time of the stroke.  No significant dysphagia.  She had a URI " "as of a couple of days ago, and has had productive cough.  No fevers or chills; no shortness of breath.  No bleeding or bruising as her physician [facility] the [facility] has been tapering the prednisone.  She fell out of bed and hit her head about 2 weeks ago, but did not get a hematoma and had no subsequent neurologic symptoms.    A complete review of systems was performed and was negative with the exception of pertinent positives noted above.    Past medical history:  Osteoarthritis  Hypertension  Hyperlipidemia  Vertigo    Past surgical history:  Hysterectomy  Submandibular gland excision    Family history:  Father with history of leukemia; diabetes in multiple family members    Social history:  Lives at [facility].  Lifelong smoker up until the time of her stroke.  Does not drink alcohol or use drugs.    Medications:  Atenolol 50 mg  Calcium-vitamin D  Lisinopril 10 mg  Pantoprazole 40 mg  Prednisone 50 mg  Simvastatin 20 mg  PRN hydroxyzine, meclizine    Allergies:  Atorvastatin, sulfa, calcium channel blockers    OBJECTIVE DATA  Blood pressure (!) 140/85, pulse 79, temperature 97.7  F (36.5  C), temperature source Oral, resp. rate 12, height 1.651 m (5' 5\"), weight 43.1 kg (95 lb), SpO2 97 %.  -- General: no acute distress; appears thin and frail; in a wheelchair  -- HEENT: mucous membranes moist, no erythema; pupils equally round, reactive; very slight L facial droop  -- Lymph: no lymphadenopathy in the cervical, submandibular, supraclavicular, axillary, or inguinal areas  -- Cardiovascular: regular rate and rhythm, no murmurs; no peripheral edema or JVD  -- Pulmonary: diffuse rales on the right side; normal on the left  -- Gastrointestinal: abdomen soft, non-tender, non-distended; bowel sounds present; no organomegaly  -- Musculoskeletal: no swelling or erythema of joints  -- Integumentary: no rashes  -- Neurologic: alert and oriented to self, place, time; L  strength is much weaker than the right; L " leg is also weaker, but not to the same degree  -- Psychiatric: appropriate affect, cooperative    Labs:    Factor VIII activity -- 78%    Assessment:  Acquired hemophilia A  -- Originally diagnosed and treated with steroids in 2012, resulting in remission  -- Relapsed in May 2019, now in remission after steroids/rituximab    Clinically has responded well to 3 doses of rituximab and high dose prednisone.  She should continue the steroid taper.  We will check labs each month; this will allow us to see a down-trending Factor VIII level (hopefully) before she becomes significantly predisposed to bleeding, if the acquired FVIII antibody should recur again.  We hope that rituximab/steroids will provide a durable response.  If not, we will consider adding Cellcept.    Recommendations given to care team at [facility]:  -- Please taper prednisone by 5 mg per week: give 10 mg daily starting tomorrow (Fri 10/4), 5 mg daily starting Fri 10/11, give last dose of prednisone on Thursday 10/17  -- Repeat Factor VIII, [place] assay and aPTT on 11/3/2019.  She can come to the [facility] for this per granddaughter Socorro.  -- Return to clinic the week of 12/1/2019.  She should also have the same labs 2-3 days prior to this appointment.  -- If any signs of significant bleeding, check aPTT and go to the Emergency Department.  Also call [facility] IMMEDIATELY at [phone].    Patient seen and discussed with faculty, Dr. Oswald.    Wendi Parks MD  Hematology-Oncology-Transplant Fellow        HEMATOLOGY STAFF:  Seen with fellow, whose note reflects our joint evaluation, assessment, and plan.      Dwight Oswald MD  Associate Professor of Medicine  Division of Hematology, Oncology, and Transplantation  Director, [facility]

## 2019-10-03 NOTE — NURSING NOTE
Introduced myself briefly and provided contact card with numbers to call with follow up questions or concerns.  Lyn Noble RN - Nurse Clinician - [facility] - [phone]

## 2019-10-03 NOTE — PATIENT INSTRUCTIONS
DIAGNOSIS:  Recurrent, acquired hemophilia A (factor VIII inhibitor) manifesting as hemorrhagic stroke    TREATMENT:  High dose steroids  Rituximab x3 doses    PLAN:  -- We will check Factor VIII, [place] assay and aPTT today  -- Please taper prednisone by 5 mg per week: give 10 mg daily starting tomorrow (Fri 10/4), 5 mg daily starting Fri 10/11, give last dose of prednisone on Thursday 10/17  -- Repeat Factor VIII, [place] assay and aPTT on 11/3/2019.  She can come to the [facility] for this per granddaughter Socorro.  -- Return to clinic the week of 12/1/2019.  She should also have the same labs 2-3 days prior to this appointment.  -- If any signs of significant bleeding, check aPTT and go to the Emergency Department.  Also call [facility] IMMEDIATELY at [phone].

## 2019-11-04 PROBLEM — D68.311: Status: ACTIVE | Noted: 2019-01-01

## 2019-11-04 PROBLEM — Z86.73 HISTORY OF STROKE: Status: ACTIVE | Noted: 2019-01-01

## 2019-11-04 PROBLEM — E46 PROTEIN-CALORIE MALNUTRITION (H): Status: ACTIVE | Noted: 2019-01-01

## 2019-11-04 NOTE — LETTER
11/1/2019        RE: Jackeline Smiley  [facility]  [address]  [facility] [address]        [facility]  PRIMARY CARE PROVIDER AND CLINIC:  [facility], 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      Previous PCP in community: Dr. Cat Lora.     Physician through [facility] since [facility] admission - notes written on paper in chart.    Admitted to this facility for  rehab, medical management and nursing care.    HPI:    HPI information obtained from: facility chart records, facility staff, patient report, [facility] chart review, family/first contact lisecarinaisabel ([place]) report and paper chart notes.     Chronic health issues include acquired hemophilia (factor VIII inhibitor) originally diagnosed in 2012 initially in remission with steroids, but relapsed in May 2019.   Multiple acute care encounters in [facility] over last year, Hospitalized 5/17/19 with sudden onset left sided weakness and fall secondary to acute intracranial hemorrhage. Per hematology this was thought due to factor VIII inhibitor, as her aPTT was elevated on admission; she was transferred to [facility].  Factor VIII level was checked and was 11%.  She was treated for the acute hemorrhage with Novoseven; inhibitor suppression regimen of methylprednisolone and weekly rituximab (4 planned doses) was  started. Discharged on 5/25/19 with plan to taper methylprednisolone, but she presented to ER 5/26/19 with fall and head strike, CT showed no change in prior ICH. Hospitalized again 6/10/19 with dizziness and mental status in setting of hyponatremia. Sodium improved with fluid restriction. Discharged to [facility] with follow-up care through [facility].  Again did not follow-up in hematology clinic. To ER 8/31/19 with mechanical fall, fell out of bed when attempting to tie her shoes with head trauma, CT head/c-spine negative, chronically low sodium. Last visit with hematology 10/03/2019, between June and October tapered from 50 mg Prednisone daily to 15 mg Prednisone daily. Completed taper off Prednisone on 10/17. Plan for repeat labs this week and follow-up with hematology 12/01.     Other health issues include hypertension managed with atenolol and lisinopril. Hyperlipidemia which is managed with Zocor. Osteoarthritis scheduled Tylenol. Gait abnormality with weakness and repeated falls, at wheelchair level for mobiltiy. Cognitive impairment, 17/30 on SLUMS in June 2019 and 14/15 on BIMS on in Sept 2019, take Melatonin for sleep. She is also on hydroxyzine at , unclear indication. GERD is managed with pantoprazole and ranitidine. Lifelong smoker up until stroke in May 2019.     Updates on Status Since Skilled nursing Admission:   Follow-up today to establish care, switching provider teams. Patient reports she fell yesterday, trying to get into bed in the afternoon from wheelchair. On floor for a period because she couldn't reach call bell. Some mild pain to middle back. Does not think she hit her head. Granddaughter reports resident had abdominal pain the other day, but resident does not recall this. Having regular BMs per bowel record.     CODE STATUS/ADVANCE DIRECTIVES DISCUSSION:   DNR / DNI    Patient's living condition: lives in a skilled nursing facility     ALLERGIES: Atorvastatin calcium; Calcium  channel blockers; and Sulfa drugs     PAST MEDICAL HISTORY:  has a past medical history of Back ache, Hemophilia (H), Hemophilia A (H) (5/17/2019), Hyperlipidemia, Hypertension, Menopausal disorder, Osteoarthritis, Pneumonia (6-11), Tremor, and Vertigo.     PAST SURGICAL HISTORY:   has a past surgical history that includes cataract iol, rt/lt; hysterectomy due to bleeding, pap no longer indicated; and NONSPECIFIC PROCEDURE.     FAMILY HISTORY: family history includes C.A.D. in her brother and brother; Cancer in her father; Diabetes in her brother, brother, mother, sister, and sister; Lipids in her brother and daughter.     SOCIAL HISTORY:   reports that she quit smoking about 5 months ago. Her smoking use included cigarettes. She has a 46.00 pack-year smoking history. She has never used smokeless tobacco. She reports that she does not drink alcohol or use drugs.   Granddaughter ([place]) is primary contact.   Lived in [place] in an apartment prior to hospitalization in May.   Daughter - Sigrid, not involved in care.  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   Completed 11th grade.  [place] in [place].   No ETOH  Smoked - started in 30s and quit in May 2019     Most Recent Immunizations   Administered Date(s) Administered     DTaP, Unspecified 09/17/2012     Influenza (IIV3) PF 09/09/2014     Pneumo Conj 13-V (2010&after) 09/24/2015     Pneumococcal 23 valent 12/31/2010     TD (ADULT, 7+) 03/23/2009     Zoster vaccine, live 09/17/2012     Post Discharge Medication Reconciliation Status: discharge medications reconciled, continue medications without change    Current Outpatient Medications   Medication Sig Dispense Refill     acetaminophen (TYLENOL) 325 MG tablet Take 325 mg by mouth 3 times daily       atenolol (TENORMIN) 50 MG tablet Take 1 tablet (50 mg) by mouth daily 90 tablet 0     calcium carbonate-vitamin D (CALCIUM + D) 600-200 MG-UNIT TABS Take 1 tablet by mouth 2 times daily 180 tablet 3     HYDROXYZINE HCL PO  "Take 10 mg by mouth At Bedtime And 10 mg three times daily as needed       lisinopril (PRINIVIL/ZESTRIL) 10 MG tablet Take 1 tablet (10 mg) by mouth 2 times daily 90 tablet      MELATONIN PO Take 3 mg by mouth At Bedtime       pantoprazole (PROTONIX) 40 MG EC tablet Take 1 tablet (40 mg) by mouth every morning (before breakfast)       raNITIdine HCl (ZANTAC PO) Take 150 mg by mouth daily       simvastatin (ZOCOR) 20 MG tablet Take 1 tablet (20 mg) by mouth At Bedtime 30 tablet 3     vitamin D3 (CHOLECALCIFEROL) 1000 units (25 mcg) tablet Take 1 tablet (1,000 Units) by mouth daily 60 tablet 6     Recent weights and vitals reviewed in Epic:  Wt Readings from Last 5 Encounters:   10/31/19 43 kg (94 lb 11.2 oz)   10/03/19 43.1 kg (95 lb)   08/31/19 44 kg (97 lb 1.6 oz)   06/18/19 44.9 kg (99 lb)   06/10/19 44.3 kg (97 lb 9.6 oz)     Weight 8/24/18: 101#    BP Readings from Last 3 Encounters:   10/31/19 (!) 146/76   10/03/19 (!) 140/85   08/31/19 (!) 143/88     Pulse Readings from Last 4 Encounters:   10/31/19 64   10/03/19 79   08/31/19 72   06/18/19 77       ROS:  Constitutional - No fever/chills. \"I'm cold all the time.\" Fair appetite, lost weight post-stroke, weight is down 6# over last year. Sleeps okay at night.   HEENT - No HA, No double/blurry vision. Change to vision post-stroke, harder to read, L worse than right. Some difficulty hearing, no hearing aids. No difficulty with or pain on swallowing. Rhinitis with eating.   Pulmonary - Occasional SOB. No SOB at this time. Chronic cough.  CV- No CP, no palpitations.   PV - No leg swelling, but some foot swelling.   GI - No abd pain. Sometimes nausea. No vomiting.   -  Now incontinent and urinating more. UA/UC negative 8/8/19.  Neuro - No focal deficit. No dizziness or seizure. History of tremor with eating.   MS - wheelchair for mobility, pain in muscles or joints,\"middle of back\". Fell yesterday on to back.   Psychiatric - Some days \"very depressed\" - watches TV " "to distract herself. Has taken anti-depressant in the past. Memory \"bad\" - \"Oct 4th\", not oriented to place or time, to person only. \"I don't like being old!\"      Vitals:  BP (!) 146/76   Pulse 64   Temp 97.5  F (36.4  C)   Resp 18   Ht 1.651 m (5' 5\")   Wt 43 kg (94 lb 11.2 oz)   SpO2 93%   BMI 15.76 kg/m     Exam:  GENERAL APPEARANCE:  Alert, chronically ill appearing, in no distress.  EYES:  Sclera clear and conjunctiva normal, no discharge   ENT:  Mouth normal, moist mucous membranes, nose without drainage or crusting, external ears without lesions, hearing acuity slightly diminished to normal voice.  NECK:  Nontender, supple, symmetrical; no cervical adenopathy, masses or thyromegaly, trachea midline  RESP:  Non-labored breathing, palpation of chest normal, no chest wall tenderness, no respiratory distress, Lung sounds clear, patient is on room air.  CV:  Palpation - no murmur/non-displaced PMI, Auscultation - rate and rhythm regular, no murmur, no rub or gallop.  VASCULAR: No edema bilateral lower extremities.  ABDOMEN:  normal bowel sounds, soft, nontender, no grimacing or guarding with palpation. No hepatosplenomegaly. No appreciable masses. Well healed lower abdominal incision.  M/S:   Gait and station wheelchair bound. Digits without clubbing, cyanosis, no swelling or tenderness, good  BL. No tenderness over back. Unable to sit unassisted at edge of bed, flops back.  SKIN:  Inspection - no visible rashes/lesions/uclerations. No ecchymosis or abrasions to back. Palpation- no increased warmth, skin is dry and non-tender.  NEURO: Cranial nerves II-XII grossly intact except hearing and vision, no facial asymmetry, follows simple commands, moves all extremities symmetrically, normal tone, no tremor  PSYCH: awake and alert, speech fluent,  insight and judgement impaired, oriented to person, memory impaired, flat affect, cooperative.     Lab/Diagnostic data:  [facility] reviewed by me today. " "  CBC RESULTS:   Recent Labs   Lab Test 08/31/19  1002 08/28/19  0728   WBC 11.1* 8.4   RBC 4.85 4.17   HGB 15.5 13.5   HCT 47.3* 39.5   MCV 98 95   MCH 32.0 32.4   MCHC 32.8 34.2   RDW 14.2 14.0    160       Last Basic Metabolic Panel:  Recent Labs   Lab Test 09/03/19  0726 08/31/19  1002  07/24/19  0633   * 127*   < > 134   POTASSIUM  --  4.9  --  4.2   CHLORIDE  --  94  --  96   MICK  --  9.0  --  9.1   CO2  --  29  --  32   BUN  --  15  --  23   CR  --  0.54  --  0.71   GLC  --  84  --  81    < > = values in this interval not displayed.       Liver Function Studies -   Recent Labs   Lab Test 08/31/19  1002 06/26/19  0450   PROTTOTAL 7.1 5.7*   ALBUMIN 3.4 2.8*   BILITOTAL 0.9 0.6   ALKPHOS 78 65   AST 33 12   ALT 37 22     TSH   Date Value Ref Range Status   05/16/2019 2.21 0.40 - 4.00 mU/L Final   06/18/2011 1.08 0.4 - 5.0 mU/L Final     Lab Results   Component Value Date    A1C 6.2 05/18/2019         ASSESSMENT/PLAN:  (D68.311) Acquired hemophilia A (H)  (primary encounter diagnosis)  Comment: Hemophilia secondary to factor VIII inhibitor antibody followed by [facility]. Diagnosed in 2012 with initial response to steroids. Relapsed in May 2019 with ICH. Completed prednisone taper 10/17.   Plan:   Per per hematology --  - Repeat Factor VIII, [place] assay and aPTT this week at [facility]   - Hematology follow-up 12/1/2019 with labs 2-3 days prior to this appointment.  - \"If any signs of significant bleeding, check aPTT and go to the Emergency Department.  Also call [facility] IMMEDIATELY at [phone].\"    (Z86.73) History of stroke  Comment: In May 2019 present to ER with left sided weakness secondary to R thalamic hemorrhagic CVA. Unable to return to baseline functional status, now resides in LTC facility. [facility] 17/30.   Plan:   - Continues to benefit from supportive care in LTC setting for meals, medications, " safety, and ADL support.    (R13.10) Dysphagia, unspecified type  Comment: Resident is no aware of any swallowing trouble, on DD2 diet in the past. Now on regular diet.  Plan:   - Monitor clinically     (I10) Essential hypertension, benign  Comment: Most SBPs 120s to 140s   Plan:   - Continue lisinopril 10 mg BID and atenolol 50 mg daily  - BMP 11/11    (E78.5) Hyperlipidemia, unspecified hyperlipidemia type  Comment: Last LDL 96 in August 2018   Plan:   - Continue Zocor 20 mg daily   - Check lipid panel     (K21.9) Gastroesophageal reflux disease, esophagitis presence not specified  Comment: Hx of reflux, denies symptoms today   Plan:  - Continue Protonix and Zantac for now   - Monitor for recurrent abd pain     (E22.2) SIADH (syndrome of inappropriate ADH production) (H)  Comment: Last sodium 129 in September 2019  Plan:   - Check BMP  - May need fluid restriction     (J98.8) Congestion of upper airway  Comment: Chronic with rhinitis, noted for several years per chart review. Granddaughter notes chronic rhinitis. Long smoking history.   Plan:   - Monitor clinically, consider Flonase     (M19.90) Osteoarthritis, unspecified osteoarthritis type, unspecified site  Comment: Previously on hydrocodone, stopped in setting of acute CVA. No significant pain today.  Plan:   - Continue low dose Tylenol     (G47.00) Insomonia  Comment: Report she is sleeping okay now  Plan:   - Continue Melatonin  - Consider stopping hydroxyzine if no complaints at next visit    (Z71.89) Advance care planning  Comment: Reviewed POLST with patient and granddaughter, confirm DNR/DNI   Plan:   - No change to POLST completed by Dr. Mejia     Orders written by provider at facility.     Labs and follow-up with Dr. Mulligan 11/11.    Electronically signed by:  AAYUSH Lanza CNP                   Sincerely,        AAYUSH Lanza CNP

## 2019-11-08 NOTE — PROGRESS NOTES
"Jackeline Smiley is a 86 year old female seen November 11, 2019 at [facility] where she has resided for 6 months (admit 5/2019) recently turned over to [facility] and seen for initial visit.   Pt is seen in her room, lying abed    She is frail and very fatigued, does not offer much history   State she is \"not feeling well\" but cannot give any specifics.   By chart review, patient has a dx of acquired hemophilia A (factor VIII inhibitor) initially diagnosed in 2012.    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  She was treated with Novoseven at that time, alsong with methlyprednisolone and weekly rituximab (received only 3 of 4 doses of the latter)     Returned to [facility] and prednisone dose tapered down.    She was lost to Hematology follow up for awhile, then seen 10/3/19 and thought to have a clinical response.  She had her last dose of prednisone in October and labs redrawn 11/3 with Factor % and PTT 30.  No evidence of bleeding.       Past Medical History:   Diagnosis Date     Back ache      Hemophilia (H)      Hemophilia A (H) 5/17/2019     Hyperlipidemia      Hypertension      Menopausal disorder      Osteoarthritis      Pneumonia 6-11    LEFT MID LUNG     Tremor      Vertigo        Past Surgical History:   Procedure Laterality Date     C NONSPECIFIC PROCEDURE      submandibular gland excision     CATARACT IOL, RT/LT      both, Dr Zhou     HYSTERECTOMY, PAP NO LONGER INDICATED         Family History   Problem Relation Age of Onset     Diabetes Mother      Cancer Father         leukemia     C.A.D. Brother         CHF     C.A.D. Brother      Diabetes Sister      Diabetes Sister      Diabetes Brother      Diabetes Brother      Lipids Daughter         grand daughter     Lipids Brother         all sibs had it       Social History     Tobacco Use     Smoking status: Former " "Smoker     Packs/day: 1.00     Years: 46.00     Pack years: 46.00     Types: Cigarettes     Last attempt to quit: 2019     Years since quittin.4     Smokeless tobacco: Never Used     Tobacco comment: 3/4 pack per day   Substance Use Topics     Alcohol use: No      SH:  Previously lived alone, [place] apartment   She has one daughter who has mental illness and substance abuse issues, and Dale Pathak has a no trespass order for this daughter.   She also has one granddaughter who is supportive.   Pt currently is her own decision maker.     Retired .   Smoked until admission to LTC.          Review Of Systems  Skin: negative   Eyes: impaired vision  Ears/Nose/Throat: hearing loss  Respiratory: No shortness of breath, dyspnea on exertion, cough, or hemoptysis  Cardiovascular: exercise intolerance   BP Readings from Last 3 Encounters:   19 (!) 146/86   10/31/19 (!) 146/76   10/03/19 (!) 140/85      Pulse Readings from Last 4 Encounters:   19 67   10/31/19 64   10/03/19 79   19 72      Gastrointestinal: poor appetite   Wt Readings from Last 5 Encounters:   19 43.9 kg (96 lb 12.8 oz)   10/31/19 43 kg (94 lb 11.2 oz)   10/03/19 43.1 kg (95 lb)   19 44 kg (97 lb 1.6 oz)   19 44.9 kg (99 lb)      Genitourinary: incontinence  Musculoskeletal: generalized weakness  Neurologic: CPT 4.4   SLUMS   Psychiatric: anxiety and depression  Hematologic/Lymphatic/Immunologic: as above  Endocrine: negative     GENERAL APPEARANCE: alert and no distress  BP (!) 146/86   Pulse 67   Temp 97.6  F (36.4  C)   Resp 18   Ht 1.626 m (5' 4\")   Wt 43.9 kg (96 lb 12.8 oz)   SpO2 93%   BMI 16.62 kg/m     HEENT: normocephalic, no lesion or abnormalities  NECK: no adenopathy, no asymmetry, masses, or scars and thyroid normal to palpation  RESP: lungs clear to auscultation - no rales, rhonchi or wheezes  CV: regular rate and rhythm, normal S1 S2  ABDOMEN:  soft, nontender, no HSM or masses and " bowel sounds normal  MS: extremities normal- no ext edema.  SKIN: no suspicious lesions or rashes  NEURO: generalized weakness, limited hx  PSYCH: affect flat, disengaged  LYMPHATICS: No cervical,  or supraclavicular nodes    Last Comprehensive Metabolic Panel:  Sodium   Date Value Ref Range Status   11/11/2019 131 (L) 133 - 144 mmol/L Final     Potassium   Date Value Ref Range Status   11/11/2019 4.0 3.4 - 5.3 mmol/L Final     Chloride   Date Value Ref Range Status   11/11/2019 96 94 - 109 mmol/L Final     Carbon Dioxide   Date Value Ref Range Status   11/11/2019 28 20 - 32 mmol/L Final     Anion Gap   Date Value Ref Range Status   11/11/2019 7 3 - 14 mmol/L Final     Glucose   Date Value Ref Range Status   11/11/2019 97 70 - 99 mg/dL Final     Urea Nitrogen   Date Value Ref Range Status   11/11/2019 6 (L) 7 - 30 mg/dL Final     Creatinine   Date Value Ref Range Status   11/11/2019 0.47 (L) 0.52 - 1.04 mg/dL Final     GFR Estimate   Date Value Ref Range Status   11/11/2019 89 >60 mL/min/[1.73_m2] Final     Comment:     Non  GFR Calc  Starting 12/18/2018, serum creatinine based estimated GFR (eGFR) will be   calculated using the Chronic Kidney Disease Epidemiology Collaboration   (CKD-EPI) equation.       Calcium   Date Value Ref Range Status   11/11/2019 8.4 (L) 8.5 - 10.1 mg/dL Final     Lab Results   Component Value Date    AST 33 08/31/2019      BILITOTAL 0.9 08/31/2019     Lab Results   Component Value Date    ALBUMIN 3.4 08/31/2019     Lab Results   Component Value Date    PROTTOTAL 7.1 08/31/2019      Lab Results   Component Value Date    ALKPHOS 78 08/31/2019     Lab Results   Component Value Date    WBC 9.3 11/11/2019      HGB 12.9 11/11/2019      MCV 95 11/11/2019       11/11/2019     TSH   Date Value Ref Range Status   05/16/2019 2.21 0.40 - 4.00 mU/L Final      Lab Results   Component Value Date    A1C 6.2 05/18/2019           IMP/PLAN:    (I10) Essential hypertension, benign    Comment: fair control only    Plan: change atenolol to metoprolol 50 mg bid with hold parameters.   Decrease lisinopril to 10 mg/day  Follow bps    (E87.1) Hyponatremia  Comment: still 131   Plan: decrease ACEI as above, follow Na    (D68.311) Acquired hemophilia A (H)  Comment: acquired factor VIII inhibitor  Plan: [facility]  If bleeding episode occurs, check PTT, refer to ED.      (Z86.73) History of stroke  Comment: right thalamic hemorrhagic CVI 6 months ago with residual weakness and cognitive impairment, low functional status     Plan: LTC support for meds, meals, activity, mobility.        (M19.90) Osteoarthritis, unspecified osteoarthritis type, unspecified site  Comment: was previously on long term hydrocodone, stopped last spring     Plan: scheduled acetaminophen, local measures.   k    (K21.9) Gastroesophageal reflux disease, esophagitis presence not specified  Comment: no current symptoms.    Plan: remains on omeprazole and ranitidine, consider discontinuation of the latter.        The health plan new enrollment has happened. I have reviewed the  MDS, the preventative needs,  and facility care plan. The level of care is appropriate. I have reviewed the code status/advanced directives.     Leatha Mulligan MD

## 2019-11-20 NOTE — TELEPHONE ENCOUNTER
Prior Authorization Retail Medication Request    Medication/Dose: Hydroxyzine 10 mg Tab; 10 mg @ HS and TID PRN  ICD code (if different than what is on RX):  F41.9 Anxiety  Previously Tried and Failed:  Seroquel, Ativan, Celexa  Rationale:  Has been very effective    Insurance Name:  [facility]  Insurance ID:  [id number]      Pharmacy Information (if different than what is on RX)  Name:  [facility]  Phone:  [phone]

## 2019-11-20 NOTE — TELEPHONE ENCOUNTER
Central Prior Authorization Team   Phone: [phone]    Prior Authorization Approval    Authorization Effective Date: 10/21/2019  Authorization Expiration Date: 11/19/2020  Medication: Hydroxyzine 10mg  Approved Dose/Quantity: 120 per 30 days  Reference #: [id number]   Insurance Company: [employer] - Phone [phone] Fax [phone]  Expected CoPay:       CoPay Card Available:      Foundation Assistance Needed:    Which Pharmacy is filling the prescription (Not needed for infusion/clinic administered): [facility] - [address]  Pharmacy Notified: Yes  Patient Notified: Yes  **Instructed pharmacy to notify patient when script is ready to /ship.**  Approved today  Case Id: [id number]; Status: Approved; Review Type: Prior Auth; Coverage Start Date: 10/21/2019; Coverage End Date: 11/19/2020;

## 2019-11-25 NOTE — PROGRESS NOTES
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      HPI information obtained from: facility chart records, facility staff, patient report and [facility] chart review.     Chronic health issues include acquired hemophilia (factor VIII inhibitor) originally diagnosed in 2012 initially in remission with steroids, but relapsed in May 2019.   Multiple acute care encounters in [facility] over last year, Hospitalized 5/17/19 with sudden onset left sided weakness and fall secondary to acute intracranial hemorrhage. Per hematology this was thought due to factor VIII inhibitor, as her aPTT was elevated on admission; she was transferred to [facility].  Factor VIII level was checked and was 11%.  She was treated for the acute hemorrhage with Novoseven; inhibitor suppression regimen of methylprednisolone and weekly rituximab (4 planned doses) was started. Discharged on 5/25/19 with plan to taper methylprednisolone, but she presented to ER 5/26/19 with fall and head strike, CT showed no change in prior ICH. Hospitalized again 6/10/19 with dizziness and mental status in setting of hyponatremia. Sodium improved with fluid restriction. Discharged to [facility] with follow-up care through [facility].  Again did not follow-up in hematology clinic. To ER 8/31/19 with mechanical fall, fell out of bed when attempting to tie her shoes with head trauma, CT head/c-spine negative, chronically  "low sodium. Last visit with hematology 10/03/2019, between June and October tapered from 50 mg Prednisone daily to 15 mg Prednisone daily. Completed taper off Prednisone on 10/17. Plan for repeat labs this week and follow-up with hematology 12/01.     Other health issues include hypertension managed with metoprolol and lisinopril. Lisinopril dose lowered after visit with Dr. Mulligan 11/1/1. Hyperlipidemia which is managed with Zocor. Osteoarthritis scheduled Tylenol. Gait abnormality with weakness and repeated falls, at wheelchair level for mobiltiy. Cognitive impairment, 17/30 on SLUMS in June 2019 and 14/15 on BIMS on in Sept 2019, take Melatonin for sleep. She is also on hydroxyzine at  for anxiety. GERD is managed with pantoprazole and ranitidine. Lifelong smoker up until stroke in May 2019.     Today's concern is:  Follow-up to assess hypertensions and hyponatremia after medication changes last week: atenolol changed to metoprolol and lisinopril dose decreased. No SOB. No CP. No HA. No leg swelling.         Reports increased dysuria over last week. No diarrhea or constipation. No pain in joints or back. Reports fair appetite, was up for breakfast, but now resting in bed. Still having trouble sleeping at times at times, \"I'm an anxious person.\"    Past Medical and Surgical History reviewed in Epic today.    MEDICATIONS:  Current Outpatient Medications   Medication Sig Dispense Refill     acetaminophen (TYLENOL) 325 MG tablet Take 325 mg by mouth 3 times daily       calcium carbonate-vitamin D (CALCIUM + D) 600-200 MG-UNIT TABS Take 1 tablet by mouth 2 times daily 180 tablet 3     FAMOTIDINE PO Take 20 mg by mouth At Bedtime       HYDROXYZINE HCL PO Take 10 mg by mouth At Bedtime        LISINOPRIL PO Take 10 mg by mouth At Bedtime       MELATONIN PO Take 3 mg by mouth At Bedtime       METOPROLOL TARTRATE PO Take 50 mg by mouth 2 times daily       pantoprazole (PROTONIX) 40 MG EC tablet Take 1 tablet (40 mg) by " "mouth every morning (before breakfast)       simvastatin (ZOCOR) 20 MG tablet Take 1 tablet (20 mg) by mouth At Bedtime 30 tablet 3     vitamin D3 (CHOLECALCIFEROL) 1000 units (25 mcg) tablet Take 1 tablet (1,000 Units) by mouth daily 60 tablet 6     Recent weights and vitals reviewed in Epic:  Wt Readings from Last 5 Encounters:   11/25/19 42.1 kg (92 lb 12.8 oz)   11/11/19 43.9 kg (96 lb 12.8 oz)   10/31/19 43 kg (94 lb 11.2 oz)   10/03/19 43.1 kg (95 lb)   08/31/19 44 kg (97 lb 1.6 oz)     BP Readings from Last 3 Encounters:   11/25/19 (!) 149/80   11/11/19 (!) 146/86   10/31/19 (!) 146/76     Pulse Readings from Last 4 Encounters:   11/25/19 67   11/11/19 67   10/31/19 64   10/03/19 79     REVIEW OF SYSTEMS:  4 point ROS including Respiratory, CV, GI and , other than that noted in the HPI,  is negative    Objective:  BP (!) 149/80   Pulse 67   Temp 98.2  F (36.8  C)   Resp 16   Ht 1.651 m (5' 5\")   Wt 42.1 kg (92 lb 12.8 oz)   SpO2 93%   BMI 15.44 kg/m    Exam:  GENERAL APPEARANCE:  Alert, chronically ill appearing, in no distress.  EYES:  Sclera clear and conjunctiva normal, no discharge   RESP:  Non-labored breathing, palpation of chest normal, no chest wall tenderness, no respiratory distress, Lung sounds clear, patient is on room air.  CV:  Palpation - no murmur/non-displaced PMI, Auscultation - rate and rhythm regular, no murmur, no rub or gallop.  VASCULAR: No edema bilateral lower extremities.  ABDOMEN:  Normal bowel sounds, soft, nontender, no grimacing or guarding with palpation.  M/S:   Gait and station wheelchair bound.   SKIN:  Inspection - no visible rashes/lesions/uclerations. No ecchymosis or abrasions to back. Palpation- no increased warmth, skin is dry and non-tender.  NEURO: Cranial nerves II-XII grossly intact except hearing and vision, no facial asymmetry, follows simple commands, moves all extremities symmetrically, normal tone, no tremor  PSYCH: Awake and alert, speech fluent,  " insight and judgement impaired, oriented to person, memory impaired, flat affect, cooperative.     Labs:   Recent labs in [facility] reviewed by me today.    CBC RESULTS:   Recent Labs   Lab Test 11/11/19  0658 08/31/19  1002   WBC 9.3 11.1*   RBC 4.13 4.85   HGB 12.9 15.5   HCT 39.3 47.3*   MCV 95 98   MCH 31.2 32.0   MCHC 32.8 32.8   RDW 13.1 14.2    189       Last Basic Metabolic Panel:  Recent Labs   Lab Test 11/25/19  0744 11/11/19  0658   * 131*   POTASSIUM 3.8 4.0   CHLORIDE 94 96   MICK 8.9 8.4*   CO2 30 28   BUN 8 6*   CR 0.57 0.47*   GLC 95 97     Liver Function Studies -   Recent Labs   Lab Test 08/31/19  1002 06/26/19  0450   PROTTOTAL 7.1 5.7*   ALBUMIN 3.4 2.8*   BILITOTAL 0.9 0.6   ALKPHOS 78 65   AST 33 12   ALT 37 22     TSH   Date Value Ref Range Status   05/16/2019 2.21 0.40 - 4.00 mU/L Final   06/18/2011 1.08 0.4 - 5.0 mU/L Final     Lab Results   Component Value Date    A1C 6.2 05/18/2019     Recent Labs   Lab Test 11/11/19  0658   CHOL 127   HDL 41*   LDL 62   TRIG 122     ASSESSMENT/PLAN:  (E22.2) SIADH (syndrome of inappropriate ADH production) (H) (primary encounter diagnosis)  Comment: Sodium today 129, down from 131 after stopping fluid restriction and lowering ACE-I dose. Responded to fluid restriction in past per hospital notes.   Plan:   - Start fluid restriction 1500 mL per day   - [facility] 12/04    (I10) Essential hypertension, benign  Comment: Most SBPs 120s to 140s, some in 160s to 170s.   Plan:   - Lisinopril 10 mg daily and metoprolol tartrated 50 mg BID   - [facility] 12/04    (D68.311) Acquired hemophilia A (H)    Comment: Hemophilia secondary to factor VIII inhibitor antibody followed by [facility]. Diagnosed in 2012 with initial response to steroids. Relapsed in May 2019 with ICH. Completed prednisone taper 10/17.   Plan:   Per per hematology --  - Repeat Factor VIII, [place] assay and aPTT this week at [facility]   - Hematology  "follow-up 12/12/2019 with lab prior to this appointment 12/05.  - \"If any signs of significant bleeding, check aPTT and go to the Emergency Department.  Also call [facility] IMMEDIATELY at [phone].\"    (Z86.73) History of stroke  Comment: In May 2019 present to ER with left sided weakness secondary to R thalamic hemorrhagic CVA. Unable to return to baseline functional status, now resides in LTC facility. SLUMS 17/30.   Plan:   - Continues to benefit from supportive care in LTC setting for meals, medications, safety, and ADL support.    (G47.00) Insomonia  Comment: Report fair sleep  Plan:   - Continue Melatonin - could increase dose   - Consider hydroxyzine, has found this helpful     (R30.0) Dysuria  Comment: Burning on urination over the last week   Plan:   - UA/UC    Orders written by provider at facility.    Electronically signed by:  AAYUSH Lanza CNP   "

## 2019-11-27 NOTE — TELEPHONE ENCOUNTER
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    Complained of dysuria on visit 11/25. UA looks to be positive for UTI.    Allergies   Allergen Reactions     Atorvastatin Calcium      hepatitis     Calcium Channel Blockers      Sulfa Drugs Fatigue     Estimated Creatinine Clearance: 47.1 mL/min (based on SCr of 0.57 mg/dL).    Afebrile.      No recent urine cultures to review.     ORDERS:   - BID temp until UC results, notify provider for temp > 99 F  - Check for urine culture results q shift until available and update provider for orders.

## 2019-12-06 NOTE — PROGRESS NOTES
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      HPI information obtained from: facility chart records, facility staff, patient report and [place] Epic chart review.     Chronic health issues include acquired hemophilia (factor VIII inhibitor) originally diagnosed in 2012 initially in remission with steroids, but relapsed in May 2019. Multiple acute care encounters in [facility] over last year, Hospitalized 5/17/19 with sudden onset left sided weakness and fall secondary to acute intracranial hemorrhage. Per hematology this was thought due to factor VIII inhibitor, as her aPTT was elevated on admission; she was transferred to [facility].  Factor VIII level was checked and was 11%.  She was treated for the acute hemorrhage with Novoseven; inhibitor suppression regimen of methylprednisolone and weekly rituximab (4 planned doses) was started. Discharged on 5/25/19 with plan to taper methylprednisolone, but she presented to ER 5/26/19 with fall and head strike, CT showed no change in prior ICH. Hospitalized again 6/10/19 with dizziness and mental status in setting of hyponatremia. Sodium improved with fluid restriction. Discharged to [facility] with [facility].  Again did not follow-up in hematology clinic. To ER 8/31/19 with mechanical fall, fell out of bed when attempting to tie her shoes with head trauma, CT head/c-spine negative,  chronically low sodium. Last visit with hematology 10/03/2019, between June and October tapered from 50 mg Prednisone daily to 15 mg Prednisone daily. Completed taper off Prednisone on 10/17. Plan for repeat and follow-up with hematology December 2019.     Other health issues include hypertension managed with metoprolol and lisinopril. Lisinopril dose lowered after visit with Dr. Mulligan 11/1/1. Hyperlipidemia which is managed with Zocor. Osteoarthritis scheduled Tylenol. Gait abnormality with weakness and repeated falls, at wheelchair level for mobiltiy. Cognitive impairment, 17/30 on SLUMS in June 2019 and 14/15 on BIMS on in Sept 2019, takes Melatonin and Vistaril for sleep. GERD is managed with pantoprazole and famotidine. Lifelong smoker up until stroke in May 2019.      Today's concerns are:  Follow-up today for regulatory visit and assessment of multiple health concerns including SIADH, HTN, hemophilia, and physical deconditioning. Reports she is not very well today because she wants to get back into bed and needs assistance with changing her incontinence brief. No SOB. No chest pain. No abd pain. Incontinent of bladder chronically, but knows when she needs to move bowels. UA/UC negative for infection and resident reports dysuria has abated. No pain in joints reported today. Has noted come dizziness from time to time, but no falls. Not sure if she is following fluid restriction has multiple cups of water and a large pitcher on her table. Got blood drawn this week for upcoming hematology appointment.     Blood pressures from this month reviewed:      Heat rates from this month reviewed:      ALLERGIES:Atorvastatin calcium; Calcium channel blockers; and Sulfa drugs     PAST MEDICAL HISTORY:   has a past medical history of Back ache, Hemophilia (H), Hemophilia A (H) (5/17/2019), Hyperlipidemia, Hypertension, Menopausal disorder, Osteoarthritis, Pneumonia (6-11), Tremor, and Vertigo.     PAST SURGICAL HISTORY:   has  a past surgical history that includes cataract iol, rt/lt; hysterectomy, pap no longer indicated; and NONSPECIFIC PROCEDURE.     FAMILY HISTORY: family history includes C.A.D. in her brother and brother; Cancer in her father; Diabetes in her brother, brother, mother, sister, and sister; Lipids in her brother and daughter.     SOCIAL HISTORY:  reports that she quit smoking about 6 months ago. Her smoking use included cigarettes. She has a 46.00 pack-year smoking history. She has never used smokeless tobacco. She reports that she does not drink alcohol or use drugs.    MEDICATIONS:  Current Outpatient Medications   Medication Sig Dispense Refill     acetaminophen (TYLENOL) 325 MG tablet Take 325 mg by mouth 3 times daily       calcium carbonate-vitamin D (CALCIUM + D) 600-200 MG-UNIT TABS Take 1 tablet by mouth 2 times daily 180 tablet 3     FAMOTIDINE PO Take 20 mg by mouth At Bedtime       HYDROXYZINE HCL PO Take 10 mg by mouth At Bedtime        LISINOPRIL PO Take 10 mg by mouth At Bedtime       MELATONIN PO Take 3 mg by mouth At Bedtime       METOPROLOL TARTRATE PO Take 50 mg by mouth 2 times daily       pantoprazole (PROTONIX) 40 MG EC tablet Take 1 tablet (40 mg) by mouth every morning (before breakfast)       simvastatin (ZOCOR) 20 MG tablet Take 1 tablet (20 mg) by mouth At Bedtime 30 tablet 3     vitamin D3 (CHOLECALCIFEROL) 1000 units (25 mcg) tablet Take 1 tablet (1,000 Units) by mouth daily 60 tablet 6     Recent weights and vitals reviewed in Epic:  Wt Readings from Last 5 Encounters:   12/06/19 41.6 kg (91 lb 12.8 oz)   11/25/19 42.1 kg (92 lb 12.8 oz)   11/11/19 43.9 kg (96 lb 12.8 oz)   10/31/19 43 kg (94 lb 11.2 oz)   10/03/19 43.1 kg (95 lb)     BP Readings from Last 3 Encounters:   12/06/19 (!) 149/79   11/25/19 (!) 149/80   11/11/19 (!) 146/86     Pulse Readings from Last 4 Encounters:   12/06/19 76   11/25/19 67   11/11/19 67   10/31/19 64       Case Management:  I have reviewed the care plan and  "MDS and do agree with the plan. Patient's desire to return to the community is present, but is not able due to care needs . Information reviewed:  Medications, vital signs, orders, and nursing notes.  - BIMS 14/15  - PHQ-9 17/27     ROS:  Limited secondary to cognitive impairment but today pt reports history as per HPI.     Vitals:  BP (!) 149/79   Pulse 76   Temp 97.8  F (36.6  C)   Resp 18   Ht 1.651 m (5' 5\")   Wt 41.6 kg (91 lb 12.8 oz)   SpO2 94%   BMI 15.28 kg/m    Body mass index is 15.28 kg/m .  Exam:  GENERAL APPEARANCE:  Alert, chronically ill appearing, in no distress.  EYES:  Sclera clear and conjunctiva normal, no discharge   NECK:  Nontender, supple, symmetrical; no cervical adenopathy, masses or thyromegaly, trachea midline  RESP:  Non-labored breathing, palpation of chest normal, no chest wall tenderness, no respiratory distress, Lung sounds clear, patient is on room air.  CV:  Palpation - no murmur/non-displaced PMI, Auscultation - rate and rhythm regular, no murmur, no rub or gallop.  VASCULAR: No edema bilateral lower extremities.  ABDOMEN:  normal bowel sounds, soft, nontender, no grimacing or guarding with palpation.  M/S:   Gait and station wheelchair bound.   SKIN:  Inspection - no visible rashes/lesions/uclerations.  Palpation- no increased warmth, skin is dry and non-tender.  NEURO: Cranial nerves II-XII grossly intact except hearing and vision, no facial asymmetry, follows simple commands, moves all extremities symmetrically, normal tone, no tremor  PSYCH: awake and alert, speech fluent,  insight and judgement impaired, oriented to person, memory impaired, flat affect, cooperative.     Lab/Diagnostic data:   Recent labs in [facility] reviewed by me today.    CBC RESULTS:   Recent Labs   Lab Test 11/11/19  0658 08/31/19  1002   WBC 9.3 11.1*   RBC 4.13 4.85   HGB 12.9 15.5   HCT 39.3 47.3*   MCV 95 98   MCH 31.2 32.0   MCHC 32.8 32.8   RDW 13.1 14.2    189       Last Basic Metabolic " Panel:  Recent Labs   Lab Test 12/04/19  0634 11/25/19  0744   * 129*   POTASSIUM 4.0 3.8   CHLORIDE 95 94   MICK 8.9 8.9   CO2 26 30   BUN 7 8   CR 0.48* 0.57   GLC 91 95       Liver Function Studies -   Recent Labs   Lab Test 08/31/19  1002 06/26/19  0450   PROTTOTAL 7.1 5.7*   ALBUMIN 3.4 2.8*   BILITOTAL 0.9 0.6   ALKPHOS 78 65   AST 33 12   ALT 37 22       TSH   Date Value Ref Range Status   05/16/2019 2.21 0.40 - 4.00 mU/L Final   06/18/2011 1.08 0.4 - 5.0 mU/L Final     Lab Results   Component Value Date    A1C 6.2 05/18/2019     Recent Labs   Lab Test 11/11/19  0658   CHOL 127   HDL 41*   LDL 62   TRIG 122       ASSESSMENT/PLAN  (E22.2) SIADH (syndrome of inappropriate ADH production) (H)  Comment: Last sodium 127 on 12/04, unclear if following fluid restriction.   Plan:   - Continue 1,500 mL/day fluid restriction  - No water pitcher in room  - Education provided to nursing   - Reviewed medications with PharmD, possible lisinopril could contribute     (I10) Essential hypertension, benign  Comment: SBPs 140s to 150s after changing atenolol to metoprolol and lowering lisinopril dose.   Plan:   - Decrease Lisinopril to 5 mg daily due to hyponatremia and continue metoprolol tartrated 50 mg BID  - Consider increasing beta-blocker based on blood pressures  - Allergy to CCB, may need third medication, add PRN hydralazine 10 mg for SBP > 160, notify provider if given  - Check BP and HR daily 2 hours after AM medications   - [facility] 12/09    (D68.311) Acquired hemophilia A (H)   Comment: Hemophilia secondary to factor VIII inhibitor antibody followed by [facility]. Diagnosed in 2012 with initial response to steroids. Relapsed in May 2019 with ICH. Completed prednisone taper 10/17.   Plan:   Per per hematology --  - Repeat Factor VIII, [place] assay and aPTT this week at [facility]   - Hematology follow-up 12/12/2019 with labs 2-3 days prior to this appointment.  -  "\"If any signs of significant bleeding, check aPTT and go to the Emergency Department.  Also call [facility] IMMEDIATELY at [phone].\"    (Z86.73) History of stroke  Comment: In May 2019 present to ER with left sided weakness secondary to R thalamic hemorrhagic CVA. Unable to return to baseline functional status, now resides in LTC facility. [facility] 17/30.   Plan:   - Continues to benefit from supportive care in LTC setting for meals, medications, safety, and ADL support.    (E78.5) Hyperlipidemia, unspecified hyperlipidemia type  Comment: Last LDL 62 in 11/2019  Plan:   - Continue Zocor 20 mg daily     (K21.9) Gastroesophageal reflux disease, esophagitis presence not specified  Comment: Hx of reflux, denies symptoms today   Plan:  - Continue Protonix and famotidine   - Monitor for recurrent abd pain     (M19.90) Osteoarthritis, unspecified osteoarthritis type, unspecified site  Comment: Previously on hydrocodone, stopped in setting of acute CVA. No significant pain today.  Plan:   - Continue low dose Tylenol  - Continue calcium and vitamin D     (G47.00) Insomonia  Comment: Report she is sleeping okay now  Plan:   - Continue Melatonin 3 mg q HS and hydroxyzine 10 mg q HS    (R41.89) Cognitive Impairment  Comment: [facility] 17/30, reports today she is \"bored\"   Plan:   - Continues to benefit from LTC supports  - Concern for depression, order ACP psych consult   - Activities consult      Orders written by provider at facility.     Electronically signed by:  AAYUSH Lanza CNP  "

## 2019-12-13 NOTE — TELEPHONE ENCOUNTER
Called patient granddaughter at work and on personal cell to reschedule appointment for 12/19. Socorro was not at work today and LVM on cell for a return call.

## 2019-12-18 NOTE — PROGRESS NOTES
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      HPI information obtained from: facility chart records, facility staff, patient report and [facility] chart review.     Chronic health issues include acquired hemophilia (factor VIII inhibitor) originally diagnosed in 2012 initially in remission with steroids, but relapsed in May 2019. Multiple acute care encounters in [facility] over last year, Hospitalized 5/17/19 with sudden onset left sided weakness and fall secondary to acute intracranial hemorrhage. Per hematology this was thought due to factor VIII inhibitor, as her aPTT was elevated on admission; she was transferred to [facility].  Factor VIII level was checked and was 11%.  She was treated for the acute hemorrhage with Novoseven; inhibitor suppression regimen of methylprednisolone and weekly rituximab (4 planned doses) was started. Discharged on 5/25/19 with plan to taper methylprednisolone, but she presented to ER 5/26/19 with fall and head strike, CT showed no change in prior ICH. Hospitalized again 6/10/19 with dizziness and mental status in setting of hyponatremia. Sodium improved with fluid restriction. Discharged to [facility] with follow-up care through [facility].  Again did not follow-up in hematology clinic. To ER 8/31/19 with mechanical fall, fell out of bed when attempting to tie her shoes with head trauma, CT head/c-spine  "negative, chronically low sodium. Last visit with hematology 10/03/2019, between June and October tapered from 50 mg Prednisone daily to 15 mg Prednisone daily. Completed taper off Prednisone on 10/17. Plan for repeat and follow-up with hematology December 2019.     Other health issues include hypertension managed with metoprolol and lisinopril. Lisinopril dose lowered after visit with Dr. Mulligan November 2019. Hyperlipidemia which is managed with Zocor. Osteoarthritis scheduled Tylenol. Gait abnormality with weakness and repeated falls, at wheelchair level for mobiltiy. Cognitive impairment, 17/30 on SLUMS in June 2019 and 14/15 on BIMS on in Sept 2019, takes Melatonin and Vistaril for sleep. GERD is managed with pantoprazole and famotidine. Lifelong smoker up until stroke in May 2019.      Today's concern is:  Follow-up today at request of  due to low mood with high score on PHQ-9 (21/27). Reports today mood \"comes and goes\". Sleeping well now. Thinks she has been on medications in the past for depression, but cannot recall which medications. Seeing her family helps mood, wishes she saw them more often. Says mood doesn't really bother her, \"this is my life now\". Recent fall with head strike. Forward on to forehead. Was not sent to ER, neuro checks WNL, circumstance was attempting to self-transfer. No SOB. Chronic cough. Normal BMs. No dysuria. Aches in joints at times, feels this is related to arthritis.       Recent blood pressures reviewed:       Of note, daughter has no trespass order placed by facility, but continues to call.     Past Medical and Surgical History reviewed in Epic today.    MEDICATIONS:  Current Outpatient Medications   Medication Sig Dispense Refill     acetaminophen (TYLENOL) 325 MG tablet Take 325 mg by mouth 3 times daily       calcium carbonate-vitamin D (CALCIUM + D) 600-200 MG-UNIT TABS Take 1 tablet by mouth 2 times daily 180 tablet 3     FAMOTIDINE PO Take 20 mg by mouth At " "Bedtime       hydrALAZINE (APRESOLINE) 10 MG tablet Take 1 tablet (10 mg) by mouth daily as needed (SBP > 160)       HYDROXYZINE HCL PO Take 10 mg by mouth At Bedtime        LISINOPRIL PO Take 2.5 mg by mouth daily        MELATONIN PO Take 3 mg by mouth At Bedtime       METOPROLOL TARTRATE PO Take 50 mg by mouth 2 times daily       pantoprazole (PROTONIX) 40 MG EC tablet Take 1 tablet (40 mg) by mouth every morning (before breakfast)       simvastatin (ZOCOR) 20 MG tablet Take 1 tablet (20 mg) by mouth At Bedtime 30 tablet 3     vitamin D3 (CHOLECALCIFEROL) 1000 units (25 mcg) tablet Take 1 tablet (1,000 Units) by mouth daily 60 tablet 6     Recent weights and vitals reviewed in Epic:  Wt Readings from Last 5 Encounters:   12/18/19 41.5 kg (91 lb 6.4 oz)   12/06/19 41.6 kg (91 lb 12.8 oz)   11/25/19 42.1 kg (92 lb 12.8 oz)   11/11/19 43.9 kg (96 lb 12.8 oz)   10/31/19 43 kg (94 lb 11.2 oz)     BP Readings from Last 3 Encounters:   12/18/19 (!) 142/72   12/06/19 (!) 149/79   11/25/19 (!) 149/80     Pulse Readings from Last 4 Encounters:   12/18/19 85   12/06/19 76   11/25/19 67   11/11/19 67     REVIEW OF SYSTEMS:  Limited secondary to cognitive impairment but today pt reports history as per HPI.    Objective:  BP (!) 142/72   Pulse 85   Temp 97.9  F (36.6  C)   Resp 16   Ht 1.651 m (5' 5\")   Wt 41.5 kg (91 lb 6.4 oz)   SpO2 93%   BMI 15.21 kg/m    Exam:  GENERAL APPEARANCE:  Alert, chronically ill appearing, in no distress.  HEAD: AC/AT   EYES:  Sclera clear and conjunctiva normal, no discharge   RESP:  Non-labored breathing, palpation of chest normal, no chest wall tenderness, no respiratory distress, Lung sounds clear, patient is on room air.  CV:  Palpation - no murmur/non-displaced PMI, Auscultation - rate and rhythm regular, no murmur, no rub or gallop.  VASCULAR: No edema bilateral lower extremities.  ABDOMEN:  Normal bowel sounds, soft, nontender, no grimacing or guarding with palpation.  M/S:   Gait " and station wheelchair bound.   SKIN:  Inspection - no visible rashes/lesions/uclerations.  Palpation- no increased warmth, skin is dry and non-tender.  NEURO: Cranial nerves II-XII grossly intact except hearing and vision, no facial asymmetry, follows simple commands, moves all extremities symmetrically, normal tone, no tremor  PSYCH: Awake and alert, speech fluent,  insight and judgement impaired, oriented to person, memory impaired, flat affect, cooperative.     Labs:   Recent labs in [facility] reviewed by me today.    CBC RESULTS:   Recent Labs   Lab Test 11/11/19  0658 08/31/19  1002   WBC 9.3 11.1*   RBC 4.13 4.85   HGB 12.9 15.5   HCT 39.3 47.3*   MCV 95 98   MCH 31.2 32.0   MCHC 32.8 32.8   RDW 13.1 14.2    189       Last Basic Metabolic Panel:  Recent Labs   Lab Test 12/10/19  0602 12/09/19  0727   * 128*   POTASSIUM 4.1 4.0   CHLORIDE 95 91*   MICK 8.7 9.3   CO2 27 31   BUN 7 7   CR 0.49* 0.45*   GLC 94 104*       Liver Function Studies -   Recent Labs   Lab Test 08/31/19  1002 06/26/19  0450   PROTTOTAL 7.1 5.7*   ALBUMIN 3.4 2.8*   BILITOTAL 0.9 0.6   ALKPHOS 78 65   AST 33 12   ALT 37 22       TSH   Date Value Ref Range Status   05/16/2019 2.21 0.40 - 4.00 mU/L Final   06/18/2011 1.08 0.4 - 5.0 mU/L Final     Lab Results   Component Value Date    A1C 6.2 05/18/2019       ASSESSMENT/PLAN:  (E22.2) SIADH (syndrome of inappropriate ADH production) (H) (primary encounter diagnosis)  Comment: Last sodium 129 on 12/10  Plan:   - discontinue lisinopril  - BMP 12/30   - Continue 1,500 mL/day fluid restriction  - No water pitcher in room    (F32.9) Depression, unspecified depression type    (G47.00) [place]  Comment: Mood is low, not good candidate for psych meds at this time given sodium level   Plan:   - [facility] Psych to visit q 1- 2 weeks   - Continue Melatonin 3 mg q HS and hydroxyzine 10 mg q HS   - Repeat sodium off ACE-I if improved consider medication, consult with PharmD    (R41.12) Cognitive  "Impairment  Comment: [facility] 17/30 with poor safety awareness and recurrent falls  Plan:   - PT/OT s/p falls  - Continues to benefit from LTC supports  - Activities staff to encourage resident to get out of room and participate in events    (I10) Essential hypertension, benign  Comment:   Recent blood pressures in facility EMR:    Plan:   - discontinue Lisinopril due to hyponatremia   - Continue metoprolol tartrate 50 mg BID  - Consider increasing beta-blocker based on blood pressures  - Monitor routine blood pressures    (D68.311) Acquired hemophilia A (H)   Comment: Hemophilia secondary to factor VIII inhibitor antibody followed by [facility]. Diagnosed in 2012 with initial response to steroids. Relapsed in May 2019 with ICH. Completed prednisone taper 10/17.   Plan:   Per per hematology --  - Repeat Factor VIII, [place] assay and aPTT this week at [facility]   - Hematology follow-up needs to be scheduled   - \"If any signs of significant bleeding, check aPTT and go to the Emergency Department.  Also call [facility] IMMEDIATELY at [phone].\"    (Z86.73) History of stroke  Comment: In May 2019 present to ER with left sided weakness secondary to R thalamic hemorrhagic CVA. Unable to return to baseline functional status, now resides in LTC facility. [facility] 17/30.   Plan:   - Continues to benefit from supportive care in LTC setting for meals, medications, safety, and ADL support.    (E46) Protein-calorie malnutrition   Comment: Body mass index is 15.21 kg/m .  Plan:   - Dietician consult for supplements     Orders written by provider at facility          Electronically signed by:  AAYUSH Lanza CNP   "

## 2020-01-01 ENCOUNTER — TELEPHONE (OUTPATIENT)
Dept: GERIATRICS | Facility: CLINIC | Age: 85
End: 2020-01-01

## 2020-01-01 ENCOUNTER — HOSPITAL ENCOUNTER (INPATIENT)
Facility: CLINIC | Age: 85
LOS: 12 days | Discharge: NURSING, REHAB OR LONG-TERM CARE | DRG: 177 | End: 2020-02-06
Attending: EMERGENCY MEDICINE | Admitting: INTERNAL MEDICINE
Payer: COMMERCIAL

## 2020-01-01 ENCOUNTER — APPOINTMENT (OUTPATIENT)
Dept: SPEECH THERAPY | Facility: CLINIC | Age: 85
DRG: 177 | End: 2020-01-01
Attending: HOSPITALIST
Payer: COMMERCIAL

## 2020-01-01 ENCOUNTER — NURSING HOME VISIT (OUTPATIENT)
Dept: GERIATRICS | Facility: CLINIC | Age: 85
End: 2020-01-01
Payer: COMMERCIAL

## 2020-01-01 ENCOUNTER — HOSPITAL ENCOUNTER (EMERGENCY)
Facility: CLINIC | Age: 85
Discharge: ANOTHER HOSPITAL | End: 2020-01-13
Attending: EMERGENCY MEDICINE | Admitting: EMERGENCY MEDICINE
Payer: COMMERCIAL

## 2020-01-01 ENCOUNTER — AMBULATORY - HEALTHEAST (OUTPATIENT)
Dept: OTHER | Facility: CLINIC | Age: 85
End: 2020-01-01

## 2020-01-01 ENCOUNTER — APPOINTMENT (OUTPATIENT)
Dept: GENERAL RADIOLOGY | Facility: CLINIC | Age: 85
DRG: 177 | End: 2020-01-01
Attending: HOSPITALIST
Payer: COMMERCIAL

## 2020-01-01 ENCOUNTER — VIRTUAL VISIT (OUTPATIENT)
Dept: GERIATRICS | Facility: CLINIC | Age: 85
End: 2020-01-01
Payer: MEDICARE

## 2020-01-01 ENCOUNTER — ANCILLARY PROCEDURE (OUTPATIENT)
Dept: ULTRASOUND IMAGING | Facility: CLINIC | Age: 85
End: 2020-01-01
Attending: PEDIATRICS
Payer: COMMERCIAL

## 2020-01-01 ENCOUNTER — NURSING HOME VISIT (OUTPATIENT)
Dept: GERIATRICS | Facility: CLINIC | Age: 85
End: 2020-01-01
Payer: MEDICARE

## 2020-01-01 ENCOUNTER — HOSPITAL LABORATORY (OUTPATIENT)
Dept: OTHER | Facility: CLINIC | Age: 85
End: 2020-01-01

## 2020-01-01 ENCOUNTER — DOCUMENTATION ONLY (OUTPATIENT)
Dept: OTHER | Facility: CLINIC | Age: 85
End: 2020-01-01

## 2020-01-01 ENCOUNTER — HOME CARE/HOSPICE - HEALTHEAST (OUTPATIENT)
Dept: HOSPICE | Facility: HOSPICE | Age: 85
End: 2020-01-01

## 2020-01-01 ENCOUNTER — APPOINTMENT (OUTPATIENT)
Dept: SPEECH THERAPY | Facility: CLINIC | Age: 85
DRG: 177 | End: 2020-01-01
Payer: COMMERCIAL

## 2020-01-01 ENCOUNTER — TRANSFERRED RECORDS (OUTPATIENT)
Dept: HEALTH INFORMATION MANAGEMENT | Facility: CLINIC | Age: 85
End: 2020-01-01

## 2020-01-01 ENCOUNTER — DOCUMENTATION ONLY (OUTPATIENT)
Dept: GERIATRICS | Facility: CLINIC | Age: 85
End: 2020-01-01

## 2020-01-01 ENCOUNTER — APPOINTMENT (OUTPATIENT)
Dept: GENERAL RADIOLOGY | Facility: CLINIC | Age: 85
DRG: 177 | End: 2020-01-01
Attending: EMERGENCY MEDICINE
Payer: COMMERCIAL

## 2020-01-01 ENCOUNTER — APPOINTMENT (OUTPATIENT)
Dept: CT IMAGING | Facility: CLINIC | Age: 85
DRG: 177 | End: 2020-01-01
Attending: PHYSICIAN ASSISTANT
Payer: COMMERCIAL

## 2020-01-01 ENCOUNTER — APPOINTMENT (OUTPATIENT)
Dept: GENERAL RADIOLOGY | Facility: CLINIC | Age: 85
End: 2020-01-01
Attending: EMERGENCY MEDICINE
Payer: COMMERCIAL

## 2020-01-01 VITALS
BODY MASS INDEX: 15.16 KG/M2 | HEART RATE: 92 BPM | WEIGHT: 91 LBS | DIASTOLIC BLOOD PRESSURE: 67 MMHG | RESPIRATION RATE: 16 BRPM | OXYGEN SATURATION: 91 % | SYSTOLIC BLOOD PRESSURE: 127 MMHG | TEMPERATURE: 97.8 F | HEIGHT: 65 IN

## 2020-01-01 VITALS
DIASTOLIC BLOOD PRESSURE: 67 MMHG | HEART RATE: 89 BPM | HEIGHT: 65 IN | WEIGHT: 80.9 LBS | BODY MASS INDEX: 13.48 KG/M2 | RESPIRATION RATE: 16 BRPM | OXYGEN SATURATION: 89 % | SYSTOLIC BLOOD PRESSURE: 103 MMHG | TEMPERATURE: 97.8 F

## 2020-01-01 VITALS
OXYGEN SATURATION: 92 % | TEMPERATURE: 97.8 F | HEIGHT: 65 IN | HEART RATE: 77 BPM | WEIGHT: 91.8 LBS | BODY MASS INDEX: 15.29 KG/M2 | SYSTOLIC BLOOD PRESSURE: 138 MMHG | RESPIRATION RATE: 16 BRPM | DIASTOLIC BLOOD PRESSURE: 83 MMHG

## 2020-01-01 VITALS
OXYGEN SATURATION: 92 % | RESPIRATION RATE: 18 BRPM | HEIGHT: 65 IN | BODY MASS INDEX: 17.23 KG/M2 | SYSTOLIC BLOOD PRESSURE: 119 MMHG | DIASTOLIC BLOOD PRESSURE: 63 MMHG | WEIGHT: 103.4 LBS | HEART RATE: 89 BPM | TEMPERATURE: 97.3 F

## 2020-01-01 VITALS
DIASTOLIC BLOOD PRESSURE: 67 MMHG | RESPIRATION RATE: 16 BRPM | HEART RATE: 96 BPM | TEMPERATURE: 98.3 F | BODY MASS INDEX: 12.8 KG/M2 | OXYGEN SATURATION: 95 % | WEIGHT: 76.8 LBS | SYSTOLIC BLOOD PRESSURE: 88 MMHG | HEIGHT: 65 IN

## 2020-01-01 VITALS
TEMPERATURE: 97.5 F | RESPIRATION RATE: 18 BRPM | SYSTOLIC BLOOD PRESSURE: 143 MMHG | HEIGHT: 65 IN | BODY MASS INDEX: 17.23 KG/M2 | WEIGHT: 103.4 LBS | HEART RATE: 86 BPM | DIASTOLIC BLOOD PRESSURE: 82 MMHG | OXYGEN SATURATION: 90 %

## 2020-01-01 VITALS
WEIGHT: 124.34 LBS | SYSTOLIC BLOOD PRESSURE: 123 MMHG | DIASTOLIC BLOOD PRESSURE: 62 MMHG | TEMPERATURE: 98.2 F | OXYGEN SATURATION: 90 % | BODY MASS INDEX: 20.69 KG/M2 | RESPIRATION RATE: 16 BRPM | HEART RATE: 83 BPM

## 2020-01-01 VITALS
BODY MASS INDEX: 15.13 KG/M2 | OXYGEN SATURATION: 95 % | RESPIRATION RATE: 18 BRPM | WEIGHT: 90.8 LBS | SYSTOLIC BLOOD PRESSURE: 134 MMHG | HEIGHT: 65 IN | TEMPERATURE: 97.1 F | DIASTOLIC BLOOD PRESSURE: 65 MMHG | HEART RATE: 87 BPM

## 2020-01-01 VITALS
DIASTOLIC BLOOD PRESSURE: 65 MMHG | RESPIRATION RATE: 18 BRPM | TEMPERATURE: 98.1 F | WEIGHT: 91.6 LBS | OXYGEN SATURATION: 91 % | HEIGHT: 65 IN | BODY MASS INDEX: 15.26 KG/M2 | HEART RATE: 78 BPM | SYSTOLIC BLOOD PRESSURE: 107 MMHG

## 2020-01-01 DIAGNOSIS — D68.311 ACQUIRED HEMOPHILIA A (H): ICD-10-CM

## 2020-01-01 DIAGNOSIS — F01.53 VASCULAR DEMENTIA WITH DEPRESSED MOOD (H): ICD-10-CM

## 2020-01-01 DIAGNOSIS — Z51.5 HOSPICE CARE PATIENT: ICD-10-CM

## 2020-01-01 DIAGNOSIS — R91.1 LUNG NODULE: ICD-10-CM

## 2020-01-01 DIAGNOSIS — J18.9 PNEUMONIA OF BOTH LOWER LOBES DUE TO INFECTIOUS ORGANISM: ICD-10-CM

## 2020-01-01 DIAGNOSIS — M62.81 GENERALIZED MUSCLE WEAKNESS: ICD-10-CM

## 2020-01-01 DIAGNOSIS — G47.00 INSOMNIA, UNSPECIFIED TYPE: ICD-10-CM

## 2020-01-01 DIAGNOSIS — R29.6 FALLS FREQUENTLY: Primary | ICD-10-CM

## 2020-01-01 DIAGNOSIS — I10 ESSENTIAL HYPERTENSION, BENIGN: ICD-10-CM

## 2020-01-01 DIAGNOSIS — F32.A DEPRESSION, UNSPECIFIED DEPRESSION TYPE: ICD-10-CM

## 2020-01-01 DIAGNOSIS — D75.839 THROMBOCYTOSIS: ICD-10-CM

## 2020-01-01 DIAGNOSIS — Z71.89 ADVANCE CARE PLANNING: ICD-10-CM

## 2020-01-01 DIAGNOSIS — R13.10 DYSPHAGIA, UNSPECIFIED TYPE: ICD-10-CM

## 2020-01-01 DIAGNOSIS — K21.9 GASTROESOPHAGEAL REFLUX DISEASE, ESOPHAGITIS PRESENCE NOT SPECIFIED: ICD-10-CM

## 2020-01-01 DIAGNOSIS — A41.9 SEVERE SEPSIS (H): ICD-10-CM

## 2020-01-01 DIAGNOSIS — D66 HEREDITARY FACTOR VIII DEFICIENCY (H): ICD-10-CM

## 2020-01-01 DIAGNOSIS — L25.8 DERMATITIS ASSOCIATED WITH INCONTINENCE: ICD-10-CM

## 2020-01-01 DIAGNOSIS — M19.90 OSTEOARTHRITIS, UNSPECIFIED OSTEOARTHRITIS TYPE, UNSPECIFIED SITE: ICD-10-CM

## 2020-01-01 DIAGNOSIS — D68.311 ACQUIRED HEMOPHILIA A (H): Primary | ICD-10-CM

## 2020-01-01 DIAGNOSIS — J18.9 PNEUMONIA OF BOTH LUNGS DUE TO INFECTIOUS ORGANISM, UNSPECIFIED PART OF LUNG: Primary | ICD-10-CM

## 2020-01-01 DIAGNOSIS — R41.89 COGNITIVE IMPAIRMENT: ICD-10-CM

## 2020-01-01 DIAGNOSIS — J18.9 PNEUMONIA OF BOTH LUNGS DUE TO INFECTIOUS ORGANISM, UNSPECIFIED PART OF LUNG: ICD-10-CM

## 2020-01-01 DIAGNOSIS — Z51.5 HOSPICE CARE PATIENT: Primary | ICD-10-CM

## 2020-01-01 DIAGNOSIS — I10 ESSENTIAL HYPERTENSION: Primary | ICD-10-CM

## 2020-01-01 DIAGNOSIS — K59.03 CONSTIPATION DUE TO PAIN MEDICATION: ICD-10-CM

## 2020-01-01 DIAGNOSIS — R53.81 PHYSICAL DECONDITIONING: ICD-10-CM

## 2020-01-01 DIAGNOSIS — E78.5 HYPERLIPIDEMIA, UNSPECIFIED HYPERLIPIDEMIA TYPE: ICD-10-CM

## 2020-01-01 DIAGNOSIS — E22.2 SIADH (SYNDROME OF INAPPROPRIATE ADH PRODUCTION) (H): ICD-10-CM

## 2020-01-01 DIAGNOSIS — M19.90 ARTHRITIS: ICD-10-CM

## 2020-01-01 DIAGNOSIS — E87.6 HYPOKALEMIA: ICD-10-CM

## 2020-01-01 DIAGNOSIS — I48.0 PAF (PAROXYSMAL ATRIAL FIBRILLATION) (H): ICD-10-CM

## 2020-01-01 DIAGNOSIS — J18.9 PNEUMONIA OF RIGHT LOWER LOBE DUE TO INFECTIOUS ORGANISM: Primary | ICD-10-CM

## 2020-01-01 DIAGNOSIS — E46 PROTEIN-CALORIE MALNUTRITION, UNSPECIFIED SEVERITY (H): ICD-10-CM

## 2020-01-01 DIAGNOSIS — M79.671 PAIN OF RIGHT HEEL: ICD-10-CM

## 2020-01-01 DIAGNOSIS — F33.2 SEVERE EPISODE OF RECURRENT MAJOR DEPRESSIVE DISORDER, WITHOUT PSYCHOTIC FEATURES (H): ICD-10-CM

## 2020-01-01 DIAGNOSIS — Z86.73 HISTORY OF STROKE: ICD-10-CM

## 2020-01-01 DIAGNOSIS — R32 DERMATITIS ASSOCIATED WITH INCONTINENCE: ICD-10-CM

## 2020-01-01 DIAGNOSIS — I48.91 ATRIAL FIBRILLATION WITH RVR (H): ICD-10-CM

## 2020-01-01 DIAGNOSIS — R65.20 SEVERE SEPSIS (H): ICD-10-CM

## 2020-01-01 DIAGNOSIS — R52 PAIN: Primary | ICD-10-CM

## 2020-01-01 DIAGNOSIS — R93.89 ABNORMAL CHEST CT: ICD-10-CM

## 2020-01-01 DIAGNOSIS — E83.42 HYPOMAGNESEMIA: ICD-10-CM

## 2020-01-01 DIAGNOSIS — R93.89 ABNORMAL CHEST CT: Primary | ICD-10-CM

## 2020-01-01 DIAGNOSIS — K59.00 CONSTIPATION, UNSPECIFIED CONSTIPATION TYPE: ICD-10-CM

## 2020-01-01 LAB
ALBUMIN SERPL-MCNC: 1.2 G/DL (ref 3.4–5)
ALBUMIN SERPL-MCNC: 1.4 G/DL (ref 3.4–5)
ALBUMIN SERPL-MCNC: 1.6 G/DL (ref 3.4–5)
ALBUMIN SERPL-MCNC: 2.2 G/DL (ref 3.4–5)
ALBUMIN UR-MCNC: NEGATIVE MG/DL
ALP SERPL-CCNC: 119 U/L (ref 40–150)
ALP SERPL-CCNC: 126 U/L (ref 40–150)
ALP SERPL-CCNC: 136 U/L (ref 40–150)
ALP SERPL-CCNC: 146 U/L (ref 40–150)
ALT SERPL W P-5'-P-CCNC: 10 U/L (ref 0–50)
ALT SERPL W P-5'-P-CCNC: 16 U/L (ref 0–50)
ALT SERPL W P-5'-P-CCNC: 16 U/L (ref 0–50)
ALT SERPL W P-5'-P-CCNC: 8 U/L (ref 0–50)
ANION GAP SERPL CALCULATED.3IONS-SCNC: 4 MMOL/L (ref 3–14)
ANION GAP SERPL CALCULATED.3IONS-SCNC: 5 MMOL/L (ref 3–14)
ANION GAP SERPL CALCULATED.3IONS-SCNC: 6 MMOL/L (ref 3–14)
ANION GAP SERPL CALCULATED.3IONS-SCNC: 7 MMOL/L (ref 3–14)
ANION GAP SERPL CALCULATED.3IONS-SCNC: 8 MMOL/L (ref 3–14)
ANION GAP SERPL CALCULATED.3IONS-SCNC: NORMAL MMOL/L (ref 6–17)
ANISOCYTOSIS BLD QL SMEAR: SLIGHT
APPEARANCE UR: CLEAR
APTT PPP: 55 SEC (ref 22–37)
APTT PPP: 58 SEC (ref 22–37)
AST SERPL W P-5'-P-CCNC: 13 U/L (ref 0–45)
AST SERPL W P-5'-P-CCNC: 19 U/L (ref 0–45)
AST SERPL W P-5'-P-CCNC: 9 U/L (ref 0–45)
AST SERPL W P-5'-P-CCNC: 9 U/L (ref 0–45)
BACTERIA SPEC CULT: NO GROWTH
BASOPHILS # BLD AUTO: 0 10E9/L (ref 0–0.2)
BASOPHILS # BLD AUTO: 0.1 10E9/L (ref 0–0.2)
BASOPHILS NFR BLD AUTO: 0 %
BASOPHILS NFR BLD AUTO: 0 %
BASOPHILS NFR BLD AUTO: 0.1 %
BASOPHILS NFR BLD AUTO: 0.2 %
BASOPHILS NFR BLD AUTO: 0.3 %
BASOPHILS NFR BLD AUTO: 0.4 %
BASOPHILS NFR BLD AUTO: 0.4 %
BASOPHILS NFR BLD AUTO: 0.5 %
BASOPHILS NFR BLD AUTO: 0.6 %
BASOPHILS NFR BLD AUTO: 0.7 %
BILIRUB DIRECT SERPL-MCNC: 0.1 MG/DL (ref 0–0.2)
BILIRUB DIRECT SERPL-MCNC: <0.1 MG/DL (ref 0–0.2)
BILIRUB SERPL-MCNC: 0.2 MG/DL (ref 0.2–1.3)
BILIRUB SERPL-MCNC: 0.3 MG/DL (ref 0.2–1.3)
BILIRUB SERPL-MCNC: 0.3 MG/DL (ref 0.2–1.3)
BILIRUB SERPL-MCNC: 0.5 MG/DL (ref 0.2–1.3)
BILIRUB UR QL STRIP: NEGATIVE
BUN SERPL-MCNC: 25 MG/DL (ref 7–30)
BUN SERPL-MCNC: 27 MG/DL (ref 7–30)
BUN SERPL-MCNC: 3 MG/DL (ref 7–30)
BUN SERPL-MCNC: 4 MG/DL (ref 7–30)
BUN SERPL-MCNC: 5 MG/DL (ref 7–30)
BUN SERPL-MCNC: 7 MG/DL (ref 7–30)
BUN SERPL-MCNC: 7 MG/DL (ref 7–30)
BUN SERPL-MCNC: 8 MG/DL (ref 7–30)
BUN SERPL-MCNC: NORMAL MG/DL (ref 7–30)
BURR CELLS BLD QL SMEAR: SLIGHT
CALCIUM SERPL-MCNC: 10.1 MG/DL (ref 8.5–10.1)
CALCIUM SERPL-MCNC: 7.6 MG/DL (ref 8.5–10.1)
CALCIUM SERPL-MCNC: 7.8 MG/DL (ref 8.5–10.1)
CALCIUM SERPL-MCNC: 8.1 MG/DL (ref 8.5–10.1)
CALCIUM SERPL-MCNC: 8.2 MG/DL (ref 8.5–10.1)
CALCIUM SERPL-MCNC: 8.2 MG/DL (ref 8.5–10.1)
CALCIUM SERPL-MCNC: 8.3 MG/DL (ref 8.5–10.1)
CALCIUM SERPL-MCNC: 8.4 MG/DL (ref 8.5–10.1)
CALCIUM SERPL-MCNC: 8.5 MG/DL (ref 8.5–10.1)
CALCIUM SERPL-MCNC: 8.5 MG/DL (ref 8.5–10.1)
CALCIUM SERPL-MCNC: 8.6 MG/DL (ref 8.5–10.1)
CALCIUM SERPL-MCNC: 8.6 MG/DL (ref 8.5–10.1)
CALCIUM SERPL-MCNC: 8.7 MG/DL (ref 8.5–10.1)
CALCIUM SERPL-MCNC: 9.2 MG/DL (ref 8.5–10.1)
CALCIUM SERPL-MCNC: 9.2 MG/DL (ref 8.5–10.1)
CALCIUM SERPL-MCNC: NORMAL MG/DL (ref 8.5–10.1)
CHLORIDE SERPL-SCNC: 105 MMOL/L (ref 94–109)
CHLORIDE SERPL-SCNC: 105 MMOL/L (ref 94–109)
CHLORIDE SERPL-SCNC: 107 MMOL/L (ref 94–109)
CHLORIDE SERPL-SCNC: 107 MMOL/L (ref 94–109)
CHLORIDE SERPL-SCNC: 108 MMOL/L (ref 94–109)
CHLORIDE SERPL-SCNC: 110 MMOL/L (ref 94–109)
CHLORIDE SERPL-SCNC: 111 MMOL/L (ref 94–109)
CHLORIDE SERPL-SCNC: 111 MMOL/L (ref 94–109)
CHLORIDE SERPL-SCNC: 92 MMOL/L (ref 94–109)
CHLORIDE SERPL-SCNC: 98 MMOL/L (ref 94–109)
CHLORIDE SERPL-SCNC: NORMAL MMOL/L (ref 94–109)
CO2 BLDCOV-SCNC: 24 MMOL/L (ref 21–28)
CO2 BLDCOV-SCNC: 28 MMOL/L (ref 21–28)
CO2 SERPL-SCNC: 23 MMOL/L (ref 20–32)
CO2 SERPL-SCNC: 24 MMOL/L (ref 20–32)
CO2 SERPL-SCNC: 25 MMOL/L (ref 20–32)
CO2 SERPL-SCNC: 26 MMOL/L (ref 20–32)
CO2 SERPL-SCNC: 27 MMOL/L (ref 20–32)
CO2 SERPL-SCNC: 29 MMOL/L (ref 20–32)
CO2 SERPL-SCNC: 29 MMOL/L (ref 20–32)
CO2 SERPL-SCNC: NORMAL MMOL/L (ref 20–32)
COLOR UR AUTO: YELLOW
CREAT SERPL-MCNC: 0.36 MG/DL (ref 0.52–1.04)
CREAT SERPL-MCNC: 0.39 MG/DL (ref 0.52–1.04)
CREAT SERPL-MCNC: 0.39 MG/DL (ref 0.52–1.04)
CREAT SERPL-MCNC: 0.4 MG/DL (ref 0.52–1.04)
CREAT SERPL-MCNC: 0.41 MG/DL (ref 0.52–1.04)
CREAT SERPL-MCNC: 0.41 MG/DL (ref 0.52–1.04)
CREAT SERPL-MCNC: 0.42 MG/DL (ref 0.52–1.04)
CREAT SERPL-MCNC: 0.42 MG/DL (ref 0.52–1.04)
CREAT SERPL-MCNC: 0.45 MG/DL (ref 0.52–1.04)
CREAT SERPL-MCNC: 0.46 MG/DL (ref 0.52–1.04)
CREAT SERPL-MCNC: 0.46 MG/DL (ref 0.52–1.04)
CREAT SERPL-MCNC: 0.51 MG/DL (ref 0.52–1.04)
CREAT SERPL-MCNC: 0.58 MG/DL (ref 0.52–1.04)
CREAT SERPL-MCNC: NORMAL MG/DL (ref 0.52–1.04)
CREAT UR-MCNC: 32 MG/DL
CRP SERPL-MCNC: 150 MG/L (ref 0–8)
CRP SERPL-MCNC: 74 MG/L (ref 0–8)
CRP SERPL-MCNC: 87 MG/L (ref 0–8)
CRP SERPL-MCNC: 96 MG/L (ref 0–8)
DIFFERENTIAL METHOD BLD: ABNORMAL
EOSINOPHIL # BLD AUTO: 0 10E9/L (ref 0–0.7)
EOSINOPHIL # BLD AUTO: 0 10E9/L (ref 0–0.7)
EOSINOPHIL # BLD AUTO: 0.1 10E9/L (ref 0–0.7)
EOSINOPHIL # BLD AUTO: 0.2 10E9/L (ref 0–0.7)
EOSINOPHIL # BLD AUTO: 0.3 10E9/L (ref 0–0.7)
EOSINOPHIL # BLD AUTO: 0.4 10E9/L (ref 0–0.7)
EOSINOPHIL # BLD AUTO: 0.5 10E9/L (ref 0–0.7)
EOSINOPHIL # BLD AUTO: 0.7 10E9/L (ref 0–0.7)
EOSINOPHIL NFR BLD AUTO: 0 %
EOSINOPHIL NFR BLD AUTO: 0.3 %
EOSINOPHIL NFR BLD AUTO: 0.7 %
EOSINOPHIL NFR BLD AUTO: 0.9 %
EOSINOPHIL NFR BLD AUTO: 0.9 %
EOSINOPHIL NFR BLD AUTO: 1 %
EOSINOPHIL NFR BLD AUTO: 1.3 %
EOSINOPHIL NFR BLD AUTO: 1.3 %
EOSINOPHIL NFR BLD AUTO: 2.2 %
EOSINOPHIL NFR BLD AUTO: 2.6 %
EOSINOPHIL NFR BLD AUTO: 2.8 %
EOSINOPHIL NFR BLD AUTO: 3 %
EOSINOPHIL NFR BLD AUTO: 4 %
EOSINOPHIL NFR BLD AUTO: 6.7 %
ERYTHROCYTE [DISTWIDTH] IN BLOOD BY AUTOMATED COUNT: 13.3 % (ref 10–15)
ERYTHROCYTE [DISTWIDTH] IN BLOOD BY AUTOMATED COUNT: 13.5 % (ref 10–15)
ERYTHROCYTE [DISTWIDTH] IN BLOOD BY AUTOMATED COUNT: 14 % (ref 10–15)
ERYTHROCYTE [DISTWIDTH] IN BLOOD BY AUTOMATED COUNT: 14.1 % (ref 10–15)
ERYTHROCYTE [DISTWIDTH] IN BLOOD BY AUTOMATED COUNT: 14.2 % (ref 10–15)
ERYTHROCYTE [DISTWIDTH] IN BLOOD BY AUTOMATED COUNT: 14.3 % (ref 10–15)
ERYTHROCYTE [DISTWIDTH] IN BLOOD BY AUTOMATED COUNT: 14.6 % (ref 10–15)
ERYTHROCYTE [DISTWIDTH] IN BLOOD BY AUTOMATED COUNT: 14.8 % (ref 10–15)
ERYTHROCYTE [DISTWIDTH] IN BLOOD BY AUTOMATED COUNT: 14.9 % (ref 10–15)
ERYTHROCYTE [DISTWIDTH] IN BLOOD BY AUTOMATED COUNT: 15.3 % (ref 10–15)
ERYTHROCYTE [DISTWIDTH] IN BLOOD BY AUTOMATED COUNT: 15.5 % (ref 10–15)
ERYTHROCYTE [DISTWIDTH] IN BLOOD BY AUTOMATED COUNT: 15.9 % (ref 10–15)
ERYTHROCYTE [DISTWIDTH] IN BLOOD BY AUTOMATED COUNT: 16.7 % (ref 10–15)
FACT VIII ACT/NOR PPP: 94 % (ref 55–200)
FACT VIII INHIB PPP-ACNC: NORMAL BU/ML
FERRITIN SERPL-MCNC: 478 NG/ML (ref 8–252)
FIBRINOGEN PPP-MCNC: 682 MG/DL (ref 200–420)
FLUAV+FLUBV AG SPEC QL: NEGATIVE
FLUAV+FLUBV AG SPEC QL: NEGATIVE
GFR SERPL CREATININE-BSD FRML MDRD: 83 ML/MIN/{1.73_M2}
GFR SERPL CREATININE-BSD FRML MDRD: 86 ML/MIN/{1.73_M2}
GFR SERPL CREATININE-BSD FRML MDRD: 89 ML/MIN/{1.73_M2}
GFR SERPL CREATININE-BSD FRML MDRD: 89 ML/MIN/{1.73_M2}
GFR SERPL CREATININE-BSD FRML MDRD: >90 ML/MIN/{1.73_M2}
GFR SERPL CREATININE-BSD FRML MDRD: NORMAL ML/MIN/{1.73_M2}
GLUCOSE SERPL-MCNC: 101 MG/DL (ref 70–99)
GLUCOSE SERPL-MCNC: 134 MG/DL (ref 70–99)
GLUCOSE SERPL-MCNC: 202 MG/DL (ref 70–99)
GLUCOSE SERPL-MCNC: 70 MG/DL (ref 70–99)
GLUCOSE SERPL-MCNC: 76 MG/DL (ref 70–99)
GLUCOSE SERPL-MCNC: 77 MG/DL (ref 70–99)
GLUCOSE SERPL-MCNC: 89 MG/DL (ref 70–99)
GLUCOSE SERPL-MCNC: 89 MG/DL (ref 70–99)
GLUCOSE SERPL-MCNC: 90 MG/DL (ref 70–99)
GLUCOSE SERPL-MCNC: 91 MG/DL (ref 70–99)
GLUCOSE SERPL-MCNC: 92 MG/DL (ref 70–99)
GLUCOSE SERPL-MCNC: 93 MG/DL (ref 70–99)
GLUCOSE SERPL-MCNC: 93 MG/DL (ref 70–99)
GLUCOSE SERPL-MCNC: NORMAL MG/DL (ref 70–99)
GLUCOSE UR STRIP-MCNC: NEGATIVE MG/DL
HCT VFR BLD AUTO: 27.1 % (ref 35–47)
HCT VFR BLD AUTO: 27.6 % (ref 35–47)
HCT VFR BLD AUTO: 27.7 % (ref 35–47)
HCT VFR BLD AUTO: 27.9 % (ref 35–47)
HCT VFR BLD AUTO: 28.8 % (ref 35–47)
HCT VFR BLD AUTO: 29.5 % (ref 35–47)
HCT VFR BLD AUTO: 29.6 % (ref 35–47)
HCT VFR BLD AUTO: 31 % (ref 35–47)
HCT VFR BLD AUTO: 31.2 % (ref 35–47)
HCT VFR BLD AUTO: 31.4 % (ref 35–47)
HCT VFR BLD AUTO: 32.4 % (ref 35–47)
HCT VFR BLD AUTO: 35.3 % (ref 35–47)
HCT VFR BLD AUTO: 38.8 % (ref 35–47)
HGB BLD-MCNC: 10.1 G/DL (ref 11.7–15.7)
HGB BLD-MCNC: 11.6 G/DL (ref 11.7–15.7)
HGB BLD-MCNC: 12.6 G/DL (ref 11.7–15.7)
HGB BLD-MCNC: 8.3 G/DL (ref 11.7–15.7)
HGB BLD-MCNC: 8.6 G/DL (ref 11.7–15.7)
HGB BLD-MCNC: 8.7 G/DL (ref 11.7–15.7)
HGB BLD-MCNC: 8.9 G/DL (ref 11.7–15.7)
HGB BLD-MCNC: 9 G/DL (ref 11.7–15.7)
HGB BLD-MCNC: 9.3 G/DL (ref 11.7–15.7)
HGB BLD-MCNC: 9.6 G/DL (ref 11.7–15.7)
HGB BLD-MCNC: 9.6 G/DL (ref 11.7–15.7)
HGB BLD-MCNC: 9.9 G/DL (ref 11.7–15.7)
HGB UR QL STRIP: NEGATIVE
IMM GRANULOCYTES # BLD: 0 10E9/L (ref 0–0.4)
IMM GRANULOCYTES # BLD: 0.1 10E9/L (ref 0–0.4)
IMM GRANULOCYTES NFR BLD: 0.2 %
IMM GRANULOCYTES NFR BLD: 0.4 %
IMM GRANULOCYTES NFR BLD: 0.4 %
IMM GRANULOCYTES NFR BLD: 0.5 %
IMM GRANULOCYTES NFR BLD: 0.6 %
IMM GRANULOCYTES NFR BLD: 0.8 %
INTERPRETATION ECG - MUSE: NORMAL
INTERPRETATION ECG - MUSE: NORMAL
IRON SATN MFR SERPL: 13 % (ref 15–46)
IRON SERPL-MCNC: 15 UG/DL (ref 35–180)
KETONES UR STRIP-MCNC: NEGATIVE MG/DL
LACTATE BLD-SCNC: 1.3 MMOL/L (ref 0.7–2)
LACTATE BLD-SCNC: 2 MMOL/L (ref 0.7–2.1)
LACTATE BLD-SCNC: 3.3 MMOL/L (ref 0.7–2.1)
LEUKOCYTE ESTERASE UR QL STRIP: NEGATIVE
LYMPHOCYTES # BLD AUTO: 0.7 10E9/L (ref 0.8–5.3)
LYMPHOCYTES # BLD AUTO: 0.8 10E9/L (ref 0.8–5.3)
LYMPHOCYTES # BLD AUTO: 0.9 10E9/L (ref 0.8–5.3)
LYMPHOCYTES # BLD AUTO: 0.9 10E9/L (ref 0.8–5.3)
LYMPHOCYTES # BLD AUTO: 1 10E9/L (ref 0.8–5.3)
LYMPHOCYTES # BLD AUTO: 1.1 10E9/L (ref 0.8–5.3)
LYMPHOCYTES # BLD AUTO: 1.1 10E9/L (ref 0.8–5.3)
LYMPHOCYTES # BLD AUTO: 1.2 10E9/L (ref 0.8–5.3)
LYMPHOCYTES # BLD AUTO: 1.3 10E9/L (ref 0.8–5.3)
LYMPHOCYTES # BLD AUTO: 1.4 10E9/L (ref 0.8–5.3)
LYMPHOCYTES NFR BLD AUTO: 12.3 %
LYMPHOCYTES NFR BLD AUTO: 14.2 %
LYMPHOCYTES NFR BLD AUTO: 3.4 %
LYMPHOCYTES NFR BLD AUTO: 4 %
LYMPHOCYTES NFR BLD AUTO: 6.7 %
LYMPHOCYTES NFR BLD AUTO: 7.2 %
LYMPHOCYTES NFR BLD AUTO: 8 %
LYMPHOCYTES NFR BLD AUTO: 8 %
LYMPHOCYTES NFR BLD AUTO: 8.4 %
LYMPHOCYTES NFR BLD AUTO: 8.6 %
LYMPHOCYTES NFR BLD AUTO: 8.7 %
LYMPHOCYTES NFR BLD AUTO: 8.9 %
Lab: NORMAL
MAGNESIUM SERPL-MCNC: 1.4 MG/DL (ref 1.6–2.3)
MAGNESIUM SERPL-MCNC: 1.6 MG/DL (ref 1.6–2.3)
MAGNESIUM SERPL-MCNC: 1.7 MG/DL (ref 1.6–2.3)
MAGNESIUM SERPL-MCNC: 1.8 MG/DL (ref 1.6–2.3)
MAGNESIUM SERPL-MCNC: 2.1 MG/DL (ref 1.6–2.3)
MAGNESIUM SERPL-MCNC: 2.3 MG/DL (ref 1.6–2.3)
MAGNESIUM SERPL-MCNC: 3.1 MG/DL (ref 1.6–2.3)
MAGNESIUM SERPL-MCNC: NORMAL MG/DL (ref 1.6–2.3)
MCH RBC QN AUTO: 29.5 PG (ref 26.5–33)
MCH RBC QN AUTO: 29.5 PG (ref 26.5–33)
MCH RBC QN AUTO: 29.6 PG (ref 26.5–33)
MCH RBC QN AUTO: 29.7 PG (ref 26.5–33)
MCH RBC QN AUTO: 29.9 PG (ref 26.5–33)
MCH RBC QN AUTO: 29.9 PG (ref 26.5–33)
MCH RBC QN AUTO: 30 PG (ref 26.5–33)
MCH RBC QN AUTO: 30 PG (ref 26.5–33)
MCH RBC QN AUTO: 30.1 PG (ref 26.5–33)
MCH RBC QN AUTO: 30.2 PG (ref 26.5–33)
MCH RBC QN AUTO: 30.4 PG (ref 26.5–33)
MCH RBC QN AUTO: 30.6 PG (ref 26.5–33)
MCH RBC QN AUTO: 30.6 PG (ref 26.5–33)
MCHC RBC AUTO-ENTMCNC: 29.5 G/DL (ref 31.5–36.5)
MCHC RBC AUTO-ENTMCNC: 29.9 G/DL (ref 31.5–36.5)
MCHC RBC AUTO-ENTMCNC: 30.4 G/DL (ref 31.5–36.5)
MCHC RBC AUTO-ENTMCNC: 30.6 G/DL (ref 31.5–36.5)
MCHC RBC AUTO-ENTMCNC: 30.8 G/DL (ref 31.5–36.5)
MCHC RBC AUTO-ENTMCNC: 30.9 G/DL (ref 31.5–36.5)
MCHC RBC AUTO-ENTMCNC: 31 G/DL (ref 31.5–36.5)
MCHC RBC AUTO-ENTMCNC: 31 G/DL (ref 31.5–36.5)
MCHC RBC AUTO-ENTMCNC: 31.2 G/DL (ref 31.5–36.5)
MCHC RBC AUTO-ENTMCNC: 32.3 G/DL (ref 31.5–36.5)
MCHC RBC AUTO-ENTMCNC: 32.4 G/DL (ref 31.5–36.5)
MCHC RBC AUTO-ENTMCNC: 32.5 G/DL (ref 31.5–36.5)
MCHC RBC AUTO-ENTMCNC: 32.9 G/DL (ref 31.5–36.5)
MCV RBC AUTO: 101 FL (ref 78–100)
MCV RBC AUTO: 93 FL (ref 78–100)
MCV RBC AUTO: 93 FL (ref 78–100)
MCV RBC AUTO: 94 FL (ref 78–100)
MCV RBC AUTO: 94 FL (ref 78–100)
MCV RBC AUTO: 96 FL (ref 78–100)
MCV RBC AUTO: 97 FL (ref 78–100)
MCV RBC AUTO: 98 FL (ref 78–100)
MCV RBC AUTO: 99 FL (ref 78–100)
MICROALBUMIN UR-MCNC: 15 MG/L
MICROALBUMIN/CREAT UR: 47.47 MG/G CR (ref 0–25)
MONOCYTES # BLD AUTO: 0.2 10E9/L (ref 0–1.3)
MONOCYTES # BLD AUTO: 0.7 10E9/L (ref 0–1.3)
MONOCYTES # BLD AUTO: 1 10E9/L (ref 0–1.3)
MONOCYTES # BLD AUTO: 1.1 10E9/L (ref 0–1.3)
MONOCYTES # BLD AUTO: 1.2 10E9/L (ref 0–1.3)
MONOCYTES # BLD AUTO: 1.3 10E9/L (ref 0–1.3)
MONOCYTES # BLD AUTO: 1.4 10E9/L (ref 0–1.3)
MONOCYTES # BLD AUTO: 1.5 10E9/L (ref 0–1.3)
MONOCYTES # BLD AUTO: 1.5 10E9/L (ref 0–1.3)
MONOCYTES NFR BLD AUTO: 1.8 %
MONOCYTES NFR BLD AUTO: 10.4 %
MONOCYTES NFR BLD AUTO: 10.9 %
MONOCYTES NFR BLD AUTO: 11 %
MONOCYTES NFR BLD AUTO: 11.2 %
MONOCYTES NFR BLD AUTO: 12.3 %
MONOCYTES NFR BLD AUTO: 12.9 %
MONOCYTES NFR BLD AUTO: 13.7 %
MONOCYTES NFR BLD AUTO: 4 %
MONOCYTES NFR BLD AUTO: 5.6 %
MONOCYTES NFR BLD AUTO: 7.3 %
MONOCYTES NFR BLD AUTO: 8.6 %
MONOCYTES NFR BLD AUTO: 9 %
MONOCYTES NFR BLD AUTO: 9.6 %
NEUTROPHILS # BLD AUTO: 10 10E9/L (ref 1.6–8.3)
NEUTROPHILS # BLD AUTO: 12.5 10E9/L (ref 1.6–8.3)
NEUTROPHILS # BLD AUTO: 15.1 10E9/L (ref 1.6–8.3)
NEUTROPHILS # BLD AUTO: 18.6 10E9/L (ref 1.6–8.3)
NEUTROPHILS # BLD AUTO: 7 10E9/L (ref 1.6–8.3)
NEUTROPHILS # BLD AUTO: 7.1 10E9/L (ref 1.6–8.3)
NEUTROPHILS # BLD AUTO: 7.5 10E9/L (ref 1.6–8.3)
NEUTROPHILS # BLD AUTO: 8 10E9/L (ref 1.6–8.3)
NEUTROPHILS # BLD AUTO: 8.2 10E9/L (ref 1.6–8.3)
NEUTROPHILS # BLD AUTO: 8.6 10E9/L (ref 1.6–8.3)
NEUTROPHILS # BLD AUTO: 9.3 10E9/L (ref 1.6–8.3)
NEUTROPHILS # BLD AUTO: 9.5 10E9/L (ref 1.6–8.3)
NEUTROPHILS # BLD AUTO: 9.5 10E9/L (ref 1.6–8.3)
NEUTROPHILS # BLD AUTO: 9.9 10E9/L (ref 1.6–8.3)
NEUTROPHILS NFR BLD AUTO: 70.5 %
NEUTROPHILS NFR BLD AUTO: 73.4 %
NEUTROPHILS NFR BLD AUTO: 73.7 %
NEUTROPHILS NFR BLD AUTO: 75 %
NEUTROPHILS NFR BLD AUTO: 76.5 %
NEUTROPHILS NFR BLD AUTO: 77.1 %
NEUTROPHILS NFR BLD AUTO: 78.1 %
NEUTROPHILS NFR BLD AUTO: 78.5 %
NEUTROPHILS NFR BLD AUTO: 78.8 %
NEUTROPHILS NFR BLD AUTO: 80.1 %
NEUTROPHILS NFR BLD AUTO: 84 %
NEUTROPHILS NFR BLD AUTO: 89.3 %
NEUTROPHILS NFR BLD AUTO: 90.4 %
NEUTROPHILS NFR BLD AUTO: 91 %
NITRATE UR QL: NEGATIVE
NRBC # BLD AUTO: 0 10*3/UL
NRBC BLD AUTO-RTO: 0 /100
NT-PROBNP SERPL-MCNC: 843 PG/ML (ref 0–1800)
PCO2 BLDV: 34 MM HG (ref 40–50)
PCO2 BLDV: 45 MM HG (ref 40–50)
PH BLDV: 7.4 PH (ref 7.32–7.43)
PH BLDV: 7.44 PH (ref 7.32–7.43)
PH UR STRIP: 6.5 PH (ref 5–7)
PHOSPHATE SERPL-MCNC: 2.3 MG/DL (ref 2.5–4.5)
PHOSPHATE SERPL-MCNC: 2.4 MG/DL (ref 2.5–4.5)
PHOSPHATE SERPL-MCNC: 2.6 MG/DL (ref 2.5–4.5)
PHOSPHATE SERPL-MCNC: 2.8 MG/DL (ref 2.5–4.5)
PHOSPHATE SERPL-MCNC: 2.9 MG/DL (ref 2.5–4.5)
PHOSPHATE SERPL-MCNC: 3 MG/DL (ref 2.5–4.5)
PHOSPHATE SERPL-MCNC: NORMAL MG/DL (ref 2.5–4.5)
PLATELET # BLD AUTO: 430 10E9/L (ref 150–450)
PLATELET # BLD AUTO: 482 10E9/L (ref 150–450)
PLATELET # BLD AUTO: 504 10E9/L (ref 150–450)
PLATELET # BLD AUTO: 521 10E9/L (ref 150–450)
PLATELET # BLD AUTO: 540 10E9/L (ref 150–450)
PLATELET # BLD AUTO: 557 10E9/L (ref 150–450)
PLATELET # BLD AUTO: 558 10E9/L (ref 150–450)
PLATELET # BLD AUTO: 570 10E9/L (ref 150–450)
PLATELET # BLD AUTO: 574 10E9/L (ref 150–450)
PLATELET # BLD AUTO: 576 10E9/L (ref 150–450)
PLATELET # BLD AUTO: 594 10E9/L (ref 150–450)
PLATELET # BLD AUTO: 614 10E9/L (ref 150–450)
PLATELET # BLD AUTO: 663 10E9/L (ref 150–450)
PLATELET # BLD AUTO: 708 10E9/L (ref 150–450)
PLATELET # BLD AUTO: 774 10E9/L (ref 150–450)
PLATELET # BLD EST: ABNORMAL 10*3/UL
PLATELET # BLD EST: ABNORMAL 10*3/UL
PO2 BLDV: 13 MM HG (ref 25–47)
PO2 BLDV: 27 MM HG (ref 25–47)
POIKILOCYTOSIS BLD QL SMEAR: SLIGHT
POTASSIUM SERPL-SCNC: 2.8 MMOL/L (ref 3.4–5.3)
POTASSIUM SERPL-SCNC: 2.9 MMOL/L (ref 3.4–5.3)
POTASSIUM SERPL-SCNC: 3.1 MMOL/L (ref 3.4–5.3)
POTASSIUM SERPL-SCNC: 3.1 MMOL/L (ref 3.4–5.3)
POTASSIUM SERPL-SCNC: 3.3 MMOL/L (ref 3.4–5.3)
POTASSIUM SERPL-SCNC: 3.4 MMOL/L (ref 3.4–5.3)
POTASSIUM SERPL-SCNC: 3.6 MMOL/L (ref 3.4–5.3)
POTASSIUM SERPL-SCNC: 3.7 MMOL/L (ref 3.4–5.3)
POTASSIUM SERPL-SCNC: 3.9 MMOL/L (ref 3.4–5.3)
POTASSIUM SERPL-SCNC: 4 MMOL/L (ref 3.4–5.3)
POTASSIUM SERPL-SCNC: 4 MMOL/L (ref 3.4–5.3)
POTASSIUM SERPL-SCNC: 4.1 MMOL/L (ref 3.4–5.3)
POTASSIUM SERPL-SCNC: 4.3 MMOL/L (ref 3.4–5.3)
POTASSIUM SERPL-SCNC: 4.4 MMOL/L (ref 3.4–5.3)
POTASSIUM SERPL-SCNC: 4.9 MMOL/L (ref 3.4–5.3)
POTASSIUM SERPL-SCNC: NORMAL MMOL/L (ref 3.4–5.3)
PROCALCITONIN SERPL-MCNC: 21.19 NG/ML
PROT SERPL-MCNC: 4.6 G/DL (ref 6.8–8.8)
PROT SERPL-MCNC: 5.2 G/DL (ref 6.8–8.8)
PROT SERPL-MCNC: 5.5 G/DL (ref 6.8–8.8)
PROT SERPL-MCNC: 6.8 G/DL (ref 6.8–8.8)
PROT UR-MCNC: 0.22 G/L
PROT/CREAT 24H UR: 0.7 G/G CR (ref 0–0.2)
RBC # BLD AUTO: 2.77 10E12/L (ref 3.8–5.2)
RBC # BLD AUTO: 2.85 10E12/L (ref 3.8–5.2)
RBC # BLD AUTO: 2.87 10E12/L (ref 3.8–5.2)
RBC # BLD AUTO: 2.91 10E12/L (ref 3.8–5.2)
RBC # BLD AUTO: 2.91 10E12/L (ref 3.8–5.2)
RBC # BLD AUTO: 2.92 10E12/L (ref 3.8–5.2)
RBC # BLD AUTO: 2.98 10E12/L (ref 3.8–5.2)
RBC # BLD AUTO: 3.05 10E12/L (ref 3.8–5.2)
RBC # BLD AUTO: 3.09 10E12/L (ref 3.8–5.2)
RBC # BLD AUTO: 3.23 10E12/L (ref 3.8–5.2)
RBC # BLD AUTO: 3.24 10E12/L (ref 3.8–5.2)
RBC # BLD AUTO: 3.32 10E12/L (ref 3.8–5.2)
RBC # BLD AUTO: 3.35 10E12/L (ref 3.8–5.2)
RBC # BLD AUTO: 3.79 10E12/L (ref 3.8–5.2)
RBC # BLD AUTO: 4.12 10E12/L (ref 3.8–5.2)
RBC MORPH BLD: ABNORMAL
RETICS # AUTO: 64.3 10E9/L (ref 25–95)
RETICS # AUTO: 78.1 10E9/L (ref 25–95)
RETICS/RBC NFR AUTO: 2 % (ref 0.5–2)
RETICS/RBC NFR AUTO: 2.7 % (ref 0.5–2)
SAO2 % BLDV FROM PO2: 14 %
SAO2 % BLDV FROM PO2: 54 %
SODIUM SERPL-SCNC: 129 MMOL/L (ref 133–144)
SODIUM SERPL-SCNC: 133 MMOL/L (ref 133–144)
SODIUM SERPL-SCNC: 138 MMOL/L (ref 133–144)
SODIUM SERPL-SCNC: 138 MMOL/L (ref 133–144)
SODIUM SERPL-SCNC: 139 MMOL/L (ref 133–144)
SODIUM SERPL-SCNC: 139 MMOL/L (ref 133–144)
SODIUM SERPL-SCNC: 140 MMOL/L (ref 133–144)
SODIUM SERPL-SCNC: 141 MMOL/L (ref 133–144)
SODIUM SERPL-SCNC: 142 MMOL/L (ref 133–144)
SODIUM SERPL-SCNC: 142 MMOL/L (ref 133–144)
SODIUM SERPL-SCNC: NORMAL MMOL/L (ref 133–144)
SOURCE: NORMAL
SP GR UR STRIP: 1.01 (ref 1–1.03)
SPECIMEN SOURCE: NORMAL
TIBC SERPL-MCNC: 116 UG/DL (ref 240–430)
TROPONIN I SERPL-MCNC: <0.015 UG/L (ref 0–0.04)
TSH SERPL DL<=0.005 MIU/L-ACNC: 0.51 MU/L (ref 0.4–4)
UROBILINOGEN UR STRIP-MCNC: NORMAL MG/DL (ref 0–2)
VIT B12 SERPL-MCNC: 1445 PG/ML (ref 193–986)
WBC # BLD AUTO: 10.6 10E9/L (ref 4–11)
WBC # BLD AUTO: 10.7 10E9/L (ref 4–11)
WBC # BLD AUTO: 11 10E9/L (ref 4–11)
WBC # BLD AUTO: 11.4 10E9/L (ref 4–11)
WBC # BLD AUTO: 11.6 10E9/L (ref 4–11)
WBC # BLD AUTO: 12.1 10E9/L (ref 4–11)
WBC # BLD AUTO: 12.4 10E9/L (ref 4–11)
WBC # BLD AUTO: 12.6 10E9/L (ref 4–11)
WBC # BLD AUTO: 13.1 10E9/L (ref 4–11)
WBC # BLD AUTO: 14.8 10E9/L (ref 4–11)
WBC # BLD AUTO: 16.6 10E9/L (ref 4–11)
WBC # BLD AUTO: 20.6 10E9/L (ref 4–11)
WBC # BLD AUTO: 9.2 10E9/L (ref 4–11)
WBC # BLD AUTO: 9.5 10E9/L (ref 4–11)
WBC # BLD AUTO: 9.6 10E9/L (ref 4–11)

## 2020-01-01 PROCEDURE — 25000132 ZZH RX MED GY IP 250 OP 250 PS 637: Performed by: HOSPITALIST

## 2020-01-01 PROCEDURE — 83540 ASSAY OF IRON: CPT | Performed by: HOSPITALIST

## 2020-01-01 PROCEDURE — 96366 THER/PROPH/DIAG IV INF ADDON: CPT

## 2020-01-01 PROCEDURE — 00000328 ZZHCL STATISTIC PTT NC: Performed by: INTERNAL MEDICINE

## 2020-01-01 PROCEDURE — 36415 COLL VENOUS BLD VENIPUNCTURE: CPT | Performed by: HOSPITALIST

## 2020-01-01 PROCEDURE — 71045 X-RAY EXAM CHEST 1 VIEW: CPT

## 2020-01-01 PROCEDURE — 99285 EMERGENCY DEPT VISIT HI MDM: CPT | Mod: 25 | Performed by: EMERGENCY MEDICINE

## 2020-01-01 PROCEDURE — 84100 ASSAY OF PHOSPHORUS: CPT | Performed by: HOSPITALIST

## 2020-01-01 PROCEDURE — 83735 ASSAY OF MAGNESIUM: CPT | Performed by: EMERGENCY MEDICINE

## 2020-01-01 PROCEDURE — 85384 FIBRINOGEN ACTIVITY: CPT | Performed by: HOSPITALIST

## 2020-01-01 PROCEDURE — 99232 SBSQ HOSP IP/OBS MODERATE 35: CPT | Performed by: HOSPITALIST

## 2020-01-01 PROCEDURE — 83735 ASSAY OF MAGNESIUM: CPT | Performed by: HOSPITALIST

## 2020-01-01 PROCEDURE — 92526 ORAL FUNCTION THERAPY: CPT | Mod: GN

## 2020-01-01 PROCEDURE — 25000128 H RX IP 250 OP 636: Performed by: PHYSICIAN ASSISTANT

## 2020-01-01 PROCEDURE — 87804 INFLUENZA ASSAY W/OPTIC: CPT | Performed by: EMERGENCY MEDICINE

## 2020-01-01 PROCEDURE — 94640 AIRWAY INHALATION TREATMENT: CPT | Mod: 76

## 2020-01-01 PROCEDURE — 12000001 ZZH R&B MED SURG/OB UMMC

## 2020-01-01 PROCEDURE — 83735 ASSAY OF MAGNESIUM: CPT | Performed by: INTERNAL MEDICINE

## 2020-01-01 PROCEDURE — 85025 COMPLETE CBC W/AUTO DIFF WBC: CPT | Performed by: HOSPITALIST

## 2020-01-01 PROCEDURE — 94640 AIRWAY INHALATION TREATMENT: CPT

## 2020-01-01 PROCEDURE — 96375 TX/PRO/DX INJ NEW DRUG ADDON: CPT

## 2020-01-01 PROCEDURE — 80048 BASIC METABOLIC PNL TOTAL CA: CPT | Performed by: HOSPITALIST

## 2020-01-01 PROCEDURE — 00000167 ZZHCL STATISTIC INR NC: Performed by: INTERNAL MEDICINE

## 2020-01-01 PROCEDURE — 25800030 ZZH RX IP 258 OP 636: Performed by: HOSPITALIST

## 2020-01-01 PROCEDURE — 82043 UR ALBUMIN QUANTITATIVE: CPT | Performed by: HOSPITALIST

## 2020-01-01 PROCEDURE — G0463 HOSPITAL OUTPT CLINIC VISIT: HCPCS

## 2020-01-01 PROCEDURE — 83550 IRON BINDING TEST: CPT | Performed by: HOSPITALIST

## 2020-01-01 PROCEDURE — 25000132 ZZH RX MED GY IP 250 OP 250 PS 637: Performed by: PHYSICIAN ASSISTANT

## 2020-01-01 PROCEDURE — 40000141 ZZH STATISTIC PERIPHERAL IV START W/O US GUIDANCE

## 2020-01-01 PROCEDURE — 99232 SBSQ HOSP IP/OBS MODERATE 35: CPT | Mod: GC | Performed by: INTERNAL MEDICINE

## 2020-01-01 PROCEDURE — 83605 ASSAY OF LACTIC ACID: CPT

## 2020-01-01 PROCEDURE — 99222 1ST HOSP IP/OBS MODERATE 55: CPT | Mod: GC | Performed by: INTERNAL MEDICINE

## 2020-01-01 PROCEDURE — 80076 HEPATIC FUNCTION PANEL: CPT | Performed by: HOSPITALIST

## 2020-01-01 PROCEDURE — 36415 COLL VENOUS BLD VENIPUNCTURE: CPT | Performed by: INTERNAL MEDICINE

## 2020-01-01 PROCEDURE — 84145 PROCALCITONIN (PCT): CPT | Performed by: EMERGENCY MEDICINE

## 2020-01-01 PROCEDURE — 85730 THROMBOPLASTIN TIME PARTIAL: CPT | Performed by: HOSPITALIST

## 2020-01-01 PROCEDURE — 84132 ASSAY OF SERUM POTASSIUM: CPT | Performed by: HOSPITALIST

## 2020-01-01 PROCEDURE — 84100 ASSAY OF PHOSPHORUS: CPT | Performed by: INTERNAL MEDICINE

## 2020-01-01 PROCEDURE — 86140 C-REACTIVE PROTEIN: CPT | Performed by: HOSPITALIST

## 2020-01-01 PROCEDURE — 86140 C-REACTIVE PROTEIN: CPT | Performed by: EMERGENCY MEDICINE

## 2020-01-01 PROCEDURE — 85335 FACTOR INHIBITOR TEST: CPT | Performed by: INTERNAL MEDICINE

## 2020-01-01 PROCEDURE — 85025 COMPLETE CBC W/AUTO DIFF WBC: CPT | Performed by: EMERGENCY MEDICINE

## 2020-01-01 PROCEDURE — 87040 BLOOD CULTURE FOR BACTERIA: CPT | Performed by: EMERGENCY MEDICINE

## 2020-01-01 PROCEDURE — 99233 SBSQ HOSP IP/OBS HIGH 50: CPT | Performed by: HOSPITALIST

## 2020-01-01 PROCEDURE — 83605 ASSAY OF LACTIC ACID: CPT | Mod: 91

## 2020-01-01 PROCEDURE — 85730 THROMBOPLASTIN TIME PARTIAL: CPT | Performed by: PHYSICIAN ASSISTANT

## 2020-01-01 PROCEDURE — 40000275 ZZH STATISTIC RCP TIME EA 10 MIN

## 2020-01-01 PROCEDURE — 84484 ASSAY OF TROPONIN QUANT: CPT | Performed by: EMERGENCY MEDICINE

## 2020-01-01 PROCEDURE — 84443 ASSAY THYROID STIM HORMONE: CPT | Performed by: EMERGENCY MEDICINE

## 2020-01-01 PROCEDURE — 00000401 ZZHCL STATISTIC THROMBIN TIME NC: Performed by: INTERNAL MEDICINE

## 2020-01-01 PROCEDURE — 25000125 ZZHC RX 250: Performed by: HOSPITALIST

## 2020-01-01 PROCEDURE — 93005 ELECTROCARDIOGRAM TRACING: CPT | Mod: 76

## 2020-01-01 PROCEDURE — 96367 TX/PROPH/DG ADDL SEQ IV INF: CPT | Performed by: EMERGENCY MEDICINE

## 2020-01-01 PROCEDURE — 40000894 ZZH STATISTIC OT IP EVAL DEFER: Performed by: OCCUPATIONAL THERAPIST

## 2020-01-01 PROCEDURE — 99207 ZZC APP CREDIT; MD BILLING SHARED VISIT: CPT | Performed by: PHYSICIAN ASSISTANT

## 2020-01-01 PROCEDURE — 99309 SBSQ NF CARE MODERATE MDM 30: CPT | Performed by: NURSE PRACTITIONER

## 2020-01-01 PROCEDURE — 36415 COLL VENOUS BLD VENIPUNCTURE: CPT | Performed by: PHYSICIAN ASSISTANT

## 2020-01-01 PROCEDURE — 85045 AUTOMATED RETICULOCYTE COUNT: CPT | Performed by: HOSPITALIST

## 2020-01-01 PROCEDURE — 25000125 ZZHC RX 250: Performed by: EMERGENCY MEDICINE

## 2020-01-01 PROCEDURE — 85025 COMPLETE CBC W/AUTO DIFF WBC: CPT | Performed by: PHYSICIAN ASSISTANT

## 2020-01-01 PROCEDURE — 25000128 H RX IP 250 OP 636: Performed by: EMERGENCY MEDICINE

## 2020-01-01 PROCEDURE — 99318 ZZC ANNUAL NURSING FAC ASSESSMNT, STABLE: CPT | Mod: GW | Performed by: NURSE PRACTITIONER

## 2020-01-01 PROCEDURE — 93010 ELECTROCARDIOGRAM REPORT: CPT | Performed by: INTERNAL MEDICINE

## 2020-01-01 PROCEDURE — 85240 CLOT FACTOR VIII AHG 1 STAGE: CPT | Performed by: INTERNAL MEDICINE

## 2020-01-01 PROCEDURE — 84100 ASSAY OF PHOSPHORUS: CPT | Performed by: PHYSICIAN ASSISTANT

## 2020-01-01 PROCEDURE — 99207 ZZC CDG-MDM COMPONENT: MEETS LOW - DOWN CODED: CPT | Performed by: CLINICAL NURSE SPECIALIST

## 2020-01-01 PROCEDURE — 92610 EVALUATE SWALLOWING FUNCTION: CPT | Mod: GN

## 2020-01-01 PROCEDURE — 99239 HOSP IP/OBS DSCHRG MGMT >30: CPT | Performed by: HOSPITALIST

## 2020-01-01 PROCEDURE — 86140 C-REACTIVE PROTEIN: CPT | Performed by: PHYSICIAN ASSISTANT

## 2020-01-01 PROCEDURE — 40000556 ZZH STATISTIC PERIPHERAL IV START W US GUIDANCE

## 2020-01-01 PROCEDURE — 40000893 ZZH STATISTIC PT IP EVAL DEFER: Performed by: REHABILITATION PRACTITIONER

## 2020-01-01 PROCEDURE — 82803 BLOOD GASES ANY COMBINATION: CPT

## 2020-01-01 PROCEDURE — 96365 THER/PROPH/DIAG IV INF INIT: CPT | Performed by: EMERGENCY MEDICINE

## 2020-01-01 PROCEDURE — 80053 COMPREHEN METABOLIC PANEL: CPT | Performed by: EMERGENCY MEDICINE

## 2020-01-01 PROCEDURE — 93005 ELECTROCARDIOGRAM TRACING: CPT

## 2020-01-01 PROCEDURE — 82607 VITAMIN B-12: CPT | Performed by: HOSPITALIST

## 2020-01-01 PROCEDURE — 99233 SBSQ HOSP IP/OBS HIGH 50: CPT | Performed by: INTERNAL MEDICINE

## 2020-01-01 PROCEDURE — 85384 FIBRINOGEN ACTIVITY: CPT | Performed by: INTERNAL MEDICINE

## 2020-01-01 PROCEDURE — 99223 1ST HOSP IP/OBS HIGH 75: CPT | Performed by: CLINICAL NURSE SPECIALIST

## 2020-01-01 PROCEDURE — 71046 X-RAY EXAM CHEST 2 VIEWS: CPT

## 2020-01-01 PROCEDURE — 80053 COMPREHEN METABOLIC PANEL: CPT | Performed by: PHYSICIAN ASSISTANT

## 2020-01-01 PROCEDURE — 84156 ASSAY OF PROTEIN URINE: CPT | Performed by: HOSPITALIST

## 2020-01-01 PROCEDURE — 83880 ASSAY OF NATRIURETIC PEPTIDE: CPT | Performed by: EMERGENCY MEDICINE

## 2020-01-01 PROCEDURE — 80048 BASIC METABOLIC PNL TOTAL CA: CPT | Performed by: EMERGENCY MEDICINE

## 2020-01-01 PROCEDURE — 99223 1ST HOSP IP/OBS HIGH 75: CPT | Mod: AI | Performed by: INTERNAL MEDICINE

## 2020-01-01 PROCEDURE — 99222 1ST HOSP IP/OBS MODERATE 55: CPT | Performed by: CLINICAL NURSE SPECIALIST

## 2020-01-01 PROCEDURE — 80048 BASIC METABOLIC PNL TOTAL CA: CPT | Performed by: INTERNAL MEDICINE

## 2020-01-01 PROCEDURE — 99356 ZZC PROLONGED SERV,INPATIENT,1ST HR: CPT | Performed by: CLINICAL NURSE SPECIALIST

## 2020-01-01 PROCEDURE — 83605 ASSAY OF LACTIC ACID: CPT | Performed by: EMERGENCY MEDICINE

## 2020-01-01 PROCEDURE — 99232 SBSQ HOSP IP/OBS MODERATE 35: CPT | Performed by: CLINICAL NURSE SPECIALIST

## 2020-01-01 PROCEDURE — 99285 EMERGENCY DEPT VISIT HI MDM: CPT | Mod: 25

## 2020-01-01 PROCEDURE — 96365 THER/PROPH/DIAG IV INF INIT: CPT

## 2020-01-01 PROCEDURE — 71250 CT THORAX DX C-: CPT

## 2020-01-01 PROCEDURE — 99285 EMERGENCY DEPT VISIT HI MDM: CPT | Mod: Z6 | Performed by: EMERGENCY MEDICINE

## 2020-01-01 PROCEDURE — 99310 SBSQ NF CARE HIGH MDM 45: CPT | Performed by: NURSE PRACTITIONER

## 2020-01-01 PROCEDURE — 83735 ASSAY OF MAGNESIUM: CPT | Performed by: PHYSICIAN ASSISTANT

## 2020-01-01 PROCEDURE — 97161 PT EVAL LOW COMPLEX 20 MIN: CPT | Mod: GP | Performed by: PHYSICAL THERAPIST

## 2020-01-01 PROCEDURE — 99309 SBSQ NF CARE MODERATE MDM 30: CPT | Mod: 95 | Performed by: INTERNAL MEDICINE

## 2020-01-01 PROCEDURE — 96361 HYDRATE IV INFUSION ADD-ON: CPT

## 2020-01-01 PROCEDURE — 99233 SBSQ HOSP IP/OBS HIGH 50: CPT | Performed by: CLINICAL NURSE SPECIALIST

## 2020-01-01 PROCEDURE — 81003 URINALYSIS AUTO W/O SCOPE: CPT | Performed by: HOSPITALIST

## 2020-01-01 PROCEDURE — 82728 ASSAY OF FERRITIN: CPT | Performed by: HOSPITALIST

## 2020-01-01 PROCEDURE — 25800030 ZZH RX IP 258 OP 636: Performed by: EMERGENCY MEDICINE

## 2020-01-01 PROCEDURE — 99309 SBSQ NF CARE MODERATE MDM 30: CPT | Performed by: INTERNAL MEDICINE

## 2020-01-01 RX ORDER — FAMOTIDINE 20 MG/1
20 TABLET, FILM COATED ORAL AT BEDTIME
Status: DISCONTINUED | OUTPATIENT
Start: 2020-01-01 | End: 2020-01-01 | Stop reason: HOSPADM

## 2020-01-01 RX ORDER — PANTOPRAZOLE SODIUM 40 MG/1
40 TABLET, DELAYED RELEASE ORAL
Status: DISCONTINUED | OUTPATIENT
Start: 2020-01-01 | End: 2020-01-01 | Stop reason: HOSPADM

## 2020-01-01 RX ORDER — POTASSIUM CHLORIDE 1.5 G/1.58G
20-40 POWDER, FOR SOLUTION ORAL
Status: DISCONTINUED | OUTPATIENT
Start: 2020-01-01 | End: 2020-01-01 | Stop reason: HOSPADM

## 2020-01-01 RX ORDER — SENNA AND DOCUSATE SODIUM 50; 8.6 MG/1; MG/1
1 TABLET, FILM COATED ORAL 2 TIMES DAILY PRN
COMMUNITY
Start: 2020-01-01

## 2020-01-01 RX ORDER — NALOXONE HYDROCHLORIDE 0.4 MG/ML
.1-.4 INJECTION, SOLUTION INTRAMUSCULAR; INTRAVENOUS; SUBCUTANEOUS
Status: DISCONTINUED | OUTPATIENT
Start: 2020-01-01 | End: 2020-01-01 | Stop reason: HOSPADM

## 2020-01-01 RX ORDER — AMOXICILLIN 250 MG
1 CAPSULE ORAL 2 TIMES DAILY
Status: DISCONTINUED | OUTPATIENT
Start: 2020-01-01 | End: 2020-01-01 | Stop reason: HOSPADM

## 2020-01-01 RX ORDER — BISACODYL 10 MG
10 SUPPOSITORY, RECTAL RECTAL 2 TIMES DAILY PRN
COMMUNITY

## 2020-01-01 RX ORDER — POTASSIUM CHLORIDE 750 MG/1
20-40 TABLET, EXTENDED RELEASE ORAL
Status: DISCONTINUED | OUTPATIENT
Start: 2020-01-01 | End: 2020-01-01 | Stop reason: HOSPADM

## 2020-01-01 RX ORDER — ACETAMINOPHEN 325 MG/1
650 TABLET ORAL EVERY 4 HOURS PRN
COMMUNITY
End: 2020-01-01

## 2020-01-01 RX ORDER — HYDRALAZINE HYDROCHLORIDE 25 MG/1
25 TABLET, FILM COATED ORAL EVERY 6 HOURS PRN
Status: DISCONTINUED | OUTPATIENT
Start: 2020-01-01 | End: 2020-01-01 | Stop reason: HOSPADM

## 2020-01-01 RX ORDER — MORPHINE SULFATE 30 MG/1
2.5 TABLET ORAL 3 TIMES DAILY
COMMUNITY
End: 2020-01-01

## 2020-01-01 RX ORDER — POTASSIUM CHLORIDE 29.8 MG/ML
20 INJECTION INTRAVENOUS
Status: DISCONTINUED | OUTPATIENT
Start: 2020-01-01 | End: 2020-01-01 | Stop reason: HOSPADM

## 2020-01-01 RX ORDER — SIMVASTATIN 20 MG
20 TABLET ORAL AT BEDTIME
Status: DISCONTINUED | OUTPATIENT
Start: 2020-01-01 | End: 2020-01-01 | Stop reason: HOSPADM

## 2020-01-01 RX ORDER — POTASSIUM CL/LIDO/0.9 % NACL 10MEQ/0.1L
10 INTRAVENOUS SOLUTION, PIGGYBACK (ML) INTRAVENOUS
Status: DISCONTINUED | OUTPATIENT
Start: 2020-01-01 | End: 2020-01-01 | Stop reason: HOSPADM

## 2020-01-01 RX ORDER — LOSARTAN POTASSIUM 25 MG/1
25 TABLET ORAL DAILY
DISCHARGE
Start: 2020-01-01 | End: 2020-01-01

## 2020-01-01 RX ORDER — AMOXICILLIN 250 MG
2 CAPSULE ORAL 2 TIMES DAILY
Status: DISCONTINUED | OUTPATIENT
Start: 2020-01-01 | End: 2020-01-01 | Stop reason: HOSPADM

## 2020-01-01 RX ORDER — SODIUM CHLORIDE, SODIUM LACTATE, POTASSIUM CHLORIDE, CALCIUM CHLORIDE 600; 310; 30; 20 MG/100ML; MG/100ML; MG/100ML; MG/100ML
INJECTION, SOLUTION INTRAVENOUS CONTINUOUS
Status: DISCONTINUED | OUTPATIENT
Start: 2020-01-01 | End: 2020-01-01 | Stop reason: HOSPADM

## 2020-01-01 RX ORDER — BISACODYL 10 MG
10 SUPPOSITORY, RECTAL RECTAL
COMMUNITY

## 2020-01-01 RX ORDER — PIPERACILLIN SODIUM, TAZOBACTAM SODIUM 4; .5 G/20ML; G/20ML
4.5 INJECTION, POWDER, LYOPHILIZED, FOR SOLUTION INTRAVENOUS ONCE
Status: COMPLETED | OUTPATIENT
Start: 2020-01-01 | End: 2020-01-01

## 2020-01-01 RX ORDER — BISACODYL 10 MG
10 SUPPOSITORY, RECTAL RECTAL EVERY OTHER DAY
Start: 2020-01-01 | End: 2020-05-15 | Stop reason: DRUGHIGH

## 2020-01-01 RX ORDER — VITAMIN B COMPLEX
1000 TABLET ORAL DAILY
Status: DISCONTINUED | OUTPATIENT
Start: 2020-01-01 | End: 2020-01-01 | Stop reason: HOSPADM

## 2020-01-01 RX ORDER — MULTIPLE VITAMINS W/ MINERALS TAB 9MG-400MCG
1 TAB ORAL DAILY
Status: DISCONTINUED | OUTPATIENT
Start: 2020-01-01 | End: 2020-01-01 | Stop reason: HOSPADM

## 2020-01-01 RX ORDER — METOPROLOL TARTRATE 1 MG/ML
5 INJECTION, SOLUTION INTRAVENOUS EVERY 5 MIN PRN
Status: DISCONTINUED | OUTPATIENT
Start: 2020-01-01 | End: 2020-01-01 | Stop reason: HOSPADM

## 2020-01-01 RX ORDER — IPRATROPIUM BROMIDE AND ALBUTEROL SULFATE 2.5; .5 MG/3ML; MG/3ML
3 SOLUTION RESPIRATORY (INHALATION)
Status: DISCONTINUED | OUTPATIENT
Start: 2020-01-01 | End: 2020-01-01

## 2020-01-01 RX ORDER — LANOLIN ALCOHOL/MO/W.PET/CERES
3 CREAM (GRAM) TOPICAL AT BEDTIME
Status: DISCONTINUED | OUTPATIENT
Start: 2020-01-01 | End: 2020-01-01 | Stop reason: HOSPADM

## 2020-01-01 RX ORDER — POTASSIUM CHLORIDE 7.45 MG/ML
10 INJECTION INTRAVENOUS
Status: DISCONTINUED | OUTPATIENT
Start: 2020-01-01 | End: 2020-01-01 | Stop reason: HOSPADM

## 2020-01-01 RX ORDER — IPRATROPIUM BROMIDE AND ALBUTEROL SULFATE 2.5; .5 MG/3ML; MG/3ML
3 SOLUTION RESPIRATORY (INHALATION) EVERY 4 HOURS PRN
Status: DISCONTINUED | OUTPATIENT
Start: 2020-01-01 | End: 2020-01-01 | Stop reason: HOSPADM

## 2020-01-01 RX ORDER — LOSARTAN POTASSIUM 25 MG/1
25 TABLET ORAL DAILY
Status: DISCONTINUED | OUTPATIENT
Start: 2020-01-01 | End: 2020-01-01 | Stop reason: HOSPADM

## 2020-01-01 RX ORDER — LOSARTAN POTASSIUM 25 MG/1
25 TABLET ORAL DAILY
Status: DISCONTINUED | OUTPATIENT
Start: 2020-01-01 | End: 2020-01-01

## 2020-01-01 RX ORDER — HALOPERIDOL 0.5 MG/1
0.5 TABLET ORAL EVERY 6 HOURS PRN
COMMUNITY

## 2020-01-01 RX ORDER — HYDRALAZINE HYDROCHLORIDE 10 MG/1
10 TABLET, FILM COATED ORAL DAILY PRN
Status: ON HOLD | COMMUNITY
End: 2020-01-01

## 2020-01-01 RX ORDER — LIDOCAINE 40 MG/G
CREAM TOPICAL
Status: DISCONTINUED | OUTPATIENT
Start: 2020-01-01 | End: 2020-01-01 | Stop reason: HOSPADM

## 2020-01-01 RX ORDER — LORAZEPAM 0.5 MG/1
0.25 TABLET ORAL AT BEDTIME
COMMUNITY

## 2020-01-01 RX ORDER — MORPHINE SULFATE 30 MG/1
TABLET ORAL
Qty: 10 TABLET | Refills: 0 | Status: SHIPPED | OUTPATIENT
Start: 2020-01-01

## 2020-01-01 RX ORDER — MAGNESIUM SULFATE HEPTAHYDRATE 40 MG/ML
4 INJECTION, SOLUTION INTRAVENOUS EVERY 4 HOURS PRN
Status: DISCONTINUED | OUTPATIENT
Start: 2020-01-01 | End: 2020-01-01 | Stop reason: HOSPADM

## 2020-01-01 RX ORDER — LOSARTAN POTASSIUM 25 MG/1
50 TABLET ORAL DAILY
Status: DISCONTINUED | OUTPATIENT
Start: 2020-01-01 | End: 2020-01-01

## 2020-01-01 RX ORDER — LEVOFLOXACIN 500 MG/1
500 TABLET, FILM COATED ORAL DAILY
Status: DISCONTINUED | OUTPATIENT
Start: 2020-01-01 | End: 2020-01-01 | Stop reason: HOSPADM

## 2020-01-01 RX ORDER — LEVOFLOXACIN 500 MG/1
500 TABLET, FILM COATED ORAL DAILY
Qty: 14 TABLET | Refills: 0 | Status: SHIPPED | OUTPATIENT
Start: 2020-01-01 | End: 2020-01-01

## 2020-01-01 RX ORDER — METOPROLOL TARTRATE 25 MG/1
50 TABLET, FILM COATED ORAL 2 TIMES DAILY
Status: DISCONTINUED | OUTPATIENT
Start: 2020-01-01 | End: 2020-01-01 | Stop reason: HOSPADM

## 2020-01-01 RX ORDER — AZITHROMYCIN 250 MG/1
TABLET, FILM COATED ORAL AT BEDTIME
COMMUNITY
End: 2020-01-01

## 2020-01-01 RX ORDER — POTASSIUM CHLORIDE 750 MG/1
TABLET, EXTENDED RELEASE ORAL
Qty: 30 TABLET | Refills: 3 | Status: SHIPPED | OUTPATIENT
Start: 2020-01-01 | End: 2020-01-01

## 2020-01-01 RX ORDER — VANCOMYCIN HYDROCHLORIDE 1 G/200ML
1000 INJECTION, SOLUTION INTRAVENOUS ONCE
Status: COMPLETED | OUTPATIENT
Start: 2020-01-01 | End: 2020-01-01

## 2020-01-01 RX ORDER — ACETAMINOPHEN 325 MG/1
650 TABLET ORAL EVERY 4 HOURS PRN
Status: DISCONTINUED | OUTPATIENT
Start: 2020-01-01 | End: 2020-01-01 | Stop reason: HOSPADM

## 2020-01-01 RX ORDER — PIPERACILLIN SODIUM, TAZOBACTAM SODIUM 3; .375 G/15ML; G/15ML
3.38 INJECTION, POWDER, LYOPHILIZED, FOR SOLUTION INTRAVENOUS EVERY 6 HOURS
Status: DISCONTINUED | OUTPATIENT
Start: 2020-01-01 | End: 2020-01-01

## 2020-01-01 RX ORDER — ACETAMINOPHEN 650 MG/1
650 SUPPOSITORY RECTAL EVERY 4 HOURS PRN
COMMUNITY

## 2020-01-01 RX ORDER — HYDROXYZINE HYDROCHLORIDE 10 MG/1
10 TABLET, FILM COATED ORAL
Status: DISCONTINUED | OUTPATIENT
Start: 2020-01-01 | End: 2020-01-01 | Stop reason: HOSPADM

## 2020-01-01 RX ORDER — ACETAMINOPHEN 500 MG
1000 TABLET ORAL 3 TIMES DAILY
COMMUNITY
End: 2020-01-01

## 2020-01-01 RX ORDER — SODIUM CHLORIDE 9 MG/ML
INJECTION, SOLUTION INTRAVENOUS CONTINUOUS
Status: DISCONTINUED | OUTPATIENT
Start: 2020-01-01 | End: 2020-01-01

## 2020-01-01 RX ADMIN — ACETAMINOPHEN 650 MG: 325 TABLET, FILM COATED ORAL at 21:57

## 2020-01-01 RX ADMIN — MULTIPLE VITAMINS W/ MINERALS TAB 1 TABLET: TAB at 08:35

## 2020-01-01 RX ADMIN — POTASSIUM & SODIUM PHOSPHATES POWDER PACK 280-160-250 MG 2 PACKET: 280-160-250 PACK at 20:24

## 2020-01-01 RX ADMIN — METOPROLOL TARTRATE 50 MG: 25 TABLET ORAL at 09:29

## 2020-01-01 RX ADMIN — MELATONIN TAB 3 MG 3 MG: 3 TAB at 21:13

## 2020-01-01 RX ADMIN — MULTIPLE VITAMINS W/ MINERALS TAB 1 TABLET: TAB at 11:27

## 2020-01-01 RX ADMIN — METOPROLOL TARTRATE 50 MG: 25 TABLET ORAL at 20:51

## 2020-01-01 RX ADMIN — METOPROLOL TARTRATE 5 MG: 5 INJECTION INTRAVENOUS at 11:37

## 2020-01-01 RX ADMIN — ACETAMINOPHEN 650 MG: 325 TABLET, FILM COATED ORAL at 12:24

## 2020-01-01 RX ADMIN — LOSARTAN POTASSIUM 25 MG: 25 TABLET, FILM COATED ORAL at 10:24

## 2020-01-01 RX ADMIN — MELATONIN TAB 3 MG 3 MG: 3 TAB at 01:59

## 2020-01-01 RX ADMIN — PIPERACILLIN AND TAZOBACTAM 3.38 G: 3; .375 INJECTION, POWDER, FOR SOLUTION INTRAVENOUS at 16:05

## 2020-01-01 RX ADMIN — PIPERACILLIN AND TAZOBACTAM 3.38 G: 3; .375 INJECTION, POWDER, FOR SOLUTION INTRAVENOUS at 16:36

## 2020-01-01 RX ADMIN — MELATONIN TAB 3 MG 3 MG: 3 TAB at 21:58

## 2020-01-01 RX ADMIN — PANTOPRAZOLE SODIUM 40 MG: 40 TABLET, DELAYED RELEASE ORAL at 08:12

## 2020-01-01 RX ADMIN — METOPROLOL TARTRATE 50 MG: 25 TABLET ORAL at 08:43

## 2020-01-01 RX ADMIN — ACETAMINOPHEN 650 MG: 325 TABLET, FILM COATED ORAL at 10:30

## 2020-01-01 RX ADMIN — METOPROLOL TARTRATE 50 MG: 25 TABLET ORAL at 08:12

## 2020-01-01 RX ADMIN — SIMVASTATIN 20 MG: 20 TABLET, FILM COATED ORAL at 22:03

## 2020-01-01 RX ADMIN — METOPROLOL TARTRATE 50 MG: 25 TABLET ORAL at 09:19

## 2020-01-01 RX ADMIN — SENNOSIDES AND DOCUSATE SODIUM 1 TABLET: 8.6; 5 TABLET ORAL at 20:05

## 2020-01-01 RX ADMIN — ACETAMINOPHEN 650 MG: 325 TABLET, FILM COATED ORAL at 09:54

## 2020-01-01 RX ADMIN — SENNOSIDES AND DOCUSATE SODIUM 1 TABLET: 8.6; 5 TABLET ORAL at 20:24

## 2020-01-01 RX ADMIN — MELATONIN 1000 UNITS: at 10:08

## 2020-01-01 RX ADMIN — PIPERACILLIN AND TAZOBACTAM 3.38 G: 3; .375 INJECTION, POWDER, FOR SOLUTION INTRAVENOUS at 09:22

## 2020-01-01 RX ADMIN — FAMOTIDINE 20 MG: 20 TABLET ORAL at 01:59

## 2020-01-01 RX ADMIN — MELATONIN TAB 3 MG 3 MG: 3 TAB at 21:48

## 2020-01-01 RX ADMIN — SIMVASTATIN 20 MG: 20 TABLET, FILM COATED ORAL at 21:55

## 2020-01-01 RX ADMIN — LEVOFLOXACIN 500 MG: 500 TABLET, FILM COATED ORAL at 08:43

## 2020-01-01 RX ADMIN — SENNOSIDES AND DOCUSATE SODIUM 1 TABLET: 8.6; 5 TABLET ORAL at 08:45

## 2020-01-01 RX ADMIN — METOPROLOL TARTRATE 50 MG: 25 TABLET ORAL at 07:54

## 2020-01-01 RX ADMIN — Medication 10 MEQ: at 13:12

## 2020-01-01 RX ADMIN — ACETAMINOPHEN 650 MG: 325 TABLET, FILM COATED ORAL at 21:00

## 2020-01-01 RX ADMIN — LOSARTAN POTASSIUM 50 MG: 25 TABLET ORAL at 09:14

## 2020-01-01 RX ADMIN — SENNOSIDES AND DOCUSATE SODIUM 2 TABLET: 8.6; 5 TABLET ORAL at 20:00

## 2020-01-01 RX ADMIN — PANTOPRAZOLE SODIUM 40 MG: 40 TABLET, DELAYED RELEASE ORAL at 11:26

## 2020-01-01 RX ADMIN — PIPERACILLIN AND TAZOBACTAM 3.38 G: 3; .375 INJECTION, POWDER, FOR SOLUTION INTRAVENOUS at 18:31

## 2020-01-01 RX ADMIN — Medication 10 MEQ: at 10:45

## 2020-01-01 RX ADMIN — MELATONIN 1000 UNITS: at 09:14

## 2020-01-01 RX ADMIN — LOSARTAN POTASSIUM 25 MG: 25 TABLET ORAL at 11:27

## 2020-01-01 RX ADMIN — FAMOTIDINE 20 MG: 20 TABLET ORAL at 21:48

## 2020-01-01 RX ADMIN — MELATONIN 1000 UNITS: at 08:44

## 2020-01-01 RX ADMIN — FAMOTIDINE 20 MG: 20 TABLET ORAL at 22:24

## 2020-01-01 RX ADMIN — LOSARTAN POTASSIUM 25 MG: 25 TABLET, FILM COATED ORAL at 09:22

## 2020-01-01 RX ADMIN — MELATONIN 1000 UNITS: at 09:22

## 2020-01-01 RX ADMIN — ACETAMINOPHEN 650 MG: 325 TABLET, FILM COATED ORAL at 16:24

## 2020-01-01 RX ADMIN — PIPERACILLIN AND TAZOBACTAM 3.38 G: 3; .375 INJECTION, POWDER, FOR SOLUTION INTRAVENOUS at 21:54

## 2020-01-01 RX ADMIN — PIPERACILLIN AND TAZOBACTAM 3.38 G: 3; .375 INJECTION, POWDER, FOR SOLUTION INTRAVENOUS at 21:01

## 2020-01-01 RX ADMIN — LEVOFLOXACIN 500 MG: 500 TABLET, FILM COATED ORAL at 16:24

## 2020-01-01 RX ADMIN — METOPROLOL TARTRATE 50 MG: 25 TABLET ORAL at 20:06

## 2020-01-01 RX ADMIN — SENNOSIDES AND DOCUSATE SODIUM 2 TABLET: 8.6; 5 TABLET ORAL at 19:57

## 2020-01-01 RX ADMIN — LEVOFLOXACIN 500 MG: 500 TABLET, FILM COATED ORAL at 08:36

## 2020-01-01 RX ADMIN — PIPERACILLIN AND TAZOBACTAM 3.38 G: 3; .375 INJECTION, POWDER, FOR SOLUTION INTRAVENOUS at 08:53

## 2020-01-01 RX ADMIN — ACETAMINOPHEN 650 MG: 325 TABLET, FILM COATED ORAL at 21:52

## 2020-01-01 RX ADMIN — ACETAMINOPHEN 650 MG: 325 TABLET, FILM COATED ORAL at 18:34

## 2020-01-01 RX ADMIN — PIPERACILLIN AND TAZOBACTAM 3.38 G: 3; .375 INJECTION, POWDER, FOR SOLUTION INTRAVENOUS at 21:15

## 2020-01-01 RX ADMIN — MELATONIN TAB 3 MG 3 MG: 3 TAB at 22:24

## 2020-01-01 RX ADMIN — METOPROLOL TARTRATE 50 MG: 25 TABLET ORAL at 01:58

## 2020-01-01 RX ADMIN — PANTOPRAZOLE SODIUM 40 MG: 40 TABLET, DELAYED RELEASE ORAL at 09:55

## 2020-01-01 RX ADMIN — PIPERACILLIN AND TAZOBACTAM 3.38 G: 3; .375 INJECTION, POWDER, FOR SOLUTION INTRAVENOUS at 02:40

## 2020-01-01 RX ADMIN — PIPERACILLIN AND TAZOBACTAM 3.38 G: 3; .375 INJECTION, POWDER, FOR SOLUTION INTRAVENOUS at 03:10

## 2020-01-01 RX ADMIN — PIPERACILLIN SODIUM AND TAZOBACTAM SODIUM 4.5 G: 4; .5 INJECTION, POWDER, LYOPHILIZED, FOR SOLUTION INTRAVENOUS at 19:21

## 2020-01-01 RX ADMIN — PIPERACILLIN AND TAZOBACTAM 3.38 G: 3; .375 INJECTION, POWDER, FOR SOLUTION INTRAVENOUS at 04:32

## 2020-01-01 RX ADMIN — MELATONIN 1000 UNITS: at 08:12

## 2020-01-01 RX ADMIN — PIPERACILLIN AND TAZOBACTAM 3.38 G: 3; .375 INJECTION, POWDER, FOR SOLUTION INTRAVENOUS at 16:10

## 2020-01-01 RX ADMIN — SIMVASTATIN 20 MG: 20 TABLET, FILM COATED ORAL at 22:28

## 2020-01-01 RX ADMIN — SIMVASTATIN 20 MG: 20 TABLET, FILM COATED ORAL at 21:13

## 2020-01-01 RX ADMIN — MELATONIN TAB 3 MG 3 MG: 3 TAB at 21:42

## 2020-01-01 RX ADMIN — ACETAMINOPHEN 650 MG: 325 TABLET, FILM COATED ORAL at 17:28

## 2020-01-01 RX ADMIN — SODIUM CHLORIDE, POTASSIUM CHLORIDE, SODIUM LACTATE AND CALCIUM CHLORIDE 1000 ML: 600; 310; 30; 20 INJECTION, SOLUTION INTRAVENOUS at 10:17

## 2020-01-01 RX ADMIN — IPRATROPIUM BROMIDE AND ALBUTEROL SULFATE 3 ML: .5; 3 SOLUTION RESPIRATORY (INHALATION) at 12:43

## 2020-01-01 RX ADMIN — MULTIPLE VITAMINS W/ MINERALS TAB 1 TABLET: TAB at 08:12

## 2020-01-01 RX ADMIN — PIPERACILLIN AND TAZOBACTAM 3.38 G: 3; .375 INJECTION, POWDER, FOR SOLUTION INTRAVENOUS at 04:07

## 2020-01-01 RX ADMIN — SENNOSIDES AND DOCUSATE SODIUM 1 TABLET: 8.6; 5 TABLET ORAL at 08:35

## 2020-01-01 RX ADMIN — MULTIPLE VITAMINS W/ MINERALS TAB 1 TABLET: TAB at 10:23

## 2020-01-01 RX ADMIN — PANTOPRAZOLE SODIUM 40 MG: 40 TABLET, DELAYED RELEASE ORAL at 07:54

## 2020-01-01 RX ADMIN — PIPERACILLIN AND TAZOBACTAM 3.38 G: 3; .375 INJECTION, POWDER, FOR SOLUTION INTRAVENOUS at 17:18

## 2020-01-01 RX ADMIN — POTASSIUM CHLORIDE 20 MEQ: 750 TABLET, EXTENDED RELEASE ORAL at 15:22

## 2020-01-01 RX ADMIN — MAGNESIUM SULFATE HEPTAHYDRATE 4 G: 40 INJECTION, SOLUTION INTRAVENOUS at 13:27

## 2020-01-01 RX ADMIN — PIPERACILLIN AND TAZOBACTAM 3.38 G: 3; .375 INJECTION, POWDER, FOR SOLUTION INTRAVENOUS at 21:44

## 2020-01-01 RX ADMIN — Medication 10 MEQ: at 18:34

## 2020-01-01 RX ADMIN — METOPROLOL TARTRATE 50 MG: 25 TABLET ORAL at 09:13

## 2020-01-01 RX ADMIN — PIPERACILLIN AND TAZOBACTAM 3.38 G: 3; .375 INJECTION, POWDER, FOR SOLUTION INTRAVENOUS at 22:23

## 2020-01-01 RX ADMIN — ACETAMINOPHEN 650 MG: 325 TABLET, FILM COATED ORAL at 08:43

## 2020-01-01 RX ADMIN — PIPERACILLIN AND TAZOBACTAM 3.38 G: 3; .375 INJECTION, POWDER, FOR SOLUTION INTRAVENOUS at 03:53

## 2020-01-01 RX ADMIN — SIMVASTATIN 20 MG: 20 TABLET, FILM COATED ORAL at 01:59

## 2020-01-01 RX ADMIN — PIPERACILLIN AND TAZOBACTAM 3.38 G: 3; .375 INJECTION, POWDER, FOR SOLUTION INTRAVENOUS at 21:48

## 2020-01-01 RX ADMIN — MELATONIN TAB 3 MG 3 MG: 3 TAB at 21:46

## 2020-01-01 RX ADMIN — FAMOTIDINE 20 MG: 20 TABLET ORAL at 19:01

## 2020-01-01 RX ADMIN — MULTIPLE VITAMINS W/ MINERALS TAB 1 TABLET: TAB at 08:46

## 2020-01-01 RX ADMIN — PIPERACILLIN AND TAZOBACTAM 3.38 G: 3; .375 INJECTION, POWDER, FOR SOLUTION INTRAVENOUS at 15:34

## 2020-01-01 RX ADMIN — Medication 10 MEQ: at 14:21

## 2020-01-01 RX ADMIN — LOSARTAN POTASSIUM 25 MG: 25 TABLET ORAL at 18:17

## 2020-01-01 RX ADMIN — LOSARTAN POTASSIUM 25 MG: 25 TABLET, FILM COATED ORAL at 08:36

## 2020-01-01 RX ADMIN — PIPERACILLIN AND TAZOBACTAM 3.38 G: 3; .375 INJECTION, POWDER, FOR SOLUTION INTRAVENOUS at 09:13

## 2020-01-01 RX ADMIN — MELATONIN 1000 UNITS: at 08:36

## 2020-01-01 RX ADMIN — PANTOPRAZOLE SODIUM 40 MG: 40 TABLET, DELAYED RELEASE ORAL at 08:44

## 2020-01-01 RX ADMIN — SENNOSIDES AND DOCUSATE SODIUM 1 TABLET: 8.6; 5 TABLET ORAL at 20:19

## 2020-01-01 RX ADMIN — PIPERACILLIN AND TAZOBACTAM 3.38 G: 3; .375 INJECTION, POWDER, FOR SOLUTION INTRAVENOUS at 10:04

## 2020-01-01 RX ADMIN — ACETAMINOPHEN 650 MG: 325 TABLET, FILM COATED ORAL at 16:12

## 2020-01-01 RX ADMIN — METOPROLOL TARTRATE 50 MG: 25 TABLET ORAL at 10:07

## 2020-01-01 RX ADMIN — METOPROLOL TARTRATE 50 MG: 25 TABLET ORAL at 11:26

## 2020-01-01 RX ADMIN — MELATONIN TAB 3 MG 3 MG: 3 TAB at 21:15

## 2020-01-01 RX ADMIN — MULTIPLE VITAMINS W/ MINERALS TAB 1 TABLET: TAB at 07:54

## 2020-01-01 RX ADMIN — PANTOPRAZOLE SODIUM 40 MG: 40 TABLET, DELAYED RELEASE ORAL at 08:49

## 2020-01-01 RX ADMIN — PIPERACILLIN AND TAZOBACTAM 3.38 G: 3; .375 INJECTION, POWDER, FOR SOLUTION INTRAVENOUS at 21:13

## 2020-01-01 RX ADMIN — MELATONIN 1000 UNITS: at 09:55

## 2020-01-01 RX ADMIN — SIMVASTATIN 20 MG: 20 TABLET, FILM COATED ORAL at 21:42

## 2020-01-01 RX ADMIN — PANTOPRAZOLE SODIUM 40 MG: 40 TABLET, DELAYED RELEASE ORAL at 09:19

## 2020-01-01 RX ADMIN — MULTIPLE VITAMINS W/ MINERALS TAB 1 TABLET: TAB at 08:43

## 2020-01-01 RX ADMIN — PANTOPRAZOLE SODIUM 40 MG: 40 TABLET, DELAYED RELEASE ORAL at 09:13

## 2020-01-01 RX ADMIN — VANCOMYCIN HYDROCHLORIDE 1000 MG: 1 INJECTION, SOLUTION INTRAVENOUS at 18:15

## 2020-01-01 RX ADMIN — FAMOTIDINE 20 MG: 20 TABLET ORAL at 21:58

## 2020-01-01 RX ADMIN — SIMVASTATIN 20 MG: 20 TABLET, FILM COATED ORAL at 21:01

## 2020-01-01 RX ADMIN — METOPROLOL TARTRATE 50 MG: 25 TABLET ORAL at 20:04

## 2020-01-01 RX ADMIN — PANTOPRAZOLE SODIUM 40 MG: 40 TABLET, DELAYED RELEASE ORAL at 09:22

## 2020-01-01 RX ADMIN — METOPROLOL TARTRATE 50 MG: 25 TABLET ORAL at 20:19

## 2020-01-01 RX ADMIN — MELATONIN 1000 UNITS: at 08:45

## 2020-01-01 RX ADMIN — ACETAMINOPHEN 650 MG: 325 TABLET, FILM COATED ORAL at 13:00

## 2020-01-01 RX ADMIN — PIPERACILLIN AND TAZOBACTAM 3.38 G: 3; .375 INJECTION, POWDER, FOR SOLUTION INTRAVENOUS at 05:09

## 2020-01-01 RX ADMIN — IPRATROPIUM BROMIDE AND ALBUTEROL SULFATE 3 ML: .5; 3 SOLUTION RESPIRATORY (INHALATION) at 19:44

## 2020-01-01 RX ADMIN — SIMVASTATIN 20 MG: 20 TABLET, FILM COATED ORAL at 19:01

## 2020-01-01 RX ADMIN — SIMVASTATIN 20 MG: 20 TABLET, FILM COATED ORAL at 21:46

## 2020-01-01 RX ADMIN — METOPROLOL TARTRATE 50 MG: 25 TABLET ORAL at 19:57

## 2020-01-01 RX ADMIN — PIPERACILLIN AND TAZOBACTAM 3.38 G: 3; .375 INJECTION, POWDER, FOR SOLUTION INTRAVENOUS at 10:05

## 2020-01-01 RX ADMIN — METOPROLOL TARTRATE 50 MG: 25 TABLET ORAL at 20:24

## 2020-01-01 RX ADMIN — ACETAMINOPHEN 650 MG: 325 TABLET, FILM COATED ORAL at 00:41

## 2020-01-01 RX ADMIN — SODIUM CHLORIDE: 9 INJECTION, SOLUTION INTRAVENOUS at 06:52

## 2020-01-01 RX ADMIN — METOPROLOL TARTRATE 50 MG: 25 TABLET ORAL at 20:01

## 2020-01-01 RX ADMIN — MELATONIN TAB 3 MG 3 MG: 3 TAB at 21:55

## 2020-01-01 RX ADMIN — LEVOFLOXACIN 500 MG: 500 TABLET, FILM COATED ORAL at 10:29

## 2020-01-01 RX ADMIN — PANTOPRAZOLE SODIUM 40 MG: 40 TABLET, DELAYED RELEASE ORAL at 10:06

## 2020-01-01 RX ADMIN — POTASSIUM CHLORIDE 10 MEQ: 750 TABLET, EXTENDED RELEASE ORAL at 14:57

## 2020-01-01 RX ADMIN — LOSARTAN POTASSIUM 25 MG: 25 TABLET ORAL at 09:55

## 2020-01-01 RX ADMIN — ACETAMINOPHEN 650 MG: 325 TABLET, FILM COATED ORAL at 09:21

## 2020-01-01 RX ADMIN — ACETAMINOPHEN 650 MG: 325 TABLET, FILM COATED ORAL at 22:06

## 2020-01-01 RX ADMIN — ACETAMINOPHEN 650 MG: 325 TABLET, FILM COATED ORAL at 16:22

## 2020-01-01 RX ADMIN — FAMOTIDINE 20 MG: 20 TABLET ORAL at 21:13

## 2020-01-01 RX ADMIN — IPRATROPIUM BROMIDE AND ALBUTEROL SULFATE 3 ML: .5; 3 SOLUTION RESPIRATORY (INHALATION) at 16:41

## 2020-01-01 RX ADMIN — METOPROLOL TARTRATE 50 MG: 25 TABLET ORAL at 09:54

## 2020-01-01 RX ADMIN — MULTIPLE VITAMINS W/ MINERALS TAB 1 TABLET: TAB at 09:56

## 2020-01-01 RX ADMIN — FAMOTIDINE 20 MG: 20 TABLET ORAL at 21:46

## 2020-01-01 RX ADMIN — FAMOTIDINE 20 MG: 20 TABLET ORAL at 21:15

## 2020-01-01 RX ADMIN — PIPERACILLIN AND TAZOBACTAM 3.38 G: 3; .375 INJECTION, POWDER, FOR SOLUTION INTRAVENOUS at 04:06

## 2020-01-01 RX ADMIN — LEVOFLOXACIN 500 MG: 500 TABLET, FILM COATED ORAL at 09:21

## 2020-01-01 RX ADMIN — FAMOTIDINE 20 MG: 20 TABLET ORAL at 21:55

## 2020-01-01 RX ADMIN — SIMVASTATIN 20 MG: 20 TABLET, FILM COATED ORAL at 21:15

## 2020-01-01 RX ADMIN — PANTOPRAZOLE SODIUM 40 MG: 40 TABLET, DELAYED RELEASE ORAL at 08:35

## 2020-01-01 RX ADMIN — PANTOPRAZOLE SODIUM 40 MG: 40 TABLET, DELAYED RELEASE ORAL at 10:24

## 2020-01-01 RX ADMIN — FAMOTIDINE 20 MG: 20 TABLET ORAL at 22:03

## 2020-01-01 RX ADMIN — FAMOTIDINE 20 MG: 20 TABLET ORAL at 21:42

## 2020-01-01 RX ADMIN — MELATONIN 1000 UNITS: at 10:24

## 2020-01-01 RX ADMIN — HYDROXYZINE HYDROCHLORIDE 10 MG: 10 TABLET ORAL at 01:56

## 2020-01-01 RX ADMIN — LOSARTAN POTASSIUM 25 MG: 25 TABLET, FILM COATED ORAL at 08:44

## 2020-01-01 RX ADMIN — ACETAMINOPHEN 650 MG: 325 TABLET, FILM COATED ORAL at 19:01

## 2020-01-01 RX ADMIN — PIPERACILLIN SODIUM AND TAZOBACTAM SODIUM 4.5 G: 4; .5 INJECTION, POWDER, LYOPHILIZED, FOR SOLUTION INTRAVENOUS at 11:37

## 2020-01-01 RX ADMIN — MELATONIN TAB 3 MG 3 MG: 3 TAB at 22:03

## 2020-01-01 RX ADMIN — POTASSIUM CHLORIDE 40 MEQ: 750 TABLET, EXTENDED RELEASE ORAL at 12:05

## 2020-01-01 RX ADMIN — SENNOSIDES AND DOCUSATE SODIUM 1 TABLET: 8.6; 5 TABLET ORAL at 10:30

## 2020-01-01 RX ADMIN — MELATONIN TAB 3 MG 3 MG: 3 TAB at 21:00

## 2020-01-01 RX ADMIN — FAMOTIDINE 20 MG: 20 TABLET ORAL at 21:00

## 2020-01-01 RX ADMIN — METOPROLOL TARTRATE 50 MG: 25 TABLET ORAL at 20:00

## 2020-01-01 RX ADMIN — PIPERACILLIN AND TAZOBACTAM 3.38 G: 3; .375 INJECTION, POWDER, FOR SOLUTION INTRAVENOUS at 11:21

## 2020-01-01 RX ADMIN — MELATONIN TAB 3 MG 3 MG: 3 TAB at 19:01

## 2020-01-01 RX ADMIN — PIPERACILLIN AND TAZOBACTAM 3.38 G: 3; .375 INJECTION, POWDER, FOR SOLUTION INTRAVENOUS at 09:27

## 2020-01-01 RX ADMIN — METOPROLOL TARTRATE 50 MG: 25 TABLET ORAL at 19:01

## 2020-01-01 RX ADMIN — MELATONIN 1000 UNITS: at 07:54

## 2020-01-01 RX ADMIN — POTASSIUM CHLORIDE 40 MEQ: 750 TABLET, EXTENDED RELEASE ORAL at 13:31

## 2020-01-01 RX ADMIN — ACETAMINOPHEN 650 MG: 325 TABLET, FILM COATED ORAL at 09:45

## 2020-01-01 RX ADMIN — ACETAMINOPHEN 650 MG: 325 TABLET, FILM COATED ORAL at 09:13

## 2020-01-01 RX ADMIN — SODIUM CHLORIDE, POTASSIUM CHLORIDE, SODIUM LACTATE AND CALCIUM CHLORIDE 500 ML: 600; 310; 30; 20 INJECTION, SOLUTION INTRAVENOUS at 14:19

## 2020-01-01 RX ADMIN — ACETAMINOPHEN 650 MG: 325 TABLET, FILM COATED ORAL at 00:17

## 2020-01-01 RX ADMIN — METOPROLOL TARTRATE 50 MG: 25 TABLET ORAL at 10:30

## 2020-01-01 RX ADMIN — METOPROLOL TARTRATE 50 MG: 25 TABLET ORAL at 20:14

## 2020-01-01 RX ADMIN — MULTIPLE VITAMINS W/ MINERALS TAB 1 TABLET: TAB at 09:14

## 2020-01-01 RX ADMIN — SIMVASTATIN 20 MG: 20 TABLET, FILM COATED ORAL at 21:48

## 2020-01-01 RX ADMIN — ACETAMINOPHEN 650 MG: 325 TABLET, FILM COATED ORAL at 14:59

## 2020-01-01 RX ADMIN — POTASSIUM & SODIUM PHOSPHATES POWDER PACK 280-160-250 MG 2 PACKET: 280-160-250 PACK at 14:27

## 2020-01-01 RX ADMIN — ACETAMINOPHEN 650 MG: 325 TABLET, FILM COATED ORAL at 14:30

## 2020-01-01 RX ADMIN — MULTIPLE VITAMINS W/ MINERALS TAB 1 TABLET: TAB at 17:21

## 2020-01-01 RX ADMIN — METOPROLOL TARTRATE 50 MG: 25 TABLET ORAL at 21:15

## 2020-01-01 RX ADMIN — SIMVASTATIN 20 MG: 20 TABLET, FILM COATED ORAL at 21:58

## 2020-01-01 RX ADMIN — MELATONIN 1000 UNITS: at 11:27

## 2020-01-01 RX ADMIN — PIPERACILLIN AND TAZOBACTAM 3.38 G: 3; .375 INJECTION, POWDER, FOR SOLUTION INTRAVENOUS at 09:30

## 2020-01-01 RX ADMIN — SODIUM CHLORIDE 1000 ML: 9 INJECTION, SOLUTION INTRAVENOUS at 15:30

## 2020-01-01 ASSESSMENT — ACTIVITIES OF DAILY LIVING (ADL)
ADLS_ACUITY_SCORE: 36
ADLS_ACUITY_SCORE: 38
ADLS_ACUITY_SCORE: 29
ADLS_ACUITY_SCORE: 33
ADLS_ACUITY_SCORE: 34
ADLS_ACUITY_SCORE: 36
ADLS_ACUITY_SCORE: 38
ADLS_ACUITY_SCORE: 34
ADLS_ACUITY_SCORE: 37
ADLS_ACUITY_SCORE: 40
ADLS_ACUITY_SCORE: 33
ADLS_ACUITY_SCORE: 40
ADLS_ACUITY_SCORE: 33
ADLS_ACUITY_SCORE: 33
ADLS_ACUITY_SCORE: 29
ADLS_ACUITY_SCORE: 33
ADLS_ACUITY_SCORE: 36
ADLS_ACUITY_SCORE: 42
ADLS_ACUITY_SCORE: 33
ADLS_ACUITY_SCORE: 36
ADLS_ACUITY_SCORE: 33
ADLS_ACUITY_SCORE: 34
ADLS_ACUITY_SCORE: 33
ADLS_ACUITY_SCORE: 33
ADLS_ACUITY_SCORE: 37
ADLS_ACUITY_SCORE: 40
ADLS_ACUITY_SCORE: 37
ADLS_ACUITY_SCORE: 33
ADLS_ACUITY_SCORE: 38
ADLS_ACUITY_SCORE: 32
ADLS_ACUITY_SCORE: 33
ADLS_ACUITY_SCORE: 38
ADLS_ACUITY_SCORE: 36
ADLS_ACUITY_SCORE: 40
ADLS_ACUITY_SCORE: 39
ADLS_ACUITY_SCORE: 40
ADLS_ACUITY_SCORE: 34
ADLS_ACUITY_SCORE: 33
ADLS_ACUITY_SCORE: 34
ADLS_ACUITY_SCORE: 37
ADLS_ACUITY_SCORE: 36
ADLS_ACUITY_SCORE: 34
ADLS_ACUITY_SCORE: 36
ADLS_ACUITY_SCORE: 34
ADLS_ACUITY_SCORE: 37
ADLS_ACUITY_SCORE: 33
ADLS_ACUITY_SCORE: 40
ADLS_ACUITY_SCORE: 36
ADLS_ACUITY_SCORE: 38
ADLS_ACUITY_SCORE: 17.5
ADLS_ACUITY_SCORE: 38
ADLS_ACUITY_SCORE: 33
ADLS_ACUITY_SCORE: 39
ADLS_ACUITY_SCORE: 40
ADLS_ACUITY_SCORE: 37
ADLS_ACUITY_SCORE: 34
ADLS_ACUITY_SCORE: 34
ADLS_ACUITY_SCORE: 40
ADLS_ACUITY_SCORE: 29
ADLS_ACUITY_SCORE: 40
ADLS_ACUITY_SCORE: 34
ADLS_ACUITY_SCORE: 40
ADLS_ACUITY_SCORE: 39
ADLS_ACUITY_SCORE: 29
ADLS_ACUITY_SCORE: 40
ADLS_ACUITY_SCORE: 33
ADLS_ACUITY_SCORE: 40
ADLS_ACUITY_SCORE: 37
ADLS_ACUITY_SCORE: 29
ADLS_ACUITY_SCORE: 38
ADLS_ACUITY_SCORE: 37

## 2020-01-01 ASSESSMENT — ENCOUNTER SYMPTOMS
FEVER: 0
CHILLS: 1
VOMITING: 0
WEAKNESS: 1
NAUSEA: 0
FEVER: 0
PALPITATIONS: 1
DIARRHEA: 0
SHORTNESS OF BREATH: 0

## 2020-01-01 ASSESSMENT — PAIN DESCRIPTION - DESCRIPTORS
DESCRIPTORS: ACHING
DESCRIPTORS: SORE
DESCRIPTORS: ACHING
DESCRIPTORS: SORE
DESCRIPTORS: SORE
DESCRIPTORS: PATIENT UNABLE TO DESCRIBE
DESCRIPTORS: ACHING
DESCRIPTORS: SORE
DESCRIPTORS: ACHING
DESCRIPTORS: PATIENT UNABLE TO DESCRIBE
DESCRIPTORS: ACHING
DESCRIPTORS: SORE
DESCRIPTORS: SORE

## 2020-01-01 ASSESSMENT — MIFFLIN-ST. JEOR
SCORE: 853.65
SCORE: 904.9
SCORE: 852.75
SCORE: 856.37
SCORE: 909.9
SCORE: 802.84
SCORE: 784.24
SCORE: 857.28

## 2020-01-02 NOTE — TELEPHONE ENCOUNTER
Prior Authorization Retail Medication Request    Medication/Dose: Hydroxyzine 10 mg Tab; 10 mg @ HS  ICD code (if different than what is on RX):  F41.9 Anxiety  Previously Tried and Failed:  Seroquel, Ativan, Celexa  Rationale:  Has been very effective, due to low sodium cannot tolerate multiple psychotropic meds which exacerbate hyponatremia    Insurance Name:  [facility]  Insurance ID:  [id number]      Pharmacy Information (if different than what is on RX)  Name:  [facility]  Phone:  [phone]

## 2020-01-03 NOTE — TELEPHONE ENCOUNTER
Central Prior Authorization Team   Phone: [phone]      Duplicate request - previously approved - see telephone encounter on 11/20/2019.        Prior Authorization Not Needed per Insurance    01/03/2020  Medication: Hydroxyzine; 10 mg @ HS - NOT NEEDED  Insurance Company: [employer] - Phone [phone] Fax [phone]  Expected CoPay: $0.00     Pharmacy Filling the Rx: [facility] - [address]  Pharmacy Notified: Yes  Patient Notified: Yes (**Instructed pharmacy to notify patient when script is ready to /ship.**)    Pharmacy received paid claim today, 01/03/2020.

## 2020-01-09 PROBLEM — F01.53 VASCULAR DEMENTIA WITH DEPRESSED MOOD (H): Status: ACTIVE | Noted: 2020-01-01

## 2020-01-09 NOTE — PROGRESS NOTES
Jackeline Smiley is a 86 year old female seen January 8, 2020 at [facility] where she has resided for 7 months (admit 5/2019) seen to follow up falls, depression and fatigue.   She has had a few recent falls when she attempts self transfers, forgets to wait for assistance.     Pt is see in her room lying abed.   Reports not feeling well in general, has pain at right costal margin / iliac crest, thinks it was from a recent fall.   X-ray negative for fracture of her hip.    She is very depressed, mostly about her circumstances.  Wishes she could see her family more.      By chart review, patient has a dx of acquired hemophilia A (factor VIII inhibitor) initially diagnosed in 2012.    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  She was treated with Novoseven at that time, alsong with methlyprednisolone and weekly rituximab (received only 3 of 4 doses of the latter)     Returned to [facility] and prednisone dose tapered down.    She was lost to Hematology follow up for awhile, then seen 10/3/19 and thought to have a clinical response.  She had her last dose of prednisone in October and labs redrawn 11/3 with Factor % and PTT 30.  No evidence of bleeding.       Past Medical History:   Diagnosis Date     Back ache      Hemophilia (H)      Hemophilia A (H) 5/17/2019     Hyperlipidemia      Hypertension      Menopausal disorder      Osteoarthritis      Pneumonia 6-11    LEFT MID LUNG     Tremor      Vertigo      Past Surgical History:   Procedure Laterality Date     C NONSPECIFIC PROCEDURE      submandibular gland excision     CATARACT IOL, RT/LT      both, Dr Zhou     HYSTERECTOMY, PAP NO LONGER INDICATED       SH:  Previously lived [place] apartment   She has one daughter who has mental illness and substance abuse issues, and [facility] has a no trespass order for this daughter.    "She also has one granddaughter who is supportive.   Pt currently is her own decision maker.     Retired .   Smoked until admission to LTC.          Review Of Systems  CPT 4.4   SLUMS 17/30    +anxiety/depression     EXAM: appears chronically ill, NAD  /83   Pulse 77   Temp 97.8  F (36.6  C)   Resp 16   Ht 1.651 m (5' 5\")   Wt 41.6 kg (91 lb 12.8 oz)   SpO2 92%   BMI 15.28 kg/m     Neck supple without adenopathy  Lungs with decreased BS, no rales or wheeze  Heart irreg irreg @80, 1/6 HSM  Abd soft, NT, no distention, +BS  No bony or soft tissue at area of pain, no rebound or guarding  Ext without edema  Neuro: limited history, generalized weakness and bedbound state  Psych: affect and content depressed      Last Comprehensive Metabolic Panel:  Sodium   Date Value Ref Range Status   12/30/2019 129 (L) 133 - 144 mmol/L Final     Potassium   Date Value Ref Range Status   12/30/2019 3.9 3.4 - 5.3 mmol/L Final     Chloride   Date Value Ref Range Status   12/30/2019 94 94 - 109 mmol/L Final     Carbon Dioxide   Date Value Ref Range Status   12/30/2019 28 20 - 32 mmol/L Final     Anion Gap   Date Value Ref Range Status   12/30/2019 7 3 - 14 mmol/L Final     Glucose   Date Value Ref Range Status   12/30/2019 94 70 - 99 mg/dL Final     Urea Nitrogen   Date Value Ref Range Status   12/30/2019 8 7 - 30 mg/dL Final     Creatinine   Date Value Ref Range Status   12/30/2019 0.41 (L) 0.52 - 1.04 mg/dL Final     GFR Estimate   Date Value Ref Range Status   12/30/2019 >90 >60 mL/min/[1.73_m2] Final     Comment:     Non  GFR Calc  Starting 12/18/2018, serum creatinine based estimated GFR (eGFR) will be   calculated using the Chronic Kidney Disease Epidemiology Collaboration   (CKD-EPI) equation.       Calcium   Date Value Ref Range Status   12/30/2019 9.0 8.5 - 10.1 mg/dL Final     Lab Results   Component Value Date    ALBUMIN 3.4 08/31/2019      Lab Results   Component Value Date    WBC 9.3 " 11/11/2019      HGB 12.9 11/11/2019      MCV 95 11/11/2019       11/11/2019        IMP/PLAN:   (R29.6) Falls frequently    (M62.81) Generalized muscle weakness  Comment: deconditioning, poor safety awareness  Plan: PHYSICAL THERAPY for transfers, balance, gait strengthening.  Pt reports she is agreeable to PHYSICAL THERAPY       (F32.9) Depression, unspecified depression type  Comment: treatment options limited by chronic hyponatremia     Plan: trial nortriptyline 10 mg/HS    (G47.00) Insomnia, unspecified type  Comment: nortriptyline may help with this as well.   Plan: continue melatonin, change hydroxyzine to prn.      (I10) Essential hypertension, benign   Comment: recent SBPs 110-130s  BP Readings from Last 3 Encounters:   01/06/20 138/83   12/18/19 (!) 142/72   12/06/19 (!) 149/79    Plan:  metoprolol 50 mg bid   Follow bps    (E22.2) SIADH (syndrome of inappropriate ADH production) (H)  (E87.1) Hyponatremia  Comment: lisinopril stopped 3 weeks ago, no further offending meds on her list  Plan: continue fluid restriction, consider addition of NaHCO3 tabs and watch bps    (D68.311) Acquired hemophilia A (H)  Comment: acquired factor VIII inhibitor  Plan: Pt is followed at [facility]  If bleeding episode occurs, check PTT, refer to [facility] ED.    Follow up appointment has been rescheduled a couple of times, trying to find a time when her granddaughter can go with her.       (Z86.73) History of stroke  (F01.51,  F32.9) Vascular dementia with depressed mood (H)  Comment: right thalamic hemorrhagic CVI 6 months ago with residual weakness and cognitive impairment, low functional status     Plan: LTC support for meds, meals, activity, mobility.        (M19.90) Osteoarthritis, unspecified osteoarthritis type, unspecified site  Comment: was previously on long term hydrocodone, stopped >6 months ago    Plan: scheduled acetaminophen, local measures.       (K21.9) Gastroesophageal  reflux disease, esophagitis presence not specified  Comment: no current symptoms.    Plan: remains on pantoprazole and famotidine    Leatha Mulligan MD

## 2020-01-10 NOTE — PROGRESS NOTES
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      HPI information obtained from: facility chart records, facility staff, patient report and [facility] chart review.     Chronic health issues include acquired hemophilia (factor VIII inhibitor) originally diagnosed in 2012 initially in remission with steroids, but relapsed in May 2019. Multiple acute care encounters in [facility] over last year, Hospitalized 5/17/19 with sudden onset left sided weakness and fall secondary to acute intracranial hemorrhage. Per hematology this was thought due to factor VIII inhibitor, as her aPTT was elevated on admission; she was transferred to [facility].  Factor VIII level was checked and was 11%.  She was treated for the acute hemorrhage with Novoseven; inhibitor suppression regimen of methylprednisolone and weekly rituximab (4 planned doses) was started. Discharged on 5/25/19 with plan to taper methylprednisolone, but she presented to ER 5/26/19 with fall and head strike, CT showed no change in prior ICH. Hospitalized again 6/10/19 with dizziness and mental status in setting of hyponatremia. Sodium improved with fluid restriction. Dis[place] [facility] with follow-up care through [facility].  Again did not follow-up in hematology clinic. To ER 8/31/19 with mechanical fall, fell out of bed when attempting to tie her shoes with head trauma, CT head/c-spine negative, chronically low  "sodium. Last visit with hematology 10/03/2019, between June and October tapered from 50 mg Prednisone daily to 15 mg Prednisone daily. Completed taper off Prednisone on 10/17/19. Plan for repeat and follow-up with hematology December 2019, but family has yet to schedule appointment.     Other health issues include hypertension managed with metoprolol. Lisinopril discontinued due to hyponatremia. Hyperlipidemia which is managed with Zocor. Osteoarthritis scheduled Tylenol. Gait abnormality with weakness and repeated falls, at wheelchair level for mobiltiy. Cognitive impairment, 17/30 on SLUMS in June 2019 and 14/15 on BIMS on in Sept 2019, takes Melatonin and Vistaril for sleep. GERD is managed with pantoprazole and famotidine. Lifelong smoker up until stroke in May 2019.      Today's concern is:  Nurse called this morning to report resident \"not acting herself\" - reported on waking that she had already eaten breakfast with her family and would not go to the dinning room. No fever this morning, nurse but reported resident was febrile yesterday (not documented in PCC). Over last three days reports right rib pain. No recent falls, but reported staff member with rough with cares three days ago. No hypoxia. No cold or cough symptoms. No abd pain. No dysuria. No joint pain. Chronic hyponatremia. Stat labs showed WBC of 16.6.  CXR shows right basilar infiltrate with right pleural effusion.   Bony thorax is unremarkable.   Denies SOB. Chronic cough with strong smoking history up until about six months ago. No abd pain. No N/V. No change in bowel or bladder habits. Just doesn't feel well. Saw Dr. Mulilgan for regulatory visit two days ago reported not wanting aggressive care and to avoid going out to speciality visits. Facility has restraining order againt daughter, and resident granddaughter is primary contact.    Past Medical and Surgical History reviewed in Epic today.    MEDICATIONS:  Current Outpatient Medications " "  Medication Sig Dispense Refill     acetaminophen (TYLENOL) 325 MG tablet Take 325 mg by mouth 3 times daily       calcium carbonate-vitamin D (CALCIUM + D) 600-200 MG-UNIT TABS Take 1 tablet by mouth 2 times daily 180 tablet 3     FAMOTIDINE PO Take 20 mg by mouth At Bedtime       hydrALAZINE (APRESOLINE) 10 MG tablet Take 1 tablet (10 mg) by mouth daily as needed (SBP > 160)       HYDROXYZINE HCL PO Take 10 mg by mouth At Bedtime        MELATONIN PO Take 3 mg by mouth At Bedtime       METOPROLOL TARTRATE PO Take 50 mg by mouth 2 times daily       pantoprazole (PROTONIX) 40 MG EC tablet Take 1 tablet (40 mg) by mouth every morning (before breakfast)       simvastatin (ZOCOR) 20 MG tablet Take 1 tablet (20 mg) by mouth At Bedtime 30 tablet 3     vitamin D3 (CHOLECALCIFEROL) 1000 units (25 mcg) tablet Take 1 tablet (1,000 Units) by mouth daily 60 tablet 6     REVIEW OF SYSTEMS:  Limited secondary to cognitive impairment but today pt reports history as per HPI.    Objective:  /65   Pulse 78   Temp 98.1  F (36.7  C)   Resp 18   Ht 1.651 m (5' 5\")   Wt 41.5 kg (91 lb 9.6 oz)   SpO2 91%   BMI 15.24 kg/m    Exam:  GENERAL APPEARANCE:  Alert, chronically ill appearing, in no distress, thin, laying in bed.   EYES:  Sclera clear and conjunctiva normal, no discharge   RESP:  Non-labored breathing, palpation of chest normal, no chest wall tenderness, no respiratory distress, Lung sounds decreased right lower lung, no rales/wheezes/rhonchi, patient is on room air.  CV:  Palpation - no murmur/non-displaced PMI, Auscultation - rate and rhythm regular, no murmur, no rub or gallop.  VASCULAR: No edema bilateral lower extremities.  ABDOMEN:  Normal bowel sounds, soft, nontender, no grimacing or guarding with palpation.  M/S:   Gait and station wheelchair bound.   SKIN:  Inspection - no visible rashes/lesions/uclerations, chronic facial pallor.  Palpation- no increased warmth, skin is dry and non-tender.  NEURO: Cranial " nerves II-XII grossly intact except hearing and vision, no facial asymmetry, follows simple commands, moves all extremities symmetrically, normal tone, no tremor  PSYCH: Awake and alert, speech fluent,  insight and judgement impaired, oriented to person, memory impaired, flat affect, cooperative.     Labs:   Recent labs in [facility] reviewed by me today.    CBC RESULTS:   Recent Labs   Lab Test 01/10/20  1058 11/11/19  0658   WBC 16.6* 9.3   RBC 3.79* 4.13   HGB 11.6* 12.9   HCT 35.3 39.3   MCV 93 95   MCH 30.6 31.2   MCHC 32.9 32.8   RDW 13.3 13.1    337       Last Basic Metabolic Panel:  Recent Labs   Lab Test 01/10/20  1058 12/30/19  0745   * 129*   POTASSIUM 3.7 3.9   CHLORIDE 92* 94   MICK 9.2 9.0   CO2 29 28   BUN 25 8   CR 0.51* 0.41*   GLC 93 94     GFR Estimate   Date Value Ref Range Status   01/10/2020 86 >60 mL/min/[1.73_m2] Final   Estimated Creatinine Clearance: 51.9 mL/min (A) (based on SCr of 0.51 mg/dL (L)).    Liver Function Studies -   Recent Labs   Lab Test 08/31/19  1002 06/26/19  0450   PROTTOTAL 7.1 5.7*   ALBUMIN 3.4 2.8*   BILITOTAL 0.9 0.6   ALKPHOS 78 65   AST 33 12   ALT 37 22       TSH   Date Value Ref Range Status   05/16/2019 2.21 0.40 - 4.00 mU/L Final   06/18/2011 1.08 0.4 - 5.0 mU/L Final     Lab Results   Component Value Date    A1C 6.2 05/18/2019     EKG 8/31/19      ASSESSMENT/PLAN:  (J18.1) Pneumonia of right lower lobe due to infectious organism (H)  (primary encounter diagnosis)  Comment: Labs and CXR obtained due to right sided rib pain and nursing report resident with increased confusion and not feeling well. Leukocytosis on labs with CXR positive for right basilar infiltrate with right pleural effusion.   Plan:   - 1 gram IM Rocephin now  - Azithromycin 500 mg x 1, then 250 mg x 4 days   - On 1/11 start Augmentin ER 2 grams BID x 5 days with clinical follow-up 1/13  - VS q shift x 72 hours   - CMP and CBC 1/13    Orders written by provider at facility.    Called  Granddaughter Socorro to update, message left with my cell # and facility contact for update on clinical status and plan of care.    Electronically signed by:  AAYUSH Lanza CNP

## 2020-01-13 NOTE — ED NOTES
Writer spoke with Granddaughter Socorro via phone to update her on her grandmas condition and that her transfer was in process.  All questions and concerns answered

## 2020-01-13 NOTE — ED NOTES
Called and left a message for granddaughter Socorro at [phone]. Message left to call ED for plan of care update for planned admission to [facility]

## 2020-01-13 NOTE — TELEPHONE ENCOUNTER
LTC nurse called today report rapid  and irregular on initial assessment this morning. Down to 120s on manual check. No fever or hypoxia. Resident not feeling well over last three days, but unable to elaborate; cognitive impairment at baseline. Abnormal CXR and leukocytosis (16.6K) on 1/10 started on Augmentin and Azithromycin after initial dose of IM Rocephin. Labs from this morning are pending.      VS: /74, P 120 manual, T 98.7, R 20, O2 91% room air    ORDER:  - Transfer to ER concern for sepsis and/or rapid afib, needs higher level of care to manage.   - Nursing left message for granddaughter, resident has restraining order against daughter

## 2020-01-13 NOTE — ED NOTES
Bed: ED12  Expected date:   Expected time:   Means of arrival:   Comments:  514 86f weak, tachy ETA 0900

## 2020-01-13 NOTE — ED NOTES
"[facility]  ED Nurse Handoff Report    ED Chief complaint: Generalized Weakness and Tachycardia      ED Diagnosis:   Final diagnoses:   None       Code Status: see admitting MD    Allergies:   Allergies   Allergen Reactions     Atorvastatin Calcium      hepatitis     Calcium Channel Blockers      Sulfa Drugs Fatigue       Patient Story: 86 year old female presents to the ED via EMS from an extended care facility due to tachycardia and generalized weakness. Pt is currently being treated for recent onset of pneumonia at her extended care facility.   Focused Assessment:  Pt is very weak and has a recent hx of a CVA.     Treatments and/or interventions provided: labs, EKG  Patient's response to treatments and/or interventions: fine    To be done/followed up on inpatient unit:  nothing noted at this time    Does this patient have any cognitive concerns?: CVA and slightly disorientated     Activity level - Baseline/Home:  Total Care  Activity Level - Current:   Total Care    Patient's Preferred language: English   Needed?: No no    Isolation: None  Infection: Not Applicable  Bariatric?: No    Vital Signs:   Vitals:    01/13/20 0919 01/13/20 0923   BP: 129/77    Pulse: 126    Resp: 16    Temp: 97.8  F (36.6  C)    TempSrc: Oral    SpO2: 93%    Weight:  41.3 kg (91 lb)   Height:  1.651 m (5' 5\")       Cardiac Rhythm:     Was the PSS-3 completed:   Yes  What interventions are required if any?               Family Comments: none  OBS brochure/video discussed/provided to patient/family: No              Name of person given brochure if not patient: na              Relationship to patient: na    For the majority of the shift this patient's behavior was Green.   Behavioral interventions performed were none.    ED NURSE PHONE NUMBER: [id number]       "

## 2020-01-13 NOTE — ED PROVIDER NOTES
History     Chief Complaint:  Generalized Weakness and Tachycardia      The history is provided by the patient. The history is limited by the condition of the patient (Dementia).      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 Of note, patient is currently being treated for right lower lobe pneumonia which was diagnosed on 1/10/2020. At her clinic visit, she was given 1 g IM Rocephin. She was then started on Azithromycin 500 mg x 1, then 250 mg x 4 days. On 1/11/2020 she started Augmentin ER 2 grams BID x 5 days and scheduled to followup with clinic on 1/13/2020.     Patient's EKGs en route and here in the ED show atrial fibrillation with RVR which is new for her. She reports pain in her right arm from the blood draw, but denies chest pain, any other pain, fever, or any other concerns.     Of note, patient comes with paperwork that indicates she is DNR/DNI.    Allergies:  Atorvastatin Calcium  Calcium Channel Blockers  Sulfa Drugs   Alendronate Sodium    Medications:    Hydralazine  Hydroxyzine  Melatonin  Protonix  Zocor  Desyrel  Xyzal  Ativan  Zocor    Past Medical History:    Vascular dementia  Hemophilia A  Hypertension  Hyperlipidemia  Menopausal disorder  Osteoarthritis  Pneumonia  Tremor  Vertigo  Stroke  Protein calorie malnutrition  Intracranial hemorrhage  Anxiety  Tremor  Lyme disease  Vitamin deficiency   SIADH  Depression  DJD  Abdominal wall mass    Past Surgical History:    Submandibular gland excision  Cataract  Hysterectomy   Right foot surgery     Family History:    DM  Leukemia  CAD  Lipids    Social History:  Smoking status: former   Alcohol use: no   Drug use: no   PCP: Lucía Mendoza  Marital Status:  "       Review of Systems   Unable to perform ROS: Dementia   Constitutional: Negative for fever.   Cardiovascular: Positive for palpitations. Negative for chest pain.   Neurological: Positive for weakness.     Physical Exam     Patient Vitals for the past 24 hrs:   BP Temp Temp src Pulse Heart Rate Resp SpO2 Height Weight   01/13/20 0923 -- -- -- -- -- -- -- 1.651 m (5' 5\") 41.3 kg (91 lb)   01/13/20 0919 129/77 97.8  F (36.6  C) Oral 126 129 16 93 % -- --        Physical Exam  General: Well-nourished, appears to be resting comfortably when I enter the room, feels warm to touch.  Eyes: PERRL, conjunctivae pink no scleral icterus or conjunctival injection  ENT:  Moist mucus membranes, posterior oropharynx clear without erythema or exudates  Respiratory: Bibasilar crackles, no rubs or wheezes.  Good air movement.  Saturating 93% on room air.  No retractions or respiratory distress.  CV: Tachycardic rate and irregularly irregular rhythm, no murmurs/rubs/gallops  GI:  Abdomen soft and non-distended.  Normoactive BS.  No tenderness, guarding or rebound  Skin: Warm, dry.  No rashes or petechiae  Musculoskeletal: No peripheral edema or calf tenderness  Neuro: Alert and oriented to person/place/time  Psychiatric: Normal affect    Emergency Department Course     ECG (9:28:02):  Rate 119 bpm. MS interval *. QRS duration 66. QT/QTc 362/509. P-R-T axes * 5 9. Atrial fibrillation with rapid ventricular response. Minimal voltage criteria for LVH, may be normal variant. T wave abnormality, consider lateral ischemia. Abnormal ECG. Agree with computer interpretation.  Interpreted at 0934 by Tessa Jerome MD.     ECG # 2 (13:10:18):  Rate 99 bpm. MS interval 152. QRS duration 68. QT/QTc 348/318. P-R-T axes 45 0 15. Sinus rhythm with premature atrial complexes. Minimal voltage criteria for LVH, may be normal variant. Nonspecific T wave abnormality. Abnormal ECG. Agree with computer interpretation. Interpreted at 1313 by Tessa Jerome " MD JOHNATHAN.     Imaging:  Radiographic findings were communicated with the patient who voiced understanding of the findings.    XR Chest 2 Views  New small right pleural effusion. New infiltrates in both  mid and lower lungs. Borderline heart size. Normal pulmonary  vascularity. Stable scoliosis.  As read by Radiology.    Laboratory:  CBC: WBC 20.6 (H), HGB 12.6,  (H)   CMP: Glucose 202 (H), o/w WNL (Creatinine: 0.58)  TSH: 0.51  Nt probnp: 843  Comprehensive panel addition: albumin 2.2 (L), o/w WNL  Magnesium: 2.1  Troponin 1 (0944): <0.015  ISTAT Lactate (1044): pH 7.44 (H), pCO2 34 (L), pO2 27, Bicarbonate 24, O2 sat 54, Lactic Acid 3.3 (H)   ISTAT Lactate (1333): pH 7.40, pCO2 45, pO2 13 (L), Bicarbonate 28, O2 sat venous 14, Lactic Acid 2.0   Blood cultures x2: pending    Influenza A/B antigen: Influenza A negative, Influenza B negative     Interventions:  1017: LR 1L IV Bolus  1137: Lopressor 5 mg V  1137: Zosyn 4.5 g IV    Emergency Department Course:  0925: Nursing notes and vitals reviewed. I performed an exam of the patient as documented above.     IV inserted. Medicine administered as documented above. Blood drawn. This was sent to the lab for further testing, results above.    The patient was sent for a chest xray while in the emergency department, findings above.     1100: I rechecked the patient and discussed the results of her workup thus far.     1230: I rechecked the patient.     1325: I consulted with Dr. Ga from [facility]. They are in agreement to accept the patient for transfer admission.    I attempted to contact her granddaughter Socorro and was unable to reach her.  Our nurse was subsequently able to reach her and let her know of the plan for transfer.    Impression & Plan    CMS Diagnoses:   The patient has signs of Severe Sepsis  as evidenced by:    1. 2 SIRS criteria, AND  2. Suspected infection, AND   3. Organ dysfunction: Lactic Acid > 2.0    Time severe sepsis diagnosis  confirmed:1044  01/13/20  as this was the time when Lactate resulted, and the level was > 2.0    3 Hour Severe Sepsis Bundle Completion:  1. Initial Lactic Acid Result:   Recent Labs   Lab Test 01/13/20  1044   LACT 3.3*     2. Blood Cultures before Antibiotics: Yes  3. Broad Spectrum Antibiotics Administered:  yes       Anti-infectives (From admission through now)    Start     Dose/Rate Route Frequency Ordered Stop    01/13/20 1119  piperacillin-tazobactam (ZOSYN) 4.5 g vial to attach to  mL bag      4.5 g  over 1 Hours Intravenous ONCE 01/13/20 1118 01/13/20 1237          4. Volume of IV Fluid administered in ED: 1000 mls         Fluid calculation source: Documented body weight    Severe Sepsis reassessment:  1. Repeat Lactic Acid Level: 2.0  2. MAP>65 after initial IVF bolus, will continue to monitor fluid status and vital signs    I attest to having performed a repeat sepsis exam and assessment of perfusion at 1200 and the results demonstrate improved perfusion.       Medical Decision Making:  Jackeline Smiley is a 86 year old female with a history of acquired hemophilia A, intracranial hemorrhage as well as dementia who comes today with concern for possible pneumonia with failure of outpatient antibiotics as well as tachycardia and sepsis.  Her initial EKG showed atrial fibrillation with RVR.  Her white blood cell count is elevated and her lactic acid is elevated consistent with severe sepsis.  Chest x-ray demonstrated bilateral lower lung pneumonias.  She was treated with IV fluids and her lactic acid normalized.  She has an allergy to calcium channel blockers documented and so she was treated with a dose of Lopressor.  Heart rate did come down and on repeat EKG it appears that she is converted to sinus with PACs.  Given the failure of outpatient antibiotic she was treated with Zosyn IV here.  She will be admitted to the hospital for ongoing IV antibiotics and further monitoring.  She is not a good  candidate for anticoagulation given her history of hemophilia a as well as intracranial hemorrhage.  Unfortunately we have no beds at all in our hospital and so she was transferred to the nearest hospital which is [facility].  Dr. Vazquez accepted her to his service.  She is transferred via EMS.      Diagnosis:    ICD-10-CM    1. Pneumonia of both lower lobes due to infectious organism (H) J18.1 Blood culture     Influenza A/B antigen   2. Atrial fibrillation with RVR (H) I48.91    3. Severe sepsis (H) A41.9     R65.20        [facility]:  [facility] serving as a scribe at 9:25 AM on 1/13/2020 to document services personally performed by Tessa Jerome MD based on my observations and the provider's statements to me.      Emelina Acosta  1/13/2020    EMERGENCY DEPARTMENT       Tessa Jerome MD  01/13/20 [id number]

## 2020-01-13 NOTE — ED TRIAGE NOTES
Pt at a long term care facility and currently being treated for pneumonia with antibiotics. Pt was sent to the ED due to new onset of tachycardia and increased generalized weakness. Past hx of CVA.

## 2020-01-14 NOTE — PROGRESS NOTES
[place] [facility]  PRIMARY CARE PROVIDER AND CLINIC:  Lucía Mendoza, APRN CNP, [address] [facility] [address] / [place] [address]  Chief Complaint   Patient presents with     Hospital F/U     [place] Medical Record Number:  [id number]  Place of Service where encounter took place:  [facility] [[id number]]    Jackeline Smiley  is a 86 year old  (2/11/1933), returned to the above facility from  [facility]. Hospital stay 1/13/20 - 1/20/10. .  Admitted to this facility for  rehab, medical management and nursing care.    HPI:    HPI information obtained from: facility chart records, facility staff, patient report, [facility] chart review and family/first contact sajanisabel (Socorro) report.     Recent history reviewed:  Acquired hemophilia (factor VIII inhibitor) originally diagnosed in 2012 initially in remission with steroids, but relapsed in May 2019. Hospitalized 5/17/19 with sudden onset left sided weakness and fall secondary to acute intracranial hemorrhage. Per hematology this was thought due to factor VIII inhibitor, as her aPTT was elevated on admission; she was transferred to [facility].  Factor VIII level was checked and was 11%.  She was treated for the acute hemorrhage with Novoseven; inhibitor suppression regimen of methylprednisolone and weekly rituximab (4 planned doses) was started. Discharged on 5/25/19 with plan to taper methylprednisolone, but she presented to ER 5/26/19 with fall and head strike. CT showed no change in prior ICH. Hospitalized again 6/10/19 with dizziness and mental status in setting of hyponatremia. Sodium improved with fluid restriction. Discharged to [facility] with follow-up care through [facility].  Did not follow-up in hematology clinic as recommended.    To ER 8/31/19 with mechanical fall, fell out of bed when attempting to tie her shoes with head trauma, CT head/c-spine negative, chronically low sodium. Last visit with hematology  10/03/2019, between June and October tapered from 50 mg Prednisone daily to 15 mg Prednisone daily. Completed taper off Prednisone on 10/17/19. Plan for repeat labs and follow-up with hematology December 2019, but missed appointment, which has not been rescheduled.     Other health issues include hypertension managed with metoprolol. Lisinopril stopped due hyponatremia. Hyperlipidemia which is managed with Zocor. Osteoarthritis pain managed with scheduled Tylenol. Gait abnormality with weakness and repeated falls, at wheelchair level for mobility, high fall risk due to poor safety awarness. Cognitive impairment, 17/30 on SLUMS in June 2019 and 14/15 on BIMS on in Sept 2019, takes Vistaril for sleep. GERD is managed with pantoprazole and famotidine. Lifelong smoker up until stroke in May 2019.      Brief Summary of Hospital Course:   Sent to [facility] ER 1/13 with new onset Afib with RVR in setting of sepsis due to pneumonia. Transferred to [facility] in [place] due to bed availablity. Failed outpatient treatment for pneumonia, which was initialy diagnosed 1/10 - presented as right sided pain and showed infiltrate on x-ray. Initially treated with IM Rocephin, azithromycin and Augmentin. Transferred to hospital due to rapid HR. In hospital treated with IV Zosyn and azithromycin. Blood cultures were negative. WBC initially trended down, but continued to worsen and repeat CXR ordered which showed BL lung poacities possibly BL pneumonia, prolactonin elevated and mild elevated BNP. Increased abx coverage to vancomycin. Chest CT showed small loculated right pleural effusion, small left pleural effusion and 11 mm lingular nodule with adjacent ground glass opacity concern for infection vs malignancy. With on going leukocytosis despite board spectrum antibiotics concern for empyema, advised thoracentesis and possible chest tube, but patient refused. Granddaughter wanted to consider thoracentesis and then consider  "agressive care vs hospice after more information obtained from fluid culture, but patient adamantly refused and would not undergo procedure after discussion with inpatient team. Patient also refused IV antibiotics on hospital discharge. She insisted on being discharged on PO antibiotics. Inpatient team consulted with ID and, per discharge, discussed risk/benefit with patient, including the fact she could become septic, which could ultimately lead to death. Still elected to be discharged from hospital on PO antibiotics and follow-up with hospice at LTC facility. Hospital team unable to reach granddaughter prior to discharge despite multiple calls.     In terms of afib converted to NSR with IV hydration and antibiotics, VALERIO-VASc score is 4, not a candidate for anticoagulation given history of hemophilia, hemorrhagic stroke, and fall. Continued on prior to admission metoprolol.     Dysphagia Speech and swallow evaluation was also done, video swallow evaluation was done patient qualified for dysphagia type III diet. Recommendation  For outpatient speech therapy evaluation.    Updates on Status Since Skilled nursing Admission:   Resident returned from hospital, nursing report increased weakness. Wanting to stay in bed. Ate 25% of breakfast this morning. Did attend therapy sessions yesterday, but made her fatigued. Requiring supplemental oxygen intermiently. No fever reported. Reports today \"I just want to be left alone\" and \"I just want to go home\". Oriented to person and partly to time, but no place. Thinks we are still in the hospital. Does not recall specifics of discussions with inpatient team. Some SOB. No cough. No NV, constipation, or loose stools. No dysuria. No pain in joints or back. Prefers to stay in bed.    CODE STATUS/ADVANCE DIRECTIVES DISCUSSION:   DNR / DNI  Patient's living condition: lives in a skilled nursing facility     ALLERGIES: Atorvastatin calcium; Calcium channel blockers; and Sulfa drugs "   PAST MEDICAL HISTORY:  has a past medical history of Back ache, Hemophilia (H), Hemophilia A (H) (5/17/2019), Hyperlipidemia, Hypertension, Menopausal disorder, Osteoarthritis, Pneumonia (6-11), Tremor, and Vertigo.     PAST SURGICAL HISTORY:   has a past surgical history that includes cataract iol, rt/lt; hysterectomy, pap no longer indicated; and NONSPECIFIC PROCEDURE.     FAMILY HISTORY: family history includes C.A.D. in her brother and brother; Cancer in her father; Diabetes in her brother, brother, mother, sister, and sister; Lipids in her brother and daughter.     SOCIAL HISTORY:   reports that she quit smoking about 8 months ago. Her smoking use included cigarettes. She has a 46.00 pack-year smoking history. She has never used smokeless tobacco. She reports that she does not drink alcohol or use drugs.   Granddaughter ([place]) is primary contact.   Lived in [place] in an apartment prior to hospitalization in May 2019.   Daughter - Sigrid, not involved in care. Hx of substance abuse. Facility has restraining order against daughter.   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   Completed 11th grade.  Grew up in [place].   No ETOH  Smoked - started in 30s and quit in May 2019.     Post Discharge Medication Reconciliation Status: discharge medications reconciled and changed, per note/orders (see AVS).    Current Outpatient Medications   Medication Sig Dispense Refill     acetaminophen (TYLENOL) 500 MG tablet Take 1,000 mg by mouth 3 times daily       amoxicillin-clavulanate (AUGMENTIN) 875-125 MG tablet Take 1 tablet by mouth 2 times daily For 5 days       calcium carbonate-vitamin D (CALCIUM + D) 600-200 MG-UNIT TABS Take 1 tablet by mouth 2 times daily 180 tablet 3     FAMOTIDINE PO Take 20 mg by mouth At Bedtime       HYDROXYZINE HCL PO Take 10 mg by mouth At Bedtime        METOPROLOL TARTRATE PO Take 50 mg by mouth 2 times daily       pantoprazole (PROTONIX) 40 MG EC tablet Take 1 tablet (40 mg) by mouth every  "morning (before breakfast)       SENNA-docusate sodium (SENNA S) 8.6-50 MG tablet Take 1 tablet by mouth 2 times daily as needed (constipation)       simvastatin (ZOCOR) 20 MG tablet Take 1 tablet (20 mg) by mouth At Bedtime 30 tablet 3     vitamin D3 (CHOLECALCIFEROL) 1000 units (25 mcg) tablet Take 1 tablet (1,000 Units) by mouth daily 60 tablet 6     Recent weights and vitals reviewed in Epic:  Wt Readings from Last 5 Encounters:   01/14/20 41.2 kg (90 lb 12.8 oz)   01/13/20 41.3 kg (91 lb)   01/10/20 41.5 kg (91 lb 9.6 oz)   01/06/20 41.6 kg (91 lb 12.8 oz)   12/18/19 41.5 kg (91 lb 6.4 oz)     BP Readings from Last 3 Encounters:   01/14/20 134/65   01/13/20 127/67   01/10/20 107/65     Pulse Readings from Last 4 Encounters:   01/14/20 87   01/13/20 92   01/10/20 78   01/06/20 77       ROS:  Limited secondary to cognitive impairment but today pt reports history as per HPI.     Vitals:  /65   Pulse 87   Temp 97.1  F (36.2  C)   Resp 18   Ht 1.651 m (5' 5\")   Wt 41.2 kg (90 lb 12.8 oz)   SpO2 95%   BMI 15.11 kg/m    Exam:  GENERAL APPEARANCE:  Alert, chronically ill appearing, in no distress, thin, laying in bed.   EYES:  Sclera clear and conjunctiva normal, no discharge   RESP:  Non-labored breathing, palpation of chest normal, no chest wall tenderness, no respiratory distress, Lung sounds decreased right lower lung, no rales/wheezes/rhonchi, patient is on room air.  CV:  Palpation - no murmur/non-displaced PMI, Auscultation - rate and rhythm regular, no murmur, no rub or gallop.  VASCULAR: No edema bilateral lower extremities.  ABDOMEN:  Normal bowel sounds, soft, nontender, no grimacing or guarding with palpation.  M/S:   Gait and station wheelchair bound. Turns in bed independently for exam.  SKIN:  Inspection - facial pallor, no visible rashes/lesions/uclerations  Palpation- no increased warmth, skin is dry and non-tender.  NEURO: Cranial nerves II-XII grossly intact except hearing and vision, no " facial asymmetry, follows simple commands, moves all extremities symmetrically, normal tone, no tremor  PSYCH: Awake and alert, speech fluent,  insight and judgement impaired, oriented to person partly to time but not place, memory impaired, flat affect, cooperative.     Lab/Diagnostic data:  Recent labs in [facility] reviewed by me today.    CBC RESULTS:   Recent Labs   Lab Test 01/20/20 01/13/20  0944   WBC 18.3* 20.6*   RBC 3.19* 4.12   HGB 9.8* 12.6   HCT 31.2* 38.8   MCV 98 94   MCH 30.7 30.6   MCHC 31.4* 32.5   RDW 13.7 13.5   * 504*     Last Basic Metabolic Panel:  Recent Labs   Lab Test 01/20/20 01/13/20  0944    133   POTASSIUM 3.7 4.4   CHLORIDE 106 98   MICK 8.4* 10.1   CO2 22 27   BUN 7* 27   CR 0.44* 0.58   GLC 81 202*     Liver Function Studies -   Recent Labs   Lab Test 01/20/20 01/13/20  0944   PROTTOTAL 5.3* 6.8   ALBUMIN 1.7* 2.2*   BILITOTAL 0.4 0.5   ALKPHOS 128* 146   AST 16 19   ALT 15 16     TSH   Date Value Ref Range Status   01/13/2020 0.51 0.40 - 4.00 mU/L Final   05/16/2019 2.21 0.40 - 4.00 mU/L Final     Lab Results   Component Value Date    A1C 6.2 05/18/2019     Results from last 7 days   Lab Units 01/20/20, 0745 01/19/20, 0727 01/18/20 0907   LN-WHITE BLOOD CELL COUNT thou/uL 18.3* 19.1* 24.6*   LN-HEMOGLOBIN g/dL 9.8* 10.8* 10.7*   LN-HEMATOCRIT % 31.2* 34.1* 34.5*   LN-PLATELET COUNT thou/uL 634* 579* 607*     Results from last 7 days   Lab Units 01/20/20 0745   LN-SODIUM mmol/L 138   LN-POTASSIUM mmol/L 3.7   LN-CHLORIDE mmol/L 106   LN-CO2 mmol/L 22   LN-BLOOD UREA NITROGEN mg/dL 7*   LN-CREATININE mg/dL 0.44*   LN-CALCIUM mg/dL 8.4*     Recent Labs   Lab Test 11/11/19  0658 08/24/18   CHOL 127 179   HDL 41* 58   LDL 62 96   TRIG 122 126       Xr Chest 1 View Portable    Result Date: 1/16/2020  EXAM: XR CHEST 1 VIEW PORTABLE LOCATION: [facility] DATE/TIME: 1/16/2020 1:22 PM INDICATION: pneumona, follow up COMPARISON: 3/25/2013.     Thoracolumbar scoliosis. Enlarged  cardiac silhouette. Bilateral lung opacities which are combination of pleural effusions and lung infiltrates. Pulmonary vessels appear prominent, therefore CHF is most likely. Pneumonia with superimposed CHF could also appear this way.    Ct Chest Without Contrast  Result Date: 1/17/2020    EXAM: CT CHEST WO CONTRAST LOCATION: [facility] DATE/TIME: 1/17/2020 1:31 PM INDICATION: Pneumonia. Worsening leukocytosis.    COMPARISON: Frontal view of the chest, 1/16/2020. TECHNIQUE: CT chest without IV contrast. Multiplanar reformats were obtained. Dose reduction techniques were used. CONTRAST: None. FINDINGS: LUNGS AND PLEURA: There is a small lobulated right pleural effusion with probable loculation. Associated compressive atelectasis of the posterior portion right lower lobe is present. Concurrent infectious process is not excluded. There is also a small layering pleural effusion involving the left hemithorax with some associated atelectasis. There is a 1.1 cm nodule involving the lingula on image 83 of series 3. There is some patchy infiltrate inferior to this nodule. A nodular type infiltrate is also present involving the posterior aspect of the left upper lobe more superiorly with multiple adjacent tiny nodules. Mild emphysematous change of the lungs. There is bronchial wall thickening involving the right upper and left lower lobes which likely represents airway inflammation. MEDIASTINUM/AXILLAE: Heart size is normal. The thoracic aorta is normal in caliber. Severe coronary calcifications. There is a trace pericardial effusion. Mild mediastinal lymphadenopathy is noted with a couple of small right paratracheal lymph nodes. UPPER ABDOMEN: No significant finding. MUSCULOSKELETAL: The bones are demineralized.     1. Small loculated right pleural effusion. Empyema cannot be excluded. There is associated compressive atelectasis of the right lower lobe. Concurrent infectious process is not excluded.     2. Small  layering left pleural effusion with some associated compressive atelectasis.     3. 11 mm lingular nodule with some adjacent ground-glass opacity. These findings could reflect sequela of infection, though malignancy is also a differential consideration. Pulmonary consultation is recommended. There is some nodular bandlike opacity slightly more superiorly on the posterior aspect of the left upper lobe which could reflect sequela of infection or malignancy as well.     4. Mild emphysematous changes of the lungs.     5. Trace pericardial effusion.     6. Borderline mediastinal lymphadenopathy.    Xr Swallow Study W Speech  Result Date: 1/17/2020    EXAM: XR SWALLOW STUDY W SPEECH OR OT LOCATION: [facility] DATE/TIME: 1/17/2020 1:46 PM INDICATION: Difficulty swallowing. COMPARISON: None. TECHNIQUE: Routine. FINDINGS: FLUOROSCOPIC TIME: .7 minutes NUMBER OF IMAGES:     1 Swallow study with Speech Pathology using multiple barium thicknesses. There is shallow laryngeal penetration with thin liquids. No aspiration throughout the exam. Mild stasis.     CONCLUSION: Shallow laryngeal penetration without aspiration. For full details, please refer to speech pathology assessment and recommendations.    ASSESSMENT/PLAN:  (R93.89) Abnormal chest CT  (primary encounter diagnosis)  (R91.1) Lung nodule  (J18.9) Pneumonia of both lungs due to infectious organism, unspecified part of lung  (R53.81) Physical Deconditioning   Comment: Diagnosed with pneumonia on 1/10/20, failure of outpatient treatment complicated by afib with RVR, hospitalized on 1/13 at [facility]. CT chest completed showed loculated right pleural effusion, parapneumonia effusion vs empyema vs malignancy. Strong smoking history. WBC reamined elevated despite antibiotics. Family wanted thoracentesis, but patient refused procedure. Patient also refused IV abx on discharge from hospital.   Discharged back to LTC with plan for oral antibiotics and hospice care per  discharge documentation.  Plan:   - Continue Augmentin x 5 day and reassess clinical status on 1/27  - Follow CBC, check tomorrow 1/23 to assist with prognosis  - Follow VS   - PT/OT/SLP   - Hospice informational meeting tomorrow with patient and granddaughter     (148.0) PAF  Comment: Rapid HR on hospital admission, converted spontaneously with supportive care. [id number] score of 4.  Not candidate for anticoagulation per cardiology due to high bleeding risk. Echo showed ER 59%.   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    (F32.9) Depression, unspecified depression type    (G47.00) Insomonia  Comment: Mood is low, not good candidate for psych meds at this time given historically low sodium level.  Unhappy in LTC setting, prior to admission living independently.   Plan:   - ACP Psych to visit q 1- 2 weeks   - Discontinue Melatonin 3 mg q HS not covered by insurance  - Continue hydroxyzine 10 mg q HS for anxiety and sleep     (R41.89) Cognitive Impairment  Comment: SLUMS 17/30 with poor safety awareness and recurrent falls. Likely vascular dementia. Needs assistance with decision making.  Plan:   - Message sent to social work to clarify decision maker, would benefit from formal designation given guarded prognosis.   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 Diagnosed in 2012 with initial response to steroids. Relapsed in May 2019 with ICH. Completed prednisone taper 10/17. Overdue to follow-up. Family seems to be having trouble getting her to appointments. Prior to most recent hospitalization discussion at regulatory visit about no longer wanting to go out for speciality care.   Plan:   - Hematology follow-up needs to be scheduled once plan of care established, if hospice elected will forego this  Per per hematology --  - \"If any signs of significant bleeding, check aPTT and go to the Emergency Department.  Also call [facility] IMMEDIATELY at [phone].\"    (I74.18) History of stroke  Comment: In May 2019 present to ER with left sided weakness secondary to R thalamic hemorrhagic CVA. Unable to return to baseline functional status, now resides in LTC facility. SLUMS 17/30.   Plan:   - Continues to benefit from supportive care in LTC setting for meals, medications, safety, and ADL support.  - Decreased QoL since stroke, now resides permanently in LTC facility.    (E46) Protein-calorie malnutrition   Comment: Body mass index is 15.21 kg/m . Ate 25% of breakfast today.  Plan:   - Dietician consult for supplements  - Monitor weight closely    - Staff to provide encouragement and feeding assistance    (E78.5) Hyperlipidemia, unspecified hyperlipidemia type  Comment: Last LDL 62 in 11/2019   Plan:   - Continue Zocor 20 mg daily - would stop if moves to comfort care    (K21.9) Gastroesophageal reflux disease, esophagitis presence not specified  Comment: Hx of reflux, denies symptoms today   Plan:  - Continue Protonix 40 mg daily and Famotidine 20 mg q HS    (M19.90) Osteoarthritis, unspecified osteoarthritis type, unspecified " "site  Comment: Previously on hydrocodone, stopped in setting of acute CVA. No significant pain today.  Plan:   - Continue scheduled Tylenol   - Continue Calcium and vitamin D for now, would stop if admitted to hospice     (Z71.89) Advance Care Planning  Comment: Significant decline in overall health status since CVA in May 2019. Now resides permanently in LTC setting, wishes she could return home, but unable due to care needs. Refused recommended treatment during last hospital stay, concern for continued lung infection vs malignancy - not being fully treated. Repeats wish to be \"left alone\" and kept comfortable. Goal to spend as much meaningful time with family as able. Reviewed clinical status and POC with granddaughter, having trouble with her grandmother's decision to forego treatment, but trying to understand. Questions answered about hospice care.  Plan:   - Plan for hospice social worker to meet with patient and granddaughter (via telephone) for informational meeting tomorrow  - Hospice referral placed in the case they decide on admission per conversation regarding wishes with patient and granddaughter   - Hospice admission team contacted that no formal admission should be scheduled until after conversation with hospice SW.      Orders written by provider at facility.     Total time spent with patient visit at the skilled nursing facility was 40 min including patient visit, phone call to patient contact and discussion with hospice. Greater than 50% of total time spent with counseling and coordinating care due to recent hospitalization with new diagnosis, unstable patient with need for new care plan, considering hospice care.     9:55 - 10:00 (5 minutes) Discussion with nursing and therapy on unit regarding clinical status since return from hospital.    10:00 - 10:20 am (20 minutes) Patient visit, 50% of visit in discussion of care goals.    2:53 - 3:04 pm (11 min) Discussion of hospitalization, clinical " status, treatment options, and plan of care with graddaughter    3:09 - 313: pm (4 min) Discussion with hopsice regarding pt and family goals and coordination of potential admission.     Electronically signed by:  AAYUSH Lanza CNP

## 2020-01-25 PROBLEM — J18.9 PNEUMONIA: Status: ACTIVE | Noted: 2020-01-01

## 2020-01-25 NOTE — ED PROVIDER NOTES
[place] EMERGENCY DEPARTMENT ([facility])  January 25, 2020    History     Chief Complaint   Patient presents with     Shortness of Breath     HPI  Jackeline Smiley is a 86 year old female with history notable for hemophilia A, dementia, stroke, HTN, and HLD who presents to the ED from nursing home for PNA evaluation and bed sore evaluation.  Per review of Care Everywhere, patient was hospitalized from 1/13-1/20/2020 for sepsis secondary to bilateral pneumonia seen on CXR.  CT chest showed small loculated right pleural effusion, small left pleural effusion, and 11 mm lingula nodule with adjacent groundglass opacity infection versus malignancy.  They had suspected empyema and advise thoracentesis and chest tube placement.  However, patient refused.  Patient also noted to have new onset of A. fib with RVR and was converted back with IV fluid hydration and IV antibiotics.  Patient did opt for DNR/DNI, and there has been ongoing discussions with patient's family regarding hospice versus palliative cares.  Patient had also refused IV line placement for IV antibiotics and requested to be discharged on oral antibiotics.  They discharged her with 1 week course of oral Augmentin.  She did have a hospitalization follow-up visit on 1/22/2020 and was noted to have increased weakness in the morning to stay in bed.    She currently complains of intermittent subjective fevers and chills.  She otherwise denies chest pain, shortness of breath, pain, nausea, vomiting or diarrhea.  On further questioning family brought her in for evaluation for pain around her buttocks from possible new bedsore and second opinion and further evaluation from the pneumonia    Social: [facility]    PAST MEDICAL HISTORY  Past Medical History:   Diagnosis Date     Back ache      Hemophilia (H)      Hemophilia A (H) 5/17/2019     Hyperlipidemia      Hypertension      Menopausal disorder      Osteoarthritis      Pneumonia 6-11     LEFT MID LUNG     Tremor      Vertigo      PAST SURGICAL HISTORY  Past Surgical History:   Procedure Laterality Date     C NONSPECIFIC PROCEDURE      submandibular gland excision     CATARACT IOL, RT/LT      both, Dr Zhou     HYSTERECTOMY, PAP NO LONGER INDICATED       FAMILY HISTORY  Family History   Problem Relation Age of Onset     Diabetes Mother      Cancer Father         leukemia     C.A.D. Brother         CHF     C.A.D. Brother      Diabetes Sister      Diabetes Sister      Diabetes Brother      Diabetes Brother      Lipids Daughter         grand daughter     Lipids Brother         all sibs had it     SOCIAL HISTORY  Social History     Tobacco Use     Smoking status: Former Smoker     Packs/day: 1.00     Years: 46.00     Pack years: 46.00     Types: Cigarettes     Last attempt to quit: 2019     Years since quittin.6     Smokeless tobacco: Never Used     Tobacco comment: 3/4 pack per day   Substance Use Topics     Alcohol use: No     MEDICATIONS  Current Facility-Administered Medications   Medication     piperacillin-tazobactam (ZOSYN) 4.5 g vial to attach to  mL bag     vancomycin (VANCOCIN) 1000 mg in dextrose 5% 200 mL PREMIX     [START ON 2020] vancomycin (VANCOCIN) 750 mg in sodium chloride 0.9 % 250 mL intermittent infusion     Current Outpatient Medications   Medication     acetaminophen (TYLENOL) 500 MG tablet     amoxicillin-clavulanate (AUGMENTIN) 875-125 MG tablet     calcium carbonate-vitamin D (CALCIUM + D) 600-200 MG-UNIT TABS     FAMOTIDINE PO     HYDROXYZINE HCL PO     METOPROLOL TARTRATE PO     pantoprazole (PROTONIX) 40 MG EC tablet     SENNA-docusate sodium (SENNA S) 8.6-50 MG tablet     simvastatin (ZOCOR) 20 MG tablet     vitamin D3 (CHOLECALCIFEROL) 1000 units (25 mcg) tablet     ALLERGIES  Allergies   Allergen Reactions     Atorvastatin Calcium      hepatitis     Calcium Channel Blockers      Sulfa Drugs Fatigue       I have reviewed the Medications, Allergies,  Past Medical and Surgical History, and Social History in the Epic system.    Review of Systems   Constitutional: Positive for chills. Negative for fever.   Respiratory: Negative for shortness of breath.    Cardiovascular: Negative for chest pain.   Gastrointestinal: Negative for diarrhea, nausea and vomiting.   All other systems reviewed and are negative.      Physical Exam   BP: 132/59  Pulse: 82  Temp: 97.8  F (36.6  C)  Resp: 18  Weight: 41.7 kg (92 lb)  SpO2: 94 %      Physical Exam  Constitutional:       Appearance: She is well-developed.   HENT:      Head: Normocephalic.   Eyes:      Extraocular Movements: Extraocular movements intact.   Cardiovascular:      Rate and Rhythm: Normal rate and regular rhythm.   Pulmonary:      Effort: Pulmonary effort is normal.      Comments: Diminished breath sounds in the bases  Abdominal:      Palpations: Abdomen is soft.      Tenderness: There is no abdominal tenderness. There is no guarding or rebound.   Musculoskeletal: Normal range of motion.   Skin:     Comments: Coccyx has local skin breakdown and erythema no discharge or deep decubitus ulcer   Neurological:      General: No focal deficit present.      Mental Status: She is alert.      Comments: Patient is confused intermittently answers questions appropriately about orientation unsure of her baseline         ED Course        Procedures   2:42 PM  The patient was seen and examined by  in Room 22.                Labs Ordered and Resulted from Time of ED Arrival Up to the Time of Departure from the ED   CBC WITH PLATELETS DIFFERENTIAL - Abnormal; Notable for the following components:       Result Value    WBC 14.8 (*)     RBC Count 3.35 (*)     Hemoglobin 9.9 (*)     Hematocrit 32.4 (*)     MCHC 30.6 (*)     Platelet Count 774 (*)     Absolute Neutrophil 12.5 (*)     All other components within normal limits   BASIC METABOLIC PANEL - Abnormal; Notable for the following components:    Creatinine 0.46 (*)     All  other components within normal limits   LACTIC ACID WHOLE BLOOD   UA MACROSCOPIC WITH REFLEX TO MICRO AND CULTURE   PERIPHERAL IV CATHETER   BLOOD CULTURE   BLOOD CULTURE            Assessments & Plan (with Medical Decision Making)   Patient is hemodynamically stable.  Reviewed outside labs to establish overall picture as patient and daughter are very poor historians and do not comprehend what has been going on.  Granddaughter makes most of medical decisions and I did discuss with her overall goals of care and plan.  Granddaughter did confirm patient is DNR/DNI.  I reviewed the recent hospitalization outside recommendations with patient and both family members.  My labs today show persistent white count and x-ray shows bilateral pneumonia slightly improving on the right.  Lactate negative remainder of labs unremarkable.  I discussed the results with all parties.  Granddaughter would like patient admitted for further evaluation as she seemed to have a feeling that the patient would be more beneficial to possible further procedures such as thoracentesis since she has been discharged.  She may not want anything further aggressive following results.  Given this I will start her on IV antibiotics.  I discussed with patient and family that previous outside hospital recommendations may not be the same recommendations from our institution after she is admitted it will depend on what further evaluation reveals.  Social work has been involved in the case as well and will need to continue to be involved.    I have reviewed the nursing notes.    I have reviewed the findings, diagnosis, plan and need for follow up with the patient.    New Prescriptions    No medications on file       Final diagnoses:   Pneumonia of both lungs due to infectious organism, unspecified part of lung   IKoffi, am serving as a trained medical scribe to document services personally performed by Paul Calderon MD, based on the provider's  statements to me.      I, Paul Calderon MD, was physically present and have reviewed and verified the accuracy of this note documented by Koffi Abdul.     1/25/2020   [facility], [place], EMERGENCY DEPARTMENT     Paul Calderon MD  01/25/20 1829

## 2020-01-25 NOTE — PROGRESS NOTES
"Emergency Social Work Services Note    Date of  Intervention: 01/25/20  Last Emergency Department Visit:  01/13/20  Care Plan:  NA  Collaborated with:  Patient, Daughter, Granddaughter, ED MD, ED RN, ED Charge, ANS, and Nursing Home    Data:  Jackeline Smiley is an 86 year old female who presented to the ED today with pneumonia symptoms. Jackeline was recently discharged from [facility] where she was being treated for pneumonia. During her hospitalization, the patient only wanted minimal treatments and was planning on discharging back to her nursing home to meet with hospice. Per granddaughter's report, they had the hospice and feel it is an appropriate next step, but would like to get the pneumonia under control. Granddaughter reports that Jackeline has \"really gone down hill\" since having pneumonia, and they would \"like her to have a fair chance\" of getting better prior to starting hospice.     When the patient arrived in the ED today, she was brought in by both daughter and granddaughter. Granddaughter left shortly after the patient checked in. During the conversation with triage, family reported that they did not disclose to the nursing home they were bringing the patient to the ED for further medical evaluation. When asked why, they were not able to provide a reason but stated they are happy with the nursing home and the care provided. With the patient and daughters permission, I contacted the nursing home and informed them the patient was in the ED. They were unaware and report that the patient had been checked out earlier by the granddaughter for a visit.     Intervention: The nursing home shared great concern that the patient was with only the daughter in the ED. They report that the daughter is not allowed on the premises of the nursing home and they have a formal trespass order from police. They also reported that the daughter has no visitation rights and is not provided any medical information " "about the patient. The nursing home reports that the granddaughter is her \"informal health care\" agent and very involved in the patient's care.        I spoke with the granddaughter over the phone who confirmed that she checked the patient out of the nursing home for a visit today and dropped her and her mother off at the hospital. The granddaughter reports that because the patient is planning on going on hospice soon, she is being more lenient with letting her mother see the patient. While they were all three visiting today, the patient agreed that she would get treated for pneumonia if somebody would stay with her at the hospital so the granddaughter dropped them both off and had to leave. The granddaughter also reported that her mother is not dangerous or does not have safety concern with them being alone together and the reason she is not allowed at the nursing home is because she tries to discharge the patient to bring her back home, to a home that does not exist anymore. The patient has been in long term care for the last year and prior to that lived in her own apartment in a [facility]. The granddaughter also confirmed the patient is DNR/DNI and thinks she has documentation at home that states she is the health care agent.     When talking with the daughter, it is apparent that she does not know any medical information about the patient. She appears to have significant trouble understanding and processing information. During the ED stay, the daughter would continuously asked the same questions but could not process the answers. She arrived with medical gloves and a mask on and was very worried about being infected while in the hospital. It is possible that she also lives in a nursing home or group home, as that is what she reported initially but denied later and would not share additional details on her living situation. The daughter was adamant about staying in the hospital overnight. Throughout the " "night she asked several people for her own room, her own bed, or bedding so she could sleep on the floor. Eventually,she found her way to one of the family lounges to sleep. ED SW offered her a taxi ride home but the daughter was fearful that if she left, she would never get back to see her mom again. The daughter was appropriate with the patient, but was firm that she would make the health care decision and \"wanted everything done\" regardless of the patients wishes.         Assessment: Although the patient is not oriented to place or time, she does appear to be aware of her health needs and wishes. If the patient is not able to make her own medical decisions, the granddaughter should be involved as she has been her informal health care agent. It would be appropriate to establish a HCA this hospitalization. Daughter has been allowed to stay with the patient overnight and ANS is aware of the situation.       Plan:    Anticipated Disposition:  Facility:   [facility]    Barriers to d/c plan:  Medical Clearance    Follow Up:  Continued SW involvement for support    When the patient is ready for discharge, granddaughter is able to provide a ride as long as she is not working, otherwise the patient will need w/c transport back home. [facility] is aware the patient has been admitted and will be able to accept the patient back when discharged. It would be appropriate to get palliative care involved, if needed, as patient and granddaughter are interested in goals of care conversations and when hospice is most appropriate.     Consideration to complete a VA report was evaluated when patient first arrived, however, as the patient was checked out by granddaughter and nursing home approved the check out, a VA report was not completed. The patient arrived to the ED via granddaughter and daughter was appropriate with he patient during there stay in the ED. It appears family and patient are in " agreement with medical plan at this time and no neglect or malt[place] [address]  Main: [phone], Admissions: [phone], Fax: [phone]       Family contacts:  Granddaughter Socorro Huerta [phone]  LUIAS Prado. [email or web address].   Clinical , Emergency Department   [facility]  Pager: [phone] Mon-Sat 9 am - 9 pm  On-call/after hours pager [phone]

## 2020-01-25 NOTE — ED TRIAGE NOTES
Pt presents via W/C with daughter from nursing home. Daughter has limited information about Pt. Pt states she is being treated for pneumonia. Pt lives at [facility] located in [place]. Pt also states has had sores x months. Pt states back side is becoming worse. Care Center is not aware that Pt is here. Pt had recent hospitalization at [facility] 2-3 days ago for pneumonia.

## 2020-01-26 NOTE — PHARMACY-ADMISSION MEDICATION HISTORY
Admission medication history interview status for the 1/25/2020 admission is complete. See Epic admission navigator for allergy information, pharmacy, prior to admission medications and immunization status.     Medication history interview sources:  [facility] med list    Changes made to [facility] medication list (reason)  Added:   -Milk of Mg  -Menthol cream  -Hydralazine    Additional medication history information (including reliability of information, actions taken by pharmacist):None      Prior to Admission medications    Medication Sig Last Dose Taking? Auth Provider   acetaminophen (TYLENOL) 500 MG tablet Take 1,000 mg by mouth 3 times daily 1/24/2020 Yes Unknown, Entered By History   amoxicillin-clavulanate (AUGMENTIN) 875-125 MG tablet Take 1 tablet by mouth 2 times daily For 5 days 1/24/2020 Yes Unknown, Entered By History   calcium carbonate-vitamin D (CALCIUM + D) 600-200 MG-UNIT TABS Take 1 tablet by mouth 2 times daily 1/24/2020 Yes Pamela Chen MD   FAMOTIDINE PO Take 20 mg by mouth At Bedtime 1/24/2020 Yes Reported, Patient   hydrALAZINE (APRESOLINE) 10 MG tablet Take 10 mg by mouth daily as needed SBP >160  Yes Unknown, Entered By History   HYDROXYZINE HCL PO Take 10 mg by mouth At Bedtime  1/24/2020 Yes Reported, Patient   magnesium hydroxide (MILK OF MAGNESIA) 400 MG/5ML suspension Take 30 mLs by mouth as needed for constipation or heartburn  Yes Unknown, Entered By History   menthol-zinc oxide (CALMOSEPTINE) 0.44-20.6 % OINT ointment Apply topically 4 times daily as needed for skin protection Apply to perineal area topically as needed for itching and rash three to four times daily  Yes Unknown, Entered By History   METOPROLOL TARTRATE PO Take 50 mg by mouth 2 times daily 1/24/2020 Yes Reported, Patient   pantoprazole (PROTONIX) 40 MG EC tablet Take 1 tablet (40 mg) by mouth every morning (before breakfast) 1/24/2020 Yes Pamela Chen MD   SENNA-docusate sodium (SENNA S) 8.6-50 MG  tablet Take 1 tablet by mouth 2 times daily as needed (constipation) 1/24/2020 Yes Lucía Mendoza APRN CNP   simvastatin (ZOCOR) 20 MG tablet Take 1 tablet (20 mg) by mouth At Bedtime 1/24/2020 Yes Pamela Chen MD   vitamin D3 (CHOLECALCIFEROL) 1000 units (25 mcg) tablet Take 1 tablet (1,000 Units) by mouth daily 1/24/2020 Yes Pamela Chen MD         Medication history completed by: Alberto Christensen.D.

## 2020-01-26 NOTE — CONSULTS
[facility]SULT[facility]     Patient:  Jackeline Smiley   Date of birth 2/11/1933, Medical record number [id number]  Date of Visit:  01/26/2020  Date of Admission: 1/25/2020  Consult Requester:Yari Higuera MD          Assessment and Recommendations:   ASSESSMENT:  1. Pleural effusion  2. Community acquired pneumonia vs health care acquired pneumonia  3. 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 She was recently hospitalized at [facility] from 1/13-1/20 and treated for community acquired pneumonia. Imaging obtained at OSH showed loculated right pleural effusion and there was suspicion for empyema. Chest tube was recommended, however patient declined invasive intervention and did not want IV antibiotics for discharge. She was discharged back to LTC on Augmentin to treat suspected empyema. During that hospitalization Hospice referral was placed for ongoing goals of care discussion and possible hospice enrollment as an outpatient. She has subsequently experienced low-grade fevers and shortness of breath.    CT chest shows left apical ground glass opacities, bilateral pleural effusions with possible loculated fluid collection at left fissure. Treating with Zosyn for possible HCAP given recent hospitalization as well as possible parapneumonic effusion vs empyema. She did have a negative MRSA culture from nasal swab at [facility] on 1/18/20, can stop vancomycin. Recommend obtaining diagnostic thoracentesis. Remains on room air, afebrile, and hemodynamically stable.     RECOMMENDATION:  1. Continue Zosyn  2. Stop  Vancomycin, MRSA nares negative at OSH 1/18/20  3. Recommend diagnostic thoracentesis, please obtain cell counts with differential, LDH, protein, aerobic and anaerobic bacterial cultures, fungal culture.  4. Please obtain serum LDH and protein day of thoracentesis  5. Obtain sputum culture if able  6. Recommend repeating blood cultures x2 if spikes a fever >101    Thank you for this consult. ID will continue to follow.     Patient was discussed with Dr. Castro.     Analisa Mulligan PA-C  Infectious Disease  Pager # [phone]  ________________________________________________    Consult Question: Persistent leukocytosis with recent treatment for bilateral pneumonia and concern for empyema.  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 She was recently hospitalized at [facility] from 1/13-1/20 and treated for community acquired pneumonia. Imaging obtained at OSH showed loculated right pleural effusion and there was suspicion for empyema. Chest tube was recommended, however patient declined invasive intervention and did not want IV antibiotics for discharge. She was discharged back to LTC on Augmentin. During that hospitalization Hospice referral was placed for ongoing goals of care discussion and possible hospice enrollment as an outpatient.    Ольгаs granddaughter Socorro is medical decision maker. History is provided by Jackeline and Socorro. Possible low grade fevers at nursing home. Some increased shortness of breath, but not able to further describe. Sensation of being uncomfortable or feeling  anxious when she tries to take a deep breath, not a painful sensation. Family notes that she has not been coughing a lot and they have not seen any sputum production. Patient reports an irritated throat that feels dry, it is not painful unless she is coughing. Intermittent dizziness and diarrhea that she states has been baseline for several years. Reports sores on her bottom that are painful when she is incontinent of urine. Denies rigors, chills, nausea, vomiting (except after drinking a Boost), new skin rashes or lesions, nasal congestion, ear pain or pressure.           Review of Systems:   CONSTITUTIONAL:  + low grade fever at [facility]. No chills, rigors, weight change  EYES: negative for icterus  ENT:  + dry/irritated throat. Negative for congestion, ear pain or pressure, sinus pain  RESPIRATORY:  +mild coughing. No sputum production, shortness of breath at rest  CARDIOVASCULAR:  negative for chest pain, dyspnea  GASTROINTESTINAL:  +loose stools (patient and family state this is baseline for years) negative for nausea, vomiting, and constipation  GENITOURINARY:  negative for dysuria  HEME:  No easy bruising  INTEGUMENT:  negative for rash and pruritus  NEURO:  Negative for headache         Past Medical History:     Past Medical History:   Diagnosis Date     Back ache      Hemophilia (H)      Hemophilia A (H) 5/17/2019     Hyperlipidemia      Hypertension      Menopausal disorder      Osteoarthritis      Pneumonia 6-11    LEFT MID LUNG     Tremor      Vertigo             Past Surgical History:     Past Surgical History:   Procedure Laterality Date     C NONSPECIFIC PROCEDURE      submandibular gland excision     CATARACT IOL, RT/LT      both, Dr Zhou     HYSTERECTOMY, PAP NO LONGER INDICATED              Family History:   Reviewed and non-contributory.   Family History   Problem Relation Age of Onset     Diabetes Mother      Cancer Father         leukemia     C.A.D. Brother         CHF     C.A.D. Brother       Diabetes Sister      Diabetes Sister      Diabetes Brother      Diabetes Brother      Lipids Daughter         grand daughter     Lipids Brother         all sibs had it            Social History:     Social History     Tobacco Use     Smoking status: Former Smoker     Packs/day: 1.00     Years: 46.00     Pack years: 46.00     Types: Cigarettes     Last attempt to quit: 2019     Years since quittin.6     Smokeless tobacco: Never Used     Tobacco comment: 3/4 pack per day   Substance Use Topics     Alcohol use: No     History   Sexual Activity     Sexual activity: Not Currently     Partners: Male            Current Medications:       famotidine  20 mg Oral At Bedtime     melatonin  3 mg Oral At Bedtime     metoprolol tartrate  50 mg Oral BID     multivitamin w/minerals  1 tablet Oral Daily     pantoprazole  40 mg Oral QAM AC     piperacillin-tazobactam  3.375 g Intravenous Q6H     senna-docusate  1 tablet Oral BID    Or     senna-docusate  2 tablet Oral BID     simvastatin  20 mg Oral At Bedtime     sodium chloride (PF)  3 mL Intracatheter Q8H     vancomycin (VANCOCIN) IV  750 mg Intravenous Q24H     vitamin D3  1,000 Units Oral Daily            Allergies:     Allergies   Allergen Reactions     Atorvastatin Calcium      hepatitis     Calcium Channel Blockers      Sulfa Drugs Fatigue            Physical Exam:   Vitals were reviewed  Patient Vitals for the past 24 hrs:   BP Temp Temp src Pulse Resp SpO2 Weight   20 0755 114/56 96.8  F (36  C) Oral 82 16 96 % --   20 2344 133/61 100.2  F (37.9  C) Axillary 97 16 93 % --   20 1337 132/59 97.8  F (36.6  C) Oral 82 18 94 % 41.7 kg (92 lb)       Physical Examination:  GENERAL:  Awake, alert, oriented to person, place, and family members.  HEENT:  Head is normocephalic, atraumatic   EYES:  Eyes have anicteric sclerae without conjunctival injection. Pupils equal and round.   ENT:  Oropharynx is moist without exudates or ulcers. Tongue is midline. Poor  dentition. Difficulty to view posterior pharynx, no tonsillar enlargement in visualized portion.  NECK:  Supple.   LUNGS:  Clear to auscultation bilaterally with dimished bases. Normal respiratory effort on RA.   CARDIOVASCULAR:  Regular rate and rhythm, +S1/S2, with no murmurs appreciated.  ABDOMEN:  Normal bowel sounds, soft, nontender.   SKIN:  No acute rashes.  Line(s) are in place without any surrounding erythema or exudate.   NEUROLOGIC:  CN II-XII intact.         Laboratory Data:     Inflammatory Markers    Recent Labs   Lab Test 01/25/20  1639   .0*       Hematology Studies    Recent Labs   Lab Test 01/26/20  1031 01/25/20  1639 01/23/20  0619 01/20/20 01/13/20  0944 01/10/20  1058  08/31/19  1002   WBC 12.6* 14.8* 13.1* 18.3* 20.6* 16.6*   < > 11.1*   ANEU 9.9* 12.5* 10.0*  --  18.6* 15.1*  --  9.2*   AEOS 0.3 0.1 0.4  --  0.0 0.2  --  0.1   HGB 8.7* 9.9* 9.3* 9.8* 12.6 11.6*   < > 15.5   * 97 93 98 94 93   < > 98   * 774* 708* 634* 504* 430   < > 189    < > = values in this interval not displayed.       Metabolic Studies     Recent Labs   Lab Test 01/26/20  1031 01/25/20  1639 01/23/20  0619 01/20/20 01/13/20  0944    138 140 138 133   POTASSIUM 3.6 3.6 3.9 3.7 4.4   CHLORIDE 108 105 108 106 98   CO2 24 24 27 22 27   BUN 7 8 7 7* 27   CR 0.40* 0.46* 0.36* 0.44* 0.58   GFRESTIMATED >90 89 >90 >60 83       Hepatic Studies    Recent Labs   Lab Test 01/26/20  1031 01/20/20 01/13/20  0944 08/31/19  1002 06/26/19  0450 06/10/19  1300   BILITOTAL 0.3 0.4 0.5 0.9 0.6 0.5   ALKPHOS 126 128* 146 78 65 86   ALBUMIN 1.4* 1.7* 2.2* 3.4 2.8* 3.3*   AST 9 16 19 33 12 18   ALT 10 15 16 37 22 25       Microbiology:  Culture Micro   Date Value Ref Range Status   01/25/2020 No growth after 10 hours  Preliminary   01/13/2020 No growth  Final   01/13/2020 No growth  Final   11/27/2019   Final    >100,000 colonies/mL  mixed urogenital amrita  Susceptibility testing not routinely done     08/08/2019    Final    <10,000 colonies/mL  urogenital amrita  Susceptibility testing not routinely done         Urine Studies    Recent Labs   Lab Test 11/27/19  1349 08/08/19  1920 05/20/19  1120 05/16/19  2146   LEUKEST Large* Negative Negative Large*   WBCU 26*  --  1 24*     Hepatitis B Testing   Recent Labs   Lab Test 05/19/19  0424   HBCAB Nonreactive   HEPBANG Nonreactive              Imaging:       CT Chest (1/26/2020)  IMPRESSION:   1. Bilateral large pleural effusions with layering of fluid in the  bilateral fissures. Question of loculation of fluid in the left  fissure suggesting empyema.  2. Left apical groundglass opacities likely representing infectious  process.  3. Bibasilar atelectasis.

## 2020-01-26 NOTE — PLAN OF CARE
Shift: 1641-5580    Status: Admission from ED for pneumonia w/u and social work consult  Neuro: Disoriented to time and situation, mildly agitated overnight  Cardiac/VS: VSS  Respiratory: RA, fine crackles throughout lung fields  GI: Denies N/V  Diet/Appetite: DD 3 with thin liquids  : Incontinent, purewick in place  Activity: Reportedly wheelchair bound at home, turn q2hrs but pt refusing overnight. bed alarm on for safety.   Pain: Denies pain  Skin: Mepilex on pre-existing coccyx wound, as reported by patient and ED nurse.   LDA(s): L PIV      Changes this shift: L PIV does not flush and pt initially agreed to get new one placed. When vascular access arrived, pt became very agitated and refused, but ultimately agreed on attempting again in the AM so IV abx could be given.       Plan: Social work consult ordered, continue plan of care

## 2020-01-26 NOTE — PROGRESS NOTES
"CLINICAL NUTRITION SERVICES - ASSESSMENT NOTE     Nutrition Prescription    RECOMMENDATIONS FOR MDs/PROVIDERS TO ORDER:  - Continue diet as ordered; SLP assessment as indicated.     Malnutrition Status:    - Severe malnutrition in the context of acute on chronic illness     Recommendations already ordered by Registered Dietitian (RD):  - Ordered supplementt: Gelatein Plus (x 1) and Magic Cups (x 2)  - Ordered room service with assist (pt requires assistance with ordering meals)  - Ordered MVI in the setting of prolonged decreased intake     Future/Additional Recommendations:  - PO/supp adequacy (rex counts as appro)     REASON FOR ASSESSMENT  Jackeline Smiley is a/an 86 year old female assessed by the dietitian for Provider Order - Malnutrition     Medical History: PT with history notable for hemophilia A, dementia, stroke, HTN, and HLD who presents to the ED from nursing home for PNA evaluation and bed sore evaluation.     NUTRITION HISTORY  Pt not able to answer questions. Family at bedside reported that PO intake has declined over the past 6 months. Pt reported not liking a nutrition supplement she was given and vomited, but unable to report which one.     CURRENT NUTRITION ORDERS  Diet: Dysphagia Level 3:  Advanced, thin liquids   Intake/Tolerance: tolerating breakfast with help of grand daughter at bedside. Eating scrambled eggs and pancakes. Tolerating diet per family; pt does not have teeth.     LABS  Labs reviewed  Creatinine: 0.46 (L)  CRP: 150.0 (H)    MEDICATIONS  Medications reviewed  Senokot  Zocor  D3 1000 units daily   Lyte replacement protocol     ANTHROPOMETRICS  Height: 0 cm (Data Unavailable)   Ht Readings from Last 2 Encounters:   01/14/20 1.651 m (5' 5\")   01/13/20 1.651 m (5' 5\")   65\"  Most Recent Weight: 41.7 kg (92 lb)    IBW: 57 kg  BMI: Underweight BMI <18.5  Weight History:   Wt Readings from Last 8 Encounters:   01/25/20 41.7 kg (92 lb)   01/14/20 41.2 kg (90 lb 12.8 oz)   01/13/20 41.3 " kg (91 lb)   01/10/20 41.5 kg (91 lb 9.6 oz)   01/06/20 41.6 kg (91 lb 12.8 oz)   12/18/19 41.5 kg (91 lb 6.4 oz)   12/06/19 41.6 kg (91 lb 12.8 oz)   11/25/19 42.1 kg (92 lb 12.8 oz)   Pt weight stable over > 1 year.     Dosing Weight: 42 kg (current weight)    ASSESSED NUTRITION NEEDS  Estimated Energy Needs: 1470 - 1680 kcals/day (35 - 40 kcals/kg)  Justification: Repletion and Underweight  Estimated Protein Needs: 55-63+ grams protein/day (1.3 - 1.5+ grams of pro/kg)  Justification: Increased needs and Repletion  Estimated Fluid Needs: 4004-1532 mL/day (30 - 35 mL/kg)   Justification: OR per provider pending fluid status    PHYSICAL FINDINGS  See malnutrition section below.  Skin: fragile, pale  Did not observe LE due to pt up eating with tray.  Poor dentition/missing teeth       MALNUTRITION  % Intake: </=75% for >/= 1 month (severe)  % Weight Loss: Weight loss does not meet criteria: stable over 1 year, but has lost weight over time per family -- unable to quantify   Subcutaneous Fat Loss: Facial region, Upper arm and Lower arm: moderate   Muscle Loss: Temporal, Facial & jaw region, Scapular bone, Thoracic region (clavicle, acromium bone, deltoid, trapezius, pectoral), Upper arm (bicep, tricep) and Lower arm  (forearm): severe; suspect some LBM just due to aging as well.   Fluid Accumulation/Edema: None noted  Malnutrition Diagnosis: Severe malnutrition in the context of acute on chronic illness     NUTRITION DIAGNOSIS  Inadequate oral intake related to dentition, decreased appetite, dementia as evidenced by decreased PO intake with modified diet and reported poor PO x at least 6 months with severe malnutrition.       INTERVENTIONS  Implementation  Nutrition Education: Not appropriate at this time due to patient condition   Medical food supplement therapy  Multivitamin/mineral supplement therapy     Goals  Patient to consume % of nutritionally adequate meal trays TID, or the equivalent with  supplements/snacks.     Monitoring/Evaluation  Progress toward goals will be monitored and evaluated per protocol.     Bandar El RD, YOUNG, [facility]  Unit Pager:  [phone]

## 2020-01-26 NOTE — PLAN OF CARE
Discharge Planner OT   Patient plan for discharge:  PT DEFERRED. Per 1/25/2020 SW note, patient plans to return to nursing home with hospice care, family in agreement with this plan.   Current status: Orders received and appreciated. Pt reports transfers <> WC with [facility]. Receiving assist for ADLs, including toileting, bathing, dressing, and grooming/hygiene at [facility]. Pt reports having brushed her teeth, with no difficulty, however toothbrush within room was still wrapped.  Barriers to return to prior living situation: medical status  Recommendations for discharge: Return to LTC.  Will complete orders.  Rationale for recommendations: Pt at functional baseline with assist for all ADLs at LT. No acute OT/PT needs identified at this time. PT will complete orders at this time.

## 2020-01-26 NOTE — PLAN OF CARE
7C OT - Defer    Discharge Planner OT   Patient plan for discharge:  Per 1/25/2020 SW note, patient plans to return to nursing home with hospice care, family in agreement with this plan.   Current status: Orders received and appreciated. Saw patient in AM, daughter and granddaughter present. Pt reports transfers <> WC with [facility]. Receiving assist for ADLs, including toileting, bathing, dressing, and grooming/hygiene at [facility]. Pt reports having brushed her teeth, with no difficulty, however toothbrush within room was still wrapped.  Barriers to return to prior living situation: medical status  Recommendations for discharge: Return to LTC.  Will complete orders.  Rationale for recommendations: Pt at functional baseline with assist for all ADLs at LTC. No acute OT/PT needs identified at this time. OT will complete orders at this time.       Entered by: Marlys Bloom 01/26/2020 1:01 PM

## 2020-01-26 NOTE — H&P
"[facility], [place]    History and Physical - Hospitalist Service, Gold Night        Date of Admission:  1/25/2020    Assessment & Plan   Jackeline Smiley is a 86 year old female admitted on 1/25/2020. She has a history of acquired hemophilia A s/p rituxin and steroids last year, HTN, anxiety, previous CVA, dementia, dyslipidemia, SIADH, depression, frequent falls who was recently admitted 1/13-1/20 at OSH with sepsis due to bilateral pneumonia and c/f empyema discharged on oral antibiotics and returned to ED with SOB.      Leukocytosis suspected due to pneumonia   SOB   Recently admitted to [facility] for sepsis 2/2 bilateral pneumonia. CT chest 1/17 with small loculated right pleural effusion, small left pleural effusion and 11 mm lingular nodule with adjacent ground glass opacity possible infection vs malignancy. With ongoing leukocytosis despite being on IV zosyn, azithromycin and vacomycin there was suspicion for empyema however patient refused recommended thoracentesis and possible chest tube placement. After discussion with ID at [facility], patient was discharged on oral Augmentin X 1 week. She presented to the ED 5 days following discharge with SOB. CXR in ED with left lower lobe and mid lung consolidation, improvement in right lower lung consolidation. WBC 14.8 (13.1), lactate 1.3. Hemodynamically stable. On RA.    - Admit to medicine   - Hold Augmentin    - Follow blood culture   - CT chest non contrast    - Check procalcitonin and CRP   - UA pending    - ID consult, please call in AM    - Pending CT consider CAPS consult for thoracentesis    - Sputum culture if able to collect    - Supplemental O2 to keep SpO2>92% (currently on RA)     # HTN  Stable on admission.   - Continue PTA Metorpolol 50 mg BID and Hydalazine 10 mg daily PRN    # Chronic protein calorie malnutrition    - Nutrition consult      # Wound on buttocks   Patient reports wound on buttocks, \"pressure wound.\" " Would not allow examination at time of admission.   - WOCN consult    - Mepilex placement     # Normocytic Anemia  Hgb 9.9 (9.3). Recent baseline closer to 12, however noted to have down trend during prior hospitalization. Denies melena, hematochezia, hematuria. No signs of active bleeding. No recent iron studies.     - Continue to monitor   - Iron studies   - Notify medicine with any active bleeding     # Paroxysmal A Fib   [id number] score of 4. Not on AC as not felt to be a candidate due to history of hemophilia, hemorrhagenic stoke, dementia and falls. Echo 1/14/2020 with LVEF 59%. Currently appears to be in NSR.   - Continue PTA Metoprolol 50 mg BID (hold if SBP < 110 or HR < 60)     - Check EKG     # Acquired hemophilia A: Initially diagnosed 2012 responded to treatement with steroids but recurred in May 2019 and had intracerebral hemorrhage at that time and received steroids and rituximab for several doses. Hgb slightly down as above.   - Check aPTT    - Consider hematology consult if PTT abnormal     Chronic medical issues:   # HLD: Continue PTA simvastatin 20 mg HS   # GERD: Stable. Continue PTA famotidine and pantoprazole   # Dementia with depression: Oriented to person and time, not place. Continue PTA Hydroxyzine 10 mg HS PRN and Melatonin 3 mg HS      Diet: DD3 diet   DVT Prophylaxis: Pneumatic Compression Devices  Granados Catheter: not present  Code Status: DNR?DNI    Disposition Plan   Expected discharge: 2 - 3 days, recommended to prior living arrangement once antibiotic plan established, mental status at baseline and safe disposition plan/ TCU bed available.  Entered: DIANNE Almeida 01/25/2020, 6:21 PM     The patient's care was discussed with the Attending Physician, Dr. Merritt, Bedside Nurse, Patient and Patient's Family.    Per patient and patients daughter, her granddaughter is her primary decision maker, Socorro ([phone])     DIANNE Almeida  [facility],  "[place]  Pager: [phone]  Please see sticky note for cross cover information  ______________________________________________________________________    Chief Complaint   SOB     History is obtained from the patient and chart review. Patient poor historian due to dementia.     History of Present Illness   Jackeline Smiley is a 86 year old female who has a history of hemophilia A, dementia, CVA, HTN, HLD who was recently admitted to 1/13-1/20 for sepsis secondary to bilateral pneumonia with concern for empyema. Patient refused recommended thoracentesis and chest tube placement and was discharged on oral antibiotics, today she presented to the ED with SOB.     Patient was recently admitted at [facility] for bilateral pneumonia and treated initially with IV antibiotics. Chest CT was completed as WBC continued to rise despite broad spectrum antibiotics and revealed small loculated right pleural effusion, small left pleural effusion which shashank concern for empyema. Patient was recommended to have thoracentesis and possible chest tube placement, however patient declined. She also requested to return to her nursing facility on oral antibiotics. She was discharged on Augmentin. Since returning to her nursing facility, she has had \"some\" SOB and dyspnea, currently denies. Reports she has chronic weakness in her lower extremities due to prior stoke that is unchanged. She also notes she has been producing sputum infrequently. Denies chest pain, fevers, chills, palpitations.  She is not on any oxygen at baseline.     Patient notes she has sores on her buttocks which burn when she is incontinent of urine. She states, \"I would like those to go away.\"     Denies nausea, vomiting, abdominal pain, dysuria, increased weakness, numbness, headache, change in vision or hearing.       Review of Systems    The 10 point Review of Systems is negative other than noted in the HPI.    Past Medical History    I have reviewed this " patient's medical history and updated it with pertinent information if needed.   Past Medical History:   Diagnosis Date     Back ache      Hemophilia (H)      Hemophilia A (H) 2019     Hyperlipidemia      Hypertension      Menopausal disorder      Osteoarthritis      Pneumonia 6-11    LEFT MID LUNG     Tremor      Vertigo        Past Surgical History   I have reviewed this patient's surgical history and updated it with pertinent information if needed.  Past Surgical History:   Procedure Laterality Date     C NONSPECIFIC PROCEDURE      submandibular gland excision     CATARACT IOL, RT/LT      both, Dr Zhou     HYSTERECTOMY, PAP NO LONGER INDICATED         Social History   I have reviewed this patient's social history and updated it with pertinent information if needed.  Social History     Tobacco Use     Smoking status: Former Smoker     Packs/day: 1.00     Years: 46.00     Pack years: 46.00     Types: Cigarettes     Last attempt to quit: 2019     Years since quittin.6     Smokeless tobacco: Never Used     Tobacco comment: 3/4 pack per day   Substance Use Topics     Alcohol use: No     Drug use: No       Family History   I have reviewed this patient's family history and updated it with pertinent information if needed.   Family History   Problem Relation Age of Onset     Diabetes Mother      Cancer Father         leukemia     C.A.D. Brother         CHF     C.A.D. Brother      Diabetes Sister      Diabetes Sister      Diabetes Brother      Diabetes Brother      Lipids Daughter         grand daughter     Lipids Brother         all sibs had it       Prior to Admission Medications   Prior to Admission Medications   Prescriptions Last Dose Informant Patient Reported? Taking?   FAMOTIDINE PO 2020  Yes Yes   Sig: Take 20 mg by mouth At Bedtime   HYDROXYZINE HCL PO 2020  Yes Yes   Sig: Take 10 mg by mouth At Bedtime    METOPROLOL TARTRATE PO 2020  Yes Yes   Sig: Take 50 mg by mouth 2 times  daily   SENNA-docusate sodium (SENNA S) 8.6-50 MG tablet 1/24/2020  Yes Yes   Sig: Take 1 tablet by mouth 2 times daily as needed (constipation)   acetaminophen (TYLENOL) 500 MG tablet 1/24/2020  Yes Yes   Sig: Take 1,000 mg by mouth 3 times daily   amoxicillin-clavulanate (AUGMENTIN) 875-125 MG tablet 1/24/2020  Yes Yes   Sig: Take 1 tablet by mouth 2 times daily For 5 days   calcium carbonate-vitamin D (CALCIUM + D) 600-200 MG-UNIT TABS 1/24/2020  No Yes   Sig: Take 1 tablet by mouth 2 times daily   pantoprazole (PROTONIX) 40 MG EC tablet 1/24/2020  No Yes   Sig: Take 1 tablet (40 mg) by mouth every morning (before breakfast)   simvastatin (ZOCOR) 20 MG tablet 1/24/2020  No Yes   Sig: Take 1 tablet (20 mg) by mouth At Bedtime   vitamin D3 (CHOLECALCIFEROL) 1000 units (25 mcg) tablet 1/24/2020  No Yes   Sig: Take 1 tablet (1,000 Units) by mouth daily      Facility-Administered Medications: None     Allergies   Allergies   Allergen Reactions     Atorvastatin Calcium      hepatitis     Calcium Channel Blockers      Sulfa Drugs Fatigue       Physical Exam   Vital Signs: Temp: 97.8  F (36.6  C) Temp src: Oral BP: 132/59 Pulse: 82   Resp: 18 SpO2: 96 % O2 Device: None (Room air)    Weight: 92 lbs 0 oz   GENERAL: Alert and oriented to person and time, not place. NAD. Eating dinner upon arrival.   HEENT: Anicteric sclera. PERRL. Mucous membranes moist and without lesions. Poor dentition   CV: RRR. S1, S2. No murmurs appreciated.   RESPIRATORY: Effort normal on RA. Diminished lung sounds at bilateral bases, no wheezing, rales or rhonchi appreciated   GI: Abdomen soft and non distended, bowel sounds present. No tenderness, rebound, guarding.   MUSCULOSKELETAL: No joint swelling or tenderness.   NEUROLOGICAL: Unwilling to participate in neuro exam on admission.   EXTREMITIES: No peripheral edema. Intact bilateral pedal pulses.   SKIN: No jaundice. No rashes on visible skin.       Data   Data reviewed today: I reviewed all  medications, new labs and imaging results over the last 24 hours. I personally reviewed the chest x-ray image(s) showing small bilateral opacities at bases.    Recent Labs   Lab 01/25/20  1639 01/23/20  0619 01/20/20   WBC 14.8* 13.1* 18.3*   HGB 9.9* 9.3* 9.8*   MCV 97 93 98   * 708* 634*   INR  --   --  1.11*    140 138   POTASSIUM 3.6 3.9 3.7   CHLORIDE 105 108 106   CO2 24 27 22   BUN 8 7 7*   CR 0.46* 0.36* 0.44*   ANIONGAP 8 5 10   MICK 9.2 8.7 8.4*   GLC 92 77 81   ALBUMIN  --   --  1.7*   PROTTOTAL  --   --  5.3*   BILITOTAL  --   --  0.4   ALKPHOS  --   --  128*   ALT  --   --  15   AST  --   --  16     Recent Results (from the past 24 hour(s))   XR Chest Port 1 View    Narrative     Examination:  XR CHEST PORT 1 VW     Date:  1/25/2020 4:17 PM      Clinical Information: cough     Additional Information: none    Comparison: 1/13/2020 chest x-ray.    Findings:     Since the previous examination there has been interval clearing of  much of the lower lung on the right. Left retrocardiac and basilar  atelectasis/consolidation has developed since previous study. There is  a small focal consolidation left mid lung.    There is aortic tortuosity calcifications. There is upper thoracic  scoliosis convex to the right.      Impression    Impression:    1. The left lower lobe and mid lung atelectasis/consolidation.  2. Interval improvement in right mid and lower lung consolidation..    DEJA STONER MD

## 2020-01-26 NOTE — PROGRESS NOTES
Patient arrived on unit 7C from ED. Instructed how to use call light and oriented to room. Patient disoriented to time of day and situation, unable to complete admission questions accurately.

## 2020-01-26 NOTE — PLAN OF CARE
Pt's granddaughter, Socorro Huerta  who is pt's POA came and daughter was also present; pt was very cooperative when granddaughter was present. PIV placed; IV antibiotic started.Pt took her morning medication.Primary MD was in room. Pt just left for CT of chest via her bed escorted by transport personnel.Daughter and granddaughter just left.    Pt was incontinent of urine and stool; pure wick is placed to help prevent skin from breaking down( rectal area is excoriated( preexising), pt came with it).Coccyx with blanchable erythema, WOCN consult is ordered.Pt refused to eat breakfast, lunch and dinner. Pt only agrees to drink water. Pt refused lab draw this evening; Md is notified; plan to try to draw it in the morning.Turn Q 2 hrs. Unable to complete admission profile, due to pt not answering questions.Bed alarm is maintained for safety.Continue with plan of care.

## 2020-01-26 NOTE — PROGRESS NOTES
[facility], [facility] Progress Note - Hospitalist Service, Gold 8       Date of Admission:  1/25/2020  Assessment & Plan   Jackeline Smiley is a 86 year old female admitted on 1/25/2020. She has a history of acquired hemophilia A s/p rituxin and steroids last year, HTN, anxiety, previous CVA, dementia, dyslipidemia, SIADH, depression, frequent falls who was recently admitted 1/13-1/20 at OSH with sepsis due to bilateral pneumonia and c/f empyema discharged on oral antibiotics and returned to ED with SOB.      Leukocytosis due to pneumonia   Pneumonia with bilateral pleural effusions, possible empyema  Recently admitted to [facility] for sepsis 2/2 bilateral pneumonia. CT chest 1/17 with small loculated right pleural effusion, small left pleural effusion and 11 mm lingular nodule with adjacent ground glass opacity possible infection vs malignancy. With ongoing leukocytosis despite being on IV zosyn, azithromycin and vacomycin there was suspicion for empyema however patient refused recommended thoracentesis and possible chest tube placement. After discussion with ID at [facility], patient was discharged on oral Augmentin X 1 week. She presented to the ED 5 days following discharge with SOB. CXR in ED with left lower lobe and mid lung consolidation, improvement in right lower lung consolidation. WBC 14.8 (13.1), lactate 1.3. Hemodynamically stable. On RA.    - Hold Augmentin; started on vanco/zosyn empirically - per ID given previous negative MRSA swab, okay to stop vanco and cont zosyn for now   - Follow blood cultures  - CT chest non contrast with potential loculation in left fissure with bilateral pleural effusions, left apical groundglass opacities  - Procalcitonin elevated at 21.19, ; lactic acid 1.3  - UA still pending    - ID consulted - appreciate their recs  - CAPS consulted for possible thoracentesis on 1/27; as below, will also ask Heme to weigh in on bleeding  "risk/treatment that may need to happen prior to procedure  - Sputum culture if able to collect    - Supplemental O2 to keep SpO2>90% (currently on RA)     # HTN  Stable on admission.   - Continue PTA Metoprolol 50 mg BID and Hydralazine 10 mg daily PRN    # Chronic protein calorie malnutrition    - Nutrition consult      # Wound on buttocks   Patient reports wound on buttocks, \"pressure wound.\" Would not allow examination at time of admission.   - WOCN consult    - Mepilex placement     # Normocytic Anemia  Hgb 9.9 (9.3). Recent baseline closer to 12, however noted to have down trend during prior hospitalization. Denies melena, hematochezia, hematuria. No signs of active bleeding. No recent iron studies.     - Continue to monitor   - Iron studies   - Notify medicine with any active bleeding     # Paroxysmal A Fib   [id number] score of 4. Not on AC as not felt to be a candidate due to history of hemophilia, hemorrhagic stoke, dementia and falls. Echo 1/14/2020 with LVEF 59%. Currently appears to be in NSR.   - Continue PTA Metoprolol 50 mg BID (hold if SBP < 110 or HR < 60)     - Check EKG     # Acquired hemophilia A:  Initially diagnosed 2012 responded to treatement with steroids but recurred in May 2019 and had intracerebral hemorrhage at that time and received steroids and rituximab for several doses. Hgb slightly down as above.   - Check aPTT - 58 today  - will ask Heme to comment on pt's PTT and if any further treatment needed, especially in light of potential thoracentesis    # HLD:   Continue PTA simvastatin 20 mg HS     # GERD:   Stable. Continue PTA famotidine and pantoprazole     # Dementia with depression:   Oriented to person and time, not place. Continue PTA Hydroxyzine 10 mg HS PRN and Melatonin 3 mg HS      Diet: Dysphagia Diet Level 3 Advanced Thin Liquids (water, ice chips, juice, milk gelatin, ice cream, etc)    DVT Prophylaxis: pt not on anticoagulation due to hemophilia and previous hemorrhagic " stroke  Granados Catheter: not present  Code Status: DNR/DNI      Disposition Plan   Expected discharge: 2 - 3 days, recommended to TBD once antibiotic plan established and respiratory status stable.  Entered: Yari Higuera MD 01/26/2020, 10:43 AM       The patient's care was discussed with the Bedside Nurse, Patient and Patient's Family. Granddaughter, Socorro, is patient's medical POA.    Yari Higuera MD  Hospitalist Service, [address], [place]  Pager: [phone]  Please see sticky note for cross cover information  ______________________________________________________________________    Interval History   Pt much calmer with family by the bedside. She reports feeling very fatigued. Mild SOB with exertion, o/w comfortable. Denies cough today. Has pain in her bottom from pressure sore. Denies nausea, but has minimal appetite.    Data reviewed today: I reviewed all medications, new labs and imaging results over the last 24 hours. I personally reviewed no images or EKG's today.    Physical Exam   Vital Signs: Temp: 96.8  F (36  C) Temp src: Oral BP: 114/56 Pulse: 82   Resp: 16 SpO2: 96 % O2 Device: None (Room air)    Weight: 92 lbs 0 oz  Constitutional: Thin elderly female lying in bed in [facility]  HEENT: NC/AT, no scleral icterus, nl conjunctiva, dry oral mucosa, nl dentition  Respiratory: good inspiratory effort, lungs diminished at bases, no wheezes noted  Cardiovascular: RRR, no M/R/G, 2+ radial pulses, no peripheral edema  GI: soft, nontender, nondistended, no rebound/guarding  Skin: no rashes, warm, dry, scattered ecchymoses  Neuro: CN 2-12 grossly intact, sensation grossly intact  MSK:  able to move all 4 exts  Psych: nl mood/affect      Data   Recent Labs   Lab 01/25/20  1639 01/23/20  0619 01/20/20   WBC 14.8* 13.1* 18.3*   HGB 9.9* 9.3* 9.8*   MCV 97 93 98   * 708* 634*   INR  --   --  1.11*    140 138   POTASSIUM 3.6 3.9 3.7   CHLORIDE 105 108 106   CO2 24  27 22   BUN 8 7 7*   CR 0.46* 0.36* 0.44*   ANIONGAP 8 5 10   MICK 9.2 8.7 8.4*   GLC 92 77 81   ALBUMIN  --   --  1.7*   PROTTOTAL  --   --  5.3*   BILITOTAL  --   --  0.4   ALKPHOS  --   --  128*   ALT  --   --  15   AST  --   --  16     Recent Results (from the past 24 hour(s))   XR Chest Port 1 View    Narrative     Examination:  XR CHEST PORT 1 VW     Date:  1/25/2020 4:17 PM      Clinical Information: cough     Additional Information: none    Comparison: 1/13/2020 chest x-ray.    Findings:     Since the previous examination there has been interval clearing of  much of the lower lung on the right. Left retrocardiac and basilar  atelectasis/consolidation has developed since previous study. There is  a small focal consolidation left mid lung.    There is aortic tortuosity calcifications. There is upper thoracic  scoliosis convex to the right.      Impression    Impression:    1. The left lower lobe and mid lung atelectasis/consolidation.  2. Interval improvement in right mid and lower lung consolidation..    DEJA STONER MD     Medications       famotidine  20 mg Oral At Bedtime     melatonin  3 mg Oral At Bedtime     metoprolol tartrate  50 mg Oral BID     pantoprazole  40 mg Oral QAM AC     piperacillin-tazobactam  3.375 g Intravenous Q6H     senna-docusate  1 tablet Oral BID    Or     senna-docusate  2 tablet Oral BID     simvastatin  20 mg Oral At Bedtime     sodium chloride (PF)  3 mL Intracatheter Q8H     vancomycin (VANCOCIN) IV  750 mg Intravenous Q24H     vitamin D3  1,000 Units Oral Daily

## 2020-01-26 NOTE — PROGRESS NOTES
"Pt refused PIV placement, refused nursing cares; pt kept stating \" leave me alone\". Socorro Huerta (Grandchild) is notified of the situation over the phone, grand child is coming within the hour.  "

## 2020-01-26 NOTE — ED NOTES
[facility], [place]   ED Nurse to Floor Handoff     Jackeline Smiley is a 86 year old female who speaks English and lives with others,  in a nursing home  They arrived in the ED by car from home    ED Chief Complaint: Shortness of Breath    ED Dx;   Final diagnoses:   Pneumonia of both lungs due to infectious organism, unspecified part of lung         Needed?: No    Allergies:   Allergies   Allergen Reactions     Atorvastatin Calcium      hepatitis     Calcium Channel Blockers      Sulfa Drugs Fatigue   .  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     Hyperlipidemia      Hypertension      Menopausal disorder      Osteoarthritis      Pneumonia 6-11    LEFT MID LUNG     Tremor      Vertigo       Baseline Mental status: mild dementia  Current Mental Status changes: at basesline    Infection present or suspected this encounter: yes respiratory  Sepsis suspected: No  Isolation type: No active isolations     Activity level - Baseline/Home:  Walker and Wheelchair/total care  Activity Level - Current:   Wheelchair/total care    Bariatric equipment needed?: No    In the ED these meds were given:   Medications   piperacillin-tazobactam (ZOSYN) 4.5 g vial to attach to  mL bag (4.5 g Intravenous New Bag 1/25/20 1921)   vancomycin (VANCOCIN) 750 mg in sodium chloride 0.9 % 250 mL intermittent infusion (has no administration in time range)   vancomycin (VANCOCIN) 1000 mg in dextrose 5% 200 mL PREMIX (0 mg Intravenous Stopped 1/25/20 1915)       Drips running?  No    Home pump  No    Current LDAs  Peripheral IV 01/25/20 Left Lower forearm (Active)   Number of days: 0       External Urinary Catheter (Active)   Number of days: 225       Pressure Injury 05/17/19 Posterior Sacrum Stage 1 (Active)   Wound Base Painful;Red;Erythema, blanchable 1/25/2020  3:30 PM   Chen-wound Assessment Blanchable erythema  1/25/2020  3:30 PM   Estimated Circumference ( if not measured golf sized 1/25/2020  3:30 PM   Drainage Amount None 1/25/2020  3:30 PM   Wound Care/Cleansing Barrier applied  1/25/2020  3:30 PM   Dressing Foam 1/25/2020  3:30 PM   Dressing Status Applied 1/25/2020  3:30 PM   Number of days: 253       Labs results:   Labs Ordered and Resulted from Time of ED Arrival Up to the Time of Departure from the ED   CBC WITH PLATELETS DIFFERENTIAL - Abnormal; Notable for the following components:       Result Value    WBC 14.8 (*)     RBC Count 3.35 (*)     Hemoglobin 9.9 (*)     Hematocrit 32.4 (*)     MCHC 30.6 (*)     Platelet Count 774 (*)     Absolute Neutrophil 12.5 (*)     All other components within normal limits   BASIC METABOLIC PANEL - Abnormal; Notable for the following components:    Creatinine 0.46 (*)     All other components within normal limits   LACTIC ACID WHOLE BLOOD   UA MACROSCOPIC WITH REFLEX TO MICRO AND CULTURE   PERIPHERAL IV CATHETER   BLOOD CULTURE   BLOOD CULTURE       Imaging Studies:   Recent Results (from the past 24 hour(s))   XR Chest Port 1 View    Narrative     Examination:  XR CHEST PORT 1 VW     Date:  1/25/2020 4:17 PM      Clinical Information: cough     Additional Information: none    Comparison: 1/13/2020 chest x-ray.    Findings:     Since the previous examination there has been interval clearing of  much of the lower lung on the right. Left retrocardiac and basilar  atelectasis/consolidation has developed since previous study. There is  a small focal consolidation left mid lung.    There is aortic tortuosity calcifications. There is upper thoracic  scoliosis convex to the right.      Impression    Impression:    1. The left lower lobe and mid lung atelectasis/consolidation.  2. Interval improvement in right mid and lower lung consolidation..    DEJA STONER MD       Recent vital signs:   /59   Pulse 82   Temp 97.8  F (36.6  C) (Oral)   Resp 18   Wt 41.7 kg (92 lb)    SpO2 96%   BMI 15.31 kg/m      Coello Coma Scale Score: 15 (01/25/20 1337)       Cardiac Rhythm: Normal Sinus  Pt needs tele? No  Skin/wound Issues: pressure ulcer on coccyx. assessed by ED MD, RN cleaned site and placed mepilex on 1/25/20    Code Status: DNR / DNI, needs further clarification with social work and granddaughter (POA)    Pain control: pt had none    Nausea control: pt had none    Abnormal labs/tests/findings requiring intervention: See epic    Family present during ED course? Yes   Family Comments/Social Situation comments: Patient comes from nursing home. Pt was checked out by daughter without permission and brought to [place] ED. Granddaughter has medical power and rights for patient and daughter is not supposed to have any say in medical decisions or cares. Daughter has some cognitive delay and mental health issues. Very difficult to complete tasks with patient while daughter is in room. Daughter continually requesting a cot to stay in patients room overnight. Granddaughter has been contacted by ED MD and  and will need to be involved continually for further medical decisions and care plans, prior to discharge back to facility.     Tasks needing completion: None    Talisha Mendez, RN  [phone] [facility]

## 2020-01-26 NOTE — PROVIDER NOTIFICATION
"\"OMID rm [id number]  Gold. TJ pt refusing new IV access and demanding to get sleep. cannot infuse IV abx through current PIV, pt agreed at this time to try again in AM. Thanks Ian [phone]\"        Paged Gold crosscover at [phone]    "

## 2020-01-27 NOTE — PROGRESS NOTES
Social Work Services Progress Note    Hospital Day: 2  Date of Initial Social Work Evaluation:  Not completed  Collaborated with:  Chart review, LTC SW, Pt, bedside nurse    Data: SW involved for discharge planning - return to LT ([facility]) in 2-3 days pending medical stability.     SW spoke / [facility] and confirmed Pt does have an 18 day bedhold in order to return to her LTC bed. SW consulted with [facility] SW: Belinda (P: [phone]), Pt has deferred decision-making to her granddaughter Socorro (P: [phone]) and Pt's dtr Sigrid has a no-contact order with the [facility] facility. Patient has authorized for her dtr to visit during her hospital stay, but not be involved in her medical cares/decisions. SW confirmed the LTC does not have a health care directive on file, though they do believe Pt has the capacity to determine an alternative decision-maker. Patient completed a healthcare directive that is ready to be notarized designating her granddaughter Socorro as an alternative decision maker. SW notified notary with request to notarize during hospital stay.     Intervention:  HCD/Discharge planning    Assessment:  Pt resting throughout the day and reports increased fatigue.    Plan:    Anticipated Disposition:  Facility:  [facility]    Barriers to d/c plan:  Medical stability    Follow Up:  SW to f/u & assist as needed.    LUISA Damon, MARICRUZ  7C Surgical/Oncology Unit   Phone: [phone]  Pager: [phone]

## 2020-01-27 NOTE — PLAN OF CARE
AVSS on room air. Denied pain. On a dysphagia diet level 3 with thin liquid diet, at 50% or less of food. Incontinent of urine and stool. Purewick catheter in place. Had 1 BM this shift. On IV antibiotics. Slow to respond, on a bed alarm for safety. Blanchable redness on coccyx. Mepilex on. Turned q2h. Continue with plan of care. Working on making granddaughter POA per patient wishes, forms with  ready to be notarized.

## 2020-01-27 NOTE — CONSULTS
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 Smiley is an 86-year-old woman with known acquired factor VIII inhibitor previously in remission, who presented to the Emergency Department from her nursing home with progressive shortness of breath.  She was hospitalized at [facility] from 1/13/2020-1/20/2020 for pneumonia and new onset atrial fibrillation with RVR.  She was discharged back to her nursing home on Augmentin.  Per the Discharge Summary in [facility], she was considering hospice but the final decision was not made.    On my interview, Ms. Smiley vaguely remembers a bleeding problem.  She does not think she is bleeding right now.  Her breathing feels a lot better than when she came to the ED.  She is eating and drinking.  No bruising or purple spots on her skin.  Has not brushed her teeth but has not noticed any bleeding in her mouth.    A complete review of systems was performed and was negative with the exception of pertinent positives noted above.    Hematologic history:  Acquired hemophilia A originally diagnosed in 2012 and treated elsewhere; achieved remission with steroids.    Recurred in May 2019 when presenting with weakness and fall, found to have intracranial hemorrhage.  APTT was elevated and factor VIII level was 11%.  Treated with Novoseven then got methylprednisolone and rituximab for definitive treatment.  She received 3 of 4 planned rituximab doses but then did not receive the 4th dose because she was in rehab and labs showed remission when she was finally able to follow up in October 2019.  At that time, steroids were tapered and plan was to consider Cellcept if she did not remain in remission.    Past medical history:  Osteoarthritis  Hypertension  Hyperlipidemia  Vertigo  Intracranial hemorrhage  Dementia    Past surgical history:  Hysterectomy  Submandibular  gland excision    Family history:  Father with history of leukemia; diabetes in multiple family members.    Social history:  Lifelong smoker until the time of her intracranial hemorrhage in May 2019.  Does not use alcohol or drugs.  Living at [facility].    Medications:  Calcium-vitamin D 600-200 mg  Famotidine 20 mg  Metoprolol 50 mg BID  Pantoprazole 40 mg  Simvastatin 20 mg  Vitamin D3 1000 units  PRN acetaminophen, milk of magnesia, hydroxyzine, senna-docusate    Allergies:  Atorvastatin, sulfa, calcium channel blockers    OBJECTIVE DATA  Blood pressure (!) 150/63, pulse 84, temperature 98.4  F (36.9  C), temperature source Axillary, resp. rate 16, weight 41.7 kg (92 lb), SpO2 92 %.  -- General: no acute distress  -- HEENT: mucous membranes moist, no erythema; pupils equally round, reactive  -- Lymph: no lymphadenopathy in the cervical, submandibular, supraclavicular, axillary, or inguinal areas  -- Cardiovascular: regular rate and rhythm, no murmurs; no peripheral edema or JVD  -- Pulmonary: cannot examine adequately due to patient unable to follow command of taking a deep breath  -- Gastrointestinal: abdomen soft, non-tender, non-distended; bowel sounds present  -- Musculoskeletal: no swelling or erythema of joints  -- Integumentary: some bruises around venipuncture sites on the forearms and hands; no petechiae or ecchymoses  -- Neurologic: alert and oriented to self, and hospital; cannot state the date or which hospital she is at  -- Psychiatric: appropriate affect, cooperative, but argumentative with her daughter    Relevant laboratory workup:  PTT initially 58, today is 55  Factor 8 today is 94%, Inhibitor detected  Hgb 9.6 from normal baseline on 1/13/2020  ANC 9.5    I personally reviewed the following relevant imaging:  Chest xray 1/25/2020 with LLL and mid-lung consolidation.    Chest CT 1/26/2020 with large bilateral effusions and layering of fluid; L fissure appears loculated and may  represent empyema; L atypical groundglass opacities    Assessment:  Acquired hemophilia A (factor VIII inhibitor)    She probably currently has a low level of the same inhibitor.  While her factor VIII level was normal, the [facility] showed that an inhibitor was, in fact, present though the titer is unclear.  Per our stellar Pathologist, this can happen when the inhibitor binding kinetics are slow; longer incubation time on the factor VIII would likely show a decreased level.  This means that her bleeding risk is elevated.  For thoracentesis, we would recommend giving Novoseven 90 mcg/kg x 1 within 15 minutes of starting the procedure.  If she were to end up with a chest tube, she would need several doses of Novoseven. Also, she would likely need to give large doses of steroids and possibly Rituximab again as well to try to eliminate the inhibityr, and we hope (but cannot say for sure) whether she would respond to this.    Per the Discharge Summary from [facility], hospice was considered during that hospitalization and this is a very reasonable consideration.  We urge the team to assess the magnitude of benefit of placing a chest tube, should the diagnostic thoracentesis show empyema.  If this has a reasonable possibility of resolving the infection in this patient with dementia and scoliosis, and if the team thinks the chest tube could eventually be removed, then it makes sense to risk the bleeding and the need for significant treatment from a hematologic standpoint.       Recommendations:  -- Please give 90 mcg/kg Novoseven immediately prior to thoracentesis if pursued  -- Monitor closely for any bleeding; give 90 mcg/kg Novoseven and page the Hematology Fellow on call if spontaneous bleeding occurs    Thank you for involving us in the care of Ms. Smiley.  Hematology consult service will continue to follow with you.  Patient seen and discussed with Dr. Shi.    Wendi Parks MD  Hematology-Oncology-Transplant  Fellow    Attending Note:  I have reviewed the patient chart, and interviewed and examined the patient.  I agree with the assessment and plan. This frail patient has and acquire FVIII inhibitor and probable empyema. The measured FVIII activity is normal, however, the PTT is elevated and an inhibitor is detected in the [place] assay, indicating there is still a risk of excessive hemorrhage with procedures. Often patients with acquired FVII inhibitors can bleed more than expected based on FVIII levels.     Her team needs to discuss with her and her family whether chest tubes are likely to fix her declining status. In this frail patient 86 year old patient with multiple serous medical problems, I worry that she may have many things done to her that will cause harm rather than help, and lead to a prolonged hospital stay that does not prevent her demise.  Nicole Shi MD  Hematology

## 2020-01-27 NOTE — PLAN OF CARE
AVSS on room air. Continuous pulse ox on. Denied pain. On a dysphagia diet level 3 with thin liquid diet, had no po intake overnight. Incontinent of urine and stool. Purewick catheter in place. Had 1 BM this shift. On IV antibiotics. Very lethargic, on a bed alarm for safety. Pre-existing pressure ulcer on coccyx. Mepilex on. Turned q2h. Continue with plan of care.

## 2020-01-27 NOTE — PLAN OF CARE
Status: admit for SOB d/t bilateral pneumonia and empyema  Vitals: /68 (BP Location: Left arm)   Pulse 80   Temp 96.7  F (35.9  C) (Axillary)   Resp 14   Wt 41.7 kg (92 lb)   SpO2 90%   BMI 15.31 kg/m    Neuros: A&Ox2-3 forgetful  IV: R PIV TKO  Resp/trach: Dim on RA  Diet: DD3, room service, poor appetite this shift.   Bowel status: has 1 large soft bowel movement this shift.   : PW in place, adequate O.P  Skin: Bruised throughout, has mepilex on sacrum for pressure injury and perianal redness and irritation, barrier cream applied  Pain: received tylenol for perianal irritation after perianal care  Activity: Ax2. Wheelchair bound, repo q2  Social: Daughter present at start of shift  Plan: pt  Received x1 potassium 10 mEq this shift, completing 4th for K+ of 3.1 but refuses blood draw to recheck until AM draws. Continue to monitor

## 2020-01-27 NOTE — PROGRESS NOTES
[facility], [facility] Progress Note - Hospitalist Service, Gold 8       Date of Admission:  1/25/2020  Assessment & Plan   Jackeline Smiley is a 86 year old female admitted on 1/25/2020. She has a history of acquired hemophilia A s/p rituxin and steroids last year, HTN, anxiety, previous CVA, dementia, dyslipidemia, SIADH, depression, frequent falls who was recently admitted 1/13-1/20 at OSH with sepsis due to bilateral pneumonia and c/f empyema discharged on oral antibiotics and returned to ED with SOB.      Leukocytosis due to pneumonia   Pneumonia with bilateral pleural effusions, possible parapneumonic effusion vs empyema  Recently admitted to [facility] for sepsis 2/2 bilateral pneumonia. CT chest 1/17 with small loculated right pleural effusion, small left pleural effusion and 11 mm lingular nodule with adjacent ground glass opacity possible infection vs malignancy. With ongoing leukocytosis despite being on IV zosyn, azithromycin and vacomycin there was suspicion for empyema however patient refused recommended thoracentesis and possible chest tube placement. After discussion with ID at [facility], patient was discharged on oral Augmentin X 1 week. She presented to the ED 5 days following discharge with SOB. CXR in ED with left lower lobe and mid lung consolidation, improvement in right lower lung consolidation. WBC 14.8 (13.1), lactate 1.3. Hemodynamically stable. On RA.    - stopped Augmentin and started on vanco/zosyn empirically - per ID given previous negative MRSA swab and no h/o MRSA infections, okay to stop vanco and cont zosyn for now   - Follow blood cultures  - CT chest non contrast with potential loculation in left fissure with bilateral pleural effusions, left apical groundglass opacities  - Procalcitonin elevated at 21.19, ; lactic acid 1.3  - UA still pending    - ID consulted - appreciate their recs; will need to revisit benefits vs risks of  "thoracentesis based on pt's medical history of acquired hemophilia A   - CAPS consulted - given loculation and hemophilia, if pt needs thoracentesis it will need to be done in IR; as below, will also ask Heme to weigh in on bleeding risk/treatment that may need to happen prior to procedure  - Sputum culture if able to collect    - Supplemental O2 to keep SpO2>90% (currently on RA)     # HTN  Stable on admission.   - Continue PTA Metoprolol 50 mg BID and Hydralazine 10 mg daily PRN    # Chronic protein calorie malnutrition    - Nutrition consult      # Wound on buttocks   Patient reports wound on buttocks, \"pressure wound.\" Would not allow examination at time of admission.   - WOCN consult    - Mepilex placement     # Normocytic Anemia  Hgb 9.9 (9.3). Recent baseline closer to 12, however noted to have down trend during prior hospitalization. Denies melena, hematochezia, hematuria. No signs of active bleeding. No recent iron studies.     - Continue to monitor   - Iron studies   - Notify medicine with any active bleeding     # Paroxysmal A Fib   [id number] score of 4. Not on AC as not felt to be a candidate due to history of hemophilia, hemorrhagic stoke, dementia and falls. Echo 1/14/2020 with LVEF 59%. Currently appears to be in NSR.   - Continue PTA Metoprolol 50 mg BID (hold if SBP < 110 or HR < 60)       # Acquired hemophilia A:  Initially diagnosed 2012 responded to treatement with steroids but recurred in May 2019 and had intracerebral hemorrhage at that time and received steroids and rituximab for several doses. Hgb slightly down as above.   - PTT elevated, normal factor 8 assay  - Heme consulted and following - will place recommendations, especially re: thoracentesis bleeding risk and any interventions needed prior      # HLD:   Continue PTA simvastatin 20 mg HS     # GERD:   Stable. Continue PTA famotidine and pantoprazole     # Dementia with depression:   Oriented to person and time, not place. Continue PTA " Hydroxyzine 10 mg HS PRN and Melatonin 3 mg HS      Diet: Dysphagia Diet Level 3 Advanced Thin Liquids (water, ice chips, juice, milk gelatin, ice cream, etc)  Snacks/Supplements Adult: Gelatein Plus; With Meals  Snacks/Supplements Adult: Magic Cup; With Meals  Room Service    DVT Prophylaxis: pt not on anticoagulation due to hemophilia and previous hemorrhagic stroke  Granados Catheter: not present  Code Status: DNR/DNI      Disposition Plan   Expected discharge: 2 - 3 days, recommended to TBD once antibiotic plan established and respiratory status stable.  Entered: Yari Higuera MD 01/27/2020, 5:06 PM       The patient's care was discussed with the Bedside Nurse, Patient, Patient's Family and CAPS, Hematology Consultant. Granddaughter, Socorro, is patient's medical POA.    Yari Higuera MD  Hospitalist Service, [address], [place]  Pager: [phone]  Please see sticky note for cross cover information  ______________________________________________________________________    Interval History   Pt still feeling tired today, but better than yesterday. Denies cough, SOB. No nausea. She'd like to go home soon.    Data reviewed today: I reviewed all medications, new labs and imaging results over the last 24 hours. I personally reviewed no images or EKG's today.    Physical Exam   Vital Signs: Temp: 96.7  F (35.9  C) Temp src: Axillary BP: 134/68 Pulse: 80   Resp: 14 SpO2: 90 % O2 Device: None (Room air)    Weight: 92 lbs 0 oz  Constitutional: Thin elderly female lying in bed in NAD  HEENT: NC/AT, no scleral icterus, nl conjunctiva, moist oral mucosa, nl dentition  Respiratory: good inspiratory effort, lungs diminished at bases, no wheezes noted  Cardiovascular: RRR, no M/R/G, 2+ radial pulses, no peripheral edema  GI: soft, nontender, nondistended, no rebound/guarding  Skin: no rashes, warm, dry, scattered ecchymoses  Neuro: CN 2-12 grossly intact, sensation grossly intact  MSK:   able to move all 4 exts  Psych: nl mood/affect      Data   Recent Labs   Lab 01/27/20  0846 01/26/20  1031 01/25/20  1639   WBC 12.1* 12.6* 14.8*   HGB 9.6* 8.7* 9.9*   MCV 96 101* 97   * 614* 774*    138 138   POTASSIUM 3.1* 3.6 3.6   CHLORIDE 107 108 105   CO2 27 24 24   BUN 5* 7 8   CR 0.42* 0.40* 0.46*   ANIONGAP 7 7 8   MICK 8.6 8.6 9.2   GLC 93 91 92   ALBUMIN  --  1.4*  --    PROTTOTAL  --  5.2*  --    BILITOTAL  --  0.3  --    ALKPHOS  --  126  --    ALT  --  10  --    AST  --  9  --      No results found for this or any previous visit (from the past 24 hour(s)).  Medications       famotidine  20 mg Oral At Bedtime     melatonin  3 mg Oral At Bedtime     metoprolol tartrate  50 mg Oral BID     multivitamin w/minerals  1 tablet Oral Daily     pantoprazole  40 mg Oral QAM AC     piperacillin-tazobactam  3.375 g Intravenous Q6H     senna-docusate  1 tablet Oral BID    Or     senna-docusate  2 tablet Oral BID     simvastatin  20 mg Oral At Bedtime     sodium chloride (PF)  3 mL Intracatheter Q8H     vitamin D3  1,000 Units Oral Daily

## 2020-01-27 NOTE — PROGRESS NOTES
Focus- Coccyx, sacrum, and BL buttocks  Received consult for suspected pressure injury on coccyx. Assessed the area and noted 2 x 1.4 x 0.0cm blanchable erythema with prominent bone. Currently covered with Mepilex for protection. No pressure injury detected. Able to shift weight from side to side but need reminders. Educated pt on pressure injury, risk factors, and preventive measures. Verbalized understanding.  P. Continue to follow pressure injury prevention protocol. Cover the area with Mepilex for protection and change as needed. No further visit planned, will sign off.

## 2020-01-27 NOTE — PROGRESS NOTES
GENERAL INFECTIOUS DISEASES FOLLOW UP     Patient:  Jackeline Smiley   Date of birth 2/11/1933, Medical record number [id number]  Date of Visit:  01/27/2020  Date of Admission: 1/25/2020  Consult Requester:Yari Higuera MD          Assessment and Recommendations:   ASSESSMENT:  1. Pleural effusion  2. Community acquired pneumonia vs health care acquired pneumonia  3. 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 She was recently hospitalized at [facility] from 1/13-1/20 and treated for community acquired pneumonia. Imaging obtained at OSH showed loculated right pleural effusion and there was suspicion for empyema. Chest tube was recommended, however patient declined invasive intervention and did not want IV antibiotics for discharge. She was discharged back to LTC on Augmentin to treat suspected empyema. During that hospitalization Hospice referral was placed for ongoing goals of care discussion and possible hospice enrollment as an outpatient. She has subsequently experienced low-grade fevers and shortness of breath.    CT chest shows left apical ground glass opacities, bilateral pleural effusions with possible loculated fluid collection at left fissure. Treating with Zosyn for possible HCAP given recent hospitalization as well as possible parapneumonic effusion vs empyema. She did have a negative MRSA culture from nasal swab at [facility] on 1/18/20, can stop vancomycin. Recommend obtaining diagnostic thoracentesis. Remains on room air, afebrile, and hemodynamically stable.     RECOMMENDATION:  1. Continue Zosyn  2. Recommend diagnostic  thoracentesis, please obtain cell counts with differential, LDH, protein, aerobic and anaerobic bacterial cultures, fungal culture.  The patient is unsure if she wants this.   4. Please obtain serum LDH and protein day of thoracentesis  5. Obtain sputum culture if able  6. Recommend repeating blood cultures x2 if spikes a fever >101    ID will continue to follow.     Sebastian Capps MD  Professor of Medicine      ________________________________________________    Consult Question: Persistent leukocytosis with recent treatment for bilateral pneumonia and concern for empyema.  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 She was recently hospitalized at [facility] from 1/13-1/20 and treated for community acquired pneumonia. Imaging obtained at OSH showed loculated right pleural effusion and there was suspicion for empyema. Chest tube was recommended, however patient declined invasive intervention and did not want IV antibiotics for discharge. She was discharged back to LTC on Augmentin. During that hospitalization Hospice referral was placed for ongoing goals of care discussion and possible hospice enrollment as an outpatient.    Jackeline's granddaughter Socorro is medical decision maker. History is provided by Jackeline and Socorro. Possible low grade fevers at nursing home. Some increased shortness of breath, but not able to further describe. Sensation of being uncomfortable or feeling anxious when she tries to take a deep breath, not a painful sensation. Family notes that she has not been coughing a lot  and they have not seen any sputum production. Patient reports an irritated throat that feels dry, it is not painful unless she is coughing. Intermittent dizziness and diarrhea that she states has been baseline for several years. Reports sores on her bottom that are painful when she is incontinent of urine. Denies rigors, chills, nausea, vomiting (except after drinking a Boost), new skin rashes or lesions, nasal congestion, ear pain or pressure.    Interval History:    Patient says she feels weak and tired.  Some cough.  She is unsure if she wants a thoracentesis. She said she had diarrhea which is quite bothersome..         Review of Systems:   CONSTITUTIONAL:  + low grade fever at LT. No chills, rigors, weight change  EYES: negative for icterus  ENT:  + dry/irritated throat. Negative for congestion, ear pain or pressure, sinus pain  RESPIRATORY:  +mild coughing. No sputum production, shortness of breath at rest  CARDIOVASCULAR:  negative for chest pain, dyspnea  GASTROINTESTINAL:  +loose stools (patient and family state this is baseline for years) negative for nausea, vomiting, and constipation  GENITOURINARY:  negative for dysuria  HEME:  No easy bruising  INTEGUMENT:  negative for rash and pruritus  NEURO:  Negative for headache         Past Medical History:     Past Medical History:   Diagnosis Date     Back ache      Hemophilia (H)      Hemophilia A (H) 5/17/2019     Hyperlipidemia      Hypertension      Menopausal disorder      Osteoarthritis      Pneumonia 6-11    LEFT MID LUNG     Tremor      Vertigo             Past Surgical History:     Past Surgical History:   Procedure Laterality Date     C NONSPECIFIC PROCEDURE      submandibular gland excision     CATARACT IOL, RT/LT      both, Dr Zhou     HYSTERECTOMY, PAP NO LONGER INDICATED              Family History:   Reviewed and non-contributory.   Family History   Problem Relation Age of Onset     Diabetes Mother      Cancer Father         leukemia      AUREA Brother         [facility]     AUREA Brother      Diabetes Sister      Diabetes Sister      Diabetes Brother      Diabetes Brother      Lipids Daughter         grand daughter     Lipids Brother         all sibs had it            Social History:     Social History     Tobacco Use     Smoking status: Former Smoker     Packs/day: 1.00     Years: 46.00     Pack years: 46.00     Types: Cigarettes     Last attempt to quit: 2019     Years since quittin.6     Smokeless tobacco: Never Used     Tobacco comment: 3/4 pack per day   Substance Use Topics     Alcohol use: No     History   Sexual Activity     Sexual activity: Not Currently     Partners: Male            Current Medications:       famotidine  20 mg Oral At Bedtime     melatonin  3 mg Oral At Bedtime     metoprolol tartrate  50 mg Oral BID     multivitamin w/minerals  1 tablet Oral Daily     pantoprazole  40 mg Oral QAM AC     piperacillin-tazobactam  3.375 g Intravenous Q6H     senna-docusate  1 tablet Oral BID    Or     senna-docusate  2 tablet Oral BID     simvastatin  20 mg Oral At Bedtime     sodium chloride (PF)  3 mL Intracatheter Q8H     vitamin D3  1,000 Units Oral Daily            Allergies:     Allergies   Allergen Reactions     Atorvastatin Calcium      hepatitis     Calcium Channel Blockers      Sulfa Drugs Fatigue            Physical Exam:   Vitals were reviewed  Patient Vitals for the past 24 hrs:   BP Temp Temp src Pulse Resp SpO2 Weight   20 0755 114/56 96.8  F (36  C) Oral 82 16 96 % --   20 2344 133/61 100.2  F (37.9  C) Axillary 97 16 93 % --   20 1337 132/59 97.8  F (36.6  C) Oral 82 18 94 % 41.7 kg (92 lb)       Physical Examination:  GENERAL:  Awake, alert, oriented to person, place, and family members.  HEENT:  Head is normocephalic, atraumatic   EYES:  Eyes have anicteric sclerae without conjunctival injection. Pupils equal and round.   ENT:  Oropharynx is moist without exudates or ulcers. Tongue is midline. Poor  dentition. Difficulty to view posterior pharynx, no tonsillar enlargement in visualized portion.  NECK:  Supple.   LUNGS:  Clear to auscultation bilaterally with dimished bases. Normal respiratory effort on RA.   CARDIOVASCULAR:  Regular rate and rhythm, +S1/S2, with no murmurs appreciated.  ABDOMEN:  Normal bowel sounds, soft, nontender.   SKIN:  No acute rashes.  Line(s) are in place without any surrounding erythema or exudate.   NEUROLOGIC:  CN II-XII intact.         Laboratory Data:     Inflammatory Markers    Recent Labs   Lab Test 01/25/20  1639   .0*       Hematology Studies    Recent Labs   Lab Test 01/27/20  0846 01/26/20  1031 01/25/20  1639 01/23/20  0619 01/20/20 01/13/20  0944 01/10/20  1058   WBC 12.1* 12.6* 14.8* 13.1* 18.3* 20.6* 16.6*   ANEU 9.5* 9.9* 12.5* 10.0*  --  18.6* 15.1*   AEOS 0.5 0.3 0.1 0.4  --  0.0 0.2   HGB 9.6* 8.7* 9.9* 9.3* 9.8* 12.6 11.6*   MCV 96 101* 97 93 98 94 93   * 614* 774* 708* 634* 504* 430       Metabolic Studies     Recent Labs   Lab Test 01/27/20  0846 01/26/20  1031 01/25/20  1639 01/23/20  0619 01/20/20    138 138 140 138   POTASSIUM 3.1* 3.6 3.6 3.9 3.7   CHLORIDE 107 108 105 108 106   CO2 27 24 24 27 22   BUN 5* 7 8 7 7*   CR 0.42* 0.40* 0.46* 0.36* 0.44*   GFRESTIMATED >90 >90 89 >90 >60       Hepatic Studies    Recent Labs   Lab Test 01/26/20  1031 01/20/20 01/13/20  0944 08/31/19  1002 06/26/19  0450 06/10/19  1300   BILITOTAL 0.3 0.4 0.5 0.9 0.6 0.5   ALKPHOS 126 128* 146 78 65 86   ALBUMIN 1.4* 1.7* 2.2* 3.4 2.8* 3.3*   AST 9 16 19 33 12 18   ALT 10 15 16 37 22 25       Microbiology:  Culture Micro   Date Value Ref Range Status   01/25/2020 No growth after 2 days  Preliminary   01/13/2020 No growth  Final   01/13/2020 No growth  Final   11/27/2019   Final    >100,000 colonies/mL  mixed urogenital amrita  Susceptibility testing not routinely done     08/08/2019   Final    <10,000 colonies/mL  urogenital amrita  Susceptibility testing not  routinely done         Urine Studies    Recent Labs   Lab Test 11/27/19  1349 08/08/19  1920 05/20/19  1120 05/16/19  2146   LEUKEST Large* Negative Negative Large*   WBCU 26*  --  1 24*     Hepatitis B Testing   Recent Labs   Lab Test 05/19/19  0424   HBCAB Nonreactive   HEPBANG Nonreactive              Imaging:       CT Chest (1/26/2020)  IMPRESSION:   1. Bilateral large pleural effusions with layering of fluid in the  bilateral fissures. Question of loculation of fluid in the left  fissure suggesting empyema.  2. Left apical groundglass opacities likely representing infectious  process.  3. Bibasilar atelectasis.

## 2020-01-27 NOTE — PLAN OF CARE
Shift: [id number]     Admitted for pneumonia  Neuro:  A & O x 4, cooperative  Cardiac/VS: VSS  Respiratory: RA, fine crackles throughout lung fields  GI: Denies N/V  Diet/Appetite: DD 3 with thin liquids, poor  : Incontinent, purewick in place  Activity: Reportedly wheelchair bound at home, turn q2hrs / bed alarm on for safety.   Pain: Denies pain  Skin: Mepilex on pre-existing coccyx wound.   LDA(s): R PIV tko

## 2020-01-28 NOTE — PLAN OF CARE
AVSS except some HTN on room air. Tylenol given x1 for back pain. On a dysphagia diet level 3 with thin liquid diet, eating about 25% of meals. Incontinent of urine and stool. Purewick catheter in place. On IV antibiotics. Slow to respond, on a bed alarm for safety. Blanchable redness on coccyx. Mepilex on. Turned q2h. Continue with plan of care. Notarized healthcare directive this shift, granddaughter Socorro is health care agent.

## 2020-01-28 NOTE — PROGRESS NOTES
"HEMATOLOGY CONSULT PROGRESS NOTE  01/28/20  9:53 AM      INTERVAL HISTORY:  On my visit, she is slumped over to the right side in front of her half-eaten breakfast tray, sleeping soundly.  When I woke her, she knew it was the year 2020 and that she was in the hospital.  Could not remember which hospital or why she was there.  She has not noticed any bleeding.  Feeling \"okay.\"  Bedside RN reports that paperwork to make the granddaughter a legal healthcare agent are in process.    A complete review of systems was performed and was negative with the exception of pertinent positives noted above.    OBJECTIVE DATA:  BP (!) 147/75 (BP Location: Right arm)   Pulse 79   Temp 98.7  F (37.1  C) (Oral)   Resp 16   Wt 41.7 kg (92 lb)   SpO2 93%   BMI 15.31 kg/m    -- General: no acute distress, frail appearing  -- HEENT: normocephalic, atraumatic  -- Cardiovascular: no peripheral edema or JVD  -- Pulmonary: breathing comfortably on room air  -- Gastrointestinal: abdomen soft, non-tender, non-distended  -- Musculoskeletal: no swelling or erythema of joints  -- Integumentary: no rashes, ecchymoses or petechiae  -- Neurologic: alert and oriented to self,  hospital setting and year  -- Psychiatric: appropriate affect, cooperative    I have independently reviewed the new laboratory results and imaging studies.    Assessment:  Acquired hemophilia A (factor VIII inhibitor), previously in remission after steroids and 3 doses of rituximab, but now with detectable inhibitor but thankfully not bleeding.  She potentially needs a thoracentesis, however for this procedure we would need to give 90 mcg/kg of Novoseven within 15 minutes of starting the procedure.  Based on the results, she may need a chest tube, which would necessitate further doses of Novoseven to mitigate the acute bleeding risk.  A chest tube would also likely require us to initiate high-dose steroids--possibly followed by rituximab--to try to eliminate the " inhibitor.    Prior hospitalization included some sort of discussion about hospice, per the Discharge Summary in [facility].  We strongly urge the team to pursue this discussion again now.  It would be helpful to clarify whether a chest tube would be beneficial (since, per report, the infusion is loculated) and whether there is a chance the tube could eventually be removed if it was placed.  If not, the considerable risk of bleeding, underlying dementia and scoliosis may predispose her to risks which outweigh the benefits of aggressive infection management.    Further, if bleeding occurs, we may be forced to pursue aggressive management of her hemophilia with the aforementioned Novoseven, steroids and rituximab.  It would be helpful to know if these interventions are in line with overall goals of care.  The patient does not really grasp the extent of her current healthcare problems.  In this regard, a goals of care discussion with the granddaughter could provide us better insight.     Recommendations:  -- Please give 90 mcg/kg Novoseven immediately prior to thoracentesis if pursued  -- Monitor closely for any bleeding; give 90 mcg/kg Novoseven and page the Hematology Fellow on call if spontaneous bleeding occurs    Patient seen and discussed with Dr. Shi.    Wendi Parks MD  Hematology-Oncology-Transplant Fellow    Attending Note:  I have reviewed the patient chart, and interviewed and examined the patient.  I agree with the assessment and plan.  Nicole Shi MD  Hematology

## 2020-01-28 NOTE — PLAN OF CARE
Pt AVSS. Pt sleeping between cares. Pt cooperative and calm. Thoughtful with responses. No confusion noted, forgetfulness observed. Pt lungs dim bases. Abd obese with +bs noted. Pt denied c/o's pain or nausea. PIV right arm d/cd due to site tenderness. New piv in left upper arm via vascular access. Pt agreeable to restart and belen procedure well. IV antibiotics infused as ordered. Periwick intact to wall sxn. No bm's noted tonite. Turned q 2hr. Pillows in place for comfort. Bed alarm on for forgetfulness. Pt using call light appropriately for asst. Mepilex intact on sacrum. K+ recheck with am labs. Cont to maintain comfort. Reorient prn.

## 2020-01-28 NOTE — PROGRESS NOTES
[facility], [facility] Progress Note - Hospitalist Service, Gold 8    Date of Admission:  1/25/2020    Assessment & Plan   Jackeline Smiley is a 86 year old female with history of acquired hemophilia A s/p rituxin and steroids last year, HTN, anxiety, previous CVA, dementia, dyslipidemia, SIADH, depression, frequent falls who was recently admitted 1/13-1/20 at OSH with sepsis due to bilateral pneumonia and c/f empyema discharged on oral antibiotics and returned to ED with SOB.      Pneumonia, bilateral pleural effusions, possible parapneumonic effusion vs empyema:  Recently admitted to [facility] for sepsis 2/2 bilateral pneumonia. CT chest 1/17 with small loculated right pleural effusion, small left pleural effusion and 11 mm lingular nodule with adjacent ground glass opacity possible infection vs malignancy. With ongoing leukocytosis despite being on IV zosyn, azithromycin and vacomycin there was suspicion for empyema however patient refused recommended thoracentesis and possible chest tube placement. After discussion with ID at [facility], patient was discharged on oral Augmentin X 1 week. She presented to the ED 5 days following discharge with SOB and imaging with potential loculation in left fissure with bilateral pleural effusions, left apical groundglass opacities. Procalcitonin elevated at 21.19, . Currently patient is HDS and breathing on room air.  -Appreciate infectious disease assessment and recommendations  -Continue empiric vanco/zosyn  -Follow blood cultures  -Discussing role of thoracentesis and subsequent need for chest tube drainage in context of bleeding risk; if moving forward should be done by IR and bleeding should be addressed as below      HTN:  Controlled.  -Continue PTA Metoprolol 50 mg BID    Chronic protein calorie malnutrition:  -Nutrition consult     Wound on buttocks:  - WOCN consult      Normocytic Anemia:  Hgb ~9. Recent baseline closer to  12, however noted to have down trend during prior hospitalization. Denies melena, hematochezia, hematuria. No signs of active bleeding.   -Continue to monitor     Paroxysmal A Fib:  [id number] score of 4. Not on AC as not felt to be a candidate due to history of hemophilia, hemorrhagic stoke, dementia and falls. Echo 1/14/2020 with LVEF 59%.   -Continue PTA Metoprolol 50 mg BID (hold if SBP < 110 or HR < 60)       Acquired hemophilia A:  Initially diagnosed 2012 responded to treatement with steroids but recurred in May 2019 and had intracerebral hemorrhage at that time and received steroids and rituximab for several doses.   -90 mcg/kg Novoseven if thoracentesis is pursed  -Monitor closely for any bleeding; give 90 mcg/kg Novoseven and page the Hematology Fellow on call if spontaneous bleeding occurs    HLD:   -Continue PTA simvastatin 20 mg HS     GERD:   Stable.   -Continue PTA famotidine and pantoprazole     Dementia with depression:   -Continue PTA Hydroxyzine 10 mg HS PRN and Melatonin 3 mg HS      Diet: Dysphagia Diet Level 3 Advanced Thin Liquids (water, ice chips, juice, milk gelatin, ice cream, etc)  Snacks/Supplements Adult: Gelatein Plus; With Meals  Snacks/Supplements Adult: Magic Cup; With Meals  Room Service    DVT Prophylaxis: Pneumatic Compression Devices  Granados Catheter: not present  Code Status: DNR/DNI      Disposition Plan   Expected discharge: 2 - 3 days, recommended to prior living arrangement once antibiotic plan established.       The patient's care was discussed with the Bedside Nurse, Care Coordinator/, Patient and ID/Hematology Consultant.    Victorino Leon MD  Hospitalist Service, [address], [place]  Pager: [phone]  Please see sticky note for cross cover information    Interval History   No acute events. Sleeping today when meeting patient, easily arousable. Denies chest pain, shortness of breath, cough, nausea, vomiting, abdominal  pain.    Data reviewed today: I reviewed all new labs and imaging results over the last 24 hours. I personally reviewed the chest CT image(s) showing previously reported findings.    Physical Exam   Temp: 98.7  F (37.1  C) Temp src: Oral BP: (!) 147/75 Pulse: 79 Heart Rate: 82 Resp: 16 SpO2: 93 % O2 Device: None (Room air)    Vitals:    01/25/20 1337   Weight: 41.7 kg (92 lb)     Vital Signs with Ranges  Temp:  [96.5  F (35.8  C)-98.7  F (37.1  C)] 98.7  F (37.1  C)  Pulse:  [79] 79  Heart Rate:  [82] 82  Resp:  [14-16] 16  BP: (135-147)/(70-75) 147/75  SpO2:  [93 %] 93 %  I/O last 3 completed shifts:  In: 170 [P.O.:170]  Out: 250 [Urine:250]  Constitutional: Thin, elderly female, NAD  Respiratory: MMM, no icterus  Cardiovascular: RRR, no edema  GI: Nontender, soft, NABS  Skin/Integumen: No rash  Neuro: CN 2-12 grossly intact, sensation grossly intact, able to move all 4 exts  Psych: nl mood/affect    Medications       famotidine  20 mg Oral At Bedtime     melatonin  3 mg Oral At Bedtime     metoprolol tartrate  50 mg Oral BID     multivitamin w/minerals  1 tablet Oral Daily     pantoprazole  40 mg Oral QAM AC     piperacillin-tazobactam  3.375 g Intravenous Q6H     senna-docusate  1 tablet Oral BID    Or     senna-docusate  2 tablet Oral BID     simvastatin  20 mg Oral At Bedtime     sodium chloride (PF)  3 mL Intracatheter Q8H     vitamin D3  1,000 Units Oral Daily       Data   Recent Labs   Lab 01/27/20  0846 01/26/20  1031 01/25/20  1639   WBC 12.1* 12.6* 14.8*   HGB 9.6* 8.7* 9.9*   MCV 96 101* 97   * 614* 774*    138 138   POTASSIUM 3.1* 3.6 3.6   CHLORIDE 107 108 105   CO2 27 24 24   BUN 5* 7 8   CR 0.42* 0.40* 0.46*   ANIONGAP 7 7 8   MICK 8.6 8.6 9.2   GLC 93 91 92   ALBUMIN  --  1.4*  --    PROTTOTAL  --  5.2*  --    BILITOTAL  --  0.3  --    ALKPHOS  --  126  --    ALT  --  10  --    AST  --  9  --        No results found for this or any previous visit (from the past 24 hour(s)).

## 2020-01-28 NOTE — PROGRESS NOTES
GENERAL INFECTIOUS DISEASES FOLLOW UP     Patient:  Jackeline Smiley   Date of birth 2/11/1933, Medical record number [id number]  Date of Visit:  01/28/2020  Date of Admission: 1/25/2020  Consult Requester:Yari Higuera MD          Assessment and Recommendations:   ASSESSMENT:  1. Pleural effusion  2. Community acquired pneumonia vs health care acquired pneumonia  3. 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 She was recently hospitalized at [facility] from 1/13-1/20 and treated for community acquired pneumonia. Imaging obtained at OSH showed loculated right pleural effusion and there was suspicion for empyema. Chest tube was recommended, however patient declined invasive intervention and did not want IV antibiotics for discharge. She was discharged back to LTC on Augmentin to treat suspected empyema. During that hospitalization Hospice referral was placed for ongoing goals of care discussion and possible hospice enrollment as an outpatient. She has subsequently experienced low-grade fevers and shortness of breath.    CT chest shows left apical ground glass opacities, bilateral pleural effusions with possible loculated fluid collection at left fissure. Treating with Zosyn for possible HCAP given recent hospitalization as well as possible parapneumonic effusion vs empyema. She did have a negative MRSA culture from nasal swab at [facility] on 1/18/20, can stop vancomycin. Recommend obtaining diagnostic thoracentesis. Remains on room air, afebrile, and hemodynamically stable.     Given patients improvement and her concern about  thoracentesis, a potential approach would be to continue antibiotics without thoracentesis.    RECOMMENDATION:  1. Continue Zosyn  2. Recommend diagnostic thoracentesis, please obtain cell counts with differential, LDH, protein, aerobic and anaerobic bacterial cultures, fungal culture.  The patient is unsure if she wants this.   4. Please obtain serum LDH and protein day of thoracentesis  5. Obtain sputum culture if able  6. Recommend repeating blood cultures x2 if spikes a fever >101    ID will continue to follow.     Sebastian Capps MD  Professor of Medicine      ________________________________________________    Consult Question: Persistent leukocytosis with recent treatment for bilateral pneumonia and concern for empyema.  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 She was recently hospitalized at [facility] from 1/13-1/20 and treated for community acquired pneumonia. Imaging obtained at OSH showed loculated right pleural effusion and there was suspicion for empyema. Chest tube was recommended, however patient declined invasive intervention and did not want IV antibiotics for discharge. She was discharged back to LTC on Augmentin. During that hospitalization Hospice referral was placed for ongoing goals of care discussion and possible hospice enrollment as an outpatient.    Ольгаs granddaughter Socorro is medical decision maker. History is provided by Jackeline and Socorro. Possible low grade fevers at nursing home. Some increased shortness of breath, but not able to further describe. Sensation  of being uncomfortable or feeling anxious when she tries to take a deep breath, not a painful sensation. Family notes that she has not been coughing a lot and they have not seen any sputum production. Patient reports an irritated throat that feels dry, it is not painful unless she is coughing. Intermittent dizziness and diarrhea that she states has been baseline for several years. Reports sores on her bottom that are painful when she is incontinent of urine. Denies rigors, chills, nausea, vomiting (except after drinking a Boost), new skin rashes or lesions, nasal congestion, ear pain or pressure.    Interval History:    Patient says she feels weak and tired.  Some cough.  She is unsure if she wants a thoracentesis. She said she had diarrhea which is quite bothersome..         Review of Systems:   CONSTITUTIONAL:  + low grade fever at [facility]. No chills, rigors, weight change  EYES: negative for icterus  ENT:  + dry/irritated throat. Negative for congestion, ear pain or pressure, sinus pain  RESPIRATORY:  +mild coughing. No sputum production, shortness of breath at rest  CARDIOVASCULAR:  negative for chest pain, dyspnea  GASTROINTESTINAL:  +loose stools (patient and family state this is baseline for years) negative for nausea, vomiting, and constipation  GENITOURINARY:  negative for dysuria  HEME:  No easy bruising  INTEGUMENT:  negative for rash and pruritus  NEURO:  Negative for headache         Past Medical History:     Past Medical History:   Diagnosis Date     Back ache      Hemophilia (H)      Hemophilia A (H) 5/17/2019     Hyperlipidemia      Hypertension      Menopausal disorder      Osteoarthritis      Pneumonia 6-11    LEFT MID LUNG     Tremor      Vertigo             Past Surgical History:     Past Surgical History:   Procedure Laterality Date     C NONSPECIFIC PROCEDURE      submandibular gland excision     CATARACT IOL, RT/LT      both, Dr Zhou     HYSTERECTOMY, PAP NO LONGER INDICATED               Family History:   Reviewed and non-contributory.   Family History   Problem Relation Age of Onset     Diabetes Mother      Cancer Father         leukemia     C.A.D. Brother         CHF     C.A.D. Brother      Diabetes Sister      Diabetes Sister      Diabetes Brother      Diabetes Brother      Lipids Daughter         grand daughter     Lipids Brother         all sibs had it            Social History:     Social History     Tobacco Use     Smoking status: Former Smoker     Packs/day: 1.00     Years: 46.00     Pack years: 46.00     Types: Cigarettes     Last attempt to quit: 2019     Years since quittin.6     Smokeless tobacco: Never Used     Tobacco comment: 3/4 pack per day   Substance Use Topics     Alcohol use: No     History   Sexual Activity     Sexual activity: Not Currently     Partners: Male            Current Medications:       famotidine  20 mg Oral At Bedtime     melatonin  3 mg Oral At Bedtime     metoprolol tartrate  50 mg Oral BID     multivitamin w/minerals  1 tablet Oral Daily     pantoprazole  40 mg Oral QAM AC     piperacillin-tazobactam  3.375 g Intravenous Q6H     senna-docusate  1 tablet Oral BID    Or     senna-docusate  2 tablet Oral BID     simvastatin  20 mg Oral At Bedtime     sodium chloride (PF)  3 mL Intracatheter Q8H     vitamin D3  1,000 Units Oral Daily            Allergies:     Allergies   Allergen Reactions     Atorvastatin Calcium      hepatitis     Calcium Channel Blockers      Sulfa Drugs Fatigue            Physical Exam:   Vitals were reviewed  Patient Vitals for the past 24 hrs:   BP Temp Temp src Pulse Resp SpO2 Weight   20 0755 114/56 96.8  F (36  C) Oral 82 16 96 % --   20 2344 133/61 100.2  F (37.9  C) Axillary 97 16 93 % --   20 1337 132/59 97.8  F (36.6  C) Oral 82 18 94 % 41.7 kg (92 lb)       Physical Examination:  GENERAL:  Awake, alert, oriented to person, place, and family members.  HEENT:  Head is normocephalic, atraumatic   EYES:  Eyes  have anicteric sclerae without conjunctival injection. Pupils equal and round.   ENT:  Oropharynx is moist without exudates or ulcers. Tongue is midline. Poor dentition. Difficulty to view posterior pharynx, no tonsillar enlargement in visualized portion.  NECK:  Supple.   LUNGS:  Clear to auscultation bilaterally with dimished bases. Normal respiratory effort on RA.   CARDIOVASCULAR:  Regular rate and rhythm, +S1/S2, with no murmurs appreciated.  ABDOMEN:  Normal bowel sounds, soft, nontender.   SKIN:  No acute rashes.  Line(s) are in place without any surrounding erythema or exudate.   NEUROLOGIC:  CN II-XII intact.         Laboratory Data:     Inflammatory Markers    Recent Labs   Lab Test 01/25/20  1639   .0*       Hematology Studies    Recent Labs   Lab Test 01/27/20  0846 01/26/20  1031 01/25/20  1639 01/23/20  0619 01/20/20 01/13/20  0944 01/10/20  1058   WBC 12.1* 12.6* 14.8* 13.1* 18.3* 20.6* 16.6*   ANEU 9.5* 9.9* 12.5* 10.0*  --  18.6* 15.1*   AEOS 0.5 0.3 0.1 0.4  --  0.0 0.2   HGB 9.6* 8.7* 9.9* 9.3* 9.8* 12.6 11.6*   MCV 96 101* 97 93 98 94 93   * 614* 774* 708* 634* 504* 430       Metabolic Studies     Recent Labs   Lab Test 01/27/20  0846 01/26/20  1031 01/25/20  1639 01/23/20  0619 01/20/20    138 138 140 138   POTASSIUM 3.1* 3.6 3.6 3.9 3.7   CHLORIDE 107 108 105 108 106   CO2 27 24 24 27 22   BUN 5* 7 8 7 7*   CR 0.42* 0.40* 0.46* 0.36* 0.44*   GFRESTIMATED >90 >90 89 >90 >60       Hepatic Studies    Recent Labs   Lab Test 01/26/20  1031 01/20/20 01/13/20  0944 08/31/19  1002 06/26/19  0450 06/10/19  1300   BILITOTAL 0.3 0.4 0.5 0.9 0.6 0.5   ALKPHOS 126 128* 146 78 65 86   ALBUMIN 1.4* 1.7* 2.2* 3.4 2.8* 3.3*   AST 9 16 19 33 12 18   ALT 10 15 16 37 22 25       Microbiology:  Culture Micro   Date Value Ref Range Status   01/25/2020 No growth after 3 days  Preliminary   01/13/2020 No growth  Final   01/13/2020 No growth  Final   11/27/2019   Final    >100,000 colonies/mL  mixed  urogenital amrita  Susceptibility testing not routinely done     08/08/2019   Final    <10,000 colonies/mL  urogenital amrita  Susceptibility testing not routinely done         Urine Studies    Recent Labs   Lab Test 11/27/19  1349 08/08/19  1920 05/20/19  1120 05/16/19  2146   LEUKEST Large* Negative Negative Large*   WBCU 26*  --  1 24*     Hepatitis B Testing   Recent Labs   Lab Test 05/19/19  0424   HBCAB Nonreactive   HEPBANG Nonreactive              Imaging:       CT Chest (1/26/2020)  IMPRESSION:   1. Bilateral large pleural effusions with layering of fluid in the  bilateral fissures. Question of loculation of fluid in the left  fissure suggesting empyema.  2. Left apical groundglass opacities likely representing infectious  process.  3. Bibasilar atelectasis.

## 2020-01-28 NOTE — PROGRESS NOTES
Had a phone conversation with Socorro (granddaughter/POA). Per Socorro, it is OK for patient's daughter Sigrid to visit the patient while she is in the hospital and it is ALSO ok to give information to her regarding the patient's condition over the phone.

## 2020-01-28 NOTE — CONSULTS
Brief CAPS Consult    I was contacted to assess for possible bedside thoracentesis.  Following chart review I met with Jackeline at bedside and attempted bedside ultrasound to assess reported loculated pleural effusion.  Jackeline had significant difficulty tolerating sitting up or rolling to her side so images were unable to be saved.  Given high bleeding risk, reported loculations per CT, and inability to tolerate repositioning in the room we will defer bedside procedure.  If still clinically indicated recommend review with IR for IR thoracentesis.    This was discussed with the patient and primary team who were in agreement.  Please feel free to contact us with questions or concerns.    Ladan Burk MD  CAPS Team

## 2020-01-28 NOTE — PROVIDER NOTIFICATION
Notified MD at 1300 PM regarding low urine output.      Spoke with: Paged Gold [phone]    Orders were not obtained.    Comments: Waiting for call back. Low urine output via external catheter.

## 2020-01-29 NOTE — PLAN OF CARE
"Orientated to birthday but disorientated to time and situation.Vitals stable.  On Dysphagia level # 3 diet. Appetite poor. Offered breakfast but pt declined. (Daughter aware of refusal)  Inconsistent with following commands. Lethargic and agitated and prefers to lay in bed. Often would state \" Leave me alone I just want to sleep\". Took only two of four scheduled po AM medications. Denied pain. Incontinent of urine and stool. Voiding adequate amount of urine via pure wick. 2 small BM this shift. Dressings on coccyx changed. Daughter came in to visit.   "

## 2020-01-29 NOTE — PROGRESS NOTES
GENERAL INFECTIOUS DISEASES FOLLOW UP     Patient:  Jackeline Smiley   Date of birth 2/11/1933, Medical record number [id number]  Date of Visit:  01/29/2020  Date of Admission: 1/25/2020  Consult Requester:Yari Higuera MD          Assessment and Recommendations:     ID Staff:    I discussed the overall situation with respect to the patients pleural effusions with the primary attending today. I feel it is reasonable to not put the patient at risk by pursuing thoracentesis.  She seems to be improving with antibiotics, she is not having progressive shortness of breath due to the effusions, and she may not tolerate a chest tube if a thoracentesis were to confirm empyema.  Switching to levaquin upon discharge would be a good choice, but she would need to be monitored for CNS toxicity which can occur in the elderly.    RECS:  If no thoracentesis is pursued, I would favor continuing zosyn while she is an inpatient and transitioning her to oral levaquin prior to discharge, which should be continued for at least another 4 weeks.  The CRP should be checked every few days while inpatient and weekly after discharge. She should have a repeat chest CT in approximately one month and antibiotics should be continued until CRP returns to baseline. After stopping antibiotics, she should be monitored carefully for recurrence or persistence of infection.    Sebastian Capps MD  Professor of Medicine      ASSESSMENT:  1. Pleural effusion  2. Community acquired pneumonia vs health care acquired pneumonia  3. 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 She was  recently hospitalized at [facility] from 1/13-1/20 and treated for community acquired pneumonia. Imaging obtained at OSH showed loculated right pleural effusion and there was suspicion for empyema. Chest tube was recommended, however patient declined invasive intervention and did not want IV antibiotics for discharge. She was discharged back to LTC on Augmentin to treat suspected empyema. During that hospitalization Hospice referral was placed for ongoing goals of care discussion and possible hospice enrollment as an outpatient. She has subsequently experienced low-grade fevers and shortness of breath.    CT chest shows left apical ground glass opacities, bilateral pleural effusions with possible loculated fluid collection at left fissure. Treating with Zosyn for possible HCAP given recent hospitalization as well as possible parapneumonic effusion vs empyema. She did have a negative MRSA culture from nasal swab at [facility] on 1/18/20, can stop vancomycin. Remains on room air, afebrile, and hemodynamically stable.     Given patients improvement and her concern about thoracentesis, a potential approach would be to continue antibiotics without thoracentesis.      Sebastian Capps MD  Professor of Medicine      ________________________________________________    Consult Question: Persistent leukocytosis with recent treatment for bilateral pneumonia and concern for empyema.  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 She was recently hospitalized at [facility] from 1/13-1/20 and treated for community  acquired pneumonia. Imaging obtained at OSH showed loculated right pleural effusion and there was suspicion for empyema. Chest tube was recommended, however patient declined invasive intervention and did not want IV antibiotics for discharge. She was discharged back to LTC on Augmentin. During that hospitalization Hospice referral was placed for ongoing goals of care discussion and possible hospice enrollment as an outpatient.    Jackeline's granddaughter Socorro is medical decision maker. History is provided by Jackeline and Socorro. Possible low grade fevers at nursing home. Some increased shortness of breath, but not able to further describe. Sensation of being uncomfortable or feeling anxious when she tries to take a deep breath, not a painful sensation. Family notes that she has not been coughing a lot and they have not seen any sputum production. Patient reports an irritated throat that feels dry, it is not painful unless she is coughing. Intermittent dizziness and diarrhea that she states has been baseline for several years. Reports sores on her bottom that are painful when she is incontinent of urine. Denies rigors, chills, nausea, vomiting (except after drinking a Boost), new skin rashes or lesions, nasal congestion, ear pain or pressure.    Interval History:    Patient says she feels weak and tired.  Some cough.  She is unsure if she wants a thoracentesis. She said she had diarrhea which is quite bothersome.         Review of Systems:   CONSTITUTIONAL:  + low grade fever at LTC. No chills, rigors, weight change  EYES: negative for icterus  ENT:  + dry/irritated throat. Negative for congestion, ear pain or pressure, sinus pain  RESPIRATORY:  +mild coughing. No sputum production, shortness of breath at rest  CARDIOVASCULAR:  negative for chest pain, dyspnea  GASTROINTESTINAL:  +loose stools (patient and family state this is baseline for years) negative for nausea, vomiting, and constipation  GENITOURINARY:   negative for dysuria  HEME:  No easy bruising  INTEGUMENT:  negative for rash and pruritus  NEURO:  Negative for headache         Past Medical History:     Past Medical History:   Diagnosis Date     Back ache      Hemophilia (H)      Hemophilia A (H) 2019     Hyperlipidemia      Hypertension      Menopausal disorder      Osteoarthritis      Pneumonia 6-11    LEFT MID LUNG     Tremor      Vertigo             Past Surgical History:     Past Surgical History:   Procedure Laterality Date     C NONSPECIFIC PROCEDURE      submandibular gland excision     CATARACT IOL, RT/LT      both, Dr Zhou     HYSTERECTOMY, PAP NO LONGER INDICATED              Family History:   Reviewed and non-contributory.   Family History   Problem Relation Age of Onset     Diabetes Mother      Cancer Father         leukemia     C.A.D. Brother         CHF     C.A.D. Brother      Diabetes Sister      Diabetes Sister      Diabetes Brother      Diabetes Brother      Lipids Daughter         grand daughter     Lipids Brother         all sibs had it            Social History:     Social History     Tobacco Use     Smoking status: Former Smoker     Packs/day: 1.00     Years: 46.00     Pack years: 46.00     Types: Cigarettes     Last attempt to quit: 2019     Years since quittin.6     Smokeless tobacco: Never Used     Tobacco comment: 3/4 pack per day   Substance Use Topics     Alcohol use: No     History   Sexual Activity     Sexual activity: Not Currently     Partners: Male            Current Medications:       famotidine  20 mg Oral At Bedtime     melatonin  3 mg Oral At Bedtime     metoprolol tartrate  50 mg Oral BID     multivitamin w/minerals  1 tablet Oral Daily     pantoprazole  40 mg Oral QAM AC     piperacillin-tazobactam  3.375 g Intravenous Q6H     senna-docusate  1 tablet Oral BID    Or     senna-docusate  2 tablet Oral BID     simvastatin  20 mg Oral At Bedtime     sodium chloride (PF)  3 mL Intracatheter Q8H     vitamin D3   1,000 Units Oral Daily            Allergies:     Allergies   Allergen Reactions     Atorvastatin Calcium      hepatitis     Calcium Channel Blockers      Sulfa Drugs Fatigue            Physical Exam:   Vitals were reviewed    Physical Examination:  GENERAL:  Awake, alert, oriented to person, place, and family members.  HEENT:  Head is normocephalic, atraumatic   EYES:  Eyes have anicteric sclerae without conjunctival injection. Pupils equal and round.   ENT:  Oropharynx is moist without exudates or ulcers. Tongue is midline. Poor dentition. Difficulty to view posterior pharynx, no tonsillar enlargement in visualized portion.  NECK:  Supple.   LUNGS:  Clear to auscultation bilaterally with dimished bases. Normal respiratory effort on RA.   CARDIOVASCULAR:  Regular rate and rhythm, +S1/S2, with no murmurs appreciated.  ABDOMEN:  Normal bowel sounds, soft, nontender.   SKIN:  No acute rashes.  Line(s) are in place without any surrounding erythema or exudate.   NEUROLOGIC:  CN II-XII intact.         Laboratory Data:     Inflammatory Markers    Recent Labs   Lab Test 01/25/20 1639   .0*       Hematology Studies    Recent Labs   Lab Test 01/28/20 1913 01/27/20  0846 01/26/20  1031 01/25/20  1639 01/23/20  0619 01/20/20 01/13/20  0944   WBC 11.0 12.1* 12.6* 14.8* 13.1* 18.3* 20.6*   ANEU 8.6* 9.5* 9.9* 12.5* 10.0*  --  18.6*   AEOS 0.3 0.5 0.3 0.1 0.4  --  0.0   HGB 9.0* 9.6* 8.7* 9.9* 9.3* 9.8* 12.6   MCV 97 96 101* 97 93 98 94   * 663* 614* 774* 708* 634* 504*       Metabolic Studies     Recent Labs   Lab Test 01/28/20 1913 01/27/20  0846 01/26/20  1031 01/25/20  1639 01/23/20  0619    140 138 138 140   POTASSIUM 3.6 3.1* 3.6 3.6 3.9   CHLORIDE 108 107 108 105 108   CO2 26 27 24 24 27   BUN 4* 5* 7 8 7   CR 0.41* 0.42* 0.40* 0.46* 0.36*   GFRESTIMATED >90 >90 >90 89 >90       Hepatic Studies    Recent Labs   Lab Test 01/26/20  1031 01/20/20 01/13/20  0944 08/31/19  1002 06/26/19  0450  06/10/19  1300   BILITOTAL 0.3 0.4 0.5 0.9 0.6 0.5   ALKPHOS 126 128* 146 78 65 86   ALBUMIN 1.4* 1.7* 2.2* 3.4 2.8* 3.3*   AST 9 16 19 33 12 18   ALT 10 15 16 37 22 25       Microbiology:  Culture Micro   Date Value Ref Range Status   01/25/2020 No growth after 4 days  Preliminary   01/13/2020 No growth  Final   01/13/2020 No growth  Final   11/27/2019   Final    >100,000 colonies/mL  mixed urogenital amrita  Susceptibility testing not routinely done     08/08/2019   Final    <10,000 colonies/mL  urogenital amrita  Susceptibility testing not routinely done         Urine Studies    Recent Labs   Lab Test 01/28/20  2230 11/27/19  1349 08/08/19  1920 05/20/19  1120 05/16/19  2146   LEUKEST Negative Large* Negative Negative Large*   WBCU  --  26*  --  1 24*     Hepatitis B Testing   Recent Labs   Lab Test 05/19/19  0424   HBCAB Nonreactive   HEPBANG Nonreactive              Imaging:       CT Chest (1/26/2020)  IMPRESSION:   1. Bilateral large pleural effusions with layering of fluid in the  bilateral fissures. Question of loculation of fluid in the left  fissure suggesting empyema.  2. Left apical groundglass opacities likely representing infectious  process.  3. Bibasilar atelectasis.

## 2020-01-29 NOTE — PLAN OF CARE
BP (!) 153/67   Pulse 95   Temp 98.3  F (36.8  C) (Oral)   Resp 16   Wt 41.7 kg (92 lb)   SpO2 94%   BMI 15.31 kg/m      VSS on RA ex slightly hypertensive at  baseline. Disoriented to place and situation periodically. Pain in lower back, refuses interventions. Voiding inadequate amounts through external catheter. Team aware. Tolerating small amounts of D3 diet with no N/V. Passing stool and flatus. Incont of stool; brief in place. PIV infusing TKO. Pt resting comfortably between cares. Continue POC.

## 2020-01-29 NOTE — PROGRESS NOTES
[facility], [facility] Progress Note - Hospitalist Service, Gold 8    Date of Admission:  1/25/2020    Assessment & Plan   Jackeline Smiley is a 86 year old female with history of acquired hemophilia A s/p rituxin and steroids last year, HTN, anxiety, previous CVA, dementia, dyslipidemia, SIADH, depression, frequent falls who was recently admitted 1/13-1/20 at OSH with sepsis due to bilateral pneumonia and c/f empyema discharged on oral antibiotics and returned to ED with SOB.      Pneumonia, bilateral pleural effusions, possible parapneumonic effusion vs empyema:  Recently admitted to [facility] for sepsis 2/2 bilateral pneumonia. CT chest 1/17 with small loculated right pleural effusion, small left pleural effusion and 11 mm lingular nodule with adjacent ground glass opacity possible infection vs malignancy. With ongoing leukocytosis despite being on IV zosyn, azithromycin and vacomycin there was suspicion for empyema however patient refused recommended thoracentesis and possible chest tube placement. After discussion with ID at [facility], patient was discharged on oral Augmentin X 1 week. She presented to the ED 5 days following discharge with SOB and imaging with potential loculation in left fissure with bilateral pleural effusions, left apical groundglass opacities. Procalcitonin elevated at 21.19, . Currently patient is HDS and breathing on room air.  -Appreciate infectious disease assessment and recommendations  -Continue empiric zosyn, discontinue vancomycin  -Follow blood cultures  -Discussing role of thoracentesis and subsequent need for chest tube drainage in context of bleeding risk; patient is inclined to defer at this time but will involve Socorro, the granddaughter, per patient request  -If no thoracentesis is pursued, ID would recommend continuing zosyn while she is an inpatient and transitioning her to oral levaquin prior to discharge, which should be  continued for at least another 4 weeks. The CRP should be checked every few days while inpatient and weekly after discharge. She should have a repeat chest CT in approximately one month and antibiotics should be continued until CRP returns to baseline. After stopping antibiotics, she should be monitored       HTN:  Uncontrolled. Requires optimization given history of ICH. Recently taken off lisinopril due to concern for hyponatremia, although not a typical culprit.  -Continue PTA Metoprolol 50 mg BID  -Losartan 25 mg at bedtime starting today  -Hydralazine PRN >160/100    Chronic protein calorie malnutrition:  -Nutrition consult     Wound on buttocks:  -WOCN consult      Normocytic Anemia:  Hgb ~9. Recent baseline closer to 12, however noted to have down trend during prior hospitalization. Denies melena, hematochezia, hematuria. No signs of active bleeding.   -Continue to monitor     History of ICH:  Weakness:  On 5/2019, patient presented with spontaneous acute deep white matter intracranial hemorrhage with corresponding left sided weakness.  -PT/OT recommending TCU    Paroxysmal A Fib:  [id number] score of 4. Not on AC as not felt to be a candidate due to history of hemophilia, hemorrhagic stoke, dementia and falls. Echo 1/14/2020 with LVEF 59%.   -Continue PTA Metoprolol 50 mg BID (hold if SBP < 110 or HR < 60)       Acquired hemophilia A:  Initially diagnosed 2012 responded to treatement with steroids but recurred in May 2019 and had intracerebral hemorrhage at that time and received steroids and rituximab for several doses.   -90 mcg/kg Novoseven if thoracentesis is pursed  -Monitor closely for any bleeding; give 90 mcg/kg Novoseven and page the Hematology Fellow on call if spontaneous bleeding occurs    HLD:   -Continue PTA simvastatin 20 mg HS     GERD:   Stable.   -Continue PTA famotidine and pantoprazole     Dementia with depression:   -Continue PTA Hydroxyzine 10 mg HS PRN and Melatonin 3 mg HS      Diet:  Dysphagia Diet Level 3 Advanced Thin Liquids (water, ice chips, juice, milk gelatin, ice cream, etc)  Snacks/Supplements Adult: Gelatein Plus; With Meals  Snacks/Supplements Adult: Magic Cup; With Meals  Room Service    DVT Prophylaxis: Pneumatic Compression Devices  Granados Catheter: not present  Code Status: DNR/DNI      Disposition Plan   Expected discharge: 2 - 3 days, recommended to prior living arrangement once antibiotic plan established.     The patient's care was discussed with the Bedside Nurse, Care Coordinator/, Patient and ID/Hematology Consultant.    Victorino Leon MD  Hospitalist Service, [address], [place]  Pager: [phone]  Please see sticky note for cross cover information    Interval History   No acute events. Feels weak and tired. Denies chest pain, shortness of breath, nausea, vomiting, abdominal pain. Cough intermittent nonproductive and improving. 2 loose bowel movements today.     Data reviewed today: I reviewed all new labs and imaging results over the last 24 hours. I personally reviewed no images or EKG's today.    Physical Exam   Temp: 95.8  F (35.4  C) Temp src: Tympanic BP: 129/69 Pulse: 80 Heart Rate: 80 Resp: 16 SpO2: 95 % O2 Device: None (Room air)    Vitals:    01/25/20 1337   Weight: 41.7 kg (92 lb)     Vital Signs with Ranges  Temp:  [95.8  F (35.4  C)-98.3  F (36.8  C)] 95.8  F (35.4  C)  Pulse:  [80-95] 80  Heart Rate:  [80-86] 80  Resp:  [16-18] 16  BP: (129-171)/(59-83) 129/69  SpO2:  [94 %-95 %] 95 %  I/O last 3 completed shifts:  In: -   Out: 1375 [Urine:1375]  Constitutional: Thin, elderly female, NAD  Respiratory: MMM, no icterus  Cardiovascular: RRR, no edema  GI: Nontender, soft, NABS  Skin/Integumen: No rash  Neuro: CN 2-12 grossly intact, sensation grossly intact, able to move all 4 exts  Psych: nl mood/affect    Medications       famotidine  20 mg Oral At Bedtime     melatonin  3 mg Oral At Bedtime     metoprolol tartrate   50 mg Oral BID     multivitamin w/minerals  1 tablet Oral Daily     pantoprazole  40 mg Oral QAM AC     piperacillin-tazobactam  3.375 g Intravenous Q6H     senna-docusate  1 tablet Oral BID    Or     senna-docusate  2 tablet Oral BID     simvastatin  20 mg Oral At Bedtime     sodium chloride (PF)  3 mL Intracatheter Q8H     vitamin D3  1,000 Units Oral Daily       Data   Recent Labs   Lab 01/28/20  1913 01/27/20  0846 01/26/20  1031   WBC 11.0 12.1* 12.6*   HGB 9.0* 9.6* 8.7*   MCV 97 96 101*   * 663* 614*    140 138   POTASSIUM 3.6 3.1* 3.6   CHLORIDE 108 107 108   CO2 26 27 24   BUN 4* 5* 7   CR 0.41* 0.42* 0.40*   ANIONGAP 5 7 7   MICK 8.2* 8.6 8.6   * 93 91   ALBUMIN  --   --  1.4*   PROTTOTAL  --   --  5.2*   BILITOTAL  --   --  0.3   ALKPHOS  --   --  126   ALT  --   --  10   AST  --   --  9       No results found for this or any previous visit (from the past 24 hour(s)).

## 2020-01-29 NOTE — PLAN OF CARE
"VSS on room air. Disoriented to situation and place. Refused repositioning throughout night, stating \"leave me alone\" multiple times. Declines PRN pain meds .Denies nausea. Purewick in place with marginal urine output (team aware). PIV infusing TKO w/ abx. Pulsate mattress ordered. Continue to monitor.  "

## 2020-01-30 NOTE — PROGRESS NOTES
[facility], [facility] Progress Note - Hospitalist Service, Gold 8    Date of Admission:  1/25/2020    Assessment & Plan   Jackeline Smiley is a 86 year old female with history of acquired hemophilia A s/p rituxin and steroids last year, HTN, anxiety, previous CVA, dementia, dyslipidemia, SIADH, depression, frequent falls who was recently admitted 1/13-1/20 at OSH with sepsis due to bilateral pneumonia and c/f empyema discharged on oral antibiotics and returned to ED with SOB.      Pneumonia, bilateral pleural effusions, possible parapneumonic effusion vs empyema:  Recently admitted to [facility] for sepsis 2/2 bilateral pneumonia. CT chest 1/17 with small loculated right pleural effusion, small left pleural effusion and 11 mm lingular nodule with adjacent ground glass opacity possible infection vs malignancy. With ongoing leukocytosis despite being on IV zosyn, azithromycin and vacomycin there was suspicion for empyema however patient refused recommended thoracentesis and possible chest tube placement. After discussion with ID at [facility], patient was discharged on oral Augmentin X 1 week. She presented to the ED 5 days following discharge with SOB and imaging with potential loculation in left fissure with bilateral pleural effusions, left apical groundglass opacities. Procalcitonin elevated at 21.19, . Blood cultures negative. Currently patient is HDS and breathing on room air.  -Appreciate infectious disease assessment and recommendations  -Continue empiric zosyn  -CXR tomorrow  -Discussing role of thoracentesis and subsequent need for chest tube drainage in context of bleeding risk; patient and granddaughter Socorro have decided to defer thoracentesis  -If no thoracentesis is pursued, ID would recommend continuing zosyn while she is an inpatient and transitioning her to oral levaquin prior to discharge, which should be continued for at least another 4 weeks. The CRP  should be checked every few days while inpatient and weekly after discharge. She should have a repeat chest CT in approximately one month and antibiotics should be continued until CRP returns to baseline. After stopping antibiotics, she should be monitored.       Acquired hemophilia A:  Initially diagnosed 2012 responded to treatement with steroids but recurred in May 2019 and had intracerebral hemorrhage at that time and received steroids and rituximab for several doses.   -Monitor closely for any bleeding; give 90 mcg/kg Novoseven and page the Hematology Fellow on call if spontaneous bleeding occurs    HTN:  Controlled. Uncontrolled during hospitalization requiring optimization given history of ICH. Recently taken off lisinopril due to concern for hyponatremia, although not a typical culprit.  -Continue PTA Metoprolol 50 mg BID  -Losartan 25 mg at bedtime starting today  -Hydralazine PRN >160/100    Chronic protein calorie malnutrition:  -Nutrition consult, appreciate assessment and recommendations    Hypokalemia:  Hypomagnesemia:  -Replete    Wound on buttocks:  -WOCN consult, appreciate assessment and recommendations    Normocytic Anemia:  Hgb ~9. Recent baseline closer to 12, however noted to have down trend during prior hospitalization. Denies melena, hematochezia, hematuria. No signs of active bleeding.   -Continue to monitor   -Anemia workup ordered    History of ICH:  Weakness:  On 5/2019, patient presented with spontaneous acute deep white matter intracranial hemorrhage with corresponding left sided weakness.  -PT/OT recommending TCU  -Palliative care consulted    Paroxysmal A Fib:  [id number] score of 4. Not on AC as not felt to be a candidate due to history of hemophilia, hemorrhagic stoke, dementia and falls. Echo 1/14/2020 with LVEF 59%.   -Continue PTA Metoprolol 50 mg BID (hold if SBP < 110 or HR < 60)       HLD:   -Continue PTA simvastatin 20 mg HS     GERD:   Stable.   -Continue PTA famotidine and  pantoprazole     Dementia with depression:   -Continue PTA Hydroxyzine 10 mg HS PRN and Melatonin 3 mg HS      Diet: Dysphagia Diet Level 3 Advanced Thin Liquids (water, ice chips, juice, milk gelatin, ice cream, etc)  Snacks/Supplements Adult: Gelatein Plus; With Meals  Snacks/Supplements Adult: Magic Cup; With Meals  Room Service    DVT Prophylaxis: Pneumatic Compression Devices  Granados Catheter: not present  Code Status: DNR/DNI      Disposition Plan   Expected discharge: 2 - 3 days, recommended to prior living arrangement once antibiotic plan established.     The patient's care was discussed with the Bedside Nurse, Care Coordinator/, Patient and ID/Hematology Consultant.    Victorino Leon MD  Hospitalist Service, [address], [place]  Pager: [phone]  Please see sticky note for cross cover information    Interval History   No acute events. Appetite poor but tolerating. Urine output low so IVF 1L ordered over 10 hours. Denies chest pain, shortness of breath, nausea, vomiting, abdominal pain. Cough intermittent nonproductive and improving. 2 bowels movement but mixing with urine.     Data reviewed today: I reviewed all new labs and imaging results over the last 24 hours. I personally reviewed no images or EKG's today.    Physical Exam   Temp: 97.3  F (36.3  C) Temp src: Oral BP: 131/63 Pulse: 85 Heart Rate: 91 Resp: 16 SpO2: 93 % O2 Device: None (Room air) Oxygen Delivery: 2 LPM  Vitals:    01/25/20 1337   Weight: 41.7 kg (92 lb)     Vital Signs with Ranges  Temp:  [95.8  F (35.4  C)-97.4  F (36.3  C)] 97.3  F (36.3  C)  Pulse:  [85-88] 85  Heart Rate:  [75-91] 91  Resp:  [16] 16  BP: (103-131)/(51-69) 131/63  SpO2:  [86 %-96 %] 93 %  I/O last 3 completed shifts:  In: -   Out: 1200 [Urine:1200]  Constitutional: Thin, elderly female, NAD  Respiratory: MMM, no icterus  Cardiovascular: RRR, no edema  GI: Nontender, soft, NABS  Skin/Integumen: No rash  Neuro: CN 2-12  grossly intact, sensation grossly intact, able to move all 4 exts  Psych: nl mood/affect    Medications       famotidine  20 mg Oral At Bedtime     losartan  25 mg Oral Daily     melatonin  3 mg Oral At Bedtime     metoprolol tartrate  50 mg Oral BID     multivitamin w/minerals  1 tablet Oral Daily     pantoprazole  40 mg Oral QAM AC     piperacillin-tazobactam  3.375 g Intravenous Q6H     senna-docusate  1 tablet Oral BID    Or     senna-docusate  2 tablet Oral BID     simvastatin  20 mg Oral At Bedtime     sodium chloride (PF)  3 mL Intracatheter Q8H     vitamin D3  1,000 Units Oral Daily       Data   Recent Labs   Lab 01/30/20  0717 01/28/20  1913 01/27/20  0846   WBC 9.6 11.0 12.1*   HGB 8.6* 9.0* 9.6*   MCV 96 97 96   * 574* 663*    139 140   POTASSIUM 2.9* 3.6 3.1*   CHLORIDE 107 108 107   CO2 27 26 27   BUN 4* 4* 5*   CR 0.39* 0.41* 0.42*   ANIONGAP 5 5 7   MICK 7.8* 8.2* 8.6   GLC 89 134* 93   ALBUMIN 1.2* 1.6*  --    PROTTOTAL 4.6* 5.5*  --    BILITOTAL 0.3 0.2  --    ALKPHOS 119 136  --    ALT 8 16  --    AST 9 13  --        No results found for this or any previous visit (from the past 24 hour(s)).

## 2020-01-30 NOTE — PLAN OF CARE
/51   Pulse 88   Temp 95.8  F (35.4  C) (Tympanic)   Resp 16   Wt 41.7 kg (92 lb)   SpO2 95%   BMI 15.31 kg/m      VSS on RA. Reports pain in lower back but refusing interventions. Wheelchair bound; turn Q2h as tolerated by pt. Purewick in place with adequate UOP. Tolerating small amounts of DD3 diet. Marc counts. Passing small amounts of soft stool frequently. Incont of urine and stool. PIV infusing TKO for abx. Pt resting comfortably between cares. Continue POC.

## 2020-01-30 NOTE — PLAN OF CARE
"VSS on 2LPM nasal cannula overnight. Disoriented to place and situation. Refusing repositioning at times overnight - stating, \"leave me alone.\" Denies pain and nausea. Purewick in place. Small BM overnight. PIV infusing TKO. Continue to monitor    Addendum 0600: Pt continues to have low urine output via external catheter. Team paged this AM. Bladder scanned for 63mL. Orders to straight cath placed for residual >200mL. Continue to encourage PO fluids. Will continue to monitor.  "

## 2020-01-30 NOTE — PLAN OF CARE
Vitals stable. Denies pain. Orientated to birthday but disorientated to time and situation. On Dysphagia level # 3 diet. Encouraging pt to drink fluids and eat but refuses to eat.  Refuses q2 repositioning. Incontinent of urine and stool, pt has occurences where her stool is mixed with urine making it difficult to accurately document urine output. Bladder scanned for 163 mls.   2 loose BM's this shift. Dressings on coccyx changed.     Potassium and Mg replaced. Recheck ordered  for this  evening.

## 2020-01-30 NOTE — PROGRESS NOTES
CLINICAL NUTRITION SERVICES - REASSESSMENT NOTE     Nutrition Prescription    Malnutrition Status:    Severe malnutrition in the context of acute on chronic illness     Recommendations already ordered by Registered Dietitian (RD):  Strawberry Boost Plus w/ meals      Future/Additional Recommendations:  Monitor goals of care and appropriateness for more aggressive nutrition interventions     EVALUATION OF THE PROGRESS TOWARD GOALS   Diet: Dysphagia Level 3:  Advanced  Magic cup w/ meals   Gelatein plus w/ meals     Intake:  Per flow sheets pt is eating 25-50% of meals since admission on 1/25     Per chart:  Pt is refusing diet, palliative has been consulted  Encouraging pt to drink fluids and eat but refuses to eat 1/30  Tolerating small amounts of D3 diet, eating about 25% of meals 1/28  Poor appetite and inadequate PO intake have been recorded since admission     Per nurse pt finished 50% of her Thai toast, 4 bites of oatmeal, had sips of coffee, and finished 100% of her apple juice, this is the most she has ate since admission   Pt reports not likeing her gelatein or magic cups, prefers strawberry flavor      NEW FINDINGS  Palliative has been consulted    MALNUTRITION  % Intake: </= 50% for >/= 5 days (severe)  % Weight Loss: Weight loss does not meet criteria: stable over 1 year, but has lost weight over time per family -- unable to quantify   Subcutaneous Fat Loss: Facial region, Upper arm and Lower arm: moderate   Muscle Loss: Temporal, Facial & jaw region, Scapular bone, Thoracic region (clavicle, acromium bone), Upper arm (bicep, tricep) and Lower arm  (forearm): severe; suspect some LBM just due to aging as well.   Fluid Accumulation/Edema: None noted  Malnutrition Diagnosis: Severe malnutrition in the context of acute on chronic illness     Previous Goals   Patient to consume % of nutritionally adequate meal trays TID, or the equivalent with supplements/snacks.  Evaluation: Not met    Previous  Nutrition Diagnosis  Inadequate oral intake related to dentition, decreased appetite, dementia as evidenced by decreased PO intake with modified diet and reported poor PO x at least 6 months with severe malnutrition.     Evaluation: No change    CURRENT NUTRITION DIAGNOSIS  Inadequate oral intake related to decreased appetite and dementia  as evidenced by eating </= 50% for >/= 5 days      INTERVENTIONS  Implementation  Medical food supplement therapy: see above    Goals  Patient to consume % of nutritionally adequate meal trays TID, or the equivalent with supplements/snacks.    Monitoring/Evaluation  Progress toward goals will be monitored and evaluated per protocol.    Tatyana Escobedo  Dietetic Intern     I have read and agree with the above nutrition note, recs, and interventions  Mehreen Charles RD, LD  [address] RD pager: [phone]

## 2020-01-31 NOTE — PROGRESS NOTES
GENERAL INFECTIOUS DISEASES FOLLOW UP     Patient:  Jackeline Smiley   Date of birth 2/11/1933, Medical record number [id number]  Date of Visit:  01/31/2020  Date of Admission: 1/25/2020  Consult Requester:Yari Higuera MD          Assessment and Recommendations:     ID Staff:    Given the overall situation with respect to the patients pleural effusions and risk of thoracentesis, I feel it is reasonable to not put the patient at risk by pursuing thoracentesis.  She seems to be improving with antibiotics, she is not having progressive shortness of breath due to the effusions, and she may not tolerate a chest tube if a thoracentesis were to confirm empyema.  Switching to levaquin upon discharge would be a good choice, but she would need to be monitored for CNS toxicity which can occur in the elderly.    RECS:  I would favor continuing zosyn while she is an inpatient and transitioning her to oral levaquin prior to discharge, which should be continued for at least another 4 weeks.  The CRP should be checked every few days while inpatient and weekly after discharge. She should have a repeat chest CT in approximately one month and antibiotics should be continued until CRP returns to baseline. After stopping antibiotics, she should be monitored carefully for recurrence or persistence of infection.    ID is signing off.  Please call if further questions arise.    Sebastian Capps MD  Professor of Medicine      ASSESSMENT:  1. Pleural effusion  2. Community acquired pneumonia vs health care acquired pneumonia  3. 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 She was recently hospitalized at [facility] from 1/13-1/20 and treated for community acquired pneumonia. Imaging obtained at OSH showed loculated right pleural effusion and there was suspicion for empyema. Chest tube was recommended, however patient declined invasive intervention and did not want IV antibiotics for discharge. She was discharged back to LTC on Augmentin to treat suspected empyema. During that hospitalization Hospice referral was placed for ongoing goals of care discussion and possible hospice enrollment as an outpatient. She has subsequently experienced low-grade fevers and shortness of breath.    CT chest shows left apical ground glass opacities, bilateral pleural effusions with possible loculated fluid collection at left fissure. Treating with Zosyn for possible HCAP given recent hospitalization as well as possible parapneumonic effusion vs empyema. She did have a negative MRSA culture from nasal swab at [facility] on 1/18/20, can stop vancomycin. Remains on room air, afebrile, and hemodynamically stable.     Given patients improvement and her concern about thoracentesis, a potential approach would be to continue antibiotics without thoracentesis.      Sebastian Capps MD  Professor of Medicine      ________________________________________________    Consult Question: Persistent leukocytosis with recent treatment for bilateral pneumonia and concern for empyema.  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 She was recently hospitalized at [facility] from 1/13-1/20 and treated  for community acquired pneumonia. Imaging obtained at OSH showed loculated right pleural effusion and there was suspicion for empyema. Chest tube was recommended, however patient declined invasive intervention and did not want IV antibiotics for discharge. She was discharged back to LTC on Augmentin. During that hospitalization Hospice referral was placed for ongoing goals of care discussion and possible hospice enrollment as an outpatient.    Jackeline's granddaughter Socorro is medical decision maker. History is provided by Jackeline and Socorro. Possible low grade fevers at nursing home. Some increased shortness of breath, but not able to further describe. Sensation of being uncomfortable or feeling anxious when she tries to take a deep breath, not a painful sensation. Family notes that she has not been coughing a lot and they have not seen any sputum production. Patient reports an irritated throat that feels dry, it is not painful unless she is coughing. Intermittent dizziness and diarrhea that she states has been baseline for several years. Reports sores on her bottom that are painful when she is incontinent of urine. Denies rigors, chills, nausea, vomiting (except after drinking a Boost), new skin rashes or lesions, nasal congestion, ear pain or pressure.    Interval History:    Patient says she feels less weak and tired.  No cough at present.  She said she again had diarrhea which is quite bothersome.    ROS:  Minimal cough.  No SOB, No fever or chills.  + diarrhea.         Past Medical History:     Past Medical History:   Diagnosis Date     Back ache      Hemophilia (H)      Hemophilia A (H) 5/17/2019     Hyperlipidemia      Hypertension      Menopausal disorder      Osteoarthritis      Pneumonia 6-11    LEFT MID LUNG     Tremor      Vertigo             Past Surgical History:     Past Surgical History:   Procedure Laterality Date     C NONSPECIFIC PROCEDURE      submandibular gland excision     CATARACT IOL, RT/LT       both, Dr Zhou     HYSTERECTOMY, PAP NO LONGER INDICATED              Family History:   Reviewed and non-contributory.   Family History   Problem Relation Age of Onset     Diabetes Mother      Cancer Father         leukemia     C.ABRUNO. Brother         CHF     C.ASHIN Brother      Diabetes Sister      Diabetes Sister      Diabetes Brother      Diabetes Brother      Lipids Daughter         grand daughter     Lipids Brother         all sibs had it            Social History:     Social History     Tobacco Use     Smoking status: Former Smoker     Packs/day: 1.00     Years: 46.00     Pack years: 46.00     Types: Cigarettes     Last attempt to quit: 2019     Years since quittin.7     Smokeless tobacco: Never Used     Tobacco comment: 3/4 pack per day   Substance Use Topics     Alcohol use: No     History   Sexual Activity     Sexual activity: Not Currently     Partners: Male            Current Medications:       famotidine  20 mg Oral At Bedtime     losartan  25 mg Oral Daily     melatonin  3 mg Oral At Bedtime     metoprolol tartrate  50 mg Oral BID     multivitamin w/minerals  1 tablet Oral Daily     pantoprazole  40 mg Oral QAM AC     piperacillin-tazobactam  3.375 g Intravenous Q6H     senna-docusate  1 tablet Oral BID    Or     senna-docusate  2 tablet Oral BID     simvastatin  20 mg Oral At Bedtime     sodium chloride (PF)  3 mL Intracatheter Q8H     vitamin D3  1,000 Units Oral Daily            Allergies:     Allergies   Allergen Reactions     Atorvastatin Calcium      hepatitis     Calcium Channel Blockers      Sulfa Drugs Fatigue            Physical Exam:   Vitals were reviewed    Physical Examination:  GENERAL:  Awake, alert  HEENT:  Head is normocephalic, atraumatic   EYES:  Eyes have anicteric sclerae without conjunctival injection. Pupils equal and round.   LUNGS:  Clear to auscultation bilaterally with dimished bases. Normal respiratory effort on RA.   CARDIOVASCULAR:  Regular rate and rhythm,  +S1/S2, with no murmurs appreciated.  ABDOMEN:  Normal bowel sounds, soft, nontender.   SKIN:  No acute rashes.   Neuro: awake alert, conversant         Laboratory Data:     Inflammatory Markers    Recent Labs   Lab Test 01/30/20  0717 01/25/20  1639   CRP 96.0* 150.0*       Hematology Studies    Recent Labs   Lab Test 01/31/20  0736 01/30/20 0717 01/28/20 1913 01/27/20  0846 01/26/20  1031 01/25/20  1639   WBC 12.4* 9.6 11.0 12.1* 12.6* 14.8*   ANEU 9.5* 7.1 8.6* 9.5* 9.9* 12.5*   AEOS 0.2 0.3 0.3 0.5 0.3 0.1   HGB 8.6* 8.6* 9.0* 9.6* 8.7* 9.9*   MCV 97 96 97 96 101* 97   * 482* 574* 663* 614* 774*       Metabolic Studies     Recent Labs   Lab Test 01/31/20  0736 01/30/20 2006 01/30/20  0717 01/28/20 1913 01/27/20  0846 01/26/20  1031     --  139 139 140 138   POTASSIUM 4.0 4.3 2.9* 3.6 3.1* 3.6   CHLORIDE 110*  --  107 108 107 108   CO2 27  --  27 26 27 24   BUN 5*  --  4* 4* 5* 7   CR 0.40*  --  0.39* 0.41* 0.42* 0.40*   GFRESTIMATED >90  --  >90 >90 >90 >90       Hepatic Studies    Recent Labs   Lab Test 01/30/20  0717 01/28/20 1913 01/26/20  1031 01/20/20 01/13/20  0944 08/31/19  1002   BILITOTAL 0.3 0.2 0.3 0.4 0.5 0.9   ALKPHOS 119 136 126 128* 146 78   ALBUMIN 1.2* 1.6* 1.4* 1.7* 2.2* 3.4   AST 9 13 9 16 19 33   ALT 8 16 10 15 16 37       Microbiology:  Culture Micro   Date Value Ref Range Status   01/25/2020 No growth  Final   01/13/2020 No growth  Final   01/13/2020 No growth  Final   11/27/2019   Final    >100,000 colonies/mL  mixed urogenital amrita  Susceptibility testing not routinely done     08/08/2019   Final    <10,000 colonies/mL  urogenital amrita  Susceptibility testing not routinely done         Urine Studies    Recent Labs   Lab Test 01/28/20  2230 11/27/19  1349 08/08/19  1920 05/20/19  1120 05/16/19  2146   LEUKEST Negative Large* Negative Negative Large*   WBCU  --  26*  --  1 24*     Hepatitis B Testing   Recent Labs   Lab Test 05/19/19  0424   HBCAB Nonreactive   HEPBANG  Nonreactive              Imaging:       CT Chest (1/26/2020)  IMPRESSION:   1. Bilateral large pleural effusions with layering of fluid in the  bilateral fissures. Question of loculation of fluid in the left  fissure suggesting empyema.  2. Left apical groundglass opacities likely representing infectious  process.  3. Bibasilar atelectasis.

## 2020-01-31 NOTE — PROGRESS NOTES
[facility], [facility] Progress Note - Hospitalist Service, Gold 8    Date of Admission:  1/25/2020    Assessment & Plan   Jackeline Smiley is a 86 year old female with history of acquired hemophilia A s/p rituxin and steroids last year, HTN, anxiety, previous CVA, dementia, dyslipidemia, SIADH, depression, frequent falls who was recently admitted 1/13-1/20 at OSH with sepsis due to bilateral pneumonia and c/f empyema discharged on oral antibiotics and returned to ED with SOB.      Pneumonia, bilateral pleural effusions, possible parapneumonic effusion vs empyema:  Recently admitted to [facility] for sepsis 2/2 bilateral pneumonia. CT chest 1/17 with small loculated right pleural effusion, small left pleural effusion and 11 mm lingular nodule with adjacent ground glass opacity possible infection vs malignancy. With ongoing leukocytosis despite being on IV zosyn, azithromycin and vacomycin there was suspicion for empyema however patient refused recommended thoracentesis and possible chest tube placement. After discussion with ID at [facility], patient was discharged on oral Augmentin X 1 week. She presented to the ED 5 days following discharge with SOB and imaging with potential loculation in left fissure with bilateral pleural effusions, left apical groundglass opacities. Procalcitonin elevated at 21.19, . Blood cultures negative. Currently patient is HDS and but still intermittent require supplemental oxygen.  Chest x-ray repeated and is unchanged.  The patient seems to be responding appropriately to treatment clinically and there is improvement in CRP to 96.  -Appreciate infectious disease assessment and recommendations  -Continue empiric zosyn with CRP every few days while inpatient  -RT for nebs and chest CT  -Per ID recommendations, plan to transition to oral levaquin prior to discharge (tomorrow), which should be continued for at least another 4 weeks. The CRP should  be checked weekly after discharge. She should have a repeat chest CT in approximately one month and antibiotics should be continued until CRP returns to baseline. After stopping antibiotics, she should be monitored.       Acquired hemophilia A:  Initially diagnosed 2012 responded to treatement with steroids but recurred in May 2019 and had intracerebral hemorrhage at that time and received steroids and rituximab for several doses.   -Monitor closely for any bleeding; give 90 mcg/kg Novoseven and page the Hematology Fellow on call if spontaneous bleeding occurs    HTN:  Controlled. Uncontrolled during hospitalization requiring optimization given history of ICH. Recently taken off lisinopril due to concern for hyponatremia, although not a typical culprit.  -Continue PTA Metoprolol 50 mg BID  -Losartan 25 mg at bedtime starting today  -Hydralazine PRN >160/100    Chronic protein calorie malnutrition:  -Nutrition consult, appreciate assessment and recommendations    Hypokalemia:  Hypomagnesemia:  -Replete    Wound on buttocks:  -WOCN consult, appreciate assessment and recommendations    Normocytic Anemia:  Hgb ~9. Recent baseline closer to 12, however noted to have down trend during prior hospitalization. Denies melena, hematochezia, hematuria. No signs of active bleeding. 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  -Continue to monitor     History of ICH:  Weakness:  On 5/2019, patient presented with spontaneous acute deep white matter intracranial hemorrhage with corresponding left sided weakness.  -PT/OT recommending TCU  -Palliative care consulted    Paroxysmal A Fib:  [id number] score of 4. Not on AC as not felt to be a candidate due to history of hemophilia, hemorrhagic stoke, dementia and falls. Echo 1/14/2020 with LVEF 59%.   -Continue PTA Metoprolol 50 mg BID (hold if SBP < 110 or HR < 60)       HLD:   -Continue PTA simvastatin 20 mg HS     GERD:   Stable.   -Continue PTA famotidine and  pantoprazole     Dementia with depression:   -Continue PTA Hydroxyzine 10 mg HS PRN and Melatonin 3 mg HS      Diet: Dysphagia Diet Level 3 Advanced Thin Liquids (water, ice chips, juice, milk gelatin, ice cream, etc)  Room Service  Snacks/Supplements Adult: Boost Plus; With Meals    DVT Prophylaxis: Pneumatic Compression Devices  Granados Catheter: not present  Code Status: DNR/DNI      Disposition Plan   Expected discharge: 2 - 3 days, recommended to prior living arrangement once antibiotic plan established.     The patient's care was discussed with the Bedside Nurse, Care Coordinator/, Patient and ID/Hematology Consultant.    Victorino Leon MD  Hospitalist Service, [address], [place]  Pager: [phone]  Please see sticky note for cross cover information    Interval History   No acute events. Appetite much better but oral intake still poor. Urine output low but improved with IVF. Denies chest pain, nausea, vomiting, abdominal pain. Denies significant cough or trouble breathing. States she just wants to go home. Had bowel movement.    Data reviewed today: I reviewed all new labs and imaging results over the last 24 hours. I personally reviewed the chest x-ray image(s) showing unchanged findings.    Physical Exam   Temp: 98.1  F (36.7  C) Temp src: Oral BP: 128/65   Heart Rate: 71 Resp: 16 SpO2: 98 % O2 Device: Nasal cannula Oxygen Delivery: 4 LPM  Vitals:    01/25/20 1337   Weight: 41.7 kg (92 lb)     Vital Signs with Ranges  Temp:  [97.6  F (36.4  C)-99.6  F (37.6  C)] 98.1  F (36.7  C)  Heart Rate:  [71-89] 71  Resp:  [16] 16  BP: (117-132)/(59-72) 128/65  SpO2:  [83 %-98 %] 98 %  I/O last 3 completed shifts:  In: 1400 [P.O.:200; I.V.:200; IV Piggyback:1000]  Out: 453 [Urine:450; Other:3]  Constitutional: Thin, elderly female, NAD  Respiratory: MMM, no icterus  Cardiovascular: RRR, no edema  GI: Nontender, soft, NABS  Skin/Integumen: No rash  Neuro: CN 2-12 grossly  intact, sensation grossly intact, able to move all 4 exts  Psych: nl mood/affect    Medications       famotidine  20 mg Oral At Bedtime     ipratropium - albuterol 0.5 mg/2.5 mg/3 mL  3 mL Nebulization 4x daily     losartan  25 mg Oral Daily     melatonin  3 mg Oral At Bedtime     metoprolol tartrate  50 mg Oral BID     multivitamin w/minerals  1 tablet Oral Daily     pantoprazole  40 mg Oral QAM AC     piperacillin-tazobactam  3.375 g Intravenous Q6H     senna-docusate  1 tablet Oral BID    Or     senna-docusate  2 tablet Oral BID     simvastatin  20 mg Oral At Bedtime     sodium chloride (PF)  3 mL Intracatheter Q8H     vitamin D3  1,000 Units Oral Daily       Data   Recent Labs   Lab 01/31/20  0736 01/30/20 2006 01/30/20  0717 01/28/20  1913   WBC 12.4*  --  9.6 11.0   HGB 8.6*  --  8.6* 9.0*   MCV 97  --  96 97   *  --  482* 574*     --  139 139   POTASSIUM 4.0 4.3 2.9* 3.6   CHLORIDE 110*  --  107 108   CO2 27  --  27 26   BUN 5*  --  4* 4*   CR 0.40*  --  0.39* 0.41*   ANIONGAP 4  --  5 5   MICK 7.6*  --  7.8* 8.2*   GLC 89  --  89 134*   ALBUMIN  --   --  1.2* 1.6*   PROTTOTAL  --   --  4.6* 5.5*   BILITOTAL  --   --  0.3 0.2   ALKPHOS  --   --  119 136   ALT  --   --  8 16   AST  --   --  9 13       Recent Results (from the past 24 hour(s))   XR Chest Port 1 View    Narrative    Exam: XR CHEST PORT 1 VW, 1/31/2020 10:47 AM    Indication: Pneumonia    Comparison: CT 1/26/2020, chest radiograph 1/25/2020    Findings:   AP view of the chest. Diffuse interstitial markings and patchy  airspace opacities in the mid and lower lung similar to prior exam.  Unchanged bilateral pleural effusions, accounting for differences in  technique. Cardiac silhouette remains partially obscured. Extensive  vascular calcifications of the aortic arch. No acute findings in the  upper abdomen. Degenerative changes of the spine with a convex right  scoliotic curvature. No acute osseous findings.      Impression     Impression:   1. Patchy mid and lower lung airspace opacities similar to prior exam.  2. Interstitial markings suggestive concurrent pulmonary edema,  unchanged.  3. Unchanged bilateral pleural effusions accounting for differences in  technique.    I have personally reviewed the examination and initial interpretation  and I agree with the findings.    BRENDA SERVIN MD

## 2020-01-31 NOTE — PROVIDER NOTIFICATION
Notified provider regarding pt refusing O2 nc after multiple attempts to put O2 on pt. Changed O2 sensor, offered pt alternative O2 delivery devices and sat pt up higher in bed. Awaiting response.     Addendum: RN able to get O2 device back on with distraction. Provider paged back after O2 placed back on the pt and said no interventions at this time.

## 2020-01-31 NOTE — PROVIDER NOTIFICATION
Notified Resident at 0630 regarding low urine output.      Spoke with: Resident paged    Orders were obtained.    Comments: UOP of 150 through purewick with one unmeasured occurrence in brief.     50 mL/hr MIVF ordered.

## 2020-01-31 NOTE — PLAN OF CARE
/59 (BP Location: Right arm)   Pulse 78   Temp 99.6  F (37.6  C) (Axillary)   Resp 16   Wt 41.7 kg (92 lb)   SpO2 94%   BMI 15.31 kg/m      VSS on 2L. Lower back pain improved with repositioning as tolerated by pt. W/c bound. Turn Q2h as pt allows. Incont of urine and stool. Purewick and brief in place. Tolerating small amounts of DD3 diet. Passing freq soft/loose stools. Stool softeners held and brief checked frequently and barrier paste applied to red bottom. PIV infusing 1000mL NS bolus at 100mL/hr with hopes of improving UOP. Pt resting comfortably between cares. Continue to monitor urine, stool, diet. Continue POC.

## 2020-01-31 NOTE — PLAN OF CARE
/72 (BP Location: Right arm)   Pulse 78   Temp 98.1  F (36.7  C) (Axillary)   Resp 16   Wt 41.7 kg (92 lb)   SpO2 (!) 83%   BMI 15.31 kg/m      Intermittently disoriented to time, place, and situation. VSS on 2L. De-sats to mid 80s on RA. C/o pain in BLE, relief with PRN tylenol and repositioning. W/c bound at baseline, has not been OOB this hospital stay. Turn Q2h as pt allows. Purewick in place with low UOP. 1L bolus infused over 10h completed. Refusing diet. Denies N/V. PIV infusing TKO for abx.     Pt refusing labs, oxygen, repositioning, incontinence cares, and diet. Will sometimes become more agreeable after consistent encouragement from nursing. May consider speaking with family about the possibility of comfort cares.     CXR scheduled for AM. Pt sleeping between cares overnight. Continue to monitor.

## 2020-01-31 NOTE — PHARMACY-CONSULT NOTE
Pharmacy was consulted to assist in levofloxacin dosing for patient Jackeline Smiley in anticipation of discharging on an oral antibiotic. The indication is pneumonia. Her SCr and BUN are stable with no indication of renal dysfunction.    The usual adult dosing for pneumonia is levofloxacin 750 mg IV/PO q24h. However given pt is 86 years old and only weighs 42 kg, I would recommend using a lower dose of 500 mg q24h. Though if the team wants a more aggressive dose for the clinical picture, 750 mg is appropriate as well. Note that usual dosing in pediatrics is 7.5-10 mg/kg, though I have seen higher.    This information was paged to the provider, Dr. Leon.  Maddie Hilario, PharmD  [phone] (text capable)

## 2020-01-31 NOTE — PLAN OF CARE
Orientated to birthday but disorientated to time and situation. Pt took all of her scheduled medications one at a time. Dysphagia level # 3 diet. This morning pt expressed she was hungry and enjoyed half of piece of Frisian toast, oatmeal, sips of coffee and a cup of apple juice.     Not compliant with wearing Nasal cannula at all times. Oxygen on room air Fluctuates between 86 to 91% MD aware. Generalized pain managed with PRN tylenol. Incontinent of urine and stool, pt has occurences where her stool is mixed with urine making it difficult to accurately document urine output. Purewick catheter in place.  Dressing on coccyx changed. PIV S/L.      Please continue to monitor urine output, oxygen and encourage fluids.

## 2020-01-31 NOTE — CONSULTS
[facility]  Palliative Care Consultation Note    Patient: Jackeline Smiley  Date of Admission:  1/25/2020    Requesting Clinician / Team: Victorino Leon MD  Reason for consult: Goals of care    Recommendations:    DNR/DNI    Continue restorative measures    Goal is to get back to her facility, and granddaughter would appreciate palliative care ongoing, we would be happy to see her in clinic but likely she could start with her primary geriatric team at the facility and can seek referral if additional needs arise.    Main concern is appetite per granddaughter and agree with nutrition consult    These recommendations have been discussed with primary team.      Thank you for the opportunity to participate in the care of this patient and family. Our team: does not plan on following further, however do not hesitate to call or re-consult if we can be of further assistance to the patient/family.     During regular M-F work hours -- if you are not sure who specifically to contact -- please contact us by sending a text page to our team consult pager at [phone].    After regular work hours and on weekends/holidays, you can call our answering service at [phone]. Also, who's on call for us is available in [facility] Smart Web.       Assessments:  Jackeline Smiley is a 86 year old female with acquired hemophilia A and history of intracranial hemorrhage and dementia admitted on 1/25 for bilateral pneumonia and empyema. She originally presented with SOB, but on visit today the patient denies difficulty breathing and has moved her nasal cannula off her nose and onto her neck. At this time, she denies pain or nausea. She expresses frustration with being in the hospital including not being able to sleep and people asking her the same questions. She is MAGALLANES to person and time, but not to place or reason why she is in the hospital.       Today, the patient was seen for:  Empyema  without ability to perform a thoracentesis due to current state of health including hemophilia.   CVA  AMS    .I visited with Socorro and patient. I introduced palliative care and ways in which we can be helpful including symptom management, decisional support (goals of care), and additional support.         Prognosis, Goals, & Planning:      Functional Status just prior to hospitalization: 3 (Capable of only limited self-care; needs help with ADLs; in bed/chair >50% of waking hours)      Prognosis, Goals, and/or Advance Care Planning were addressed today: Yes        Summary/Comments: Spoke with the patient's decision maker Socorro via phone this afternoon regarding her understanding of Jackeline's condition and goals of care. At this time, Socorro expressed hope for Jackeline to return to [facility] and to receive outpatient antibiotics for her pneumonia with the understanding that the thoracentesis is not an option at this time. She feels that Jackeline would benefit from care regarding her poor appetite. She states that she was in contact with a hospice agency over the phone at one point regarding hospice eligibility, records indicate this was 1/14 with [facility]. discussed with Socorro today also her mothers refusing some cares and makes us questions if we are doing things that help her and are giving her care that is useful. Socorro does feel that she wants to give the antibiotics a try but also is realistic in that if things are not working that it might be a time to consider hospice enrollment. Socorro is very interested in palliative care at this time and discussed working with the geriatric team regarding this first and they can always refer to palliative care as needed.      Patient's decision making preferences: with input from medical clinicians and loved ones          Patient has decision-making capacity today for complex decisions: No            I have concerns about the patient/family's  health literacy today: No   ? Patient's granddaughter Socorro has good health literacy. The patient does not have a good understanding of her health status or why she is in the hospital.           Patient has a completed Health Care Directive: Yes, and on file.      Code status: DNR/DNI    Coping, Meaning, & Spirituality:   Mood, coping, and/or meaning in the context of serious illness were addressed today: Yes  Summary/Comments: patient is frustrated with the effects of her stroke on her memory and other functions, also bothered by all the visits from the medical teams.    Social:     Living situation: [facility])    Key family / caregivers: Socorro: facundo, designated decision maker (directive on file)   ? Of note: patient has a daughter Sigrid who has a no-contact order at her [facility] facility. Patient has authorized Colleen to visit at the hospital, but not be involved in medical care/decisions.     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 Given Jackeline's hemophilia, age, and current state of health, her primary team does not feel that thoracentesis is an appropriate course of action at this time due to high risk of complications. They are also concerned that Jackeline is likely to return to the hospital if she is discharged on oral antibiotics alone. At present, Jackeline has been refusing care interventions including O2 via nasal cannula.     Key Palliative Symptom Data:  We are not helping to manage these symptoms currently in this patient.        ROS:  Comprehensive ROS is reviewed and is negative except as  here & per HPI:      Past Medical History:  Past Medical History:   Diagnosis Date     Back ache      Hemophilia (H)      Hemophilia A (H) 5/17/2019     Hyperlipidemia      Hypertension      Intracranial hemorrhage (H) 05/2019     Menopausal disorder      Osteoarthritis      Pneumonia 6-11    LEFT MID LUNG     Tremor      Vertigo         Past Surgical History:  Past Surgical History:   Procedure Laterality Date     C NONSPECIFIC PROCEDURE      submandibular gland excision     CATARACT IOL, RT/LT      both, Dr Zhou     HYSTERECTOMY, PAP NO LONGER INDICATED           Family History:  Family History   Problem Relation Age of Onset     Diabetes Mother      Cancer Father         leukemia     C.A.D. Brother         CHF     C.A.D. Brother      Diabetes Sister      Diabetes Sister      Diabetes Brother      Diabetes Brother      Lipids Daughter         grand daughter     Lipids Brother         all sibs had it        Allergies:  Allergies   Allergen Reactions     Atorvastatin Calcium      hepatitis     Calcium Channel Blockers      Sulfa Drugs Fatigue        Medications:  I have reviewed this patient's medication profile and medications from this hospitalization.   Noted scheduled meds are:  Ipratropium - albuterol 0.5 mg/2.5 mg /3 ml 4x daily  Zosyn Q6H  Melatonin 3 mg at bedtime     Noted PRN meds are:  APAP 650 mg Q4H prn  Hydroxyzine 10 mg at bedtime prn  Lidocaine cream Q1H prn  Lidocaine 1% 0.1-1ml Q1H prn  Naloxone injection prn  Senna-docusate 3 tablets BID    Physical Exam:  Vital Signs: Temp: 98.1  F (36.7  C) Temp src: Oral BP: 128/65   Heart Rate: 71 Resp: 16 SpO2: 98 % O2 Device: Nasal cannula Oxygen Delivery: 4 LPM  Weight: 92 lbs 0 oz   Constitutional: Awake, alert, cooperative, no apparent distress  ENT: Normocephalic  Lungs: No increased work of breathing  Neurologic: Awake, alert, oriented to name, time.  Neuropsychiatric: Normal affect  Skin: No rashes to visualized skin      Data reviewed:  Recent  "imaging reviewed, my comments on pertinents:   CXR 1/31:   \"1. Patchy mid and lower lung airspace opacities similar to prior exam.  2. Interstitial markings suggestive concurrent pulmonary edema, unchanged.  3. Unchanged bilateral pleural effusions accounting for differences in technique.\"    Recent lab data reviewed, my comments on pertinents:   Sodium 140  Creatinine 0.40  BUN 5  Calcium 7.6 (L)  Phosphorus 2.3 (L)  WBC 12.4 (H)  Retic % 2.7 (H)  HGB 8.6 (L)   (H)  Iron 15 (L)  TIBC 116 (L)  Ferritin 478 (H)    AAYUSH Awan CNS  Palliative Care Consult Team  Pager: [phone]    70 minutes spent. This includes 30 minutes face to face with patient and with granddaughter (via phone) discussing current complex health conditions and prognosis, goals of care. Coordination of care with the primary team regarding goals of care.   "

## 2020-02-01 NOTE — PLAN OF CARE
Patient is alert, oriented to self only, forgetful at times. Pain well controlled with Tylenol, denies nausea, poor PO intake. Pt on dysphagia diet, refused dinner meal despite multiple encouragement. Pt is taking only sips of water during the shift. Incontinent of urine , no BM during the shift, purewick in place. Repositioned pt every 2 hours. 02 sats 96% on NC 3LPM. Pt removed nasal canula many times during the shift, reminded to keep it in place. Pt on continuous pulse oxymetry, bed alarm on, call light within reach.

## 2020-02-01 NOTE — PLAN OF CARE
VSS- on 3L NC. BP elevated- not within parameters for prn meds. Continuous pulse ox in place. Pt takes off oxygen intermittently. Disoriented to place and situation. Bed alarm on for safety. Generalized pain controlled with prn tylenol. PIV x 1 infusing TKO between antibiotics. Incontinent of bowel and bladder. Purewick in place. Loose BM x 1. Pt intermittently uncooperative with cares. Repositioned as pt allows. Assist x2- wheel chair bound baseline. Dressing on coccyx c/d/i. Dysphagia level 3 diet with thin liquids- PO intake encouraged frequently. Denies nausea. Continue with plan of care.

## 2020-02-01 NOTE — PROGRESS NOTES
[facility] Progress Note - Hospitalist Service, Gold 8    Date of Admission:  1/25/2020    Assessment & Plan   Jackeline Smiley is a 86 year old female with history of acquired hemophilia A s/p rituxin and steroids last year, HTN, anxiety, previous CVA, dementia, dyslipidemia, SIADH, depression, frequent falls who was recently admitted 1/13-1/20 at OSH with sepsis due to bilateral pneumonia and c/f empyema discharged on oral antibiotics and returned to ED with SOB.      Pneumonia, bilateral pleural effusions, possible parapneumonic effusion vs empyema:  Hypoxic respiratory failure:  Recently admitted to [facility] for sepsis 2/2 bilateral pneumonia. CT chest 1/17 with small loculated right pleural effusion, small left pleural effusion and 11 mm lingular nodule with adjacent ground glass opacity possible infection vs malignancy. With ongoing leukocytosis despite being on IV zosyn, azithromycin and vacomycin there was suspicion for empyema however patient refused recommended thoracentesis and possible chest tube placement. After discussion with ID at [facility], patient was discharged on oral Augmentin X 1 week. She presented to the ED 5 days following discharge with SOB and imaging with potential loculation in left fissure with bilateral pleural effusions, left apical groundglass opacities. Procalcitonin elevated at 21.19, . Blood cultures negative. Currently patient is HDS and oxygen has been weaned to 1L.  Chest x-ray repeated and is unchanged.  The patient seems to be responding appropriately to treatment clinically and there is improvement in CRP to 96.  -Appreciate infectious disease assessment and recommendations  -Continue empiric zosyn with CRP every few days while inpatient (CRP tomorrow)  -Wean supplemental oxygen as tolerated to maintain SpO2 90-94%  -Per ID recommendations, plan to transition to oral levaquin prior to discharge (tomorrow) and monitor  patient for 24 hours, which should be continued for at least another 4 weeks. The CRP should be checked weekly after discharge. She should have a repeat chest CT in approximately one month and antibiotics should be continued until CRP returns to baseline. After stopping antibiotics, she should be monitored.       Acquired hemophilia A:  Initially diagnosed 2012 responded to treatement with steroids but recurred in May 2019 and had intracerebral hemorrhage at that time and received steroids and rituximab for several doses.   -Monitor closely for any bleeding; give 90 mcg/kg Novoseven and page the Hematology Fellow on call if spontaneous bleeding occurs    HTN:  Controlled. Uncontrolled during hospitalization requiring optimization given history of ICH. Recently taken off lisinopril due to concern for hyponatremia, although not a typical culprit.  -Continue PTA Metoprolol 50 mg BID  -Losartan 25 mg  -Hydralazine PRN >160/100    Chronic protein calorie malnutrition:  -Nutrition consult, appreciate assessment and recommendations  -Assistance with meals    Hypokalemia:  Hypomagnesemia:  -Replete    Wound on buttocks:  -WOCN consult, appreciate assessment and recommendations    Normocytic Anemia:  Hgb ~9. Recent baseline closer to 12, however noted to have down trend during prior hospitalization. Denies melena, hematochezia, hematuria. No signs of active bleeding. 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  -Continue to monitor     History of ICH:  Weakness:  On 5/2019, patient presented with spontaneous acute deep white matter intracranial hemorrhage with corresponding left sided weakness.  -PT/OT recommending TCU  -Palliative care consulted; appreciate assessment (Goal is to get back to her facility, and granddaughter would appreciate palliative care ongoing, we would be happy to see her in clinic but likely she could start with her primary geriatric team at the facility and can seek referral if  additional needs arise)    Paroxysmal A Fib:  [id number] score of 4. Not on AC as not felt to be a candidate due to history of hemophilia, hemorrhagic stoke, dementia and falls. Echo 1/14/2020 with LVEF 59%.   -Continue PTA Metoprolol 50 mg BID (hold if SBP < 110 or HR < 60)       HLD:   -Continue PTA simvastatin 20 mg HS     GERD:   Stable.   -Continue PTA famotidine and pantoprazole     Dementia with depression:   -Continue PTA Hydroxyzine 10 mg HS PRN and Melatonin 3 mg HS     Diet: Dysphagia Diet Level 3 Advanced Thin Liquids (water, ice chips, juice, milk gelatin, ice cream, etc)  Room Service  Snacks/Supplements Adult: Boost Plus; With Meals  Room Service    DVT Prophylaxis: Pneumatic Compression Devices  Granados Catheter: not present  Code Status: DNR/DNI      Disposition Plan   Expected discharge: 2 - 3 days, recommended to prior living arrangement once antibiotic plan established.     The patient's care was discussed with the Bedside Nurse, Care Coordinator/ and Patient.    Victorino Leon MD  Hospitalist Service, [address], [place]  Pager: [phone]  Please see sticky note for cross cover information    Interval History   No acute events. Appetite poor but eats with assistance. Denies chest pain, nausea, vomiting, abdominal pain. Denies significant cough or trouble breathing. Tolerating 1L nasal cannula    Data reviewed today: I reviewed all new labs and imaging results over the last 24 hours. I personally reviewed the chest x-ray image(s) showing unchanged findings.    Physical Exam   Temp: 97  F (36.1  C) Temp src: Oral BP: 109/64 Pulse: 86 Heart Rate: 79 Resp: 18 SpO2: 92 % O2 Device: Nasal cannula Oxygen Delivery: 3 LPM  Vitals:    01/25/20 1337   Weight: 41.7 kg (92 lb)     Vital Signs with Ranges  Temp:  [97  F (36.1  C)-97.4  F (36.3  C)] 97  F (36.1  C)  Pulse:  [82-89] 86  Heart Rate:  [72-79] 79  Resp:  [18] 18  BP: ()/(40-75) 109/64  SpO2:  [92  %-97 %] 92 %  I/O last 3 completed shifts:  In: 640 [P.O.:540; I.V.:100]  Out: 250 [Urine:250]  Constitutional: Thin, elderly female, NAD  Respiratory: Clear to auscultation bilaterally with dimished bases. Normal respiratory effort. On 1L oxygen  Cardiovascular: RRR, no edema  GI: Nontender, soft, NABS  Skin/Integumen: No rash  Neuro: CN 2-12 grossly intact, sensation grossly intact, able to move all 4 exts  Psych: nl mood/affect    Medications       famotidine  20 mg Oral At Bedtime     ipratropium - albuterol 0.5 mg/2.5 mg/3 mL  3 mL Nebulization 4x daily     losartan  50 mg Oral Daily     melatonin  3 mg Oral At Bedtime     metoprolol tartrate  50 mg Oral BID     multivitamin w/minerals  1 tablet Oral Daily     pantoprazole  40 mg Oral QAM AC     piperacillin-tazobactam  3.375 g Intravenous Q6H     senna-docusate  1 tablet Oral BID    Or     senna-docusate  2 tablet Oral BID     simvastatin  20 mg Oral At Bedtime     sodium chloride (PF)  3 mL Intracatheter Q8H     vitamin D3  1,000 Units Oral Daily       Data   Recent Labs   Lab 02/01/20  0926 01/31/20  0736 01/30/20 2006 01/30/20  0717 01/28/20  1913   WBC 11.6* 12.4*  --  9.6 11.0   HGB 8.6* 8.6*  --  8.6* 9.0*   MCV 99 97  --  96 97   * 521*  --  482* 574*    140  --  139 139   POTASSIUM 4.0 4.0 4.3 2.9* 3.6   CHLORIDE 110* 110*  --  107 108   CO2 24 27  --  27 26   BUN 4* 5*  --  4* 4*   CR 0.42* 0.40*  --  0.39* 0.41*   ANIONGAP 7 4  --  5 5   MICK 8.1* 7.6*  --  7.8* 8.2*   GLC 70 89  --  89 134*   ALBUMIN  --   --   --  1.2* 1.6*   PROTTOTAL  --   --   --  4.6* 5.5*   BILITOTAL  --   --   --  0.3 0.2   ALKPHOS  --   --   --  119 136   ALT  --   --   --  8 16   AST  --   --   --  9 13       No results found for this or any previous visit (from the past 24 hour(s)).

## 2020-02-01 NOTE — PLAN OF CARE
Alert and oriented x 3. Intermittent confusion. Bed alarm on.since this morning weaned O2 from 3 L to 1. Encouraged deep breathing and coughing. Poor oral intake. Had a 240 ml water and a quarter of muffin. Incontinent of stool and urine. Repositioned Q 2 hours. Plan:Encourage oral intake. Assist with feeding. Deep breathing and coughing. Continue care.

## 2020-02-02 NOTE — PLAN OF CARE
Pt sleeping between cares. Pt remains disoriented to situation and time. Pt reorients for short time periods. Pt cooperative with cares and turns q 2hr. Pt able to fall back asleep after disruptions. Purewick intact for uo. Pt mepilex to sacrum D/I. No bm, minimal smear. Chen area reddened and tender. Cleansed and barrier cream applied. Abd round, +bs. Lungs dim in bases. O2 on at 3L both nc or FM depending on which one pt chooses to remain in place. Sats with O2=93-97%. On RA sats = 87-88%. PIV at tko between antibiotics. Site intact w/o redness or swelling. Bed alarm on for forgetfulness. Cont to maintain good skin care regimen. Reorient prn.

## 2020-02-02 NOTE — PLAN OF CARE
"Total care. Ms Smiley refuses po intake, refuses to wear the O2. I was able to give her po morning meds with apple juice but she declined all else despite repeated offers of juice, water, and solid foods. PIV TKO for IV antibiotic. 2  staff  turned and cleansed her but the entire time she is telling us: \"leave me alone.\" Incont of bowel: soft/mushy brown BM. Purwick urine cath: patent for clear yellow urine. I texted Dr Leon (#[phone]) to inform him of her refusals.   "

## 2020-02-02 NOTE — PLAN OF CARE
"[id number]: /51   Pulse 75   Temp 96.1  F (35.6  C) (Oral)   Resp 18   Wt 41.7 kg (92 lb)   SpO2 93%   BMI 15.31 kg/m     Patient afebrile. On 1 L NC to maintain O2 sats >92%. O2 dipped to 88-89% while sleeping with O2, O2 increased to 3 LPM. Pt on continuous pulse ox. Pt encouraged with pulmonary toilet. Pt altered and oriented x 3, confused to situation and occasionally time. Asked her why she was in hospital, pt replied \"I really don't know.\" Pt more confused and irritable since about 2230. Stated \"I just want to lay down\" while she is in bed and occasionally taking off and refusing her NC. C/o low back pain, tylenol given with improvement. Turned q 2 hrs. Incontinent of urine and loose BM. Chen-care done and mepilex changed on pressure injury. New purewick applied. Left arm with PIV swollen. PIV removed and new PIV placed and infusing. Food and drink encouraged. Pt refused food. Bed alarm on and call light in reach. Pt currently resting. Continue to monitor and plan of care.  "

## 2020-02-02 NOTE — PROGRESS NOTES
[facility], [facility] Progress Note - Hospitalist Service, Gold 8    Date of Admission:  1/25/2020    Assessment & Plan   Jackeline Smiley is a 86 year old female with history of acquired hemophilia A s/p rituxin and steroids last year, HTN, anxiety, previous CVA, dementia, dyslipidemia, SIADH, depression, frequent falls who was recently admitted 1/13-1/20 at OSH with sepsis due to bilateral pneumonia and c/f empyema discharged on oral antibiotics and returned to ED with SOB.      Pneumonia, bilateral pleural effusions, possible parapneumonic effusion vs empyema:  Hypoxic respiratory failure:  Recently admitted to [facility] for sepsis 2/2 bilateral pneumonia. CT chest 1/17 with small loculated right pleural effusion, small left pleural effusion and 11 mm lingular nodule with adjacent ground glass opacity possible infection vs malignancy. With ongoing leukocytosis despite being on IV zosyn, azithromycin and vacomycin there was suspicion for empyema however patient refused recommended thoracentesis and possible chest tube placement. After discussion with ID at [facility], patient was discharged on oral Augmentin X 1 week. She presented to the ED 5 days following discharge with SOB and imaging with potential loculation in left fissure with bilateral pleural effusions, left apical groundglass opacities. Procalcitonin elevated at 21.19, . Blood cultures negative. Currently patient is HDS and oxygen has been weaned to room air today.  Chest x-ray repeated and is unchanged.  The patient seems to be responding appropriately to treatment clinically and there is improvement in CRP to 96 to 87.  -Appreciate infectious disease assessment and recommendations  -Continue empiric zosyn with CRP every few days while inpatient (CRP tomorrow)  -Wean supplemental oxygen as tolerated to maintain SpO2 90-94%  -Per ID recommendations, plan to transition to oral levaquin prior to discharge  (tomorrow) and monitor patient for 24 hours, which should be continued for at least another 4 weeks. The CRP should be checked weekly after discharge. She should have a repeat chest CT in approximately one month and antibiotics should be continued until CRP returns to baseline. After stopping antibiotics, she should be monitored.       Acquired hemophilia A:  Initially diagnosed 2012 responded to treatement with steroids but recurred in May 2019 and had intracerebral hemorrhage at that time and received steroids and rituximab for several doses.   -Monitor closely for any bleeding; give 90 mcg/kg Novoseven and page the Hematology Fellow on call if spontaneous bleeding occurs    HTN:  Controlled. Uncontrolled during hospitalization requiring optimization given history of ICH. Recently taken off lisinopril due to concern for hyponatremia, although not a typical culprit.  -Continue PTA Metoprolol 50 mg BID  -Losartan 25 mg  -Hydralazine PRN >160/100    Chronic protein calorie malnutrition:  -Nutrition consult, appreciate assessment and recommendations  -Assistance with meals    Hypokalemia:  Hypomagnesemia:  -Replete    Wound on buttocks:  -WOCN consult, appreciate assessment and recommendations    Normocytic Anemia:  Hgb ~9. Recent baseline closer to 12, however noted to have down trend during prior hospitalization. Denies melena, hematochezia, hematuria. No signs of active bleeding. 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  -Continue to monitor     History of ICH:  Weakness:  On 5/2019, patient presented with spontaneous acute deep white matter intracranial hemorrhage with corresponding left sided weakness.  -PT/OT recommending TCU  -Palliative care consulted; appreciate assessment (discussed with them today, plan for team to follow up with patient and granddaughter tomorrow)    Paroxysmal A Fib:  [id number] score of 4. Not on AC as not felt to be a candidate due to history of hemophilia,  hemorrhagic stoke, dementia and falls. Echo 1/14/2020 with LVEF 59%.   -Continue PTA Metoprolol 50 mg BID (hold if SBP < 110 or HR < 60)       HLD:   -Continue PTA simvastatin 20 mg HS     GERD:   Stable.   -Continue PTA famotidine and pantoprazole     Dementia with depression:   -Continue PTA Hydroxyzine 10 mg HS PRN and Melatonin 3 mg HS     Diet: Dysphagia Diet Level 3 Advanced Thin Liquids (water, ice chips, juice, milk gelatin, ice cream, etc)  Room Service  Snacks/Supplements Adult: Boost Plus; With Meals  Room Service    DVT Prophylaxis: Pneumatic Compression Devices  Granados Catheter: not present  Code Status: DNR/DNI      Disposition Plan   Expected discharge: 2 - 3 days, recommended to prior living arrangement once antibiotic plan established.     The patient's care was discussed with the Bedside Nurse, Care Coordinator/ and Patient.    Victorino Leon MD  Hospitalist Service, [address], [place]  Pager: [phone]  Please see sticky note for cross cover information    Interval History   Appetite poor refusing to eat much at all even with assistance. Denies chest pain, nausea, vomiting, abdominal pain. Denies significant cough or trouble breathing. Refuses oxygen but now only seems to need when sleeping. Thinks she is not going to make it through this infection.     Data reviewed today: I reviewed all new labs and imaging results over the last 24 hours. I personally reviewed no images or EKG's today.    Physical Exam   Temp: 97.9  F (36.6  C) Temp src: Axillary BP: (!) 144/62 Pulse: 88 Heart Rate: 76 Resp: 20 SpO2: 90 % O2 Device: None (Room air) Oxygen Delivery: 3 LPM  Vitals:    01/25/20 1337   Weight: 41.7 kg (92 lb)     Vital Signs with Ranges  Temp:  [96.1  F (35.6  C)-98.7  F (37.1  C)] 97.9  F (36.6  C)  Pulse:  [75-88] 88  Heart Rate:  [76] 76  Resp:  [18-20] 20  BP: (118-151)/(51-67) 144/62  SpO2:  [88 %-94 %] 90 %  I/O last 3 completed shifts:  In: 325  [P.O.:125; I.V.:200]  Out: 500 [Urine:500]  Constitutional: Thin, elderly female, NAD  Respiratory: Clear to auscultation bilaterally with dimished bases. Normal respiratory effort.  Cardiovascular: RRR, no edema  GI: Nontender, soft, NABS  Skin/Integumen: No rash  Neuro: CN 2-12 grossly intact, sensation grossly intact, able to move all 4 exts  Psych: nl mood/affect, alert and oriented to person and place    Medications       famotidine  20 mg Oral At Bedtime     [START ON 2/3/2020] influenza Vac Split High-Dose  0.5 mL Intramuscular Prior to discharge     levofloxacin  500 mg Oral Daily     losartan  25 mg Oral Daily     melatonin  3 mg Oral At Bedtime     metoprolol tartrate  50 mg Oral BID     multivitamin w/minerals  1 tablet Oral Daily     pantoprazole  40 mg Oral QAM AC     senna-docusate  1 tablet Oral BID    Or     senna-docusate  2 tablet Oral BID     simvastatin  20 mg Oral At Bedtime     sodium chloride (PF)  3 mL Intracatheter Q8H     vitamin D3  1,000 Units Oral Daily       Data   Recent Labs   Lab 02/02/20  0802 02/01/20  0926 01/31/20  0736  01/30/20  0717 01/28/20  1913   WBC 10.6 11.6* 12.4*  --  9.6 11.0   HGB 8.9* 8.6* 8.6*  --  8.6* 9.0*   MCV 97 99 97  --  96 97   * 576* 521*  --  482* 574*    140 140  --  139 139   POTASSIUM 3.4 4.0 4.0   < > 2.9* 3.6   CHLORIDE 111* 110* 110*  --  107 108   CO2 25 24 27  --  27 26   BUN 3* 4* 5*  --  4* 4*   CR 0.46* 0.42* 0.40*  --  0.39* 0.41*   ANIONGAP 6 7 4  --  5 5   MICK 8.2* 8.1* 7.6*  --  7.8* 8.2*   GLC 76 70 89  --  89 134*   ALBUMIN  --   --   --   --  1.2* 1.6*   PROTTOTAL  --   --   --   --  4.6* 5.5*   BILITOTAL  --   --   --   --  0.3 0.2   ALKPHOS  --   --   --   --  119 136   ALT  --   --   --   --  8 16   AST  --   --   --   --  9 13    < > = values in this interval not displayed.       No results found for this or any previous visit (from the past 24 hour(s)).

## 2020-02-03 NOTE — PROGRESS NOTES
02/03/20 1441   General Information   Onset Date 01/25/20   Start of Care Date 02/03/20   Referring Physician Victorino Leon MD   Patient Profile Review/OT: Additional Occupational Profile Info See Profile for full history and prior level of function   Patient/Family Goals Statement None stated    Swallowing Evaluation Bedside swallow evaluation   Behaviorial Observations WFL (within functional limits)   Mode of current nutrition Oral diet   Type of oral diet Dysphagia diet level 3;Thin liquid   Respiratory Status Room air   Comments Jackeline Smiley is a 86 year old female with history of acquired hemophilia A s/p rituxin and steroids last year, HTN, anxiety, previous CVA, dementia, dyslipidemia, SIADH, depression, frequent falls who was recently admitted 1/13-1/20 at OSH with sepsis due to bilateral pneumonia and c/f empyema discharged on oral antibiotics and returned to ED with SOB. SLP order for coughing with PO Intake. Pt has been eating very little and agrees to only a small amount for evaluation. She recently completed a video swallow in January 2020 at [facility] which was negative for aspriation and recommended dysphagia diet 3 and thin liquids. Pt recalls this occuring but does not remember details    Clinical Swallow Evaluation   Oral Musculature generally intact   Structural Abnormalities none present   Dentition   (Partial dentition, in poor condition )   Mucosal Quality adequate   Oral Labial Strength and Mobility impaired retraction;impaired pursing;impaired coordination   Lingual Strength and Mobility impaired coordination   Laryngeal Function Cough;Swallow;Voicing initiated   Additional Documentation Yes   Swallow Eval   Feeding Assistance minimal assistance required   Clinical Swallow Eval: Thin Liquid Texture Trial   Mode of Presentation, Thin Liquids straw;self-fed   Volume of Liquid or Food Presented 2 oz    Oral Phase of Swallow WFL   Pharyngeal Phase of Swallow intact   Diagnostic Statement No  overt s/sx of aspiration, no changes in breath sounds or vocal quality    Clinical Swallow Eval: Solid Food Texture Trial   Mode of Presentation, Solid self-fed   Volume of Solid Food Presented 1 bite of cracker    Oral Phase, Solid   (Disorganized mastication and bolus manipulation )   Pharyngeal Phase, Solid intact   Diagnostic Statement Disorganized oral manipulation, insufficient mastication with regular solids, mild diffuse oral residue requiring cues to use a liquid wash to fully clear. No overt s/sx of aspiration    Swallow Compensations   Swallow Compensations Alternate viscosity of consistencies;Pacing   Results No difficulties noted   Esophageal Phase of Swallow   Patient reports or presents with symptoms of esophageal dysphagia No   General Therapy Interventions   Planned Therapy Interventions Dysphagia Treatment   Dysphagia treatment Modified diet education;Instruction of safe swallow strategies   Swallow Eval: Clinical Impressions   Skilled Criteria for Therapy Intervention Skilled criteria met.  Treatment indicated.   Functional Assessment Scale (FAS) 5   Treatment Diagnosis Mild oropharyngeal dysphagia    Diet texture recommendations Dysphagia diet level 3;Thin liquids   Recommended Feeding/Eating Techniques alternate between small bites and sips of food/liquid;check mouth frequently for oral residue/pocketing;hard swallow w/ each bite or sip;maintain upright posture during/after eating for 30 mins;small sips/bites   Therapy Frequency 3x/week   Predicted Duration of Therapy Intervention (days/wks) 1 week    Anticipated Discharge Disposition extended care facility   Risks and Benefits of Treatment have been explained. Yes   Patient, family and/or staff in agreement with Plan of Care Yes   Clinical Impression Comments Pt seen for clinical swallow evaluation d/t coughing when eating/drinking. Video swallow completed in January of 2020 and without aspiration - recommendations for dysphagia diet 3 and thin  liquids. Clinical evaluation is very limited as pt refused most everything offered. General incoordination is noted during oral [facility] exam. Pt tolerated approx 2 oz of thin liquids via straw sip without overt s/sx of aspiration, no changes in breath sounds or vocal quality. Pt refused puree and agreed to one bite of cracker. Oral manipulation is disorganized, mastication insufficent and cues required to fully clear oral cavity. Swallow appears timely, pt denies any pain or globus sensation. Recommend continuation of dysphagia diet 3 and thin liquids. Pt must be fully upright in bed or ideally to chair, small bites/sips, slow rate of intake, and should alternate solids and liquids. Nursing also to ensure oral cavity is cleared after PO intake.    Total Evaluation Time   Total Evaluation Time (Minutes) 10

## 2020-02-03 NOTE — PLAN OF CARE
Assumed care of pt from 9782-1182. SBPs slightly elevated in 150s earlier, down to 140s now. Not w/ in notifying parameters. Alert, disoriented to time. Pt does not use call light, calls out. Bed alarm on for safety. Pt has not attempted to get OOB. Pt reported L knee pain, gave prn tylenol. Repo q 2 hrs. On a pulsate mattress. Regular diet, minimal PO intake. Total feed. Pt had some bites of pancakes and drank some of her water, coffee, and boost shake. Denies nausea. Purewick in place for urine incontinence. X2 incontinent BMs during the day shift. PIV SL. Pt was upset her family did not visit today, appears lonely. Provided emotional support. Cont to monitor mental status, pain management, and PO intake. Cont w/ POC.

## 2020-02-03 NOTE — PLAN OF CARE
Discharge Planner SLP   Patient plan for discharge: Did not discuss  Current status: Pt seen for clinical swallow evaluation d/t coughing when eating/drinking. No overt s/sx of aspiration on limited evaluation. Swallow appears timely, pt denies any pain or globus sensation.     Recommend continuation of dysphagia diet 3 and thin liquids. Pt must be fully upright in bed or ideally to chair, small bites/sips, slow rate of intake, and should alternate solids and liquids. Nursing also to ensure oral cavity is cleared after PO intake.     Barriers to return to prior living situation: None per SLP  Recommendations for discharge: LTC  Rationale for recommendations: Anticipate pt will meet dysphagia goals prior to discharge as current diet is considered her baseline          Entered by: Eda Rueda 02/03/2020 2:50 PM

## 2020-02-03 NOTE — PROGRESS NOTES
Social Work Services Progress Note    Hospital Day: 9  Date of Initial Social Work Evaluation:  Not completed  Collaborated with:  Chart review, LTC SW, Pt, bedside nurse    Data:  SW involved for discharge planning - return [facility] anticipated.     Patient continue to have a LTC bedhold w/ [facility] (Main: [phone], Admissions: [phone], Fax: [phone])    Per primary and palliative care, they prefer a care conference arranged with Pt's granddaughter ([facility]) for ongoing goals of care discussions and to discuss Pt's poor nutrition status. Pt's granddaughter states she and her grandmother do not feel that nutritional interventions would be comfortable for Pt, but continue to have questions about when hospice services should be considered. She specifically asked if Pt is anticipated to recover from the pneumonia. Family's work schedule is limited for arranging a care conference; however, she was able to coordinate time to step away from active work duties and participate via phone on Tues 2/4 between 10-11 AM. Pt's granddaughter is unsure if Pt will be able to actively participate in the care conference due to impaired cognition, but would like her to be present to potentially absorb the information.     Intervention:  Care Conference coordination; GOC    Assessment:  See bedside notes, medical team, palliative notes    Plan:    Anticipated Disposition:  Facility:  [facility]    Barriers to d/c plan:  GOC    Follow Up:  SW to f/u & assist as needed.     LUISA Damon, MARICRUZ  7C Surgical/Oncology Unit   Phone: [phone]  Pager: [phone]

## 2020-02-03 NOTE — PROGRESS NOTES
[facility]  Palliative Care Daily Progress Note       Recommendations & Counseling       Mirtazapine 7.5 mg at bedtime for appetite    Agree with speech therapy consult    Care conference tomorrow to discuss goals of care          Assessments          Jackeline Smiley is a 86 year old female with acquired hemophilia A and history of intracranial hemorrhage and dementia admitted on 1/25 for bilateral pneumonia and empyema. Hospital course has been complicated by patient's refusal to eat and refusal of some of her cares.     Today, the patient was seen for:  Empyema without ability to perform a thoracentesis due to current state of health including hemophilia.   CVA  AMS  Anorexia    Anorexia: discussed with patient and she is not really sure why she cant eat, she does report feeling hungry, she also feels that at times she eats and will cough a lot. Her granddaughter has tried bringing food in. Would not favor options such as steroids or marinol given her mental status and advanced age. Could try mirtazapine and monitor response from this.     Prognosis, Goals, or Advance Care Planning was addressed today with: Yes.    Met with patient today who is able to hold a conversation in the moment. She today seems to have the understanding that she is being treated for a pneumonia and also can appreciate she has other medical issues (such as a history of stroke). Discussed with her today her thoughts about her care and she says clearly she is bothered by all the staff that bug her both here and at her facility. Discussed with her if we bothered her less, such as not checking labs and vitals for instance, that we do that at a cost of her possibly getting sicker and some of these illnesses can lead to eventual death. She overall seemed to take in this information but unclear what she will retain and can assess this.     Discussed with her generally concepts of pathways of care  focused on treating and managing illnesses versus symptom management focused approach. Discussed this being part of our meeting tomorrow to discuss how to move forward with her care.    Mood, coping, and/or meaning in the context of serious illness were addressed today: Yes.  Summary/Comments: she is clearly bothered by medical staff and interventions and wants to be left alone, she did appreciate this visit today.            Interval History:     Chart review/discussion with unit or clinical team members:   Notes reviewed, no acute events, refusing to eat.    Per patient or family/caregivers today:  Patient denies symptoms today, generally comfortable, wants to go home, also bothered by medical team visits at hospital and facility.    Key Palliative Symptoms:  We are not helping to manage these symptoms currently in this patient.               Review of Systems:     Besides above, ROS was reviewed and is unremarkable          Medications:     I have reviewed this patient's medication profile and medications during this hospitalization.           Physical Exam:   Vitals were reviewed  Temp: 98.1  F (36.7  C) Temp src: Oral BP: 125/67 Pulse: 93 Heart Rate: 85 Resp: 16 SpO2: (!) 89 % O2 Device: None (Room air)    I/O last 3 completed shifts:  In: 275 [P.O.:275]  Out: 200 [Urine:200]    Constitutional: Awake, alert, cooperative, no apparent distress, and appears stated age.  ENT: Normocephalic, without obvious abnormality, atramatic, sinuses nontender on palpation, external ears without lesions, oral pharynx with moist mucus membranes, tonsils without erythema or exudates, gums normal and good dentition.  Lungs: No increased work of breathing, good air exchange, clear to auscultation bilaterally, no crackles or wheezing.  Cardiovascular: Regular rate and rhythm, normal S1 and S2, no S3 or S4, and no murmur noted.  Abdomen: No scars, normal bowel sounds, soft, non-distended, non-tender, no masses palpated, no  hepatosplenomegally.  Genitourinary: No urethral discharge, normal external genitalia, no hernia.  Musculoskeletal: No redness, warmth, or swelling of the joints.  Full range of motion noted.  Motor strength is 5 out of 5 all extremities bilaterally.  Tone is normal.  Neurologic: Awake, alert, oriented to name, place and time.  Cranial nerves II-XII are grossly intact.  Motor is 5 out of 5 bilaterally.  Cerebellar finger to nose, heel to shin intact.  Sensory is intact.  Babinski down going, Romberg negative, and gait is normal.  Neuropsychiatric: Normal affect, mood, orientation, memory and insight.  Skin: No rashes, erythema, pallor, petechia or purpura.               Data Reviewed:       Reviewed recent labs, comments:   Sodium 140  Potassium 3.3  Creatinine 0.45  GFR > 90  WBC 10.7  Hemoglobin 9.6  Platelets 594    AAYUSH Awan CNS  Palliative Care Consult Team  Pager: [phone]    25 minutes spent. This includes 15 minutes face to face with patient discussing current complex health conditions, appetite, psychosocial needs. Coordination of care with the primary team and unit SW regarding goals of care and symptom management.

## 2020-02-03 NOTE — PLAN OF CARE
"Total care. Refusing O2. Denies pain and n/v. Taking sips of water overnight. Incontinent of urine, purewick in place. Turn q2h or as tolerated, often refuses saying \"leave me alone\". Resting between care. Continue POC.         "

## 2020-02-03 NOTE — PROGRESS NOTES
BRIEF HEMATOLOGY NOTE  02/03/20  11:48 AM    I spoke with Socorro, Ms. Smiley's granddaughter and healthcare agent, regarding the acquired hemophilia A diagnosis and potentially following up with Dr. Oswald in clinic after hospitalization.    Socorro reports that there will be a care conference tomorrow morning and that she is currently considering hospice.  I will Morongo back with the team tomorrow and if the decision is to pursue hospice, we will not have Ms. Smiley see Dr. Oswald in clinic.  However, if Socorro and the care team decide to pursue ongoing treatment, I think it is reasonable to re-address the hemophilia with Dr. Oswald.      I also discussed with Socorro that if we were to treat the hemophilia, we would probably try high dose steroids and rituximab again.  This would hopefully work, but is not guaranteed.  If steroids/rituximab could not drive the inhibitor to undetectability, more aggressive treatment might be needed.  Last clinic visit, Cellcept was considered for this.    Wendi Parks MD  Hematology-Oncology-Transplant Fellow

## 2020-02-03 NOTE — PLAN OF CARE
AVSS, 90-92% on RA. Pt c/o headache, better with tylenol. Orientedx3, disoriented to situation. No nausea. Pt has poor appetite, had mostly water and sips of milk. Pt coughed with pancakes, is discouraged from any intake, RN giving reassurance. Aspiration precautions, pt upright with meals, supervised and alternating liquids with solids when able. Takes meds fine. K+ pills given for replacement. Repositioned Q2-3H as pt is willing, hx dementia. Coccyz bony and reddened, mepilex in place and pillow support. PIV SL. Pt eager to get to chair today. Care conference tomorrow with granddaughter via telephone. Continue POC.

## 2020-02-03 NOTE — PROGRESS NOTES
[facility], [facility] Progress Note - Hospitalist Service, Gold 8    Date of Admission:  1/25/2020    Assessment & Plan   Jackeline Smiley is a 86 year old female with history of acquired hemophilia A s/p rituxin and steroids last year, HTN, anxiety, previous CVA, dementia, dyslipidemia, SIADH, depression, frequent falls who was recently admitted 1/13-1/20 at OSH with sepsis due to bilateral pneumonia and c/f empyema discharged on oral antibiotics and returned to ED with SOB.      Pneumonia, bilateral pleural effusions, possible parapneumonic effusion vs empyema:  Hypoxic respiratory failure:  Recently admitted to [facility] for sepsis 2/2 bilateral pneumonia. CT chest 1/17 with small loculated right pleural effusion, small left pleural effusion and 11 mm lingular nodule with adjacent ground glass opacity possible infection vs malignancy. With ongoing leukocytosis despite being on IV zosyn, azithromycin and vacomycin there was suspicion for empyema however patient refused recommended thoracentesis and possible chest tube placement. After discussion with ID at [facility], patient was discharged on oral Augmentin X 1 week. She presented to the ED 5 days following discharge with SOB and imaging with potential loculation in left fissure with bilateral pleural effusions, left apical groundglass opacities. Procalcitonin elevated at 21.19, . Blood cultures negative. Currently patient is HDS and oxygen has been weaned to room air today.  Chest x-ray repeated and is unchanged.  The patient seems to be responding appropriately to treatment clinically and there is improvement in CRP to 96 to 87. She was transitioned to levaquin on 2/2 and has continued to demonstrate clinical stability.  -Appreciate infectious disease assessment and recommendations  -Continue levofloxacin 500 mg daily, CRP tomorrow  -Hold supplemental oxygen as tolerated to maintain SpO2 90-94%  -Per ID  recommendations, continue oral levaquin prior to discharge (tomorrow) and monitor patient for 24 hours, which should be continued for at least another 4 weeks. The CRP should be checked weekly after discharge. She should have a repeat chest CT in approximately one month and antibiotics should be continued until CRP returns to baseline. After stopping antibiotics, she should be monitored.       Acquired hemophilia A:  Initially diagnosed 2012 responded to treatement with steroids but recurred in May 2019 and had intracerebral hemorrhage at that time and received steroids and rituximab for several doses.   -Monitor closely for any bleeding; give 90 mcg/kg Novoseven and page the Hematology Fellow on call if spontaneous bleeding occurs    HTN:  Controlled. Uncontrolled during hospitalization requiring optimization given history of ICH. Recently taken off lisinopril due to concern for hyponatremia, although not a typical culprit.  -Continue PTA Metoprolol 50 mg BID  -Losartan 25 mg  -Hydralazine PRN >160/100    Anorexia:  Chronic protein calorie malnutrition:  Mild oropharyngeal dysphagia:  -Nutrition consult, appreciate assessment and recommendations  -Speech therapy consult  -Assistance with meals  -Discuss with palliative care regarding appetite stimulant (consider mirtazepine 7.5 mg at bedtime)  -Care conference tomorrow at 10    Hypokalemia:  Hypomagnesemia:  -Replete    Wound on buttocks:  -WOCN consult, appreciate assessment and recommendations    Normocytic Anemia:  Hgb ~9. Recent baseline closer to 12, however noted to have down trend during prior hospitalization. Denies melena, hematochezia, hematuria. No signs of active bleeding. 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  -Continue to monitor     History of ICH:  Weakness:  On 5/2019, patient presented with spontaneous acute deep white matter intracranial hemorrhage with corresponding left sided weakness.  -PT/OT recommending  TCU  -Palliative care consulted; appreciate assessment and recommendations  -Care conference tomorrow at 10    Paroxysmal A Fib:  [id number] score of 4. Not on AC as not felt to be a candidate due to history of hemophilia, hemorrhagic stoke, dementia and falls. Echo 1/14/2020 with LVEF 59%.   -Continue PTA Metoprolol 50 mg BID (hold if SBP < 110 or HR < 60)       HLD:   -Continue PTA simvastatin 20 mg HS     GERD:   Stable.   -Continue PTA famotidine and pantoprazole     Dementia with depression:   -Continue PTA Hydroxyzine 10 mg HS PRN and Melatonin 3 mg HS     Diet: Dysphagia Diet Level 3 Advanced Thin Liquids (water, ice chips, juice, milk gelatin, ice cream, etc)  Room Service  Snacks/Supplements Adult: Boost Plus; With Meals  Room Service    DVT Prophylaxis: Pneumatic Compression Devices  Granados Catheter: not present  Code Status: DNR/DNI      Disposition Plan   Expected discharge: 2 - 3 days, recommended to prior living arrangement once antibiotic plan established.     The patient's care was discussed with the Bedside Nurse, Care Coordinator/ and Patient.    Victorino Leon MD  Hospitalist Service, [address], [place]  Pager: [phone]  Please see sticky note for cross cover information    Interval History   Appetite poor refusing to eat much at all even with assistance. Fed this morning and after 1st bite she did have some choking. Denies chest pain, nausea, vomiting, abdominal pain, cough, or trouble breathing. Tolerating room air. Asked to be in wheelchair so she could leave the room .    Data reviewed today: I reviewed all new labs and imaging results over the last 24 hours. I personally reviewed no images or EKG's today.    Physical Exam   Temp: 96.2  F (35.7  C) Temp src: Oral BP: 125/52   Heart Rate: 85 Resp: 16 SpO2: 92 % O2 Device: None (Room air)    Vitals:    01/25/20 1337   Weight: 41.7 kg (92 lb)     Vital Signs with Ranges  Temp:  [96.2  F (35.7   C)-97.9  F (36.6  C)] 96.2  F (35.7  C)  Heart Rate:  [73-85] 85  Resp:  [16-20] 16  BP: (124-151)/(52-72) 125/52  SpO2:  [89 %-94 %] 92 %  I/O last 3 completed shifts:  In: 425 [P.O.:325; I.V.:100]  Out: 600 [Urine:600]  Constitutional: Thin, elderly female, NAD  Respiratory: Clear to auscultation bilaterally with dimished bases. Normal respiratory effort.  Cardiovascular: RRR, no edema  GI: Nontender, soft, NABS  Skin/Integumen: No rash  Neuro: CN 2-12 grossly intact, sensation grossly intact, able to move all 4 exts  Psych: nl mood/affect, alert and oriented to person time and place    Medications       famotidine  20 mg Oral At Bedtime     influenza Vac Split High-Dose  0.5 mL Intramuscular Prior to discharge     levofloxacin  500 mg Oral Daily     losartan  25 mg Oral Daily     melatonin  3 mg Oral At Bedtime     metoprolol tartrate  50 mg Oral BID     multivitamin w/minerals  1 tablet Oral Daily     pantoprazole  40 mg Oral QAM AC     senna-docusate  1 tablet Oral BID    Or     senna-docusate  2 tablet Oral BID     simvastatin  20 mg Oral At Bedtime     sodium chloride (PF)  3 mL Intracatheter Q8H     vitamin D3  1,000 Units Oral Daily       Data   Recent Labs   Lab 02/03/20  0922 02/02/20  0802 02/01/20  0926  01/30/20  0717 01/28/20  1913   WBC 10.7 10.6 11.6*   < > 9.6 11.0   HGB 9.6* 8.9* 8.6*   < > 8.6* 9.0*   MCV 97 97 99   < > 96 97   * 557* 576*   < > 482* 574*    142 140   < > 139 139   POTASSIUM 3.3* 3.4 4.0   < > 2.9* 3.6   CHLORIDE 108 111* 110*   < > 107 108   CO2 26 25 24   < > 27 26   BUN 4* 3* 4*   < > 4* 4*   CR 0.45* 0.46* 0.42*   < > 0.39* 0.41*   ANIONGAP 6 6 7   < > 5 5   MICK 8.4* 8.2* 8.1*   < > 7.8* 8.2*   * 76 70   < > 89 134*   ALBUMIN  --   --   --   --  1.2* 1.6*   PROTTOTAL  --   --   --   --  4.6* 5.5*   BILITOTAL  --   --   --   --  0.3 0.2   ALKPHOS  --   --   --   --  119 136   ALT  --   --   --   --  8 16   AST  --   --   --   --  9 13    < > = values in  this interval not displayed.       No results found for this or any previous visit (from the past 24 hour(s)).

## 2020-02-03 NOTE — PLAN OF CARE
"Cared for pt from [id number]. Disoriented to time. Sats 90% on room air but refusing oxygen overnight. Gave Tylenol for general body aches, per pt \"I feel sore all over.\" Repositioned q 2hrs, on Pulsate mattress. Poor PO intake this evening, declined dinner. Purewick in place, no BM this evening. Continue to monitor.  "

## 2020-02-04 NOTE — PLAN OF CARE
"Vitals stable. Care conference completed this morning.  PIV S/L. Dysphagia 3 diet- and on Marc counts.  Alert to person, situation and place. Pt making more of an effort to eat. This morning patient had half of her banana bread, drank a cup of apple juice and had 5 spoons of cheerios and milk. Pt expressed that she gets \"full fast\". Refused to be turned q2 hours. Pt incontinent of urine. No BM this shift.  Blanchable redness on coccyx, Coccyx dressing changed and is C/D/I. Assist of 2 and lift. Declined to get up in wheelchair patient expressed that she just wanted to rest and sleep.     Please continue to encourage fluids and nutrition.     Update: Pt had a few sips of stawberry boost but declined to drink the rest. Pt stated \" I dont like this, its makes me want to vomit\". Writer offered a change in flavour boost but she refused.     Alarm on for safety   "

## 2020-02-04 NOTE — PROGRESS NOTES
Social Work Services Progress Note    Hospital Day: 10  Date of Initial Social Work Evaluation:  Not completed  Collaborated with:  Pt at bedside, granddaughter via phone, primary team, palliative care, dietician    Data:  Jackeline Smiley is a 86 year old female with acquired hemophilia A and history of intracranial hemorrhage and dementia admitted on 1/25 for bilateral pneumonia and empyema. Hospital course has been complicated by patient's refusal to eat and refusal of some of her cares. Pt's primary team, palliative care, and  met w/ Pt and Pt's granddaughter/[facility] Socorro for a care conference to discuss goals of care.     Patient's recent pneumonia, hemophilia A, and poor nutrition was discussed. It is anticipated Pt will continue on oral antibiotics to avoid need for chest tube and Pt/family are agreeable with this plan. Pt's granddaughter discussed that Pt will refuse to eat at times and the LTC facility often crushes her medications but Pt will swallow her pills whole. It is requested the facility no longer crushes Pt's medications if she can continue to swallow per speech evaluation. Pt prefers to decrease the amount of blood draws she gets to 1x/week if possible. Pt is interested in Strawberry boost to supplement poor intake. Patient declined to add appetite stimulant. IDT discussed hospice vs palliative care, answered questions, and communicated when Pt/family may want to pursue hospice. At this time, Pt and family do not feel ready to move Pt to comfort cares, but expressed understanding of the risks for Pt to physicially decline with continued poor nutrition. Patient and family communicated that they are not interested in tube feeding measures in the future. Pt's calories will be counted and her intake monitored with anticipation of returning to her LTC facility in 1-2 days.     Patient continue to have a LTC bedhold w/ [facility] [place] (Main: [phone], Admissions: [phone], Fax: [phone]). Patient has her WC here in the hospital and it is anticipated she will return with a WC ride home now that Pt has been successfully weaned off O2.     Intervention:  Discharge planning    Assessment:  Patient presented alert, oriented, and engaged in her care conference discussion today. Patient and her granddaughter present to be communicating together regarding Pt's cares and for future long-term health planning.     Plan:    Anticipated Disposition:  Facility:  [facility]    Barriers to d/c plan:  Medical stability; calorie counts    Follow Up:  SW to f/u & assist as needed.    LUISA Damon, MARICRUZ  7C Surgical/Oncology Unit   Phone: [phone]  Pager: [phone]

## 2020-02-04 NOTE — PROGRESS NOTES
[facility]  Palliative Care Daily Progress Note       Recommendations & Counseling       Agree with nutrition consult and calorie counts    Does not want any artifical nutrition    Favorite breakfast is toast and peanut butter    DNR/DNI    Ongoing GOC discussions are appropriate and will continue to support these, will need to re-evaulate in next day or two progress on intake          Assessments          Jackeline Smiley is a 86 year old female with acquired hemophilia A and history of intracranial hemorrhage and dementia admitted on 1/25 for bilateral pneumonia and empyema.      Today, the patient was seen for:  Empyema without ability to perform a thoracentesis due to current state of health including hemophilia.   CVA  AMS  Anorexia    Prognosis, Goals, or Advance Care Planning was addressed today with: Yes.    Met with patient, granddaughter- Maxi, amanda WORKMAN, primary team- Dr. Soto. Patient seems to remember when being told all the medical issues she has but unclear how much she is able to retain. Her granddaughter is her decision maker and has a very good understanding of Jackeline's medial issues. Discussed her treatment of her pneumonia and that draining the infection with a CT was not pursued to due risk and also with patient's preferences included. Discussed that we are attempting to treat this infection with the antibiotics and that we will not know if it is cured until we see her inflammatory markers become lower and her imaging to look better, and discussed that we are still presuming that the infection is still present. Discussed also her difficulty with eating. Prior to her infection she was eating welll, also daughter notes when she was on steroids that she ate a lot more. It seems that her eating is being still affected by the infection and also she is having a hard time with swallowing some foods and also has difficulty with appetite. Discussed her  being seen by SLP who placed recommendations at this time for DD3 and thin liquids. Socorro was interested in knowing where things were at with her hemophilia and discussed that the plan would likely be continued follow up with her doctor for treatments.     Discussed today also hospice care and philosophy and what it means and also discussed palliative care and what that looks like with getting this care from her care staff at the facility and if needed, coming into clinic if its possible and not to burdensome for her.     Discussed really the main point right now and most concerning is her eating. Discussed medication options with patient and Socorro today. She is not interested in more medications at this time and Socorro is supportive of this. Discussed also atrificial nutrition support and both IV and enteral the patient and Socorro are not intersted in. Discussed that it might be helpful to gather more information in calorie counts and have the dietician visit and possible add in some nutritional supplements. Discussed in the next day or two that we should have enough information to determine her actual intake and if its not sufficient discussed in a lot of ways that this could make the decision for her about the type of care (palliative versus hospice) that she gets in her care facility. Jackeline during the meeting seemed to be able to hold attention and understand concepts of hospice and what that means, and understandably had a hard time determining if she wants this type of care.    Mood, coping, and/or meaning in the context of serious illness were addressed today: Yes.  Summary/Comments: patient and family seem to be coping appropriately at this time.            Interval History:     Chart review/discussion with unit or clinical team members:   Notes reviewed, no acute events, poor oral intake, SLP evaluated and recommended DD3 and thin liquids.    Per patient or family/caregivers today:  Patient has no  specific complaints today other then lack of appetite and having a hard time swallowing some foods and coughing. Socorro is present via phone and supportive.    Key Palliative Symptoms:  We are not helping to manage these symptoms currently in this patient.               Review of Systems:     Besides above, ROS was reviewed and is unremarkable          Medications:     I have reviewed this patient's medication profile and medications during this hospitalization.    Noted meds:    Melatonin 3 mg at bedtime  protonix 40 mg daily  Senna 1-2 tablets BID  Acetaminophen PRN- 1             Physical Exam:   Vitals were reviewed  Temp: 97.7  F (36.5  C) Temp src: Axillary BP: (!) 153/65 Pulse: 86 Heart Rate: 78 Resp: 16 SpO2: 90 % O2 Device: None (Room air)      Intake/Output Summary (Last 24 hours) at 2/4/2020 0837  Last data filed at 2/3/2020 1745  Gross per 24 hour   Intake 3 ml   Output 200 ml   Net -197 ml     Constitutional: Awake, alert, cooperative, no apparent distress  ENT: Normocephalic  Lungs: No increased work of breathing  Abdomen: non-distended  Musculoskeletal: No redness, warmth, or swelling of the joints.  Full range of motion noted.  Motor strength is 5 out of 5 all extremities bilaterally.  Tone is normal.  Neurologic: Awake, alert, oriented to self, able to answer some questions reliably   Neuropsychiatric: depressed   Skin: No rashes             Data Reviewed:       Reviewed recent labs, comments:   Sodium 140  Potassium 4.9  Creatinine 0.45  GFR > 90  WBC 10.7  Hemoglobin 9.6  Platelets 594    AAYUSH Awan CNS  Palliative Care Consult Team  Pager: [phone]    65 minutes spent. This includes 55, [phone], minutes face to face with patient and Socorro (via phone) discussing current complex health conditions and prognosis, goals of care, hospice, palliative care, symptom management. Coordination of care with the primary team, unit SW regarding goals of care and symptom management.

## 2020-02-04 NOTE — PROGRESS NOTES
CLINICAL NUTRITION SERVICES - BRIEF NOTE  *See RD note on 1/31 for nutrition reassessment details     Nutrition Prescription    Recommendations already ordered by Registered Dietitian (RD):  1. Reorder Boost Plus with all meals + additional @ 2 PM daily (strawberry flavor)  2. Calorie counts 2/4-2/6  3. Weigh patient (last recorded weight on 1/25)     Diet: DD3/Thin Liquids + Boost Plus with meals + Room Service with Assist    Per RN, Boost didn't come with breakfast tray this morning.  Spoke with patient who reports her appetite is poor, but denies any pain with eating.      ASSESSED NUTRITION NEEDS per dosing weight 42 kg (admit wt 1/25)  Estimated Energy Needs: 1470 - 1680 kcals/day (35 - 40 kcals/kg)  Justification: Repletion and Underweight  Estimated Protein Needs: 50-63+ grams protein/day (1.2 - 1.5+ grams of pro/kg)  Justification: Increased needs with acute illness and Repletion  Estimated Fluid Needs: 7905-7962 mL/day (30 - 35 mL/kg)   Justification: Maintenance, OR other per provider pending fluid status      INTERVENTIONS  Implementation  Collaboration with other providers: spoke with provider, team would like to start calorie counts to help quantify how much pt eating as it will help determine GOC/discharge planning (care conference was held today).  Per team, appetite stimulant and/or FT was offered to patient/family, and pt and family declined both.  Medical food supplement therapy: see above  Nutrition Education: discussed plan to start calorie counts, went over supplement options (pt would like strawberry Boost) and encouraged pt to drink these with and/or between meals if appetite for food poor    Monitoring/Evaluation  Progress toward goals will be monitored and evaluated per protocol.     Mehreen Charles, RD, LD  7C RD pager: [phone]

## 2020-02-04 NOTE — PLAN OF CARE
Alert, disoriented to place. Pleasant, up to bed with ceiling lift. Incontinent of bladder. Pt complaining of discomfort with pure wick. Turned and repositioned every 2 hours. Poor appetite tonight. Bed alarm on for safety. VS, afebrile. PLAN: To continue with the care plan. Care conference with grand daughter tomorrow. Daughter would like update from provider in am.

## 2020-02-04 NOTE — PLAN OF CARE
Pt AVSS. Pr remains forgetful and disoriented to place/situation. Reorients. Bed alarm on. Pt sleeping between cares. Pt cooperative with cares. Turned q 2hr. No BM noted. Attends wet and changed q 2hr. Mepilex replaced x1. Area underneath D/I pink. Pt c/o tenderness with cleansing. Sween cream applied to perineum. Lungs dim in bases. Abd round, +bs. Cont to maintain comfort. Maintain and promote good skin care.

## 2020-02-04 NOTE — PROGRESS NOTES
[facility] Progress Note - Hospitalist Service, Gold 8       Date of Admission:  1/25/2020  Assessment & Plan   This is an 86 year old female with history of acquired hemophilia A, frequent falls, and recent admission to [facility] 1/13-1/20 for sepsis 2/2 bilateral pneumonia concerning for empyema who was admitted to the [facility] 1/25 for worsening dyspnea, CT suggestive of parapneumonic effusion versus empyema.    CHANGES TODAY  - calorie count  - push supplements  - care conference    # Pneumonia with possible parapneumonic effusion vs empyema  Patient recently admitted ot [facility] for sepsis 2/2 bilateral pneumonia and imaging suggestive of potentially parapneumonic effusion vs empyema; refused possible thoracentesis and possible chest tube placement and failed outpatient management with Augmentin x1 wk. Now admitted here with potential loculation left fissure, bilateral pleural effusion, and left apical ground glass opacities -- now off O2, remains on ABx. Have deferred thoracentsis given bleeding risk with acquired hemophilia.   - continue levofloxacin 500 mg daily  - hold supplemental O2 to maintain SpO2 90-94%  - appreciate ID assistance: on discharge, obtain repeat CRPs weekly then repeat CT in 1 month; antibiotics to continue until CRP returns to baseline.    # Acquired hemophilia A  Initially diagnosed 2012 responded to treatment with steroids but recurred in May 2019 and had intracerebral hemorrhage at that time and received steroids and rituximab for several doses.  -Monitor closely for any bleeding; give 90 mcg/kg Novoseven and page the Hematology Fellow on call if spontaneous bleeding occurs  - if patient does not ultimately pursue hospice, will need to f/u with Dr. Maravilla to consider steroids and rituximab      # HTN  Uncontrolled during hospitalization requiring optimization given history of ICH. Recently taken off lisinopril due to concern  for hyponatremia, although not a typical culprit.  -Continue PTA Metoprolol 50 mg BID  -Losartan 25 mg  -Hydralazine PRN >160/100     # Anorexia with chronic protein calorie malnutrition and mild oropharyngeal dysphagia:  -Nutrition consult, appreciate assessment and recommendations  -Speech therapy consult  -Assistance with meals  -Patient defers appetite stimulant   -Care conference today: Seems that crux of patients next steps depends on whether she starts taking in PO. For now she would like to continue trying to eat more but complains of poor appetite and difficulty swallowing. Patient and granddaughter not yet ready to pursue hospice, and cannot say for sure whether the patient would want to be rehospitalized should she discharge and not take in enough PO as an outpatient. See palliative care note for add'l details.  - Start calorie counts, push supplements     # Weakness  Suspect multifactorial in setting of anorexia and h/o of ICH.   -PT/OT recommending TCU  -Palliative care consulted; appreciate assessment and recommendations    # Hypokalemia and hypomagnesemia, suspect related to poor PO  -Replete     Wound on buttocks:  -WOCN consult, appreciate assessment and recommendations     # Normocytic Anemia:  Hgb ~9. Recent baseline closer to 12, however noted to have down trend during prior hospitalization. Denies melena, hematochezia, hematuria. No signs of active bleeding. Iron studies most suggestive of anemia of inflammatory disease.  -Continue to monitor    # Severe malnutrition in the context of acute on chronic illness   - % Intake: </=75% for >/= 1 month (severe)   - Subcutaneous Fat Loss: Facial region, Upper arm and Lower arm: moderate   - Muscle Loss: Temporal, Facial & jaw region, Scapular bone, Thoracic region (clavicle, acromium bone, deltoid, trapezius, pectoral), Upper arm (bicep, tricep) and Lower arm (forearm): severe; suspect some LBM just due to aging as well.     CHRONIC   # History of  ICH  On 5/2019, patient presented with spontaneous acute deep white matter intracranial hemorrhage with corresponding left sided weakness.    # Paroxysmal A Fib  [id number] score of 4. Not on AC as not felt to be a candidate due to history of hemophilia, hemorrhagic stoke, dementia and falls. Echo 1/14/2020 with LVEF 59%.  -Continue PTA Metoprolol 50 mg BID (hold if SBP < 110 or HR < 60)        # HLD:  -Continue PTA simvastatin 20 mg HS     # GERD:  - Continue PTA famotidine and pantoprazole     # Dementia with depression:  - Continue PTA Hydroxyzine 10 mg HS PRN and Melatonin 3 mg HS    Diet: Dysphagia Diet Level 3 Advanced Thin Liquids (water, ice chips, juice, milk gelatin, ice cream, etc)  Room Service  Snacks/Supplements Adult: Boost Plus; With Meals  Room Service    DVT Prophylaxis: Pneumatic Compression Devices  Granados Catheter: not present  Code Status: DNR/DNI      Disposition Plan   Expected discharge: 2 - 3 days, recommended to prior living arrangement once taking in more PO.  Entered: Sergio Soto MD 02/04/2020, 8:16 AM       The patient's care was discussed with the Bedside Nurse, Patient, Patient's Family and palliative care Consultant.    Sergio Soto MD  Hospitalist Service, [address], [place]  Pager: [phone]  Please see sticky note for cross cover information    I spent >35 minutes in the room with patient and family in care conference as described above.  ______________________________________________________________________    Interval History   No acute events overnight. Although overnight reportedly disoriented, this morning is thinking much more clearly. Complains of some difficulty swallowing and continued loss of appetite -- wishes she could eat more. No specific complaints together. No fevers, chills, nausea, vomiting, diarrhea, difficulty breathing.    Data reviewed today: I reviewed all medications, new labs and imaging results over the last 24 hours.      Physical Exam   Vital Signs: Temp: 97.7  F (36.5  C) Temp src: Axillary BP: (!) 153/65 Pulse: 86 Heart Rate: 78 Resp: 16 SpO2: 90 % O2 Device: None (Room air)    Weight: 92 lbs 0 oz  Gen: laying in bed, comfortable  Pulm: CTAB, nonlabored  Abd: non-distended, non-tender  CV: RRR, no extra sounds    Data   Reviewed

## 2020-02-05 NOTE — PLAN OF CARE
PT-7C  PT consult order received. Per discussion with interdisciplinary team, pt has anticipated discharge of 1-2 days to LTC. Physical therapy will defer eval while inpatient. Pt has access to physical therapy services at LTC.    Discharge Planner PT   Patient plan for discharge: per care conference pt is planning to return to her previous LTC facility.  Current status: Per chart review, pt has manual wheelchair and walker and will have all necessary equipment to continue physical therapy at LTC.  Barriers to return to prior living situation: medical status  Recommendations for discharge: LTC  Rationale for recommendations: Pt has bed hold at former LTC facility, will likely discharge in 1-2 days, and has all DME and PT services available at that facility.  Entered by: Eda Kuhn 02/05/2020 8:29 AM

## 2020-02-05 NOTE — PLAN OF CARE
Discharge Planner OT   Patient plan for discharge: return to LTC   Current status: Cancel and defer OT eval, per chart review and discussion with physical therapy, pt is planning on returning to LTC facility and can initiate PT/OT at LTC. No acute changes in functional status or acute OT needs identified, will complete orders.   Barriers to return to prior living situation: medical status   Recommendations for discharge: LTC with PT/OT to progress independence with basic mobility and ADLs.   Rationale for recommendations: See above.        Entered by: Jessenia Pantoja 02/05/2020 5:01 PM

## 2020-02-05 NOTE — PROGRESS NOTES
Calorie Count  Intake recorded for: 2/4  Total Kcals: 354 Total Protein: 7g  Kcals from Hospital Food: 354  Protein: 7g  Kcals from Outside Food (average):0 Protein: 0g  # Meals Recorded: 100% apple juice, 50% banana bread, less than 25% cheerios w/ milk, hard boiled egg  # Supplements Recorded: less than 25% Boost Plus

## 2020-02-05 NOTE — PLAN OF CARE
VSS- on 2L O2. BP elevated- not within parameters for prn meds. Generalized pain controlled with prn tylenol as needed. PIV x 1 saline locked. Incontinent of bowel and bladder. BM x 1. Soaked briefs x 2 so far. Pt cooperative with cares. Repositioned q 2hours. Assist x2- wheel chair bound baseline. Dressing on coccyx c/d/i. Dysphagia level 3 diet with thin liquids- PO intake encouraged frequently. Denies nausea. Continue with plan of care.

## 2020-02-05 NOTE — PLAN OF CARE
"Vitals stable. Regular diet. On Marc counts.  Alert to person, situation and place. Pain managed with prn tylenol.  Pt making more of an effort to eat. This morning patient  had a few bites of pancakes, and oatmeal. Refusing nutritional boost shakes. Pt said she is open to having a peanut butter and jam sandwich this evening. Pt expressed that she gets \"full fast\". Refused to be turned q2 hours. Pt incontinent of urine. Had BM x1. Blanchable redness on coccyx. Coccyx dressing changed. Assist of 2 and lift.  Sat up in recliner chair.     Please continue to encourage fluids and nutrition.     "

## 2020-02-05 NOTE — PLAN OF CARE
Discharge Planner SLP   Patient plan for discharge: unknown  Current status: Recommend advance diet to regular with thin liquids to liberalize options; Pt to continue to self select softer foods from menu as needed, but tolerated trials of solids and thin liquids well this date. Pt  may benefit from ordering 1/2 portions of menu items due to report of feeling overwhelmed by the amount of food present on meal trays.  Pt should sit upright for all PO intake and take small bites/sips  Barriers to return to prior living situation: none from ST standpoint  Recommendations for discharge: return to LTC  Rationale for recommendations: Do not anticipate ST needs post discharge. Pt appears to be nearing baseline swallow function, with primary issue being poor appetite/limited interest in food.       Entered by: Milka Weems 02/05/2020 11:25 AM

## 2020-02-05 NOTE — PLAN OF CARE
Alert and oriented this shift. Bed alarm on for safety. Turned and repositioned every 2 hours. Incontinent of bladder x 2 and bowels x 1 this shift. Poor appetite for dinner. Lots of encouragement done and offered boost but pt refused. OVSS, afebrile. Coccyx with blanchable erythema. PLAN: To discharge to facility once placement determined and able to improve with nutrition.

## 2020-02-05 NOTE — PROGRESS NOTES
[facility], [facility] Progress Note - Hospitalist Service, Gold 8       Date of Admission:  1/25/2020  Assessment & Plan   This is an 86 year old female with history of acquired hemophilia A, frequent falls, and recent admission to [facility] 1/13-1/20 for sepsis 2/2 bilateral pneumonia concerning for empyema who was admitted to the [facility] 1/25 for worsening dyspnea, CT suggestive of parapneumonic effusion versus empyema.    # Pneumonia with possible parapneumonic effusion vs empyema  Patient recently admitted ot [facility] for sepsis due to bilateral pneumonia and imaging suggestive of potentially parapneumonic effusion vs empyema; refused possible thoracentesis and possible chest tube placement and failed outpatient management with Augmentin x1 wk. Now admitted here with potential loculation in left fissure, bilateral pleural effusions, and left apical ground glass opacities -- now off O2, remains on ABx. Have deferred thoracentsis given bleeding risk with acquired hemophilia.   - continue levofloxacin 500 mg daily  - hold supplemental O2 to maintain SpO2 90-94%  - appreciate ID assistance: on discharge, obtain repeat CRPs weekly then repeat CT in 1 month; antibiotics to continue until CRP returns to baseline.    # Acquired hemophilia A  Initially diagnosed 2012 responded to treatment with steroids but recurred in May 2019 and had intracerebral hemorrhage at that time and received steroids and rituximab for several doses.  -Monitor closely for any bleeding; give 90 mcg/kg Novoseven and page the Hematology Fellow on call if spontaneous bleeding occurs  - if patient does not ultimately pursue hospice, will need to f/u with Dr. Maravilla to consider steroids and rituximab      # HTN  Uncontrolled during hospitalization requiring optimization given history of ICH. Recently taken off lisinopril due to concern for hyponatremia, although not a typical culprit.  -Continue PTA  Metoprolol 50 mg BID  -Losartan 25 mg daily  -Hydralazine PRN >160/100     # Anorexia with chronic protein calorie malnutrition and mild oropharyngeal dysphagia:  -Nutrition consult, appreciate assessment and recommendations  -Speech therapy consult  -Assistance with meals  -Patient defers appetite stimulant   -Care conference 2/4: Seems that crux of patient's next steps depend on whether she starts taking in PO. For now she would like to continue trying to eat more but complains of poor appetite and difficulty swallowing. Patient and granddaughter not yet ready to pursue hospice, and cannot say for sure whether the patient would want to be rehospitalized should she discharge and not take in enough PO as an outpatient. See palliative care note for add'l details.  - Start calorie counts, push supplements  - Speech has cleared for reg diet with thin liquids     # Weakness  Suspect multifactorial in setting of anorexia and h/o of ICH.   -PT/OT recommending TCU  -Palliative care consulted; appreciate assessment and recommendations    # Hypokalemia and hypomagnesemia  - suspect related to poor PO intake  -Replete     # Wound on buttocks:  -WOCN consult, appreciate assessment and recommendations     # Normocytic Anemia:  Hgb ~9. Recent baseline closer to 12, however noted to have down trend during prior hospitalization. Denies melena, hematochezia, hematuria. No signs of active bleeding. Iron studies most suggestive of anemia of inflammatory disease.  -Continue to monitor    # Severe malnutrition in the context of acute on chronic illness   - % Intake: </=75% for >/= 1 month (severe)   - Subcutaneous Fat Loss: Facial region, Upper arm and Lower arm: moderate   - Muscle Loss: Temporal, Facial & jaw region, Scapular bone, Thoracic region (clavicle, acromium bone, deltoid, trapezius, pectoral), Upper arm (bicep, tricep) and Lower arm (forearm): severe; suspect some LBM just due to aging as well.   - cont to work on PO  intake; speech has cleared for reg diet with thin liquids  - supplement shakes as able    # History of ICH  On 5/2019, patient presented with spontaneous acute deep white matter intracranial hemorrhage with corresponding left sided weakness.  - no acute issues    # Paroxysmal A Fib  [id number] score of 4. Not on AC as not felt to be a candidate due to history of hemophilia, hemorrhagic stoke, dementia and falls. Echo 1/14/2020 with LVEF 59%.  -Continue PTA Metoprolol 50 mg BID (hold if SBP < 110 or HR < 60)        # HLD:  -Continue PTA simvastatin 20 mg HS     # GERD:  - Continue PTA famotidine and pantoprazole     # Dementia with depression:  - Continue PTA Hydroxyzine 10 mg HS PRN and Melatonin 3 mg HS    Diet: Room Service  Room Service  Calorie Counts  Snacks/Supplements Adult: Boost Plus; With Meals  Snacks/Supplements Adult: Boost Plus; Between Meals  Regular Diet Adult    DVT Prophylaxis: Pneumatic Compression Devices  Granados Catheter: not present  Code Status: DNR/DNI      Disposition Plan   Expected discharge: 2 - 3 days, recommended to prior living arrangement once taking in more PO.  Entered: Yari Higuera MD 02/05/2020, 4:49 PM       The patient's care was discussed with the Bedside Nurse and Patient.    Yari Higuera MD  Hospitalist Service, [address], [place]  Pager: [phone]  Please see sticky note for cross cover information      ______________________________________________________________________    Interval History   No acute events overnight. Reports she doesn't like sitting up in the chair as it makes it harder for her to breathe. Occ cough, nonproductive. Denies pain currently. Still with minimal appetite, but would be interested in trying peanut butter toast.    Data reviewed today: I reviewed all medications, new labs and imaging results over the last 24 hours.     Physical Exam   Vital Signs: Temp: 96.7  F (35.9  C) Temp src: Oral BP: 131/67  Pulse: 80 Heart Rate: 88 Resp: 16 SpO2: 94 % O2 Device: None (Room air) Oxygen Delivery: 1.5 LPM  Weight: 105 lbs 13.13 oz  Constitutional: Thin elderly female sitting up in NAD  HEENT: NC/AT, no scleral icterus, nl conjunctiva, moist oral mucosa, nl dentition  Respiratory: good inspiratory effort, lungs diminished at bases, but o/w clear  Cardiovascular: RRR, no M/R/G, 2+ radial pulses  GI: soft, nontender, nondistended, no rebound/guarding  Skin: no rashes, warm, dry  Neuro: CN 2-12 grossly intact, sensation grossly intact  MSK:  able to move all 4 exts  Psych: nl mood/affect      Data   Reviewed

## 2020-02-06 NOTE — PLAN OF CARE
Increased confusion overnight, intermittently disoriented to time/place/situation. Bed alarm. VSS. BP elevated- not within parameters for prn meds. Denies pain. Pt reported an itchy back, lotion/massage given and repositioned with some relief. Atarax given. PIV x 1 saline locked. Incontinent of bowel and bladder. No BM's. Soaked briefs x 3 so far. Pt cooperative with cares. Repositioned q 2hours. Assist x2- wheel chair bound baseline. Dressing on coccyx c/d/i. Regular diet- PO intake encouraged frequently. Denies nausea. Continue with plan of care.       Addendum: Last repositioned at 0650. Smear BM x1 and soaked brief.

## 2020-02-06 NOTE — PROGRESS NOTES
[facility]  Palliative Care Daily Progress Note       Recommendations & Counseling       Completed POLST today    Discharge to LTC with hospice    Continue current medications and this can be re-evaluated once the hospice team sees her          Assessments          Jackeline Smiley is a 86 year old female with acquired hemophilia A and history of intracranial hemorrhage and dementia admitted on 1/25 for bilateral pneumonia and empyema.      Today, the patient was seen for:  Empyema without ability to perform a thoracentesis due to current state of health including hemophilia.   CVA  AMS  Anorexia    Prognosis, Goals, or Advance Care Planning was addressed today with: Yes.    Met with patient today who was able to state some of her preferences at this time would be not to return to the hospital, and I explained to her what that means in terms of possibility her next health event could lead to death. Called her [facility], [place] today, to discuss goals of care. Discussed my conversation with Jackeline and reviewed where she was at with her eating. Discussed she is not taking in enough to sustain her and if she continues on this pathway she will continue to decline. Given her multiple complex health issues, if she does not continue restorative measures its likely that she has months. After my conversation with Maxi she was in agreement with Jackeline's stated preferences today to discharge to her facility with hospice care.    Mood, coping, and/or meaning in the context of serious illness were addressed today: Yes.  Summary/Comments: coping well at this time            Interval History:     Chart review/discussion with unit or clinical team members:   Notes reviewed, no acute events, calorie counts about 350 Kcals a day. More confused overnight.    Per patient or family/caregivers today:  Patient is overall feeling very tired and weak. Ate most of her pancakes this morning and 1/2  apple juice. Wants to get home for her birthday and possibly go out to a restaurant.     Key Palliative Symptoms:  We are not helping to manage these symptoms currently in this patient.               Review of Systems:     Besides above, ROS was reviewed and is unremarkable          Medications:     I have reviewed this patient's medication profile and medications during this hospitalization.    Noted meds:    Melatonin 3 mg at bedtime  protonix 40 mg daily  Senna 1-2 tablets BID  Acetaminophen PRN- x3  Hydroxyzine 10 mg at bedtime PRN- x1             Physical Exam:   Vitals were reviewed  Temp: 98.2  F (36.8  C) Temp src: Oral BP: 123/62 Pulse: 83 Heart Rate: 95 Resp: 16 SpO2: 90 % O2 Device: None (Room air)      Intake/Output Summary (Last 24 hours) at 2/6/2020 1007  Last data filed at 2/6/2020 0900  Gross per 24 hour   Intake 280 ml   Output --   Net 280 ml     Constitutional: Awake, alert, cooperative, no apparent distress  ENT: Normocephalic  Lungs: No increased work of breathing  Abdomen: non-distended  Musculoskeletal: No redness, warmth, or swelling of the joints.   Neurologic: Awake, alert, oriented to self, able to answer some questions reliably   Neuropsychiatric: normal affect  Skin: No rashes           Data Reviewed:       Reviewed recent labs, comments:   Sodium 142  Potassium 3.7  Creatinine 0.40  GFR > 90  WBC 9.2  Hemoglobin 8.6  Platelets 540    AAYUSH Awan CNS  Palliative Care Consult Team  Pager: [phone]    40 minutes spent. This includes 15 minutes face to face with patient discussing current complex health conditions and prognosis, goals of care, and hospice. Coordination of care with the primary team, unit SW, and daughter regarding goals of care and symptom management.

## 2020-02-06 NOTE — PLAN OF CARE
Discharge Planner SLP   Patient plan for discharge: Unknown  Current status: Recommend continuation of regular diet and thin liquids, with 1:1 supervision and meal tray set-up.  Ensure pt is fully alert and upright for all PO, given small bites/sips, alternating bites/sips.  Pt with functional oropharyngeal swallowing mechanism.  SLP to complete order.  Barriers to return to prior living situation: None per SLP perspective   Recommendations for discharge: Defer to PT/OT  Rationale for recommendations: Functional oropharyngeal swallowing mechanism, pt will continue to require supervision during PO intake per baseline dx of dementia.       Entered by: Maddie Neff 02/06/2020 10:12 AM    Speech Language Therapy Discharge Summary    Reason for therapy discharge:    All goals and outcomes met, no further needs identified.    Progress towards therapy goal(s). See goals on Care Plan in [facility] electronic health record for goal details.  Goals met    Therapy recommendation(s):    No further therapy is recommended.

## 2020-02-06 NOTE — DISCHARGE SUMMARY
[facility], [place]  Hospitalist Discharge Summary       Date of Admission:  1/25/2020  Date of Discharge:  2/6/2020  Discharging Provider: Yari Higuera MD  Discharge Team: Hospitalist Service, Gold 8    Discharge Diagnoses   Pneumonia with possible parapneumonic effusion, suspected gram negative bacteria  Acquired hemophilia A  HTN  Anorexia with chronic severe protein calorie malnutrition  Generalized weakness, deconditioning  History of intracranial hemorrhage  Paroxysmal Afib  Dementia with depression  GERD  HLD    Follow-ups Needed After Discharge   Follow-up Appointments     Follow Up and recommended labs and tests      Follow up with Nursing home physician.  No follow up labs or test are   needed.    Hospice admission meeting on 2/15/20 at 9:30AM             Unresulted Labs Ordered in the Past 30 Days of this Admission     No orders found from 12/26/2019 to 1/26/2020.      These results will be followed up by NA    Discharge Disposition   Discharged to long-term care facility  Condition at discharge: Stable    Hospital Course   This is an 86 year old female with history of acquired hemophilia A, frequent falls, and recent admission to OSH 1/13-1/20 for sepsis due to bilateral pneumonia concerning for empyema who was admitted to the [facility] 1/25 for worsening dyspnea, CT suggestive of parapneumonic effusion versus empyema. Due to patient's hemophilia A and juli reticence to have procedure, she did not have thoracentesis but was placed on empiric antibiotics. ID was consulted and eventually recommended transition to PO levaquin. Pt had markedly decreased appetite, poor PO intake, and severe protein calorie malnutrition. Palliative Care had been consulted and after a care conference and continued monitoring of PO intake, appetite, they had a discussion with the granddaughter, Socorro, who is the POA and decision was made to transition patient to hospice care. She  will go back to her long-term care facility with meeting set up for hospice on 2/15 at 9:30AM. Pt will continue levaquin for 2 weeks and can be reassessed for discontinuation at that time. POLST was filled out by Palliative Care.    # Pneumonia with possible parapneumonic effusion vs empyema  Patient recently admitted ot [facility] for sepsis due to bilateral pneumonia and imaging suggestive of potentially parapneumonic effusion vs empyema; refused possible thoracentesis and possible chest tube placement and failed outpatient management with Augmentin x1 wk. Now admitted here with potential loculation in left fissure, bilateral pleural effusions, and left apical ground glass opacities -- now off O2, remains on ABx. Have deferred thoracentsis given bleeding risk with acquired hemophilia.   - continue levofloxacin 500 mg daily for 2 weeks and reassess at hospice meeting on 2/15  - pt to discharge back to her long-term care facility with hospice    # Acquired hemophilia A  Initially diagnosed 2012 responded to treatment with steroids but recurred in May 2019 and had intracerebral hemorrhage at that time and received steroids and rituximab for several doses.  - no active bleeding during this admission     # HTN  Uncontrolled during hospitalization requiring optimization given history of ICH. Recently taken off lisinopril due to concern for hyponatremia.  -Continue PTA Metoprolol 50 mg BID  -Losartan 25 mg daily     # Anorexia with chronic protein calorie malnutrition and mild oropharyngeal dysphagia:  -Nutrition consulted, appreciate assessment and recommendations  -Speech therapy consulted and cleared for reg diet, thin liquids  -Assistance with meals  -Patient deferred appetite stimulant   -Care conference 2/4: Seems that crux of patient's next steps depend on whether she starts taking in PO. Given that over the next few days, pt has had little to no appetite and has been eating very little, Palliative Care discussed  transition to hospice which granddaughter, Socorro, who is POA is in agreement with  - PO intake as tolerated     # Weakness  Suspect multifactorial in setting of anorexia and h/o of ICH.   -Palliative care consulted; appreciate assessment and recommendations - after further discussion with patient and granddaughter, they made decision to transition her to hospice given her continued poor appetite and PO intake  - will return to her long-term care facility and have hospice admission meeting set for 2/15 at 9:30AM    # Hypokalemia and hypomagnesemia  - suspect related to poor PO intake  -Replete as needed     # Wound on buttocks:  -cont wound care recommendations     # Normocytic Anemia:  Hgb ~9. Recent baseline closer to 12, however noted to have down trend during prior hospitalization. Denies melena, hematochezia, hematuria. No signs of active bleeding during this admission. Iron studies most suggestive of anemia of inflammatory disease.    # Severe malnutrition in the context of acute on chronic illness   - % Intake: </=75% for >/= 1 month (severe)   - Subcutaneous Fat Loss: Facial region, Upper arm and Lower arm: moderate   - Muscle Loss: Temporal, Facial & jaw region, Scapular bone, Thoracic region (clavicle, acromium bone, deltoid, trapezius, pectoral), Upper arm (bicep, tricep) and Lower arm (forearm): severe; suspect some LBM just due to aging as well.   - cont to work on PO intake; speech has cleared for reg diet with thin liquids  - supplement shakes as able; pt does not tolerate them well  - appetite markedly decreased    # History of ICH  On 5/2019, patient presented with spontaneous acute deep white matter intracranial hemorrhage with corresponding left sided weakness.  - no acute issues    # Paroxysmal A Fib  [id number] score of 4. Not on AC as not felt to be a candidate due to history of hemophilia, hemorrhagic stoke, dementia and falls. Echo 1/14/2020 with LVEF 59%.  -Continue PTA Metoprolol 50 mg BID  (hold if SBP < 110 or HR < 60)        # HLD:  -Continue PTA simvastatin 20 mg HS     # GERD:  - Continue PTA famotidine and pantoprazole     # Dementia with depression:  - Continue PTA Hydroxyzine 10 mg HS PRN and Melatonin 3 mg HS    Diet: Room Service  Room Service  Calorie Counts  Snacks/Supplements Adult: Boost Plus; With Meals  Snacks/Supplements Adult: Boost Plus; Between Meals  Regular Diet Adult    DVT Prophylaxis: Pneumatic Compression Devices  Granados Catheter: not present  Code Status: DNR/DNI        Consultations This Hospital Stay   PHARMACY TO DOSE VANCO  WOUND OSTOMY CONTINENCE NURSE  IP CONSULT  PHYSICAL THERAPY ADULT IP CONSULT  OCCUPATIONAL THERAPY ADULT IP CONSULT  NUTRITION SERVICES ADULT IP CONSULT  INFECTIOUS DISEASE GENERAL ADULT IP CONSULT  VASCULAR ACCESS CARE ADULT IP CONSULT  VASCULAR ACCESS CARE ADULT IP CONSULT  MEDICATION HISTORY IP PHARMACY CONSULT  INTERNAL MEDICINE PROCEDURE TEAM ADULT IP CONSULT [facility] - THORACENTESIS  HEMATOLOGY ADULT IP CONSULT  VASCULAR ACCESS CARE ADULT IP CONSULT  PALLIATIVE CARE ADULT IP CONSULT  PHARMACY IP CONSULT  RESPIRATORY CARE IP CONSULT  VASCULAR ACCESS CARE ADULT IP CONSULT  SPEECH LANGUAGE PATH ADULT IP CONSULT  PHYSICAL THERAPY ADULT IP CONSULT  OCCUPATIONAL THERAPY ADULT IP CONSULT    Code Status   DNR/DNI    Time Spent on this Encounter   I, Yari Higuera MD, personally saw the patient today and spent greater than 30 minutes discharging this patient.       Yari Higuera MD  [facility], [place]  ______________________________________________________________________    Physical Exam   Vital Signs: Temp: 98.2  F (36.8  C) Temp src: Oral BP: 123/62 Pulse: 83 Heart Rate: 95 Resp: 16 SpO2: 90 % O2 Device: None (Room air)    Weight: 124 lbs 5.43 oz  Constitutional: Thin elderly female sitting up in NAD  HEENT: NC/AT, no scleral icterus, nl conjunctiva, moist oral mucosa, nl dentition  Respiratory: good inspiratory  effort, lungs diminished at bases, but o/w clear  Cardiovascular: RRR, no M/R/G, 2+ radial pulses  GI: soft, nondistended  Skin: no rashes, warm, dry  Neuro: CN 2-12 grossly intact, sensation grossly intact  MSK:  able to move all 4 exts  Psych: nl mood/affect       Primary Care Physician   Lucía Mendoza    Discharge Orders      General info for SNF    Length of Stay Estimate: Long Term Care  Condition at Discharge: Stable  Level of care:skilled   Rehabilitation Potential: Poor  Admission H&P remains valid and up-to-date: Yes  Recent Chemotherapy: N/A  Use Nursing Home Standing Orders: Yes     Reason for your hospital stay    Pneumonia     Follow Up and recommended labs and tests    Follow up with Nursing home physician.  No follow up labs or test are needed.    Hospice admission meeting on 2/15/20 at 9:30AM     Activity - Up ad maria alejandra     Activity - Up with nursing assistance     Fall precautions     Advance Diet as Tolerated    Follow this diet upon discharge: regular diet, thin liquids       Significant Results and Procedures   Most Recent 3 CBC's:  Recent Labs   Lab Test 02/05/20  0932 02/04/20  0852 02/03/20  0922   WBC 9.2 9.5 10.7   HGB 8.6* 8.3* 9.6*   MCV 96 98 97   * 558* 594*     Most Recent 3 BMP's:  Recent Labs   Lab Test 02/05/20  1031 02/05/20  0932 02/04/20  0852    Canceled, Test credited 141   POTASSIUM 3.7 Canceled, Test credited 4.1   CHLORIDE 110* Canceled, Test credited 111*   CO2 26 Canceled, Test credited 23   BUN 4* Canceled, Test credited 4*   CR 0.40* Canceled, Test credited 0.41*   ANIONGAP 6 Canceled, Test credited 7   MICK 8.3* Canceled, Test credited 8.5   GLC 90 Canceled, Test credited 77     Most Recent ESR & CRP:  Recent Labs   Lab Test 02/04/20  0852   CRP 74.0*   ,   Results for orders placed or performed during the hospital encounter of 01/25/20   XR Chest Port 1 View    Narrative     Examination:  XR CHEST PORT 1 VW     Date:  1/25/2020 4:17 PM      Clinical Information:  cough     Additional Information: none    Comparison: 1/13/2020 chest x-ray.    Findings:     Since the previous examination there has been interval clearing of  much of the lower lung on the right. Left retrocardiac and basilar  atelectasis/consolidation has developed since previous study. There is  a small focal consolidation left mid lung.    There is aortic tortuosity calcifications. There is upper thoracic  scoliosis convex to the right.      Impression    Impression:    1. The left lower lobe and mid lung atelectasis/consolidation.  2. Interval improvement in right mid and lower lung consolidation..    DEJA STONER MD   CT Chest w/o Contrast    [facility] CT CHEST W/O CONTRAST  1/26/2020 11:25 AM    History:  Pneumonia, unresolved or complicated.     COMPARISON: Chest x-ray 1/25/2020.    TECHNIQUE: CT imaging obtained through the chest with intravenous  contrast. Coronal and axial MIP reformatted images obtained.    FINDINGS:  Heart size is enlarged. There is no pericardial effusion.  Ossifications of the coronary artery, mitral valve and aorta. Thoracic  aorta and main pulmonary artery caliber within normal limits. No  significant mediastinal, hilum or axillary lymphadenopathy.     Central tracheobronchial tree is patent. There is no mass or  consolidation. No significant pulmonary nodules. Bilateral large  pleural effusions with layering of fluid in the bilateral fissures.  Question of loculation of fluid in the left fissure mid lung.  Associated bibasilar atelectasis. Left apical groundglass opacities.    Bones and soft tissues: Significant degenerative changes of the  thoracic spine with thoracolumbar rotatory scoliosis. No suspicious  bone findings. Soft tissue is unremarkable.    Partially imaged upper abdomen: Limited.      Impression    IMPRESSION:   1. Bilateral large pleural effusions with layering of fluid in the  bilateral fissures. Question of loculation of fluid in the left  fissure  suggesting empyema.  2. Left apical groundglass opacities likely representing infectious  process.  3. Bibasilar atelectasis.    I have personally reviewed the examination and initial interpretation  and I agree with the findings.    DEJA STONER MD   POC US Guide for Thorcentesis    Impression    Imaging not completed.    Ladan Burk   XR Chest Port 1 View    Narrative    Exam: XR CHEST PORT 1 VW, 1/31/2020 10:47 AM    Indication: Pneumonia    Comparison: CT 1/26/2020, chest radiograph 1/25/2020    Findings:   AP view of the chest. Diffuse interstitial markings and patchy  airspace opacities in the mid and lower lung similar to prior exam.  Unchanged bilateral pleural effusions, accounting for differences in  technique. Cardiac silhouette remains partially obscured. Extensive  vascular calcifications of the aortic arch. No acute findings in the  upper abdomen. Degenerative changes of the spine with a convex right  scoliotic curvature. No acute osseous findings.      Impression    Impression:   1. Patchy mid and lower lung airspace opacities similar to prior exam.  2. Interstitial markings suggestive concurrent pulmonary edema,  unchanged.  3. Unchanged bilateral pleural effusions accounting for differences in  technique.    I have personally reviewed the examination and initial interpretation  and I agree with the findings.    BRENDA SERVIN MD       Discharge Medications   Current Discharge Medication List      START taking these medications    Details   levofloxacin (LEVAQUIN) 500 MG tablet Take 1 tablet (500 mg) by mouth daily for 14 days Antibiotic continuation can be reconsidered once patient admitted to hospice  Qty: 14 tablet, Refills: 0    Associated Diagnoses: Pneumonia of both lungs due to infectious organism, unspecified part of lung      losartan (COZAAR) 25 MG tablet Take 1 tablet (25 mg) by mouth daily    Associated Diagnoses: Essential hypertension         CONTINUE these medications which have  NOT CHANGED    Details   acetaminophen (TYLENOL) 500 MG tablet Take 1,000 mg by mouth 3 times daily      calcium carbonate-vitamin D (CALCIUM + D) 600-200 MG-UNIT TABS Take 1 tablet by mouth 2 times daily  Qty: 180 tablet, Refills: 3    Associated Diagnoses: Osteoarthritis, unspecified osteoarthritis type, unspecified site      FAMOTIDINE PO Take 20 mg by mouth At Bedtime      HYDROXYZINE HCL PO Take 10 mg by mouth At Bedtime       magnesium hydroxide (MILK OF MAGNESIA) 400 MG/5ML suspension Take 30 mLs by mouth as needed for constipation or heartburn      menthol-zinc oxide (CALMOSEPTINE) 0.44-20.6 % OINT ointment Apply topically 4 times daily as needed for skin protection Apply to perineal area topically as needed for itching and rash three to four times daily      METOPROLOL TARTRATE PO Take 50 mg by mouth 2 times daily      pantoprazole (PROTONIX) 40 MG EC tablet Take 1 tablet (40 mg) by mouth every morning (before breakfast)    Associated Diagnoses: Hemophilia A (H)      SENNA-docusate sodium (SENNA S) 8.6-50 MG tablet Take 1 tablet by mouth 2 times daily as needed (constipation)    Associated Diagnoses: Constipation, unspecified constipation type      simvastatin (ZOCOR) 20 MG tablet Take 1 tablet (20 mg) by mouth At Bedtime  Qty: 30 tablet, Refills: 3    Associated Diagnoses: Hyperlipidemia LDL goal <100      vitamin D3 (CHOLECALCIFEROL) 1000 units (25 mcg) tablet Take 1 tablet (1,000 Units) by mouth daily  Qty: 60 tablet, Refills: 6    Associated Diagnoses: Vitamin deficiency         STOP taking these medications       amoxicillin-clavulanate (AUGMENTIN) 875-125 MG tablet Comments:   Reason for Stopping:         hydrALAZINE (APRESOLINE) 10 MG tablet Comments:   Reason for Stopping:             Allergies   Allergies   Allergen Reactions     Atorvastatin Calcium      hepatitis     Calcium Channel Blockers      Sulfa Drugs Fatigue

## 2020-02-06 NOTE — PLAN OF CARE
"Assumed care of pt from [id number]    Pt belongings packed and AVS printed and faxed to [facility]. Spoke with Nurse Miko and gave RN report. RN at [facility] said \"I read the discharge information and know the patient\". Explained to YVETTE Crouch that I needed to give report as pt has orders for new medications and there were new updates to the pts health since pt has left the care center. I encouraged the RN to listen to the update I had regarding the pt and the RN laughed and said \"okay\". During report the RN said many times \"I know this already\". I completed report and  YVETTE Crouch did not have no questions and said she knows the pt. Pt packet with all discharge info sent with pt transport. Pt transported via pt wheelchair to [facility]. Previous RN removed PIV's. Pt incontinent of urine and stool. Brief changed prior to leaving as it was saturated with urine. PRN Tylenol given for back and leg pain prior to leaving the unit. Pt left with all belongings.    "

## 2020-02-06 NOTE — PROGRESS NOTES
Calorie Count  Intake recorded for: 2/5  Total Kcals: 356 Total Protein: 16g  Kcals from Hospital Food: 356  Protein: 16g  Kcals from Outside Food (average):0 Protein: 0g  # Meals Recorded: 2 meals (First - 25% apple juice, less than 25% pancake w/ syrup, oatmeal)      (Second - 100% skim milk, 50% peanut butter sandwich)  # Supplements Recorded: 0

## 2020-02-06 NOTE — PROGRESS NOTES
Social Work Services Discharge Note      Patient Name:  Jackeline Smiley     Anticipated Discharge Date:  2/6/20    Discharge Disposition:   LTC:  [facility]   [address]  [facility] [address]  Main: [phone]  Admissions: [phone]  Fax: [phone])    Following MD:  per facility     Pre-Admission Screening (PAS) - N/A - return to LTC     Additional Services/Equipment Arranged:  WC ride at 4:15 PM via [employer] (Ph: [phone]). Patient's personal wheelchair is in her room. SW contacted [facility], Pt & granddaughter have already met with them and meet qualifications for hospice. [facility] team will meet w/ Pt and family at the LTC facility upon discharge - Per granddaughter request, sign on will occur Sat 9/15 at 9:30 AM.  [facility] [facility] NP within the facility is being updated of this information as well. Pt has no new equipment needs for hospice at this time.    Patient / Family response to discharge plan:  Pt/family are agreeable to discharge to LTC today.     Persons notified of above discharge plan:  Pt, Pt's granddaughter Socorro, LTC Admissions, bedside nurse, NST, charge nurse, Primary Team    Staff Discharge Instructions:  Please fax discharge orders and signed hard scripts for any controlled substances.  Please print a packet and send with patient.   Please call for Nurse to Nurse Report: [phone].    CTS Handoff completed:  YES    Medicare Notice of Rights provided to the patient/family:  YES    LUISA Damon, MARICRUZ  7C Surgical/Oncology Unit   Phone: [phone]  Pager: [phone]

## 2020-02-06 NOTE — PLAN OF CARE
"Assumed care of pt from [id number]     Pt belongings packed and AVS printed and faxed to [facility]. Spoke with Nurse Miko and gave RN report. RN at [facility] said \"I read the discharge information and know the patient\". Explained to YVETTE Crouch that I needed to give report as pt has orders for new medications and there were new updates to the pts health since pt has left the care center. I encouraged the RN to listen to the update I had regarding the pt and the RN laughed and said \"okay\". During report the RN said many times \"I know this already\". I completed report and  YVETTE Crouch did not have no questions and said she knows the pt. Pt packet with all discharge info sent with pt transport. Pt transported via pt wheelchair to [facility]. Previous RN removed PIV's. Pt incontinent of urine and stool. Brief changed prior to leaving as it was saturated with urine. PRN Tylenol given for back and leg pain prior to leaving the unit. Pt left with all belongings.    "

## 2020-02-06 NOTE — PLAN OF CARE
Ate half of her pancake and half of her apple juice this morning with encouragement.  Pt expresses that she typically does not have an appetite and only eats small amounts. A & O. Sat up in recliner for one hour then wanted to go back into bed. Pain managed with prn tylenol. Incontinent of stool and urine. Coccyx dressing C/D/I. Repositioned pt as she allowed. Plan is for potential discharge this evening or tomorrow to previous living arrangement.     Update: Patient will discharge today. Wheel chair transport set up for 1615 pm

## 2020-02-06 NOTE — PLAN OF CARE
BP (!) 146/61 (BP Location: Left arm)   Pulse 83   Temp 98.3  F (36.8  C) (Oral)   Resp 16   Wt 48 kg (105 lb 13.1 oz)   SpO2 92%   BMI 17.61 kg/m      Bed alarm. VSS on RA ex hypertensive at baseline. Intermittently disoriented to situation. Back pain controlled with PRN Tylenlol. Chairfast. Turn Q2h as allowed by pt. Incont of stool and urine. Brief in place. Tolerating small amounts of DD3 diet. Ate half of a piece of peanutbutter bread. Marc counts observed. PIV SL. Pt resting comfortably between cares. Continue POC.

## 2020-02-07 NOTE — PROGRESS NOTES
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. Hospital stay 1/25/20 - 2/6/20.     Admitted to this facility for  medical management, nursing care and hospice.    HPI:    HPI information obtained from: facility chart records, facility staff, patient report and [facility] chart review.     Recent history reviewed:  Acquired hemophilia (factor VIII inhibitor) originally diagnosed in 2012 initially in remission with steroids, but relapsed in May 2019. Hospitalized 5/17/19 with sudden onset left sided weakness and fall secondary to acute intracranial hemorrhage. Per hematology this was thought due to factor VIII inhibitor, as her aPTT was elevated on admission; she was transferred to [facility]. She was treated for the acute hemorrhage with Novoseven; inhibitor suppression regimen of methylprednisolone and weekly rituximab (4 planned doses). Discharged on 5/25/19 with plan to taper methylprednisolone, but she presented to ER 5/26/19 with fall and head strike. CT showed no change in prior ICH. Hospitalized again 6/10/19 with dizziness and mental status in setting of hyponatremia. Sodium improved with fluid restriction. Discharged to [facility] with follow-up care through [facility].  Did not follow-up in hematology clinic as recommended.    To ER 8/31/19 with  mechanical fall, fell out of bed when attempting to tie her shoes with head trauma, CT head/c-spine negative, chronically low sodium. Last visit with hematology 10/03/2019, between June and October tapered from 50 mg Prednisone daily to 15 mg Prednisone daily. Completed taper off Prednisone on 10/17/19. Plan for repeat labs and follow-up with hematology December 2019, but missed appointment, which has not been rescheduled.     Sent to [facility] ER 1/13 with new onset Afib with RVR in setting of sepsis due to pneumonia. Transferred to [facility] in [place] due to bed availablity. Failed outpatient treatment for pneumonia, which was initialy diagnosed 1/10 - presented as right sided pain and showed infiltrate on x-ray. Initially treated with IM Rocephin, azithromycin and Augmentin. Transferred to hospital due to rapid HR. In hospital treated with IV Zosyn and azithromycin. Blood cultures were negative. WBC initially trended down, but continued to worsen and repeat CXR ordered which showed BL lung poacities possibly BL pneumonia, prolactonin elevated and mild elevated BNP. Increased abx coverage to vancomycin. Chest CT showed small loculated right pleural effusion, small left pleural effusion and 11 mm lingular nodule with adjacent ground glass opacity concern for infection vs malignancy. With on going leukocytosis despite board spectrum antibiotics concern for empyema, advised thoracentesis and possible chest tube, but patient refused. Granddaughter wanted to consider thoracentesis and then consider agressive care vs hospice after more information obtained from fluid culture, but patient adamantly refused and would not undergo procedure after discussion with inpatient team. Patient also refused IV antibiotics on hospital discharge. She insisted on being discharged on PO antibiotics. Inpatient team consulted with ID and, per discharge, discussed risk/benefit with patient, including the fact she could become septic,  which could ultimately lead to death. Still elected to be discharged from hospital on PO antibiotics and follow-up with hospice at LTC facility.   Afib converted to NSR with IV hydration and antibiotics, VALERIO-VASc score is 4, not a candidate for anticoagulation given history of hemophilia, hemorrhagic stroke, and recurrent falls. Continued on prior to admission metoprolol. Dysphagia also noted, video swallow evaluation was done patient qualified for dysphagia type III diet. Hospital team unable to reach granddaughter prior to discharge despite multiple calls.     Other health issues include hypertension managed with metoprolol. Lisinopril stopped due hyponatremia. Hyperlipidemia which is managed with Zocor. Osteoarthritis pain managed with scheduled Tylenol. Gait abnormality with weakness and repeated falls, at wheelchair level for mobility, high fall risk due to poor safety awarness. Cognitive impairment, 17/30 on SLUMS in June 2019 and 14/15 on BIMS on in Sept 2019, takes Vistaril for sleep and anxiety, needs assistance for decision making. GERD is managed with pantoprazole and famotidine. Lifelong smoker up until stroke in May 2019.      Brief Summary of Hospital Course:   On 1/25/20 granddaughter took resident out of facility for lunch and ended up bringing her to [facility] emergency deparatment due to dyspnea. CT completed and showed parapenumonic effusion vs empyema and a thoracensis was again recommended. However, due to patient's hemophilia A and patient's wish not to under go procedure she was instead placed on empiric oral antibiotics with guidance of ID: started on Levaquin. Other concerns included decreased appetite, poor oral intake with resultant severe protein-calorie malnutrition. Due to failure to thrive picture and patient wish for limited interventions, palliative care was consulted and patient elected to return to LTC facility with plan for hospice admission on 2/15 when granddaughter can be  "present. Plan is to continue oral antibiotics for two weeks and reassess clinical status. Formal HCD completed in hospital and now on file naming Socorro as health care agent.     Updates on Status Since Skilled nursing Admission:   Resident is seen in bed after breakfast. Reports she feels \"terrible\". Thinks she is still in the hospital. Aware she is being treated for pneumonia and endorses that fact that she didn't want to undergo any procedure. Reports she hates the hospital and does not want to go back. No SOB or cough. No chest pain. No abd pain. Thinks she is moving her bowels regularly. Incontinent of urine, bed is saturated, soft stool in brief. Reports pain in low back. Incontinence associated dermitis to buttocks, no open area reported. Limited meaningful history from resident due to cognitive impairment.    CODE STATUS/ADVANCE DIRECTIVES DISCUSSION:   DNR / DNI and selective treatment.    Patient's living condition: lives in a skilled nursing facility     ALLERGIES: Atorvastatin calcium; Calcium channel blockers; and Sulfa drugs     PAST MEDICAL HISTORY:  has a past medical history of Back ache, Hemophilia (H), Hemophilia A (H) (5/17/2019), Hyperlipidemia, Hypertension, Intracranial hemorrhage (H) (05/2019), Menopausal disorder, Osteoarthritis, Pneumonia (6-11), Tremor, and Vertigo.     PAST SURGICAL HISTORY:   has a past surgical history that includes cataract iol, rt/lt; hysterectomy, pap no longer indicated; and NONSPECIFIC PROCEDURE.     FAMILY HISTORY: family history includes C.A.D. in her brother and brother; Cancer in her father; Diabetes in her brother, brother, mother, sister, and sister; Lipids in her brother and daughter.     SOCIAL HISTORY:   reports that she quit smoking about 8 months ago. Her smoking use included cigarettes. She has a 46.00 pack-year smoking history. She has never used smokeless tobacco. She reports that she does not drink alcohol or use drugs.   Granddaughter (Socorro) is " primary contact.   Lived in [place] in an apartment prior to hospitalization in May 2019.   Daughter - Sirgid, not involved in care, LTC facility has restraining order against daughter.   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   Completed 11th grade.  Grew up in [place].   No ETOH  Smoked - started in 30s and quit in May 2019     Post Discharge Medication Reconciliation Status: discharge medications reconciled and changed, per note/orders (see AVS)    Current Outpatient Medications   Medication Sig Dispense Refill     acetaminophen (TYLENOL) 500 MG tablet Take 1,000 mg by mouth 3 times daily       calcium carbonate-vitamin D (CALCIUM + D) 600-200 MG-UNIT TABS Take 1 tablet by mouth 2 times daily 180 tablet 3     FAMOTIDINE PO Take 20 mg by mouth At Bedtime       HYDROXYZINE HCL PO Take 10 mg by mouth At Bedtime        levofloxacin (LEVAQUIN) 500 MG tablet Take 1 tablet (500 mg) by mouth daily for 14 days Antibiotic continuation can be reconsidered once patient admitted to hospice 14 tablet 0     losartan (COZAAR) 25 MG tablet Take 1 tablet (25 mg) by mouth daily       menthol-zinc oxide (CALMOSEPTINE) 0.44-20.6 % OINT ointment Apply topically 4 times daily as needed for skin protection Apply to perineal area topically as needed for itching and rash three to four times daily       METOPROLOL TARTRATE PO Take 50 mg by mouth 2 times daily       pantoprazole (PROTONIX) 40 MG EC tablet Take 1 tablet (40 mg) by mouth every morning (before breakfast)       SENNA-docusate sodium (SENNA S) 8.6-50 MG tablet Take 1 tablet by mouth 2 times daily as needed (constipation)       simvastatin (ZOCOR) 20 MG tablet Take 1 tablet (20 mg) by mouth At Bedtime 30 tablet 3     vitamin D3 (CHOLECALCIFEROL) 1000 units (25 mcg) tablet Take 1 tablet (1,000 Units) by mouth daily 60 tablet 6     Recent weights and vitals reviewed in Epic:  Wt Readings from Last 5 Encounters:   02/07/20 46.9 kg (103 lb 6.4 oz)   02/06/20 56.4 kg (124 lb 5.4 oz)  "  01/14/20 41.2 kg (90 lb 12.8 oz)   01/13/20 41.3 kg (91 lb)   01/10/20 41.5 kg (91 lb 9.6 oz)     BP Readings from Last 3 Encounters:   02/07/20 (!) 143/82   02/06/20 123/62   01/14/20 134/65     Pulse Readings from Last 4 Encounters:   02/07/20 86   02/05/20 83   01/14/20 87   01/13/20 92     ROS:  Limited secondary to cognitive impairment but today pt reports history as per HPI.     Vitals:  BP (!) 143/82   Pulse 86   Temp 97.5  F (36.4  C)   Resp 18   Ht 1.651 m (5' 5\")   Wt 46.9 kg (103 lb 6.4 oz)   SpO2 90%   BMI 17.21 kg/m    Exam:  GENERAL APPEARANCE:  Alert, chronically ill appearing, in no distress, thin, laying in bed.   EYES:  Sclera clear and conjunctiva normal, no discharge   ENT: MMM  RESP:  Non-labored breathing, palpation of chest normal, no chest wall tenderness, no respiratory distress, Decreased breath sounds BL lower lung fields, no rales/wheezes/rhonchi, patient is on room air.  CV:  Palpation - no murmur/non-displaced PMI, Auscultation - rate and rhythm regular, no murmur, no rub or gallop.  VASCULAR: No edema bilateral lower extremities. Feet are warm and even.  ABDOMEN:  Normal bowel sounds, soft, nontender, no grimacing or guarding with palpation.  : bed is saturated with urine   M/S:   Turns in bed with one assist for exam.  SKIN:  Inspection - facial pallor, superficial shearing to buttocks, scattered, difficult to assess covered in barrier cream with stool in brief. Veinpuncture site to left AC with ecchymosis and mild swelling, small scab to left posterior FA. Heels intact. Palpation- no increased warmth, skin is dry and non-tender.  NEURO: Cranial nerves II-XII grossly intact except hearing and vision, no facial asymmetry, follows simple commands, moves all extremities symmetrically, normal tone, no tremor  PSYCH: Awake and alert, speech fluent,  insight and judgement impaired, oriented to person partly to time but not place, memory impaired, flat affect, cooperative. "     Lab/Diagnostic data:  Recent labs in [facility] reviewed by me today.    CBC RESULTS:   Recent Labs   Lab Test 02/05/20  0932 02/04/20  0852   WBC 9.2 9.5   RBC 2.87* 2.77*   HGB 8.6* 8.3*   HCT 27.6* 27.1*   MCV 96 98   MCH 30.0 30.0   MCHC 31.2* 30.6*   RDW 15.9* 15.5*   * 558*     Last Basic Metabolic Panel:  Recent Labs   Lab Test 02/05/20  1031 02/05/20  0932    Canceled, Test credited   POTASSIUM 3.7 Canceled, Test credited   CHLORIDE 110* Canceled, Test credited   MICK 8.3* Canceled, Test credited   CO2 26 Canceled, Test credited   BUN 4* Canceled, Test credited   CR 0.40* Canceled, Test credited   GLC 90 Canceled, Test credited   Estimated Creatinine Clearance: 74.7 mL/min (A) (based on SCr of 0.4 mg/dL (L)).      Liver Function Studies -   Recent Labs   Lab Test 01/30/20  0717 01/28/20  1913   PROTTOTAL 4.6* 5.5*   ALBUMIN 1.2* 1.6*   BILITOTAL 0.3 0.2   ALKPHOS 119 136   AST 9 13   ALT 8 16       TSH   Date Value Ref Range Status   01/13/2020 0.51 0.40 - 4.00 mU/L Final   05/16/2019 2.21 0.40 - 4.00 mU/L Final     Lab Results   Component Value Date    A1C 6.2 05/18/2019     Recent Labs   Lab Test 11/11/19  0658 08/24/18   CHOL 127 179   HDL 41* 58   LDL 62 96   TRIG 122 126     CT CHEST W/O CONTRAST  1/26/2020 11:25 AM     History:  Pneumonia, unresolved or complicated.                                                     IMPRESSION:   1. Bilateral large pleural effusions with layering of fluid in the bilateral fissures. Question of loculation of fluid in the left fissure suggesting empyema.  2. Left apical groundglass opacities likely representing infectious process.  3. Bibasilar atelectasis.     I have personally reviewed the examination and initial interpretation  and I agree with the findings.     DEJA STONER MD    ASSESSMENT/PLAN:  (J18.9) Pneumonia of both lungs due to infectious organism, unspecified part of lung  (R93.89) Abnormal chest CT  (primary encounter diagnosis)  Comment:  Diagnosed with pneumonia on 1/10/20, failure of outpatient treatment complicated by afib with RVR, hospitalized on 1/13 at [facility]. CT chest completed showed loculated right pleural effusion, parapneumonia effusion vs empyema. WBC reamined elevated despite antibiotics. Family wanted thoracentesis, but patient refused procedure. Patient also refused IV abx on discharged from hospital to LTC on Augmentin and plan for hospice. After return to LTC granddaughter brought patient back to [facility] ER with telling facility. CT remained abnormal, recommendation for thoracentesis. Patient again refused procedure and elected outpatient treatment with oral abx and hospice care in LTC facility.  Plan:   - Levofloxacin 500 mg daily x 14 days  - Hospice admission scheduled for 2/15 - this seems most consistent with patient goals  - Check CBC 2/11 to assist with prognosis  - Will benefit from supportive care in LTC setting    (E46) Protein-calorie malnutrition   Comment: Body mass index is 17.21 kg/m . Poor oral intake. Cleared by SLP in hospital for regular diet.  Plan:   - Dietician to follow  - Monitor weight closely    - Staff to provide encouragement and feeding assistance    (E87.6) Hypokalemia   (E83.42) Hypomagnesemia  Comment: Electrolyte abnormalities in setting of decreased oral intake.  Potassium   Date Value Ref Range Status   02/05/2020 3.7 3.4 - 5.3 mmol/L Final     Magnesium   Date Value Ref Range Status   02/05/2020 1.6 1.6 - 2.3 mg/dL Final   Comment:  - Replete as needed  - [facility] 2/11   - Would not follow labs if patient admits to hospice next week.  -Patient deferred appetite stimulant per [facility] notes    (R13.10) Dysphagia  Comment: Video swallow done while inpt at [facility] placed on DD3 diet, but at [facility] SLP clear for regular diet and think liquids.  Plan:  - Continue regular diet and thins    (R53.81) Physical Deconditioning   Comment: Quite deconditioned prior to respiratory infection, suspect  "multifactorial in setting of anorexia, h/o of ICH, and now acute infectious process.  Plan:   - Comfort focus - Hospice meeting 2/15 at 9:30AM    (K42.983) Acquired hemophilia A (H)   Comment: Hemophilia secondary to factor VIII inhibitor antibody followed by [facility]. Diagnosed in 2012 with initial response to steroids. Relapsed in May 2019 with ICH. Completed prednisone taper 10/17. Overdue to follow-up. Family seems to be having trouble getting her to appointments. Prior to most recent hospitalization discussion at regulatory visit about no longer wanting to go out for speciality care.   Plan:   - Will not schedule hematology follow-up unless patient elects not to sign on to hospice  Per per hematology --  - \"If any signs of significant bleeding, check aPTT and go to the Emergency Department.  Also call [facility] IMMEDIATELY at [phone].\"  - CBC 2/11    (148.0) PAF  Comment: Rapid HR on admission to [facility] 1/13, converted spontaneously with supportive care. [id number] score of 4.  Not candidate for anticoagulation per cardiology due to high bleeding risk. Echo showed ER 59%.   Plan:  - Continue metoprolol 50 mg BID  - Monitor routine VS  - BMP and CBC 2/11    (L30.8, R32) Incontinence associated dermatitis   Comment: Incontinence associated dermatitis   Plan:   - Calmoseptine QID scheduled  - Turn and reposition q2 hours  - Staff to assist with regular incontinence care and toileting as able    (F32.9) Depression, unspecified depression type    (G47.00) Insomonia  Comment: Mood is low in setting of decline in overall health status.   Plan:   - [facility] to resume  - Continue hydroxyzine 10 mg q HS for anxiety and sleep     (R41.89) Cognitive Impairment  Comment: SLUMS 17/30 with poor safety awareness and recurrent falls. Likely vascular dementia. Needs assistance with decision making.  Plan:   - Now has formal HCD naming granddaughter as HCA  - " Continues to benefit from LTC supports    (I10) Essential hypertension, benign  Comment: Blood pressure at goal of < 150/90     Plan:   - Started on Losartan 25 mg in hospital   - Continue metoprolol tartrate 50 mg BID  - Monitor routine blood pressures  - [facility] 2/11    (Z86.73) History of stroke  Comment: In May 2019 present to ER with left sided weakness secondary to R thalamic hemorrhagic CVA. Unable to return to baseline functional status, now resides in LTC facility.   Plan:   - Continues to benefit from supportive care in LTC setting for meals, medications, safety, and ADL support.  - Decreased QoL since stroke, now resides permanently in LTC facility.    (E78.5) Hyperlipidemia, unspecified hyperlipidemia type  Comment: Last LDL 62 in 11/2019   Plan:   - Continue Zocor 20 mg daily - would stop if moves to comfort care    (K21.9) Gastroesophageal reflux disease, esophagitis presence not specified  Comment: Hx of reflux, denies symptoms today   Plan:  - Continue Protonix 40 mg daily and Famotidine 20 mg q HS    (M19.90) Osteoarthritis, unspecified osteoarthritis type, unspecified site  Comment: Previously on hydrocodone, stopped in setting of acute CVA. Some low back pain today  Plan:   - Continue scheduled Tylenol 1,000 mg PO TID  - Continue Calcium and vitamin D for now, would stop if admitted to hospice  - Consider topical agent for back pain  - Staff to assist with turning and repositioning      (D64.9) Anemia  Comment; Hgb 8.6 on hospital discharge, Baseline 12, no signs of active bleeding  suggestive of anemia of inflammatory disease  Plan:   - CBC 2/11 to help family with prognosis     (Z71.89) Advance Care Planning  Comment: POLST remain DNR/DNI and treat reversible illness, unable to reach granddaughter to review. Per hospital notes plan for hospice admission next week.  Plan:   - Hospice admission scheduled for 2/15 at 9:30 am  - Called granddaughter, Socorro, no answer unable to leave message on cell  phone.    Orders written by provider at facility.     Electronically signed by:  AAYUSH Lanza CNP

## 2020-02-11 PROBLEM — D75.839 THROMBOCYTOSIS: Status: ACTIVE | Noted: 2020-01-01

## 2020-02-11 PROBLEM — F33.2 SEVERE EPISODE OF RECURRENT MAJOR DEPRESSIVE DISORDER, WITHOUT PSYCHOTIC FEATURES (H): Status: ACTIVE | Noted: 2018-08-24

## 2020-02-11 PROBLEM — I48.19 PERSISTENT ATRIAL FIBRILLATION (H): Status: ACTIVE | Noted: 2020-01-01

## 2020-02-11 PROBLEM — E87.6 HYPOKALEMIA: Status: ACTIVE | Noted: 2020-01-01

## 2020-02-11 PROBLEM — E22.2 SIADH (SYNDROME OF INAPPROPRIATE ADH PRODUCTION) (H): Status: ACTIVE | Noted: 2020-01-01

## 2020-02-11 NOTE — PROGRESS NOTES
[place] [facility]  [place] Medical Record Number:  [id number]  Place of Service where encounter took place:  [facility] ([facility]) [[id number]]  Chief Complaint   Patient presents with     RECHECK       HPI:    Jackeline Smiley  is a 87 year old (2/11/1933), who is being seen today for an episodic care visit.  HPI information obtained from: facility chart records, facility staff, patient report and [facility] chart review.  Pt is unreliable historian due to cognitive loss. Today's concern is:  Pneumonia of both lungs due to infectious organism, unspecified part of lung  Returned from hospital last week where she was treated for bilateral lower lobe pneumonia.  Remains on Levaquin.  No reports of fever or hypoxia.  WBC today is 11.4.  Is starting hospice care on 2/15.    Hypokalemia  Labs today show a potassium of 2.8.     Acquired hemophilia A (H)  Known disease and now will be starting hospice care.  No reports of acute bleeding events.  Hgb improved today to 11.1.    Thrombocytosis (H)  Chronically elevated platelets.      Pain of right heel  Pt is reporting right heel pain today. Spends much of time in bed.     Past Medical and Surgical History reviewed in Epic today.    MEDICATIONS:  Current Outpatient Medications   Medication Sig Dispense Refill     acetaminophen (TYLENOL) 500 MG tablet Take 1,000 mg by mouth 3 times daily       calcium carbonate-vitamin D (CALCIUM + D) 600-200 MG-UNIT TABS Take 1 tablet by mouth 2 times daily 180 tablet 3     FAMOTIDINE PO Take 20 mg by mouth At Bedtime       HYDROXYZINE HCL PO Take 10 mg by mouth At Bedtime        levofloxacin (LEVAQUIN) 500 MG tablet Take 1 tablet (500 mg) by mouth daily for 14 days Antibiotic continuation can be reconsidered once patient admitted to hospice 14 tablet 0     losartan (COZAAR) 25 MG tablet Take 1 tablet (25 mg) by mouth daily       menthol-zinc oxide (CALMOSEPTINE) 0.44-20.6 % OINT ointment Apply topically 4 times  "daily as needed for skin protection Apply to perineal area topically as needed for itching and rash three to four times daily       METOPROLOL TARTRATE PO Take 50 mg by mouth 2 times daily       pantoprazole (PROTONIX) 40 MG EC tablet Take 1 tablet (40 mg) by mouth every morning (before breakfast)       SENNA-docusate sodium (SENNA S) 8.6-50 MG tablet Take 1 tablet by mouth 2 times daily as needed (constipation)       simvastatin (ZOCOR) 20 MG tablet Take 1 tablet (20 mg) by mouth At Bedtime 30 tablet 3     vitamin D3 (CHOLECALCIFEROL) 1000 units (25 mcg) tablet Take 1 tablet (1,000 Units) by mouth daily 60 tablet 6     Patient Active Problem List   Diagnosis     Vertigo     Essential hypertension     Sweating     Family history of diabetes mellitus     Vitamin deficiency     Fatigue     Lyme disease     Urinary incontinence     Other hyperlipidemia     Tremor     Anxiety     Intracranial hemorrhage (H)     Acquired hemophilia A (H)     Acquired coagulation factor deficiency (H)     Hypo-osmolar hyponatremia     History of stroke     Protein-calorie malnutrition (H)     Vascular dementia with depressed mood (H)     Pneumonia     Persistent atrial fibrillation     Severe episode of recurrent major depressive disorder, without psychotic features (H)     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  Denies SOB or chest pain.  Reports right heel pain.    Objective:  /63   Pulse 89   Temp 97.3  F (36.3  C)   Resp 18   Ht 1.651 m (5' 5\")   Wt 46.9 kg (103 lb 6.4 oz)   SpO2 92%   BMI 17.21 kg/m    Exam:  GENERAL APPEARANCE:  Alert, calm, chronically ill appearing,  Thin female " seen. Resting in bed.   EYES:  Sclera clear and conjunctiva normal, no discharge   ENT: Moist oral cavity.  No exudate.  RESP:  Non-labored breathing, Decreased breath sounds BL lower lung fields, no rales/wheezes/rhonchi, patient is on room air.  CV:  Auscultation - rate and rhythm regular, no murmur, no rub or gallop.  VASCULAR: No edema bilateral lower extremities. Feet are warm to touch.  Right heel reddened and tender to touch.   ABDOMEN:  Normal bowel sounds, soft, nontender, no grimacing or guarding with palpation.  NEURO: Cranial nerves II-XII grossly intact except hearing and vision, no facial asymmetry, follows simple commands, moves all extremities symmetrically, normal tone, no tremor  PSYCH: Awake and alert, speech fluent,  insight and judgement impaired, oriented to person  memory impaired, flat affect, cooperative. Knew that her birthday was today and reported that she was 66 years old.     Labs:   CBC RESULTS:   Recent Labs   Lab Test 02/11/20  0733 02/05/20  0932   WBC 11.4* 9.2   RBC 3.32* 2.87*   HGB 10.1* 8.6*   HCT 31.2* 27.6*   MCV 94 96   MCH 30.4 30.0   MCHC 32.4 31.2*   RDW 16.7* 15.9*   * 540*       Last Basic Metabolic Panel:  Recent Labs   Lab Test 02/11/20  0733 02/05/20  1031    142   POTASSIUM 2.8* 3.7   CHLORIDE 105 110*   MICK 8.5 8.3*   CO2 29 26   BUN 5* 4*   CR 0.39* 0.40*   GLC 77 90     GFR Estimate   Date Value Ref Range Status   02/11/2020 >90 >60 mL/min/[1.73_m2] Final     Comment:     Non  GFR Calc  Starting 12/18/2018, serum creatinine based estimated GFR (eGFR) will be   calculated using the Chronic Kidney Disease Epidemiology Collaboration   (CKD-EPI) equation.     02/05/2020 >90 >60 mL/min/[1.73_m2] Final     Comment:     Non  GFR Calc  Starting 12/18/2018, serum creatinine based estimated GFR (eGFR) will be   calculated using the Chronic Kidney Disease Epidemiology Collaboration   (CKD-EPI) equation.     02/05/2020 Canceled,  Test credited >60 mL/min/[1.73_m2] Final     Comment:     Unsatisfactory specimen - contaminated  ISACC ACQUAYE 1008 2/5/2020 7C BY QD         Liver Function Studies -   Recent Labs   Lab Test 01/30/20  0717 01/28/20  1913   PROTTOTAL 4.6* 5.5*   ALBUMIN 1.2* 1.6*   BILITOTAL 0.3 0.2   ALKPHOS 119 136   AST 9 13   ALT 8 16       TSH   Date Value Ref Range Status   01/13/2020 0.51 0.40 - 4.00 mU/L Final   05/16/2019 2.21 0.40 - 4.00 mU/L Final   ]  ASSESSMENT/PLAN:  (J18.9) Pneumonia of both lungs due to infectious organism, unspecified part of lung  (primary encounter diagnosis)  Comment: Improved respiratory status, but mildly elevated WBC  Plan: Complete course of antibiotics.  Hospice to evaluate on 2/15 for possible admission.     (E87.6) Hypokalemia  Comment: Acute, unclear cause  Plan: KCL 20 meq today and then starting tomorrow 10 meq daily with food.  Recheck potassium on 2/13.    (D68.311) Acquired hemophilia A (H)  Comment: Chronic  Plan: Hospice care to start on 2/15.  Monitor for comfort.    (D47.3) Thrombocytosis (H)  Comment: Chronic, likely related to hemophilia  Plan: Monitor for clots and comfort.    (M79.671) Pain of right heel  Comment: Acute  Plan: Skin prep daily.  Monitor carefully.    Electronically signed by:  AAYUSH Melgar CNP

## 2020-02-14 NOTE — TELEPHONE ENCOUNTER
Patient with hypokalemia.   Lab Results   Component Value Date    POTASSIUM 3.1 02/13/2020    POTASSIUM 2.8 02/11/2020     Given KCL 20 meq on 2/11, then 10 meq daily starting 2/12    PLAN  Change KCL to 20 meq daily    K recheck 2/17    Electronically signed by AAYUSH Simental, GNP

## 2020-02-15 NOTE — TELEPHONE ENCOUNTER
Hospice calling for orders to admit.    OK to admit to hospice for pneumonia  Ok to discontinue hydroxyzine  Ok for Lorazepam 0.25mg every HS    Electronically signed by Sigrid Tripp RN, CNP

## 2020-03-09 NOTE — PROGRESS NOTES
[facility]  Chief Complaint   Patient presents with     Annual Comprehensive Nursing Home     Home Care/Hospice     [place] Medical Record Number:  [id number]  Place of Service where encounter took place:  [facility] ([facility] [[id number]]    HPI:    Jackeline Smiley  is a 87 year old  (2/11/1933), who is being seen today for an annual comprehensive visit. HPI information obtained from: facility chart records, facility staff and [facility] chart review.  Pt is unable to participate in ROS due to cognitive loss.  Today's concerns are:  Acquired hemophilia A (H)  Remains on hospice care.  Declining and taking in very little food and liquid.  No signs of bleeding or bruising.    Severe episode of recurrent major depressive disorder, without psychotic features (H)  Overall declining.  Most medications have been discontinued due to failure to thrive and need for hospice care. Sleeping much of time.     Protein-calorie malnutrition, unspecified severity (H)  16 lb weight loss since the end of January. On hospice care.       Essential hypertension, benign    Remains on losartan and metoprolol    Gastroesophageal reflux disease, esophagitis presence not specified  Remains on pantoprazole.     Vascular dementia with depressed mood (H)  End stage dementia.  Now on hospice care and comfort is goal of tx.    Hospice care patient  Remains on [facility] hospice.     Constipation due to pain medication  No BM since 3/4.    ALLERGIES: Atorvastatin calcium; Calcium channel blockers; and Sulfa drugs  PAST MEDICAL HISTORY:  has a past medical history of Back ache, Hemophilia (H), Hemophilia A (H) (5/17/2019), Hyperlipidemia, Hypertension, Intracranial hemorrhage (H) (05/2019), Menopausal disorder, Osteoarthritis, Pneumonia (6-11), Tremor, and Vertigo.  PAST SURGICAL HISTORY:  has a past surgical history that includes cataract iol, rt/lt; hysterectomy, pap no longer indicated; and NONSPECIFIC  PROCEDURE.  IMMUNIZATIONS:  Immunization History   Administered Date(s) Administered     DTaP, Unspecified 09/17/2012     Influenza (IIV3) PF 10/20/2004, 09/24/2009, 09/09/2014     Pneumo Conj 13-V (2010&after) 09/24/2015     Pneumococcal 23 valent 03/23/2009, 12/31/2010     TD (ADULT, 7+) 03/23/2009     Zoster vaccine, live 09/17/2012     Above immunizations pulled from [facility]. [facility] and facility records also reconciled..  Future immunizations needed:  yearly influenza per facility protocol    Current Outpatient Medications   Medication Sig Dispense Refill     acetaminophen (TYLENOL) 650 MG suppository Place 650 mg rectally every 4 hours as needed for fever       atropine 1 % SOLN Place 2 drops under the tongue every 2 hours as needed for secretions       bisacodyl (DULCOLAX) 10 MG suppository Place 10 mg rectally 2 times daily as needed for constipation       haloperidol (HALDOL) 0.5 MG tablet Take 0.5 mg by mouth every 6 hours as needed for agitation       LORazepam (ATIVAN) 0.25 mg TABS half-tab Take 0.25 mg by mouth At Bedtime And 0.25mg every 4 hours as needed       losartan (COZAAR) 25 MG tablet Take 1 tablet (25 mg) by mouth daily       menthol-zinc oxide (CALMOSEPTINE) 0.44-20.6 % OINT ointment Apply topically 4 times daily Apply to perineal area       METOPROLOL TARTRATE PO Take 50 mg by mouth 2 times daily       morphine 2.5 MG solu-tab Take 2.5 mg by mouth 3 times daily And 2.5mg every 2 hours as needed       pantoprazole (PROTONIX) 40 MG EC tablet Take 1 tablet (40 mg) by mouth every morning (before breakfast)       SENNA-docusate sodium (SENNA S) 8.6-50 MG tablet Take 1 tablet by mouth 2 times daily as needed (constipation)       Patient Active Problem List   Diagnosis     Vertigo     Essential hypertension, benign     Sweating     Family history of diabetes mellitus     Vitamin deficiency     Fatigue     Lyme disease     Urinary incontinence     Other hyperlipidemia     Tremor     Anxiety      Intracranial hemorrhage (H)     Acquired hemophilia A (H)     Acquired coagulation factor deficiency (H)     Hypo-osmolar hyponatremia     History of stroke     Protein-calorie malnutrition (H)     Vascular dementia with depressed mood (H)     Pneumonia     Persistent atrial fibrillation     Severe episode of recurrent major depressive disorder, without psychotic features (H)     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 I also reviewed the current immunizations, and preventive care. .Future cancer screening is not clinically indicated secondary to age/goals of care Patient's desire to return to the community is not assessible due to cognitive impairment. Current Level of Care is appropriate.    Advance Directive Discussion:    I reviewed the current advanced directives as reflected in EPIC, the POLST and the facility chart, and verified the congruency of orders . I contacted the first party, granddaughter Socorro and discussed the plan of Care.  I updated her on current status.  She reports that she was unaware of the decline or recent order changes.  Contacted hospice nurse and she notes that she has tried to contact her multiple times and received no answer or call back. She is very concerned about the medications and now use of morphine.   I did not due to cognitive impairment review the advance directives with the resident.     Team Discussion:  I communicated with the appropriate disciplines involved with the Plan of Care:   Nursing    Patient's goal is unobtainable secondary to cognitive impairment.  Information reviewed:  Medications, vital signs, orders, and nursing notes.    ROS:  Unobtainable secondary to cognitive impairment.  "    Vitals:  /67   Pulse 89   Temp 97.8  F (36.6  C)   Resp 16   Ht 1.651 m (5' 5\")   Wt 36.7 kg (80 lb 14.4 oz)   SpO2 (!) 89%   BMI 13.46 kg/m   Body mass index is 13.46 kg/m .  Exam:  GENERAL APPEARANCE:  Sleepy thin, chronically ill appearing,  female seen. Resting in bed. Arouseable, but asks to be left alone.  HEAD:  Normocephalic.  No facial asymmetry.   EYES:  Sclera clear and conjunctiva normal, no discharge   ENT: Dry oral cavity.  No exudate.  RESP:  Non-labored breathing, Diminished .breath sounds bilateral bases, but Clear to auscultation bilaterally.  patient is on room air.  CV:  rate and rhythm regular, no murmur, no mottling.  Skin warm and dry. .  BREASTS:  Small symmetrical breasts.  No palpable masses.  VASCULAR: No edema bilateral lower extremities. Feet are warm to touch.    ABDOMEN:  Sluggish bowel sounds, soft, nontender, no grimacing or guarding with palpation.  NEURO: Sleepy.  No facial asymmetry.  Moving arms, but overall quiet.   PSYCH: Awake and alert, speech fluent,  insight and judgement impaired, Quiet and requesting to be left alone to sleep    Lab/Diagnostic data:   No recent labs as pt is on hospice care.     ASSESSMENT/PLAN  (D68.311) Acquired hemophilia A (H)  (primary encounter diagnosis)  Comment: Chronic  Plan: Now on hospice care.  Hospice contacted.  Emelina notes that both she and the facility staff have been trying to reach the granddaughter and have left numerous messages to return the call to discuss her decline and she has not returned a call.  She will reach out again to Socorro, as she has told me that she would be at her work number today.     (F33.2) Severe episode of recurrent major depressive disorder, without psychotic features (H)  Comment: Now declining  Plan: Monitor.    (E46) Protein-calorie malnutrition, unspecified severity (H)  Comment: Chronic, now taking in very little food and fluid  Plan: Hospice nurse to discuss with granddaughter today, as " pt is declining food and most liquid.    (I10) Essential hypertension, benign  Comment: Based on JNC-8 goals,  patients age of 87 year old, no presence of diabetes or CKD, and goals of care goal BP is <150/90 mm Hg. Noted patients BP is lower than goal and will adjust plan of care by discontinuing her losartan..    (K21.9) Gastroesophageal reflux disease, esophagitis presence not specified  Comment: Chronic  Plan: Continue PPI.    (F01.51,  F32.9) Vascular dementia with depressed mood (H)  Comment: Chronic  Plan: Granddaughter to discuss situation with hospice today.    (Z51.5) Hospice care patient  Comment: Chronic  Plan: Granddaughter to discuss situation with hospice today.    (K59.03) Constipation due to pain medication  Comment: Chronic  Plan: Dulcolax supp today and then every other day.     Electronically signed by:  AAYUSH Melgar CNP

## 2020-03-10 PROBLEM — Z51.5 HOSPICE CARE PATIENT: Status: ACTIVE | Noted: 2020-01-01

## 2020-03-10 NOTE — LETTER
3/10/2020        RE: Jackeline Smiley  [facility]  [address] [address]        [place] [facility]  Chief Complaint   Patient presents with     Annual Comprehensive Nursing Home     Home Care/Hospice     [facility] Medical Record Number:  [id number]  Place of Service where encounter took place:  [facility] [[id number]]    HPI:    Jackeline Smiley  is a 87 year old  (2/11/1933), who is being seen today for an annual comprehensive visit. HPI information obtained from: facility chart records, facility staff and [facility] chart review.  Pt is unable to participate in ROS due to cognitive loss.  Today's concerns are:  Acquired hemophilia A (H)  Remains on hospice care.  Declining and taking in very little food and liquid.  No signs of bleeding or bruising.    Severe episode of recurrent major depressive disorder, without psychotic features (H)  Overall declining.  Most medications have been discontinued due to failure to thrive and need for hospice care. Sleeping much of time.     Protein-calorie malnutrition, unspecified severity (H)  16 lb weight loss since the end of January. On hospice care.       Essential hypertension, benign    Remains on losartan and metoprolol    Gastroesophageal reflux disease, esophagitis presence not specified  Remains on pantoprazole.     Vascular dementia with depressed mood (H)  End stage dementia.  Now on hospice care and comfort is goal of tx.    Hospice care patient  Remains on [facility].     Constipation due to pain medication  No BM since 3/4.    ALLERGIES: Atorvastatin calcium; Calcium channel blockers; and Sulfa drugs  PAST MEDICAL HISTORY:  has a past medical history of Back ache, Hemophilia (H), Hemophilia A (H) (5/17/2019), Hyperlipidemia, Hypertension, Intracranial hemorrhage (H) (05/2019), Menopausal disorder, Osteoarthritis, Pneumonia (6-11), Tremor, and Vertigo.  PAST SURGICAL HISTORY:  has a past  surgical history that includes cataract iol, rt/lt; hysterectomy, pap no longer indicated; and NONSPECIFIC PROCEDURE.  IMMUNIZATIONS:  Immunization History   Administered Date(s) Administered     DTaP, Unspecified 09/17/2012     Influenza (IIV3) PF 10/20/2004, 09/24/2009, 09/09/2014     Pneumo Conj 13-V (2010&after) 09/24/2015     Pneumococcal 23 valent 03/23/2009, 12/31/2010     TD (ADULT, 7+) 03/23/2009     Zoster vaccine, live 09/17/2012     Above immunizations pulled from [facility]. [facility] and facility records also reconciled..  Future immunizations needed:  yearly influenza per facility protocol    Current Outpatient Medications   Medication Sig Dispense Refill     acetaminophen (TYLENOL) 650 MG suppository Place 650 mg rectally every 4 hours as needed for fever       atropine 1 % SOLN Place 2 drops under the tongue every 2 hours as needed for secretions       bisacodyl (DULCOLAX) 10 MG suppository Place 10 mg rectally 2 times daily as needed for constipation       haloperidol (HALDOL) 0.5 MG tablet Take 0.5 mg by mouth every 6 hours as needed for agitation       LORazepam (ATIVAN) 0.25 mg TABS half-tab Take 0.25 mg by mouth At Bedtime And 0.25mg every 4 hours as needed       losartan (COZAAR) 25 MG tablet Take 1 tablet (25 mg) by mouth daily       menthol-zinc oxide (CALMOSEPTINE) 0.44-20.6 % OINT ointment Apply topically 4 times daily Apply to perineal area       METOPROLOL TARTRATE PO Take 50 mg by mouth 2 times daily       morphine 2.5 MG solu-tab Take 2.5 mg by mouth 3 times daily And 2.5mg every 2 hours as needed       pantoprazole (PROTONIX) 40 MG EC tablet Take 1 tablet (40 mg) by mouth every morning (before breakfast)       SENNA-docusate sodium (SENNA S) 8.6-50 MG tablet Take 1 tablet by mouth 2 times daily as needed (constipation)       Patient Active Problem List   Diagnosis     Vertigo     Essential hypertension, benign     Sweating     Family history of diabetes mellitus     Vitamin deficiency      Fatigue     Lyme disease     Urinary incontinence     Other hyperlipidemia     Tremor     Anxiety     Intracranial hemorrhage (H)     Acquired hemophilia A (H)     Acquired coagulation factor deficiency (H)     Hypo-osmolar hyponatremia     History of stroke     Protein-calorie malnutrition (H)     Vascular dementia with depressed mood (H)     Pneumonia     Persistent atrial fibrillation     Severe episode of recurrent major depressive disorder, without psychotic features (H)     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 I also reviewed the current immunizations, and preventive care. .Future cancer screening is not clinically indicated secondary to age/goals of care Patient's desire to return to the community is not assessible due to cognitive impairment. Current Level of Care is appropriate.    Advance Directive Discussion:    I reviewed the current advanced directives as reflected in EPIC, the POLST and the facility chart, and verified the congruency of orders . I contacted the first party, granddaughter Socorro and discussed the plan of Care.  I updated her on current status.  She reports that she was unaware of the decline or recent order changes.  Contacted hospice nurse and she notes that she has tried to contact her multiple times and received no answer or call back. She is very concerned about the medications and now use of morphine.   I did not due to cognitive impairment review the advance directives with the resident.     Team Discussion:  I communicated with the appropriate disciplines involved with the Plan of Care:   Nursing    Patient's goal is unobtainable secondary to cognitive impairment.  Information reviewed:  Medications,  "vital signs, orders, and nursing notes.    ROS:  Unobtainable secondary to cognitive impairment.     Vitals:  /67   Pulse 89   Temp 97.8  F (36.6  C)   Resp 16   Ht 1.651 m (5' 5\")   Wt 36.7 kg (80 lb 14.4 oz)   SpO2 (!) 89%   BMI 13.46 kg/m   Body mass index is 13.46 kg/m .  Exam:  GENERAL APPEARANCE:  Sleepy thin, chronically ill appearing,  female seen. Resting in bed. Arouseable, but asks to be left alone.  HEAD:  Normocephalic.  No facial asymmetry.   EYES:  Sclera clear and conjunctiva normal, no discharge   ENT: Dry oral cavity.  No exudate.  RESP:  Non-labored breathing, Diminished .breath sounds bilateral bases, but Clear to auscultation bilaterally.  patient is on room air.  CV:  rate and rhythm regular, no murmur, no mottling.  Skin warm and dry. .  BREASTS:  Small symmetrical breasts.  No palpable masses.  VASCULAR: No edema bilateral lower extremities. Feet are warm to touch.    ABDOMEN:  Sluggish bowel sounds, soft, nontender, no grimacing or guarding with palpation.  NEURO: Sleepy.  No facial asymmetry.  Moving arms, but overall quiet.   PSYCH: Awake and alert, speech fluent,  insight and judgement impaired, Quiet and requesting to be left alone to sleep    Lab/Diagnostic data:   No recent labs as pt is on hospice care.     ASSESSMENT/PLAN  (D68.311) Acquired hemophilia A (H)  (primary encounter diagnosis)  Comment: Chronic  Plan: Now on hospice care.  Hospice contacted.  Emelina notes that both she and the facility staff have been trying to reach the granddaughter and have left numerous messages to return the call to discuss her decline and she has not returned a call.  She will reach out again to Socorro, as she has told me that she would be at her work number today.     (F33.2) Severe episode of recurrent major depressive disorder, without psychotic features (H)  Comment: Now declining  Plan: Monitor.    (E46) Protein-calorie malnutrition, unspecified severity (H)  Comment: Chronic, now " taking in very little food and fluid  Plan: Hospice nurse to discuss with granddaughter today, as pt is declining food and most liquid.    (I10) Essential hypertension, benign  Comment: Based on JNC-8 goals,  patients age of 87 year old, no presence of diabetes or CKD, and goals of care goal BP is <150/90 mm Hg. Noted patients BP is lower than goal and will adjust plan of care by discontinuing her losartan..    (K21.9) Gastroesophageal reflux disease, esophagitis presence not specified  Comment: Chronic  Plan: Continue PPI.    (F01.51,  F32.9) Vascular dementia with depressed mood (H)  Comment: Chronic  Plan: Granddaughter to discuss situation with hospice today.    (Z51.5) Hospice care patient  Comment: Chronic  Plan: Granddaughter to discuss situation with hospice today.    (K59.03) Constipation due to pain medication  Comment: Chronic  Plan: Dulcolax supp today and then every other day.     Electronically signed by:  AAYUSH Melgar CNP          Sincerely,        AAYUSH Melgar CNP

## 2020-05-07 NOTE — TELEPHONE ENCOUNTER
[facility] TELEPHONE NOTE    Emelina RN w/ [facility] called to report resident with increased pain.    Resident calling out in pain, location of pain assosicated with pressure ulcers. Given PRN MSIR and ativan and appears more calm/comfortable. Currently on MSIR 2.5 mg BID scheduled. Hospice recommendation to increase MSIR frequency to TID. Called [facility], granddaughter Socorro, who gave consent for dose increase.    PLAN:  - Increase MSIR to 2.5 mg TID and q 2 hours PRN, Rx sent to pharmacy   - Hold Hadol for now, as not using, family concerned for oversedation  - Primary team to reassess symptoms next week  - Appreciate hospice supports

## 2020-05-11 NOTE — PROGRESS NOTES
"[facility] Regulatory   Jackeline Smiley is being evaluated via a billable video visit due to the restrictions of the Covid-19 pandemic.   The patient has been notified of following:  \"This video visit will be conducted via a call between you and your provider. We have found that certain health care needs can be provided without the need for an in-person physical exam.  This service lets us provide the care you need with a video conversation. If during the course of the call the provider feels a video visit is not appropriate, you will not be charged for this service.\"   The provider has received verbal consent for a Video Visit from the patient or first contact? Yes  Patient or facility staff would like the video invitation sent by: N/A   Video Start Time: 11:02 am  [facility] Medical Record Number:  [id number]  Place of Location at the time of visit: [facility]   Chief Complaint   Patient presents with     skilled nursing Regulatory     Home Care/Hospice     HPI:  Jackeline Smiley  is a 87 year old (2/11/1933), who is being seen today for an E-REG visit.  HPI information obtained from: facility staff, patient report and [facility] chart review.   She is seen May 11, 2020 [facility] where she has resided for one year (admit 5/2019)   Pt is in her room lying abed.  Appears comfortable and resting, but when awakened reports ongoing pain.     Pt was hospitalized in January with atrial fib and RVR, pneumonia and parapneumonic effusion vs empyema.   She declined thoracentesis and CT given bleeding risk with hemophilia.    She was treated with abx and returned to LTC.    She was seen by Palliative care as she was not eating, and decision was made to transition to Hospice Care.   She has continued to decline since then, with significant weight loss and profound weakness.      By chart review, patient has a dx of acquired hemophilia A (factor VIII inhibitor) initially diagnosed in " 2012.    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  She was treated with Novoseven at that time, along with methlyprednisolone and weekly rituximab (received only 3 of 4 doses of the latter)  Returned to [facility] and prednisone dose tapered down.        Past Medical History:   Diagnosis Date     Back ache      Hemophilia (H)      Hemophilia A (H) 5/17/2019     Hyperlipidemia      Hypertension      Menopausal disorder      Osteoarthritis      Pneumonia 6-11    LEFT MID LUNG     Tremor      Vertigo      Past Surgical History:   Procedure Laterality Date     C NONSPECIFIC PROCEDURE      submandibular gland excision     CATARACT IOL, RT/LT      both, Dr Zhou     HYSTERECTOMY, PAP NO LONGER INDICATED       SH:  [place] lived [place] apartment   She has one daughter who has mental illness and substance abuse issues, and Dale Pathak has a no trespass order for this daughter.   She also has one granddaughter who is supportive.   Pt currently is her own decision maker.     Retired .   Smoked until admission to LT.          MEDICATIONS:  Current Outpatient Medications   Medication Sig Dispense Refill     acetaminophen (TYLENOL) 650 MG suppository Place 650 mg rectally every 4 hours as needed for fever       atropine 1 % SOLN Place 2 drops under the tongue every 2 hours as needed for secretions       bisacodyl (DULCOLAX) 10 MG suppository Place 10 mg rectally every 72 hours       bisacodyl (DULCOLAX) 10 MG suppository Place 10 mg rectally 2 times daily as needed for constipation       haloperidol (HALDOL) 0.5 MG tablet Take 0.5 mg by mouth every 6 hours as needed for agitation Hold thru 5/21       LORazepam (ATIVAN) 0.25 mg TABS half-tab Take 0.25 mg by mouth At Bedtime And 0.25mg every 4 hours as needed       menthol-zinc oxide (CALMOSEPTINE) 0.44-20.6 % OINT ointment Apply topically  "4 times daily Apply to perineal area       morphine 2.5 MG solu-tab Take 1 tablet (2.5 mg) by mouth 3 times daily. May also take 1 tablet (2.5 mg) every 2 hours as needed for shortness of breath / dyspnea or moderate to severe pain. 10 tablet 0     SENNA-docusate sodium (SENNA S) 8.6-50 MG tablet Take 1 tablet by mouth 2 times daily as needed (constipation)       bisacodyl (DULCOLAX) 10 MG suppository Place 1 suppository (10 mg) rectally every other day       METOPROLOL TARTRATE PO Take 50 mg by mouth 2 times daily       pantoprazole (PROTONIX) 40 MG EC tablet Take 1 tablet (40 mg) by mouth every morning (before breakfast)       REVIEW OF SYSTEMS: Unobtainable secondary to cognitive impairment.   CPT 4.4   SLUMS 17/30    +anxiety/depression   Wt Readings from Last 5 Encounters:   05/11/20 34.8 kg (76 lb 12.8 oz)   03/09/20 36.7 kg (80 lb 14.4 oz)   02/11/20 46.9 kg (103 lb 6.4 oz)   02/07/20 46.9 kg (103 lb 6.4 oz)          Objective: BP (!) 88/67   Pulse 96   Temp 98.3  F (36.8  C)   Resp 16   Ht 1.651 m (5' 5\")   Wt 34.8 kg (76 lb 12.8 oz)   SpO2 95%   BMI 12.78 kg/m    Limited visit exam done given COVID-19 precautions.   GENERAL APPEARANCE:  Very frail and thin  Awake and answers questions briefly  Appears comfortable lying in bed    Recent Labs   Lab Test 02/11/20  0733 02/05/20  0932 02/04/20  0852   WBC 11.4* 9.2 9.5   HGB 10.1* 8.6* 8.3*   MCV 94 96 98   * 540* 558*     Sodium   Date Value Ref Range Status   02/11/2020 140 133 - 144 mmol/L Final     Potassium   Date Value Ref Range Status   02/17/2020 3.7 3.4 - 5.3 mmol/L Final     Chloride   Date Value Ref Range Status   02/11/2020 105 94 - 109 mmol/L Final     Carbon Dioxide   Date Value Ref Range Status   02/11/2020 29 20 - 32 mmol/L Final     Anion Gap   Date Value Ref Range Status   02/11/2020 6 3 - 14 mmol/L Final     Glucose   Date Value Ref Range Status   02/11/2020 77 70 - 99 mg/dL Final     Urea Nitrogen   Date Value Ref Range Status "   02/11/2020 5 (L) 7 - 30 mg/dL Final     Creatinine   Date Value Ref Range Status   02/11/2020 0.39 (L) 0.52 - 1.04 mg/dL Final     GFR Estimate   Date Value Ref Range Status   02/11/2020 >90 >60 mL/min/[1.73_m2] Final     Comment:     Non  GFR Calc  Starting 12/18/2018, serum creatinine based estimated GFR (eGFR) will be   calculated using the Chronic Kidney Disease Epidemiology Collaboration   (CKD-EPI) equation.       Calcium   Date Value Ref Range Status   02/11/2020 8.5 8.5 - 10.1 mg/dL Final     Lab Results   Component Value Date    ALBUMIN 1.2 01/30/2020        ASSESSMENT/PLAN:  (R52) Pain   Comment: by previous note, may be at site of pressure injury    Plan: MSIR 2.5 mg tid and q2 hours prn with dose increase if needed to achieve comfort.       (E46) Protein-calorie malnutrition, unspecified severity (H)  Comment: >20 lb weight loss in past 3 months     Very low albumin.     Plan: preferences, po as tolerated       (Z51.5) Hospice care patient  Comment: comfort focus as she is nearing end of life  Plan: prns available         (I10) Essential hypertension, benign   Comment:   BP Readings from Last 3 Encounters:   05/11/20 (!) 88/67   03/09/20 103/67   02/11/20 119/63       Plan:  metoprolol 50 mg bid, hold for low bps     (D68.311) Acquired hemophilia A (H)  Comment: acquired factor VIII inhibitor; previously [facility]  Plan: no further checks.     (Z86.73) History of stroke  (F01.51,  F32.9) Vascular dementia with depressed mood (H)  Comment: right thalamic hemorrhagic CVI 6 months ago with residual weakness and cognitive impairment, low functional status     Plan: LTC support for meds, meals, activity, mobility.        (K21.9) Gastroesophageal reflux disease, esophagitis presence not specified  Comment: no current symptoms.    Plan: remains on pantoprazole    Advanced directives: DNR/DNI, enrolled in Hospice February 2020     Electronically  signed by:  Leatha Mulligan MD     Video-Visit Details  Type of service:  Video Visit  Video End Time (time video stopped): 11:10 am  Distant Location (provider location):  [facility]

## 2020-05-15 LAB
SARS-COV-2 RNA SPEC QL NAA+PROBE: NOT DETECTED
SPECIMEN SOURCE: NORMAL

## 2020-05-15 NOTE — PROGRESS NOTES
Dear [employer] [facility],    We are notifying you of a  Hospice Death.  Jackeline VALDEMAR Smiley; MRN [id number]   peacefully On date 2020  Time 5:48 PM  Sincerely [facility]  Gely Dill  [phone]

## 2021-07-14 PROBLEM — Z86.73 HISTORY OF STROKE: Status: RESOLVED | Noted: 2019-01-01 | Resolved: 2020-01-01

## 2021-10-30 NOTE — PHARMACY-VANCOMYCIN DOSING SERVICE
Pharmacy Vancomycin Initial Note  Date of Service 2020  Patient's  1933  86 year old, female    Indication: Hospital acquired pneumonia    Current estimated CrCl = Estimated Creatinine Clearance: 57.8 mL/min (A) (based on SCr of 0.46 mg/dL (L)).    Creatinine for last 3 days  2020:  6:19 AM Creatinine 0.36 mg/dL  2020:  4:39 PM Creatinine 0.46 mg/dL    Recent Vancomycin Level(s) for last 3 days  No results found for requested labs within last 72 hours.      Vancomycin IV Administrations (past 72 hours)      No vancomycin orders with administrations in past 72 hours.                Nephrotoxins and other renal medications (From now, onward)    Start     Dose/Rate Route Frequency Ordered Stop    20 1900  vancomycin (VANCOCIN) 750 mg in sodium chloride 0.9 % 250 mL intermittent infusion      750 mg  over 90 Minutes Intravenous EVERY 24 HOURS 20 1758      20 1800  piperacillin-tazobactam (ZOSYN) 4.5 g vial to attach to  mL bag      4.5 g  over 30 Minutes Intravenous ONCE 20 1732      20 1741  vancomycin (VANCOCIN) 1000 mg in dextrose 5% 200 mL PREMIX      1,000 mg  200 mL/hr over 1 Hours Intravenous ONCE 20 1740            Contrast Orders - past 72 hours (72h ago, onward)    None                Plan:  1.  Start vancomycin  1000 mg mg IV x1 (24 mg/kg), followed by 750 q24h (18 mg/kg).   2.  Goal Trough Level: 15-20 mg/L   3.  Pharmacy will check trough levels as appropriate in 1-3 Days.    4. Serum creatinine levels will be ordered daily for the first week of therapy and at least twice weekly for subsequent weeks.    5. [place] method utilized to dose vancomycin therapy: Method 2    Peter Bee, Pharm.D.    
Universal Safety Interventions

## 2022-02-17 PROBLEM — K21.9 GASTROESOPHAGEAL REFLUX DISEASE: Status: ACTIVE | Noted: 2020-01-01
